# Patient Record
Sex: FEMALE | Race: WHITE | NOT HISPANIC OR LATINO | Employment: FULL TIME | URBAN - METROPOLITAN AREA
[De-identification: names, ages, dates, MRNs, and addresses within clinical notes are randomized per-mention and may not be internally consistent; named-entity substitution may affect disease eponyms.]

---

## 2017-04-03 ENCOUNTER — ALLSCRIPTS OFFICE VISIT (OUTPATIENT)
Dept: OTHER | Facility: OTHER | Age: 52
End: 2017-04-03

## 2017-04-03 DIAGNOSIS — Z00.00 ENCOUNTER FOR GENERAL ADULT MEDICAL EXAMINATION WITHOUT ABNORMAL FINDINGS: ICD-10-CM

## 2017-04-03 DIAGNOSIS — K91.2 POSTSURGICAL MALABSORPTION, NOT ELSEWHERE CLASSIFIED: ICD-10-CM

## 2017-04-03 DIAGNOSIS — Z98.84 BARIATRIC SURGERY STATUS: ICD-10-CM

## 2017-04-03 DIAGNOSIS — F41.9 ANXIETY DISORDER: ICD-10-CM

## 2017-04-27 ENCOUNTER — TRANSCRIBE ORDERS (OUTPATIENT)
Dept: LAB | Facility: HOSPITAL | Age: 52
End: 2017-04-27

## 2017-04-27 ENCOUNTER — APPOINTMENT (OUTPATIENT)
Dept: LAB | Facility: HOSPITAL | Age: 52
End: 2017-04-27
Payer: COMMERCIAL

## 2017-04-27 DIAGNOSIS — F41.9 ANXIETY DISORDER: ICD-10-CM

## 2017-04-27 DIAGNOSIS — K91.2 POSTSURGICAL MALABSORPTION, NOT ELSEWHERE CLASSIFIED: ICD-10-CM

## 2017-04-27 DIAGNOSIS — Z98.84 BARIATRIC SURGERY STATUS: ICD-10-CM

## 2017-04-27 DIAGNOSIS — Z00.00 ENCOUNTER FOR GENERAL ADULT MEDICAL EXAMINATION WITHOUT ABNORMAL FINDINGS: ICD-10-CM

## 2017-04-27 LAB
25(OH)D3 SERPL-MCNC: 31.3 NG/ML (ref 30–100)
ALBUMIN SERPL BCP-MCNC: 4 G/DL (ref 3.5–5)
ALP SERPL-CCNC: 57 U/L (ref 46–116)
ALT SERPL W P-5'-P-CCNC: 27 U/L (ref 12–78)
ANION GAP SERPL CALCULATED.3IONS-SCNC: 6 MMOL/L (ref 4–13)
AST SERPL W P-5'-P-CCNC: 20 U/L (ref 5–45)
BILIRUB SERPL-MCNC: 0.39 MG/DL (ref 0.2–1)
BUN SERPL-MCNC: 14 MG/DL (ref 5–25)
CALCIUM SERPL-MCNC: 8.4 MG/DL (ref 8.3–10.1)
CHLORIDE SERPL-SCNC: 105 MMOL/L (ref 100–108)
CHOLEST SERPL-MCNC: 196 MG/DL (ref 50–200)
CO2 SERPL-SCNC: 30 MMOL/L (ref 21–32)
CREAT SERPL-MCNC: 0.67 MG/DL (ref 0.6–1.3)
ERYTHROCYTE [DISTWIDTH] IN BLOOD BY AUTOMATED COUNT: 12.9 % (ref 11.6–15.1)
FERRITIN SERPL-MCNC: 11 NG/ML (ref 8–388)
FOLATE SERPL-MCNC: 16.8 NG/ML (ref 3.1–17.5)
GFR SERPL CREATININE-BSD FRML MDRD: >60 ML/MIN/1.73SQ M
GLUCOSE P FAST SERPL-MCNC: 81 MG/DL (ref 65–99)
HCT VFR BLD AUTO: 38.8 % (ref 34.8–46.1)
HDLC SERPL-MCNC: 80 MG/DL (ref 40–60)
HGB BLD-MCNC: 12.8 G/DL (ref 11.5–15.4)
LDLC SERPL CALC-MCNC: 107 MG/DL (ref 0–100)
MCH RBC QN AUTO: 29.7 PG (ref 26.8–34.3)
MCHC RBC AUTO-ENTMCNC: 33 G/DL (ref 31.4–37.4)
MCV RBC AUTO: 90 FL (ref 82–98)
PLATELET # BLD AUTO: 205 THOUSANDS/UL (ref 149–390)
PMV BLD AUTO: 11 FL (ref 8.9–12.7)
POTASSIUM SERPL-SCNC: 3.8 MMOL/L (ref 3.5–5.3)
PROT SERPL-MCNC: 6.6 G/DL (ref 6.4–8.2)
PTH-INTACT SERPL-MCNC: 105.9 PG/ML (ref 14–72)
RBC # BLD AUTO: 4.31 MILLION/UL (ref 3.81–5.12)
SODIUM SERPL-SCNC: 141 MMOL/L (ref 136–145)
TRIGL SERPL-MCNC: 43 MG/DL
VIT B12 SERPL-MCNC: 391 PG/ML (ref 100–900)
WBC # BLD AUTO: 3.24 THOUSAND/UL (ref 4.31–10.16)

## 2017-04-27 PROCEDURE — 84425 ASSAY OF VITAMIN B-1: CPT

## 2017-04-27 PROCEDURE — 84590 ASSAY OF VITAMIN A: CPT

## 2017-04-27 PROCEDURE — 82306 VITAMIN D 25 HYDROXY: CPT

## 2017-04-27 PROCEDURE — 36415 COLL VENOUS BLD VENIPUNCTURE: CPT

## 2017-04-27 PROCEDURE — 80061 LIPID PANEL: CPT

## 2017-04-27 PROCEDURE — 85027 COMPLETE CBC AUTOMATED: CPT

## 2017-04-27 PROCEDURE — 82607 VITAMIN B-12: CPT

## 2017-04-27 PROCEDURE — 83970 ASSAY OF PARATHORMONE: CPT

## 2017-04-27 PROCEDURE — 80053 COMPREHEN METABOLIC PANEL: CPT

## 2017-04-27 PROCEDURE — 82728 ASSAY OF FERRITIN: CPT

## 2017-04-27 PROCEDURE — 82746 ASSAY OF FOLIC ACID SERUM: CPT

## 2017-05-01 LAB
VIT A SERPL-MCNC: 69 UG/DL (ref 20–65)
VIT B1 BLD-SCNC: 175.5 NMOL/L (ref 66.5–200)

## 2017-05-08 ENCOUNTER — ANESTHESIA EVENT (OUTPATIENT)
Dept: OTHER | Facility: OTHER | Age: 52
End: 2017-05-08

## 2017-05-17 ENCOUNTER — GENERIC CONVERSION - ENCOUNTER (OUTPATIENT)
Dept: OTHER | Facility: OTHER | Age: 52
End: 2017-05-17

## 2017-06-17 DIAGNOSIS — Z98.84 BARIATRIC SURGERY STATUS: ICD-10-CM

## 2017-06-17 DIAGNOSIS — K91.2 POSTSURGICAL MALABSORPTION, NOT ELSEWHERE CLASSIFIED: ICD-10-CM

## 2017-06-17 DIAGNOSIS — E67.0 HYPERVITAMINOSIS A: ICD-10-CM

## 2017-08-17 DIAGNOSIS — E21.1 SECONDARY HYPERPARATHYROIDISM, NOT ELSEWHERE CLASSIFIED (HCC): ICD-10-CM

## 2017-08-17 DIAGNOSIS — E34.9 ENDOCRINE DISORDER: ICD-10-CM

## 2017-08-17 DIAGNOSIS — Z98.84 BARIATRIC SURGERY STATUS: ICD-10-CM

## 2017-08-17 DIAGNOSIS — K91.2 POSTSURGICAL MALABSORPTION, NOT ELSEWHERE CLASSIFIED: ICD-10-CM

## 2018-01-10 NOTE — RESULT NOTES
Dear Rolanda Humphrey,   Your test results have returned and are listed below:      Results  (1) CBC/ PLT (NO DIFF) [id number] 09:50AM Sonny Escalante     Test Name Result Flag Reference   HEMATOCRIT 40 7 %  34 8-46 1   HEMOGLOBIN 13 8 g/dL  11 5-15 4   MCHC 33 9 g/dL  31 4-37 4   MCH 31 0 pg  26 8-34 3   MCV 92 fL  82-98   PLATELET COUNT 239 Thousands/uL  149-390   RBC COUNT 4 45 Million/uL  3 81-5 12   RDW 12 4 %  11 6-15 1   WBC COUNT 3 51 Thousand/uL L 4 31-10 16   MPV 10 6 fL  8 9-12 7     (1) COMPREHENSIVE METABOLIC PANEL [id number] 11:81KA Nam G[facility]mez [facility] recommendations are as follows:  GFR calculation is accurate only with a steady state creatinine  Chronic Kidney disease less than 60 ml/min/1 73 sq  meters  Kidney failure less than 15 ml/min/1 73 sq  meters  Test Name Result Flag Reference   GLUCOSE,RANDM 88 mg/dL     If the patient is fasting, the ADA then defines impaired fasting glucose as > 100 mg/dL and diabetes as > or equal to 123 mg/dL     SODIUM 141 mmol/L  136-145   POTASSIUM 5 2 mmol/L  3 5-5 3   CHLORIDE 104 mmol/L  100-108   CARBON DIOXIDE 30 mmol/L  21-32   ANION GAP (CALC) 7 mmol/L  4-13   BLOOD UREA NITROGEN 10 mg/dL  5-25   CREATININE 0 75 mg/dL  0 60-1 30   Standardized to IDMS reference method   CALCIUM 9 0 mg/dL  8 3-10 1   BILI, TOTAL 0 63 mg/dL  0 20-1 00   ALK PHOSPHATAS 63 U/L     ALT (SGPT) 34 U/L  12-78   AST(SGOT) 26 U/L  5-45   ALBUMIN 4 3 g/dL  3 5-5 0   TOTAL PROTEIN 7 2 g/dL  6 4-8 2   eGFR Non-African American      >60 0 ml/min/1 73sq m     (1) FERRITIN 04Apr2016 09:50AM Sonny Omnisensgis     Test Name Result Flag Reference   FERRITIN 20 ng/mL  8-388     (1) FOLATE [id number] 09:50AM Liban Gómez     Test Name Result Flag Reference   FOLATE >20 0 ng/mL H 3 1-17 5     (1) LIPID PANEL, FASTING 04Apr2016 09:50AM Enriqueta Gómez   Triglyceride:         Normal              <150 mg/dl       Borderline High    150-199 mg/dl       High               200-499 mg/dl       Very High          >499 mg/dl  Cholesterol:         Desirable        <200 mg/dl      Borderline High  200-239 mg/dl      High             >239 mg/dl  HDL Cholesterol:        High    >59 mg/dL      Low     <41 mg/dL     Test Name Result Flag Reference   CHOLESTEROL 222 mg/dL H    HDL,DIRECT 89 mg/dL H 40-60   LDL CHOLESTEROL CALCULATED 119 mg/dL H 0-100   TRIGLYCERIDES 72 mg/dL  <=150   Specimen collection should occur prior to N-Acetylcysteine or Metamizole administration due to the potential for falsely depressed results  (1) PTH N-TERMINAL (INTACT) 04Apr2016 09:50AM Dalia Sailaja Garret     Test Name Result Flag Reference   PARATHYROID HORMONE INTACT 54 2 pg/mL  14 0-72 0     (1) VITAMIN B12 04Apr2016 09:50AM Dalia Sailaja Robbin     Test Name Result Flag Reference   VITAMIN B12 357 pg/mL  100-900     (1) VITAMIN D 25-HYDROXY 04Apr2016 09:50AM Dalia Sailajalam Siegel     Test Name Result Flag Reference   VIT D 25-HYDROX 38 2 ng/mL  30 0-100 0     (1) IRON SATURATION %, TIBC 04Apr2016 09:50AM Dalia Sailaja Garret     Test Name Result Flag Reference   IRON SATURATION 36 %     TOTAL IRON BINDING CAPACITY 429 ug/dL  250-450   IRON 156 ug/dL       (1) VITAMIN A 04Apr2016 09:50AM Sailaja Gómez   Performed at:  [address]  [id number]  : Geremias Townsend MD, Phone:  [phone]     Test Name Result Flag Reference   VITAMIN A 57 ug/dL  18 - 77   **Effective April 11, 2016 the reference interval for**  Vitamin A, Serum will be changing to:    Male     0        - 30 days       Not Established    31 days   -  6 months      7 - 51     7 months - 17 years      16 - 64               >17 years      25 - 80   Female     0        - 30 days       Not Established    31 days   -  6 months      7 - 51     7 months - 60 years      20 - 65               >60 years      32 - 80     (1) VITAMIN B1, WHOLE BLOOD 04Apr2016 09:50AM Gwendolyn Taveras   Performed at:  [address]  [id number]  : Larisa Pagan MD, Phone:  [phone]     Test Name Result Flag Reference   VITAMIN B1, WHOLE BLOOD 144 9 nmol/L  66 5 - 200 0       Discussion/Summary  Your results show some abnormalities  4/4/16 labs reviewed-overall ok except low white blood cell count of 3 51  If you are having frequent illness or infection you should have this checked sooner, otherwise I would review this with your PCP at a routine visit  Your total cholesterol is mildly high at 222 and LDL "bad cholesterol" is mildly high at 119 but your good cholesterol is very good -HDL of 89 which likely balances it out  You can review these levels further with your PCP at a routine visit  If you have any questions, please don't hesitate to call the office  Sincerely,      Signatures   Electronically signed by : RONALD Calderón;  Apr 21 2016  6:03PM EST                       (Author)    Electronically signed by : MICA Horta ; Apr 22 2016 12:50PM EST                       (Validation)

## 2018-01-11 NOTE — PROGRESS NOTES
Assessment    1  Acute cystitis with hematuria (595 0) (N30 01)    Plan  Acute cystitis with hematuria    · Ciprofloxacin HCl - 500 MG Oral Tablet; TAKE 1 TABLET TWICE DAILY    Discussion/Summary    #1 acute cystitis with hematuria: At this time I'll treat the patient with ciprofloxacin as she has been on Bactrim in the past  She'll continue to take in fluids as well  The patient will call or return of her symptoms worsen or do not improve  Chief Complaint  PT c/o urgency, bleeding and burning sensation when urinating  History of Present Illness  HPI: 44 y/o female here with concerns of burning with urination, abdominal cramping, and blood noted in urine since this AM  She states that she has UTIs about once or twice a year  She recently was treated for one in December  She states at that time she was treated with Bactrim  The patient states that she has been feeling pretty good up until today when she began to express discomfort  She states it feels like one of her UTIs  She states that she took Azo earlier today  She was unable to get in to see her primary care physician, and so she came here to be evaluated  She denies any known fevers  Review of Systems    Cardiovascular: no complaints of slow or fast heart rate, no chest pain, no palpitations, no leg claudication or lower extremity edema  Respiratory: no complaints of shortness of breath, no wheezing, no dyspnea on exertion, no orthopnea or PND  Gastrointestinal: no complaints of abdominal pain, no constipation, no nausea or diarrhea, no vomiting, no bloody stools  Genitourinary: dysuria and pelvic pain  Musculoskeletal: no complaints of arthralgia, no myalgia, no joint swelling or stiffness, no limb pain or swelling  Active Problems    1  Anxiety (300 00) (F41 9)   2  Obesity (278 00) (E66 9)   3  Obesity surgery status (V45 86) (Z98 84)   4  Other urinary incontinence (788 39) (N39 498)   5   Postgastrectomy malabsorption (579 3) (K91 2)   6  Secondary hyperparathyroidism, non-renal (252 02) (E21 1)   7  Spinal stenosis (724 00) (M48 00)    Social History    · Alcohol use (V49 89) (F10 99)   · Denied: History of Drug Use   · Never A Smoker    Current Meds   1  Biotin TABS; Therapy: (Recorded:82Szd4177) to Recorded   2  Calcium Citrate TABS; tid; Therapy: (Recorded:16Sep2013) to Recorded   3  Multiple Vitamin TABS; TAKE 1 TABLET DAILY  Opurity; Therapy: (Recorded:16Sep2013) to Recorded   4  Vitamin D 2000 UNIT Oral Capsule; Therapy: (Recorded:23Feb2015) to Recorded    Allergies    1  No Known Drug Allergies    Vitals   Recorded: [id number] 03:10PM   Heart Rate 72    Systolic 381    Diastolic 74    Patient Refused Height Yes Yes   Patient Refused Weight Yes Yes   O2 Saturation 96      Physical Exam    Constitutional   General appearance: No acute distress, well appearing and well nourished  Pulmonary   Respiratory effort: No increased work of breathing or signs of respiratory distress  Auscultation of lungs: Clear to auscultation  Cardiovascular   Auscultation of heart: Normal rate and rhythm, normal S1 and S2, without murmurs  Abdomen   Abdomen: Non-tender, no masses  Liver and spleen: No hepatomegaly or splenomegaly      Psychiatric   Orientation to person, place, and time: Normal     Mood and affect: Normal          Future Appointments    Date/Time Provider Specialty Site   02/23/2016 03:30 PM Godfrey Nunes [facility] General Surgery [address]     Signatures   Electronically signed by : MICA Dunne ; Feb 1 2016  3:24PM EST                       (Author)

## 2018-01-12 VITALS
WEIGHT: 147.5 LBS | SYSTOLIC BLOOD PRESSURE: 112 MMHG | BODY MASS INDEX: 24.57 KG/M2 | TEMPERATURE: 98.7 F | HEIGHT: 65 IN | DIASTOLIC BLOOD PRESSURE: 64 MMHG | HEART RATE: 70 BPM

## 2018-01-14 NOTE — PROGRESS NOTES
Message  as per request from CR, a call was placed to patient today  Patient unable to speak and will return call  Active Problems    1  Acute cystitis with hematuria (595 0) (N30 01)   2  Anxiety (300 00) (F41 9)   3  Obesity (278 00) (E66 9)   4  Obesity surgery status (V45 86) (Z98 84)   5  Other urinary incontinence (788 39) (N39 498)   6  Postgastrectomy malabsorption (579 3) (K91 2,Z90 3)   7  Secondary hyperparathyroidism, non-renal (252 02) (E21 1)   8  Spinal stenosis (724 00) (M48 00)    Current Meds   1  Biotin TABS; Therapy: (Recorded:02Mkb0405) to Recorded   2  Calcium Citrate TABS; tid; Therapy: (Recorded:42Jaa8891) to Recorded   3  Multiple Vitamin TABS; TAKE 1 TABLET DAILY  Opurity; Therapy: (Recorded:06Qpb9893) to Recorded   4  Pristiq 25 MG Oral Tablet Extended Release 24 Hour; Therapy: (Recorded:31Mar2016) to Recorded   5  Vitamin D 2000 UNIT Oral Capsule; Therapy: (Recorded:04Tlv9022) to Recorded    Allergies    1  No Known Drug Allergies    Signatures   Electronically signed by :  Ace Newman, ALESIAMSWMSGEOFF,RUDYW; Apr 11 2016  2:05PM EST                       (Author)

## 2018-01-17 NOTE — RESULT NOTES
Dear Celia Cedeno,   Your test results have returned and are listed below:      Plan  Elevated parathyroid hormone, Postgastrectomy malabsorption,  Secondary hyperparathyroidism, non-renal    · (1) PTH N-TERMINAL (INTACT); Status:Active - Retrospective By  Protocol Authorization; Requested for:2017;   High vitamin A level, Obesity surgery status, Postgastrectomy  malabsorption    · (1) VITAMIN A; Status:Active - Retrospective By Protocol  Authorization; Requested [id number];   Obesity surgery status, Postgastrectomy malabsorption    · (1) IRON SATURATION %, TIBC; Status:Active - Retrospective By  Protocol Authorization; Requested for:01Eyn0680;    · (1) IRON; Status:Active - Retrospective By Protocol Authorization; Requested for:02Ihh7022;   Postgastrectomy malabsorption    · (1) FERRITIN; Status:Active - Retrospective By Protocol Authorization; Requested for:2017;     Discussion/Summary  Your results show some abnormalities  Your parathyroid hormone (PTH) is elevated, which can indicate that you are losing calcium from your bones  Recommend that you take additional vitamin D3 plus 1200 to 1500 mg of calcium per day  Calcium needs to be taken in divided doses of 500 to 600 mg each  A lab slip is enclosed to recheck your levels again in three months  Your vitamin A level is elevated, which can lead to toxicity  Symptoms of having a high vitamin A include: nausea, vomiting, blurred vision, headache and vertigo  If you are experiencing these symptoms, please make an appointment to see your primary care provider  Discontinue all vitamin/mineral supplements that contain vitamin A and liver consumption  A lab slip is enclosed to recheck your level again in one month  Your iron stores are low which can lead to anemia  Recommend that you take a high potency iron supplement (65 mg of elemental iron) once per day for two weeks, then increase to twice per day as tolerated    Iron is better absorbed with a source of vitamin C  Calcium, coffee, and tea decrease absorption of iron and should be spaced 2 hours apart from iron supplements  If you are on a proton pump inhibitor medication (such as omeprazole, lansoprazole, esomeprazole) these should be taken separately from iron supplements as they also decrease absorption of iron  Enclosed is a lab slip to recheck your iron levels again after 3 months of supplementation  Your vitamin B12 level is low, which can affect your nerve health  Recommend that you take 500 mcg of vitamin B12 sublingually (under the tongue) per day  Your White Blood Cells are low, which can affect your immune function  Please follow up with your primary care physician for further evaluation and ensure that you are eating at least 60-70 grams of protein per day and adequate total calories  Your next office appointment is scheduled for April 3, 2018 at 3:30pm       If you have any questions, please don't hesitate to call the office     Sincerely,      Signatures   Electronically signed by : ALDO Peralta RD; May 17 2017 11:41AM EST                       (Author)    Electronically signed by : MICA Rodriguez ; May 19 2017 12:42PM EST                       (Validation)

## 2018-03-26 ENCOUNTER — OFFICE VISIT (OUTPATIENT)
Dept: OBGYN CLINIC | Facility: CLINIC | Age: 53
End: 2018-03-26
Payer: COMMERCIAL

## 2018-03-26 VITALS
DIASTOLIC BLOOD PRESSURE: 72 MMHG | SYSTOLIC BLOOD PRESSURE: 102 MMHG | BODY MASS INDEX: 24.29 KG/M2 | HEIGHT: 65 IN | WEIGHT: 145.8 LBS

## 2018-03-26 DIAGNOSIS — R92.2 DENSE BREAST TISSUE: ICD-10-CM

## 2018-03-26 DIAGNOSIS — N95.2 POSTMENOPAUSAL ATROPHIC VAGINITIS: ICD-10-CM

## 2018-03-26 DIAGNOSIS — Z01.419 WELL WOMAN EXAM WITH ROUTINE GYNECOLOGICAL EXAM: ICD-10-CM

## 2018-03-26 DIAGNOSIS — Z12.39 SCREENING FOR MALIGNANT NEOPLASM OF BREAST: Primary | ICD-10-CM

## 2018-03-26 PROBLEM — R79.89 ELEVATED PARATHYROID HORMONE: Status: ACTIVE | Noted: 2017-05-17

## 2018-03-26 PROBLEM — E34.9 ELEVATED PARATHYROID HORMONE: Status: ACTIVE | Noted: 2017-05-17

## 2018-03-26 PROCEDURE — G0145 SCR C/V CYTO,THINLAYER,RESCR: HCPCS | Performed by: OBSTETRICS & GYNECOLOGY

## 2018-03-26 PROCEDURE — 87624 HPV HI-RISK TYP POOLED RSLT: CPT | Performed by: OBSTETRICS & GYNECOLOGY

## 2018-03-26 PROCEDURE — S0610 ANNUAL GYNECOLOGICAL EXAMINA: HCPCS | Performed by: OBSTETRICS & GYNECOLOGY

## 2018-03-26 RX ORDER — MULTIVIT-MIN/IRON/FOLIC ACID/K 18-600-40
CAPSULE ORAL
COMMUNITY
End: 2021-11-23

## 2018-03-26 RX ORDER — FERROUS SULFATE 325(65) MG
325 TABLET ORAL
COMMUNITY
End: 2021-11-23

## 2018-03-26 RX ORDER — DESVENLAFAXINE 25 MG/1
TABLET, EXTENDED RELEASE ORAL
COMMUNITY
End: 2018-06-04 | Stop reason: ALTCHOICE

## 2018-03-26 RX ORDER — CALCIUM CITRATE 1040MG
TABLET ORAL 3 TIMES DAILY
COMMUNITY

## 2018-03-26 NOTE — PROGRESS NOTES
ASSESSMENT & PLAN: Jennifer Head is a 47 y o  [id number] with normal gynecologic exam     1   Routine well woman exam done today  2    Pap and HPV:Pap with HPV was done today  Current ASCCP Guidelines reviewed  Last Pap:  2013 :  no abnormalities  3  Mammogram ordered  Recommend yearly mammography  4   Colonoscopy recommend  5  The patient is sexually active  6  The following were reviewed in today's visit: breast self exam, mammography screening ordered, menopause, exercise and healthy diet  7  Patient to return to office in 12 months for annual yearly  8  Postmenopausal atrophic vaginitis -samples of Premarin cream given to patient  Use twice weekly  Call if interested in prescription  All questions have been answered to her satisfaction  CC:  Annual Gynecologic Examination    HPI: Jennifer Head is a 47 y o  [id number] who presents for annual gynecologic examination  She has the following concerns: vaginal dryness  Health Maintenance:    She exercises 3 days per week  She wears her seatbelt routinely  She does perform regular monthly self breast exams  She feels safe at home  Patients does follow a healthy diet  Last mammogram:   Last colonoscopy:        Past Medical History:   Diagnosis Date    Abnormal Pap smear of cervix     HPV (human papilloma virus) infection        Past Surgical History:   Procedure Laterality Date    COLPOSCOPY      COMBINED AUGMENTATION MAMMAPLASTY AND ABDOMINOPLASTY      GASTRIC BYPASS      TUBAL LIGATION         Past OB/Gyn History:   No LMP recorded  Patient is postmenopausal   Menstrual History:  OB History      Para Term  AB Living    2 2 2     2    SAB TAB Ectopic Multiple Live Births            2           No LMP recorded  Patient is postmenopausal        Menstrual history: Patient is not post menopausal    History of sexually transmitted infection No  Patient is currently sexually active    heterosexual Birth control: postmenopausal  Last Pap 2013 :  no abnormalities  H/o abnormal in past, but after treatment paps normalized  Family History   Problem Relation Age of Onset    Hypertension Mother     Hypertension Father        Social History:  Social History     Social History    Marital status: /Civil Union     Spouse name: N/A    Number of children: N/A    Years of education: N/A     Occupational History    Not on file  Social History Main Topics    Smoking status: Never Smoker    Smokeless tobacco: Never Used    Alcohol use No    Drug use: No    Sexual activity: Yes     Birth control/ protection: Female Sterilization     Other Topics Concern    Not on file     Social History Narrative    No narrative on file     Presently lives with  & son  Patient is   Patient is currently employed - [employer]  No Known Allergies    Current Outpatient Prescriptions:     Calcium Citrate 1040 MG TABS, Take by mouth 3 (three) times a day, Disp: , Rfl:     Cholecalciferol (VITAMIN D) 2000 units CAPS, Take by mouth, Disp: , Rfl:     Desvenlafaxine Succinate ER (PRISTIQ) 25 MG TB24, Take by mouth, Disp: , Rfl:     ferrous sulfate 325 (65 Fe) mg tablet, Take 325 mg by mouth daily with breakfast, Disp: , Rfl:     Review of Systems:  A complete review of systems was performed and was negative, except as listed  Denies weight changes, excessive fatigue, urinary incontinence, urinary frequency, constipation or bowel changes, blood in stool, severe headaches, vaginal bleeding, vaginal discharge, vaginal dryness  Physical Exam:  /72   Ht 5' 4 5" (1 638 m)   Wt 66 1 kg (145 lb 12 8 oz)   Breastfeeding? No   BMI 24 64 kg/m²    GEN: The patient was alert and oriented x3, pleasant well-appearing female in no acute distress     HEENT:  Unremarkable, no anterior or posterior lymphadenopathy, no thyromegaly  CV:  RRR, no murmurs  RESP:  Clear to auscultation bilaterally  BREAST:  Symmetric breasts with no palpable breast masses or obvious breast lesions  She has no retractions or nipple discharge  She has no axillary abnormalities or palpable masses  Self breast exam is taught  ABD:  Soft, nontender, non-distended  EXT: nontender, no edema  PELVIC:  Normal appearing external female genitalia, atrophic vaginal epithelium, No discharge  Cervix present   Bimanual: absent CMT,  normal uterus, non-tender  No palpable adnexal masses  Pap was collected  Some spotting with pap

## 2018-03-29 LAB
HPV RRNA GENITAL QL NAA+PROBE: NORMAL
LAB AP GYN PRIMARY INTERPRETATION: NORMAL
Lab: NORMAL

## 2018-04-03 ENCOUNTER — ANESTHESIA (OUTPATIENT)
Dept: OTHER | Facility: OTHER | Age: 53
End: 2018-04-03

## 2018-05-03 ENCOUNTER — HOSPITAL ENCOUNTER (OUTPATIENT)
Dept: RADIOLOGY | Facility: HOSPITAL | Age: 53
Discharge: HOME | End: 2018-05-03
Attending: OBSTETRICS & GYNECOLOGY
Payer: COMMERCIAL

## 2018-05-03 DIAGNOSIS — R92.2 DENSE BREAST TISSUE: ICD-10-CM

## 2018-05-03 DIAGNOSIS — Z12.39 SCREENING FOR MALIGNANT NEOPLASM OF BREAST: ICD-10-CM

## 2018-05-03 PROCEDURE — 77063 BREAST TOMOSYNTHESIS BI: CPT

## 2018-05-03 PROCEDURE — 77067 SCR MAMMO BI INCL CAD: CPT

## 2018-05-22 ENCOUNTER — PATIENT MESSAGE (OUTPATIENT)
Dept: BARIATRICS | Facility: CLINIC | Age: 53
End: 2018-05-22

## 2018-06-04 ENCOUNTER — OFFICE VISIT (OUTPATIENT)
Dept: BARIATRICS | Facility: CLINIC | Age: 53
End: 2018-06-04
Payer: COMMERCIAL

## 2018-06-04 VITALS
TEMPERATURE: 98.3 F | HEIGHT: 65 IN | DIASTOLIC BLOOD PRESSURE: 80 MMHG | SYSTOLIC BLOOD PRESSURE: 98 MMHG | HEART RATE: 61 BPM | BODY MASS INDEX: 24.74 KG/M2 | WEIGHT: 148.5 LBS

## 2018-06-04 DIAGNOSIS — E21.1 SECONDARY NON-RENAL HYPERPARATHYROIDISM (HCC): ICD-10-CM

## 2018-06-04 DIAGNOSIS — K91.2 POSTSURGICAL MALABSORPTION: ICD-10-CM

## 2018-06-04 DIAGNOSIS — E53.8 LOW VITAMIN B12 LEVEL: ICD-10-CM

## 2018-06-04 DIAGNOSIS — Z98.84 BARIATRIC SURGERY STATUS: Primary | ICD-10-CM

## 2018-06-04 DIAGNOSIS — E78.5 MILD HYPERLIPIDEMIA: ICD-10-CM

## 2018-06-04 DIAGNOSIS — E34.9 ELEVATED PARATHYROID HORMONE: ICD-10-CM

## 2018-06-04 PROBLEM — R79.89 LOW VITAMIN B12 LEVEL: Status: ACTIVE | Noted: 2018-06-04

## 2018-06-04 PROCEDURE — 99214 OFFICE O/P EST MOD 30 MIN: CPT | Performed by: PHYSICIAN ASSISTANT

## 2018-06-04 RX ORDER — ALPRAZOLAM 0.25 MG/1
TABLET ORAL
COMMUNITY
Start: 2018-04-19 | End: 2018-12-04

## 2018-06-04 NOTE — PROGRESS NOTES
Assessment/Plan:    Elevated parathyroid hormone  She is overdue to have repeat labs-was given lab slip at last visit  Encouraged to get done now-if still high would recommend referral to endocrinologist    Low vitamin B12 level  Based on older labs-she is overdue for all her labs now-ordering same-she is switching to [facility] which has extra vitamin D31-kkuh await labs for further advice    Bariatric surgery status  -s/p Tri-En-Y Gastric Bypass with Dr Mary Kate Wild on 2/14/2013  Overall doing Well-weight is stable    Initial: 214 lb  Current: 148 5 lb  EWL: 97% which is above average  Eusebio: 138 5 lb  Current BMI is Body mass index is 25 1 kg/m²      Tolerating a regular diet-yes  Eating at least 60 grams of protein per day-yes  Following 30/60 minute rule with liquids-yes  Drinking at least 64 ounces of fluid per day-yes  Drinking carbonated beverages-occasional tonic and lime  Sufficient exercise-yes  Using NSAIDs regularly-occasional use with back pain-advised to take rarely-only with food and take ppi when she needs it-but to ideally avoid regular use because of risk for ulcer  Using nicotine-no  Using alcohol-socially-one /month but working on decreasing further-advised on effect again and advised to abstain      Postsurgical malabsorption  -At risk for malabsorption of vitamins/minerals secondary to malabsorption and restriction of intake from their procedure  -Currently taking adequate postop bariatric surgery vitamin supplementation-no  -Last set of bariatric labs completed on 4/17 and are not within acceptable limits   -Next set of bariatric labs ordered for approximately 1 month    She is taking 3-500 mg calcium , b-complex, high potency 65 mg iron twice daily-and vitamin D 5000 Iu, biotin, she has not been taking a mvi because of high vitamin A-advised her this was only supposed to be for one month    She will trial switch to [employer] MVI with 45 mg of iron, 500 mg calcium citrate with D three times per day-and continue one high potency 65 mg of iron    Vitamin a level was high in the past but lab values have changed, so I suspect her level will be ok on bariatric supplements-will await results for further advice       Diagnoses and all orders for this visit:    Bariatric surgery status  -     Comprehensive metabolic panel; Future  -     CBC and Platelet; Future  -     Vitamin B12; Future  -     Vitamin A; Future  -     PTH, intact; Future  -     Vitamin B1, whole blood; Future  -     Vitamin D 25 hydroxy; Future  -     Ferritin; Future  -     Iron Saturation %; Future  -     Zinc; Future  -     Copper Level; Future  -     Folate; Future  -     Lipid panel; Future    Postsurgical malabsorption  -     Comprehensive metabolic panel; Future  -     CBC and Platelet; Future  -     Vitamin B12; Future  -     Vitamin A; Future  -     PTH, intact; Future  -     Vitamin B1, whole blood; Future  -     Vitamin D 25 hydroxy; Future  -     Ferritin; Future  -     Iron Saturation %; Future  -     Zinc; Future  -     Copper Level; Future  -     Folate; Future  -     Lipid panel; Future    Low vitamin B12 level  -     Comprehensive metabolic panel; Future  -     CBC and Platelet; Future  -     Vitamin B12; Future  -     Vitamin A; Future  -     PTH, intact; Future  -     Vitamin B1, whole blood; Future  -     Vitamin D 25 hydroxy; Future  -     Ferritin; Future  -     Iron Saturation %; Future  -     Zinc; Future  -     Copper Level; Future  -     Folate; Future  -     Lipid panel; Future    Elevated parathyroid hormone  -     PTH, intact; Future    Secondary non-renal hyperparathyroidism (HCC)    Mild hyperlipidemia  -     Lipid panel; Future    Other orders  -     ALPRAZolam (XANAX) 0 25 mg tablet;           Subjective:      Patient ID: Daisy Wadsworth is a 47 y o  female  She is tolerating a regular diet   She is taking vitamins and exercising fairly well-which got limited because of back pain but is increasing as tolerated        The following portions of the patient's history were reviewed and updated as appropriate: allergies, current medications, past family history, past medical history, past social history, past surgical history and problem list     Review of Systems   Constitutional: Negative for chills and fever  Unexpected weight change: planned weight loss  Respiratory: Negative for shortness of breath and wheezing  Cardiovascular: Negative for chest pain and palpitations  Gastrointestinal: Negative for abdominal pain, constipation, diarrhea, nausea and vomiting  Psychiatric/Behavioral: Suicidal ideas: no complait of anxiety or depression  Objective:      BP 98/80   Pulse 61   Temp 98 3 °F (36 8 °C)   Ht 5' 4 5" (1 638 m)   Wt 67 4 kg (148 lb 8 oz)   BMI 25 10 kg/m²          Physical Exam   Constitutional: She is oriented to person, place, and time  She appears well-developed and well-nourished  HENT:   Mouth/Throat: Oropharynx is clear and moist    Eyes: Conjunctivae are normal  No scleral icterus  Cardiovascular: Normal rate and regular rhythm  Pulmonary/Chest: Effort normal and breath sounds normal    Abdominal: Soft  There is no tenderness  No incisional hernias appreciated   Musculoskeletal:   Normal gait   Neurological: She is alert and oriented to person, place, and time  Psychiatric: She has a normal mood and affect  Her behavior is normal  Judgment and thought content normal    Nursing note and vitals reviewed  GOALS: Maintain  weight loss with good nutrition intakes    Normal vitamin and mineral levels  Exercise as tolerated    BARRIERS: none identified

## 2018-06-04 NOTE — ASSESSMENT & PLAN NOTE
Based on older labs-she is overdue for all her labs now-ordering same-she is switching to [facility] which has extra vitamin J09-ndam await labs for further advice

## 2018-06-04 NOTE — ASSESSMENT & PLAN NOTE
She is overdue to have repeat labs-was given lab slip at last visit   Encouraged to get done now-if still high would recommend referral to endocrinologist

## 2018-06-04 NOTE — ASSESSMENT & PLAN NOTE
-At risk for malabsorption of vitamins/minerals secondary to malabsorption and restriction of intake from their procedure  -Currently taking adequate postop bariatric surgery vitamin supplementation-no  -Last set of bariatric labs completed on 4/17 and are not within acceptable limits   -Next set of bariatric labs ordered for approximately 1 month    She is taking 3-500 mg calcium , b-complex, high potency 65 mg iron twice daily-and vitamin D 5000 Iu, biotin, she has not been taking a mvi because of high vitamin A-advised her this was only supposed to be for one month    She will trial switch to [employer] MVI with 45 mg of iron, 500 mg calcium citrate with D three times per day-and continue one high potency 65 mg of iron    Vitamin a level was high in the past but lab values have changed, so I suspect her level will be ok on bariatric supplements-will await results for further advice

## 2018-06-04 NOTE — ASSESSMENT & PLAN NOTE
-s/p Tri-En-Y Gastric Bypass with Dr Song Pandey on 2/14/2013  Overall doing Well-weight is stable    Initial: 214 lb  Current: 148 5 lb  EWL: 97% which is above average  Eusebio: 138 5 lb  Current BMI is Body mass index is 25 1 kg/m²      Tolerating a regular diet-yes  Eating at least 60 grams of protein per day-yes  Following 30/60 minute rule with liquids-yes  Drinking at least 64 ounces of fluid per day-yes  Drinking carbonated beverages-occasional tonic and lime  Sufficient exercise-yes  Using NSAIDs regularly-occasional use with back pain-advised to take rarely-only with food and take ppi when she needs it-but to ideally avoid regular use because of risk for ulcer  Using nicotine-no  Using alcohol-socially-one /month but working on decreasing further-advised on effect again and advised to abstain

## 2018-06-04 NOTE — PATIENT INSTRUCTIONS
Follow-up in one year  We kindly ask that you arrive 15 minutes before your scheduled appointment time with your provider to allow you to be roomed, have your vital signs checked and your chart updated by our staff  We thank you for your patience at your visit  Follow diet as discussed  Follow  vitamin and mineral recommendations as reviewed with you  Exercise as tolerated    If you have gotten a lab slip at this visit, please note that most labs are FASTING-but you need to drink water the night before and the morning before your labs are done  It is HIGHLY RECOMMENDED that you check with your insurance to make sure all the labs ordered are covered by your individual insurance policy  This is especially important if you also get labs done by other providers outside of [facility]  You want to avoid having duplicate labs done  Note you will be given a lab slip AFTER  your annual visit next year to check your vitamin and mineral levels  You will not need to make a second appointment after the labs are received/reviewed  You will receive a letter/and or phone call with your results  Most labs do NOT honor a lab slip dated one year in advance now  Call our office if he have any problems with abdominal pain especially if associated with fever, chills, nausea, vomiting or any other concerns  All  Post-bariatric surgery patients should be aware that very small quantities of any alcohol  can cause impairment and it is very possible not to feel the effect  The effect can be in the system for several hours  It is also a stomach irritant  It is advised to AVOID alcohol, Nonsteroidal antiinflammatory drugs (NSAIDS) and nicotine of all forms   Any of these can cause stomach irritation/pain  Get labs done fasting but drink water the night before and morning before labs are drawn

## 2018-07-12 ENCOUNTER — APPOINTMENT (OUTPATIENT)
Dept: LAB | Facility: HOSPITAL | Age: 53
End: 2018-07-12
Payer: COMMERCIAL

## 2018-07-12 ENCOUNTER — TRANSCRIBE ORDERS (OUTPATIENT)
Dept: ADMINISTRATIVE | Facility: HOSPITAL | Age: 53
End: 2018-07-12

## 2018-07-12 DIAGNOSIS — Z98.84 BARIATRIC SURGERY STATUS: ICD-10-CM

## 2018-07-12 DIAGNOSIS — E53.8 LOW VITAMIN B12 LEVEL: ICD-10-CM

## 2018-07-12 DIAGNOSIS — E78.5 MILD HYPERLIPIDEMIA: ICD-10-CM

## 2018-07-12 DIAGNOSIS — K91.2 POSTSURGICAL MALABSORPTION: ICD-10-CM

## 2018-07-12 DIAGNOSIS — E34.9 ELEVATED PARATHYROID HORMONE: ICD-10-CM

## 2018-07-12 LAB
25(OH)D3 SERPL-MCNC: 38.3 NG/ML (ref 30–100)
ALBUMIN SERPL BCP-MCNC: 4.2 G/DL (ref 3.5–5)
ALP SERPL-CCNC: 55 U/L (ref 46–116)
ALT SERPL W P-5'-P-CCNC: 43 U/L (ref 12–78)
ANION GAP SERPL CALCULATED.3IONS-SCNC: 8 MMOL/L (ref 4–13)
AST SERPL W P-5'-P-CCNC: 28 U/L (ref 5–45)
BILIRUB SERPL-MCNC: 0.6 MG/DL (ref 0.2–1)
BUN SERPL-MCNC: 15 MG/DL (ref 5–25)
CALCIUM SERPL-MCNC: 9.1 MG/DL (ref 8.3–10.1)
CHLORIDE SERPL-SCNC: 102 MMOL/L (ref 100–108)
CHOLEST SERPL-MCNC: 211 MG/DL (ref 50–200)
CO2 SERPL-SCNC: 31 MMOL/L (ref 21–32)
CREAT SERPL-MCNC: 0.75 MG/DL (ref 0.6–1.3)
ERYTHROCYTE [DISTWIDTH] IN BLOOD BY AUTOMATED COUNT: 12 % (ref 11.6–15.1)
FERRITIN SERPL-MCNC: 25 NG/ML (ref 8–388)
FOLATE SERPL-MCNC: >20 NG/ML (ref 3.1–17.5)
GFR SERPL CREATININE-BSD FRML MDRD: 91 ML/MIN/1.73SQ M
GLUCOSE P FAST SERPL-MCNC: 87 MG/DL (ref 65–99)
HCT VFR BLD AUTO: 43.3 % (ref 34.8–46.1)
HDLC SERPL-MCNC: 86 MG/DL (ref 40–60)
HGB BLD-MCNC: 13.9 G/DL (ref 11.5–15.4)
IRON SATN MFR SERPL: 27 %
IRON SERPL-MCNC: 110 UG/DL (ref 50–170)
LDLC SERPL CALC-MCNC: 111 MG/DL (ref 0–100)
MCH RBC QN AUTO: 30.7 PG (ref 26.8–34.3)
MCHC RBC AUTO-ENTMCNC: 32.1 G/DL (ref 31.4–37.4)
MCV RBC AUTO: 96 FL (ref 82–98)
NONHDLC SERPL-MCNC: 125 MG/DL
PLATELET # BLD AUTO: 229 THOUSANDS/UL (ref 149–390)
PMV BLD AUTO: 10.4 FL (ref 8.9–12.7)
POTASSIUM SERPL-SCNC: 4.3 MMOL/L (ref 3.5–5.3)
PROT SERPL-MCNC: 7.2 G/DL (ref 6.4–8.2)
PTH-INTACT SERPL-MCNC: 61.1 PG/ML (ref 18.4–80.1)
RBC # BLD AUTO: 4.53 MILLION/UL (ref 3.81–5.12)
SODIUM SERPL-SCNC: 141 MMOL/L (ref 136–145)
TIBC SERPL-MCNC: 406 UG/DL (ref 250–450)
TRIGL SERPL-MCNC: 70 MG/DL
VIT B12 SERPL-MCNC: 1575 PG/ML (ref 100–900)
WBC # BLD AUTO: 3.6 THOUSAND/UL (ref 4.31–10.16)

## 2018-07-12 PROCEDURE — 85027 COMPLETE CBC AUTOMATED: CPT

## 2018-07-12 PROCEDURE — 80061 LIPID PANEL: CPT

## 2018-07-12 PROCEDURE — 82306 VITAMIN D 25 HYDROXY: CPT

## 2018-07-12 PROCEDURE — 80053 COMPREHEN METABOLIC PANEL: CPT

## 2018-07-12 PROCEDURE — 36415 COLL VENOUS BLD VENIPUNCTURE: CPT

## 2018-07-12 PROCEDURE — 84425 ASSAY OF VITAMIN B-1: CPT

## 2018-07-12 PROCEDURE — 82607 VITAMIN B-12: CPT

## 2018-07-12 PROCEDURE — 84590 ASSAY OF VITAMIN A: CPT

## 2018-07-12 PROCEDURE — 82525 ASSAY OF COPPER: CPT

## 2018-07-12 PROCEDURE — 83970 ASSAY OF PARATHORMONE: CPT

## 2018-07-12 PROCEDURE — 82746 ASSAY OF FOLIC ACID SERUM: CPT

## 2018-07-12 PROCEDURE — 84630 ASSAY OF ZINC: CPT

## 2018-07-12 PROCEDURE — 83540 ASSAY OF IRON: CPT

## 2018-07-12 PROCEDURE — 83550 IRON BINDING TEST: CPT

## 2018-07-12 PROCEDURE — 82728 ASSAY OF FERRITIN: CPT

## 2018-07-14 LAB
COPPER SERPL-MCNC: 91 UG/DL (ref 72–166)
ZINC SERPL-MCNC: 98 UG/DL (ref 56–134)

## 2018-07-18 LAB
VIT A SERPL-MCNC: 41.6 UG/DL (ref 33.1–100)
VIT B1 BLD-SCNC: 177.6 NMOL/L (ref 66.5–200)

## 2018-12-04 ENCOUNTER — APPOINTMENT (OUTPATIENT)
Dept: RADIOLOGY | Facility: CLINIC | Age: 53
End: 2018-12-04
Payer: COMMERCIAL

## 2018-12-04 ENCOUNTER — OFFICE VISIT (OUTPATIENT)
Dept: OBGYN CLINIC | Facility: CLINIC | Age: 53
End: 2018-12-04
Payer: COMMERCIAL

## 2018-12-04 VITALS — HEIGHT: 65 IN | WEIGHT: 145.4 LBS | BODY MASS INDEX: 24.22 KG/M2

## 2018-12-04 DIAGNOSIS — M16.11 PRIMARY OSTEOARTHRITIS OF RIGHT HIP: Primary | ICD-10-CM

## 2018-12-04 DIAGNOSIS — M25.551 RIGHT HIP PAIN: ICD-10-CM

## 2018-12-04 DIAGNOSIS — M76.31 IT BAND SYNDROME, RIGHT: ICD-10-CM

## 2018-12-04 PROCEDURE — 99203 OFFICE O/P NEW LOW 30 MIN: CPT | Performed by: ORTHOPAEDIC SURGERY

## 2018-12-04 PROCEDURE — 73502 X-RAY EXAM HIP UNI 2-3 VIEWS: CPT

## 2018-12-04 NOTE — PROGRESS NOTES
Assessment/Plan:  1  Primary osteoarthritis of right hip  Ambulatory referral to Physical Therapy   2  It band syndrome, right  Ambulatory referral to Physical Therapy   3  Right hip pain  XR hip/pelv 2-3 vws right if performed       Scribe Attestation    I,:   Rangel Gorman am acting as a scribe while in the presence of the attending physician :        I,:   Shen Torres MD personally performed the services described in this documentation    as scribed in my presence :          Jael's physical examination today demonstrates signs and symptoms consistent with mild arthritis of her right hip as well as IT band syndrome  We did review her x-rays in the office today I explained that she does have mild arthritis in her right hip  I believe that this time she will do well with conservative management for her arthritis  I also explained to her that her right IT band is extremely taut and is likely limiting her internal rotation  This explains the lateral hip pain that radiates down her leg  I would like her to attend formal physical therapy 2-3 times per week for the next six weeks  I provided her with a prescription for this today in the office  I will see her back in six weeks for repeat clinical evaluation  She has not seen improvement in her symptoms at that time, we will consider an MRI to evaluate the soft tissues of the hip  Subjective: Cherie Maddox is a 46 y o  female who presents today for evaluation of right hip and groin pain  She has experiences pain for almost a year  She denies any acute inciting incident but describes worsening pain about the lateral aspect of her right hip and groin  She states that her pain is made worse with going up and down stairs  At today's visit, she describes intermittent and moderate sharp pain about her groin and a constant dull ache about the lateral aspect of her hip that radiates down her upper leg and to her knee  She denies any mechanical symptoms   She denies any distal paresthesias of the lower extremity  Review of Systems   Constitutional: Negative for chills, fever and unexpected weight change  HENT: Negative for hearing loss, nosebleeds and sore throat  Eyes: Negative for pain, redness and visual disturbance  Respiratory: Negative for cough, shortness of breath and wheezing  Cardiovascular: Negative for chest pain, palpitations and leg swelling  Gastrointestinal: Negative for abdominal pain, nausea and vomiting  Endocrine: Negative for polydipsia and polyuria  Genitourinary: Negative for dysuria and hematuria  Musculoskeletal: Positive for arthralgias and myalgias  Negative for joint swelling  See HPI   Skin: Negative for rash and wound  Neurological: Negative for dizziness, numbness and headaches  Psychiatric/Behavioral: Negative for decreased concentration and suicidal ideas  The patient is not nervous/anxious  Past Medical History:   Diagnosis Date    Abnormal Pap smear of cervix 1987    Anxiety     High vitamin A level     History of anxiety     History of hypercholesterolemia     History of hypertension     History of obesity     HPV (human papilloma virus) infection 1987    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    Secondary hyperparathyroidism, non-renal (HCC)     Spinal stenosis        Past Surgical History:   Procedure Laterality Date    ABDOMINOPLASTY      COLPOSCOPY  1987    COMBINED AUGMENTATION MAMMAPLASTY AND ABDOMINOPLASTY  2001    GASTRIC BYPASS      TUBAL LIGATION         Family History   Problem Relation Age of Onset    Hypertension Mother     Heart disease Mother     Hypertension Father     Heart disease Father        Social History     Occupational History    Not on file       Social History Main Topics    Smoking status: Never Smoker    Smokeless tobacco: Never Used    Alcohol use No    Drug use: No    Sexual activity: Yes     Birth control/ protection: Female Sterilization Current Outpatient Prescriptions:     Calcium Citrate 1040 MG TABS, Take by mouth 3 (three) times a day, Disp: , Rfl:     Cholecalciferol (VITAMIN D) 2000 units CAPS, Take by mouth, Disp: , Rfl:     ferrous sulfate 325 (65 Fe) mg tablet, Take 325 mg by mouth daily with breakfast, Disp: , Rfl:     No Known Allergies    Objective: There were no vitals filed for this visit  Right Hip Exam     Tenderness   The patient is experiencing no tenderness  Range of Motion   Internal Rotation: 10 (pain at end range)   External Rotation: 60     Muscle Strength   Flexion: 4/5     Other   Erythema: absent  Scars: absent  Sensation: normal  Pulse: present    Comments:    IT band extremely taut              Physical Exam   Constitutional: She is oriented to person, place, and time  She appears well-developed and well-nourished  HENT:   Head: Normocephalic and atraumatic  Eyes: Pupils are equal, round, and reactive to light  Conjunctivae are normal    Neck: Normal range of motion  Neck supple  Cardiovascular: Normal rate and intact distal pulses  Pulmonary/Chest: Effort normal  No respiratory distress  Musculoskeletal:   As noted in HPI   Neurological: She is alert and oriented to person, place, and time  Skin: Skin is warm and dry  Psychiatric: She has a normal mood and affect  Her behavior is normal    Vitals reviewed  I have personally reviewed pertinent films in PACS and my interpretation is as follows:  X-ray of the right hip obtained on 12/04/2018 shows no acute fracture or dislocation  There is mild osteoarthritic degenerative change to the femoral acetabular joint

## 2018-12-05 ENCOUNTER — EVALUATION (OUTPATIENT)
Dept: PHYSICAL THERAPY | Facility: CLINIC | Age: 53
End: 2018-12-05
Payer: COMMERCIAL

## 2018-12-05 DIAGNOSIS — M76.31 IT BAND SYNDROME, RIGHT: ICD-10-CM

## 2018-12-05 DIAGNOSIS — M16.11 PRIMARY OSTEOARTHRITIS OF RIGHT HIP: ICD-10-CM

## 2018-12-05 PROCEDURE — G8979 MOBILITY GOAL STATUS: HCPCS

## 2018-12-05 PROCEDURE — 97110 THERAPEUTIC EXERCISES: CPT

## 2018-12-05 PROCEDURE — G8978 MOBILITY CURRENT STATUS: HCPCS

## 2018-12-05 PROCEDURE — 97161 PT EVAL LOW COMPLEX 20 MIN: CPT

## 2018-12-05 NOTE — PROGRESS NOTES
PT Evaluation     Today's date: 2018  Patient name: Brandan Lowe  : 1965  MRN: [id number]  Referring provider: Pradeep Guzman MD  Dx:   Encounter Diagnosis     ICD-10-CM    1  Primary osteoarthritis of right hip M16 11 Ambulatory referral to Physical Therapy   2  It band syndrome, right M76 31 Ambulatory referral to Physical Therapy       Start Time: 1630  Stop Time: 1715  Total time in clinic (min): 45 minutes    Assessment  Assessment details: Brandan Lowe is a 46 y o  female who presents with pain, decreased strength, decreased ROM, ambulatory dysfunction and postural  dysfunction  Due to these impairments, patient has difficulty performing a/iadls, recreational activities, work-related activities and engaging in social activities  Patient's clinical presentation is consistent with their referring diagnosis of No diagnosis found    Patient has been educated in home exercise program and plan of care  Patient would benefit from skilled physical therapy services to address their aforementioned functional limitations and progress towards prior level of function and independence with home exercise program    Impairments: abnormal gait, abnormal muscle firing, abnormal or restricted ROM, activity intolerance, impaired balance, impaired physical strength, lacks appropriate home exercise program, pain with function and poor posture   Understanding of Dx/Px/POC: good   Prognosis: good    Goals  Short Term Goals: Target Date 18  1  Pt will initiate and advance HEP  2  Pt will report decreased pain frequency/intensity  3  Pt will demonstrate increased AROM  4  Pt will report increased ambulation tolerance  Long Term Goals: Target Date 19  1  Pt will demonstrate independence in HEP  2  Pt will report return to PLOF  3  Pt will navigate stairs step over step without limitations  4  Pt will demonstrate (-) Trendelenberg         Plan  Patient would benefit from: skilled PT  Planned modality interventions: cryotherapy, electrical stimulation/Russian stimulation and thermotherapy: hydrocollator packs  Planned therapy interventions: joint mobilization, manual therapy, patient education, postural training, activity modification, abdominal trunk stabilization, body mechanics training, flexibility, functional ROM exercises, graded exercise, home exercise program, neuromuscular re-education, strengthening, stretching, therapeutic activities, therapeutic exercise, motor coordination training, muscle pump exercises, gait training, balance/weight bearing training, ADL training and breathing training  Frequency: 2x week  Duration in weeks: 6  Treatment plan discussed with: patient        Subjective Evaluation    History of Present Illness  Date of surgery: 2017  Mechanism of injury: Pt reports increasing onset of R hip pain over the past year  Pt reports last week pain increased to impair her walking  Pt reports she sought Dr Jennifer Hooper radiographs (+) OA  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 and referred to OPPT     Not a recurrent problem   Quality of life: good    Pain  Current pain ratin  At best pain ratin  At worst pain ratin  Location: R lateral hip thigh  Quality: sharp and dull ache  Relieving factors: rest and medications  Aggravating factors: standing, stair climbing, running and walking  Progression: worsening    Social Support  Steps to enter house: yes  Stairs in house: yes   Lives in: multiple-level home  Lives with: spouse and adult children    Employment status: working  Exercise history: arc /elliptical 20-30 min 3-5x/week, upper body machines  Life stress: RN enestatist      Diagnostic Tests  X-ray: abnormal  Treatments  Current treatment: chiropractic and massage  Patient Goals  Patient goals for therapy: return to sport/leisure activities, return to work and decreased pain          Objective     Postural Observations    Additional Postural Observation Details  Pt stands with R lat shift, flattened T/S, slight L rotation, b/l pes plantus R > L    Palpation     Right   No palpable tenderness to the gluteus medardo, gluteus medius and piriformis  Hypertonic in the rectus femoris and vastus lateralis  Tenderness of the rectus femoris and vastus lateralis  Tenderness     Right Hip   No tenderness in the ASIS, PSIS, greater trochanter, iliac crest and sacroiliac joint  Active Range of Motion   Left Hip   External rotation (prone): 70 degrees   Internal rotation (90/90): 30 degrees     Right Hip   Flexion: 100 degrees with pain  Abduction: 60 degrees   External rotation (prone): 35 degrees   Internal rotation (90/90): 28 degrees with pain    Additional Active Range of Motion Details  L/S  Flexion WFLs  Extension decreased by 25%  Rotation WFLs  Side Bending WFLs     Passive Range of Motion     Right Hip   Flexion: 130 degrees     Strength/Myotome Testing     Left Hip   Planes of Motion   Flexion: 5  Extension: 4  External rotation: 4+  Internal rotation: 4-    Right Hip   Planes of Motion   Flexion: 5  Extension: 4-  Abduction: 3  External rotation: 4  Internal rotation: 4    Tests     Left Hip   Positive Ely's  90/90 SLR: Positive  Right Hip   Positive Ely's  Negative LAZARO    90/90 SLR: Positive       Additional Tests Details  SLR R 25 degrees, L 60 degrees     Functional Assessment     Single Leg Stance   Left: 6 seconds  Right: 10 seconds    Comments  Squat * pain R knee valgus       Flowsheet Rows      Most Recent Value   PT/OT G-Codes   Current Score  57   Projected Score  61   FOTO information reviewed  Yes   Assessment Type  Evaluation   G code set  Mobility: Walking & Moving Around   Mobility: Walking and Moving Around Current Status ()  CK   Mobility: Walking and Moving Around Goal Status ()  CJ          Precautions: LBP    Daily Treatment Diary     Manual  12/5            IASTM             LE Stretching Exercise Diary              Bridges 2x10            Clamshells 2x10            HS Stretch             Hip Flexor Str             TVA hip add/abd 20x5"            S/L hip abd 2x10                                                                                                                                                                                                      Modalities

## 2018-12-10 ENCOUNTER — OFFICE VISIT (OUTPATIENT)
Dept: PHYSICAL THERAPY | Facility: CLINIC | Age: 53
End: 2018-12-10
Payer: COMMERCIAL

## 2018-12-10 DIAGNOSIS — M16.11 PRIMARY OSTEOARTHRITIS OF RIGHT HIP: Primary | ICD-10-CM

## 2018-12-10 DIAGNOSIS — M76.31 IT BAND SYNDROME, RIGHT: ICD-10-CM

## 2018-12-10 PROCEDURE — 97140 MANUAL THERAPY 1/> REGIONS: CPT

## 2018-12-10 PROCEDURE — 97110 THERAPEUTIC EXERCISES: CPT

## 2018-12-10 NOTE — PROGRESS NOTES
Daily Note     Today's date: 12/10/2018  Patient name: Deanna Mcdowell  : 1965  MRN: [id number]  Referring provider: Devendra Abbott MD  Dx:   Encounter Diagnosis     ICD-10-CM    1  Primary osteoarthritis of right hip M16 11    2  It band syndrome, right M76 31        Start Time: 1630  Stop Time: 1715  Total time in clinic (min): 45 minutes    Subjective: Pt reports she went dancing on Friday night reports significant difficulty using R LE the next day  Pt reports symptoms have improved since Saturday, however pain levels remain high in R lateral hip and thigh  Pain Scale 7/10  Objective: See treatment diary below  Manual  12/5 12/10           IASTM  Quad/ITB           LE Stretching  HS           L ant innom  grd 3 mob           STM  R L/S hip                            Exercise Diary              Bridges 2x10 2x10           Clamshells 2x10 2x10           HS Stretch             Hip Flexor Str  1 min            TVA hip add/abd 20x5" 20x5"           S/L hip abd 2x10 10x                                                                                                                                                                                                     Modalities              Cryotherapy  5 min                                         Assessment: Pt demonstrates difficulty preforming hip flexion secondary to pain, reports decreased pain and increased mobility since start of treatment session  Pain Scale 0/10  Plan: Continue per plan of care  Progress treatment as tolerated

## 2018-12-12 ENCOUNTER — OFFICE VISIT (OUTPATIENT)
Dept: PHYSICAL THERAPY | Facility: CLINIC | Age: 53
End: 2018-12-12
Payer: COMMERCIAL

## 2018-12-12 DIAGNOSIS — M16.11 PRIMARY OSTEOARTHRITIS OF RIGHT HIP: Primary | ICD-10-CM

## 2018-12-12 DIAGNOSIS — M76.31 IT BAND SYNDROME, RIGHT: ICD-10-CM

## 2018-12-12 PROCEDURE — 97110 THERAPEUTIC EXERCISES: CPT

## 2018-12-12 PROCEDURE — 97140 MANUAL THERAPY 1/> REGIONS: CPT

## 2018-12-12 NOTE — PROGRESS NOTES
Daily Note     Today's date: 2018  Patient name: Cherie Maddox  : 1965  MRN: [id number]  Referring provider: Abilio Rinaldi MD  Dx:   Encounter Diagnosis     ICD-10-CM    1  Primary osteoarthritis of right hip M16 11    2  It band syndrome, right M76 31        Start Time: 1630  Stop Time: 1715  Total time in clinic (min): 45 minutes    Subjective: Pt reports symptoms have improved since last therapy session, Pt repots pain remains on R side, however walking has been less difficult  Pain Scale 2/10  Objective: See treatment diary below  Manual  12/5 12/10 12/12          IASTM  Quad/ITB Quad/ITB          LE Stretching  HS           L ant innom  grd 3 mob           STM  R L/S hip R L/S hip                           Exercise Diary              Bridges 2x10 2x10 2x10          Clamshells 2x10 2x10 2x10          HS Stretch             Hip Flexor Str  1 min  1 min          TVA hip add/abd 20x5" 20x5" 20x5"          S/L hip abd 2x10 10x 2x10          Palloff Press   5x yellow          Figure 4 stretch   3x:30                                                                                                                                                                          Modalities              Cryotherapy  5 min 5 min                                        Assessment: Pt demonstrates improved mobility in R LE, remains challenged with there ex no c/o pain post treatment  Pain Scale 0/10  Plan: Continue per plan of care  Progress treatment as tolerated

## 2018-12-17 ENCOUNTER — OFFICE VISIT (OUTPATIENT)
Dept: PHYSICAL THERAPY | Facility: CLINIC | Age: 53
End: 2018-12-17
Payer: COMMERCIAL

## 2018-12-17 DIAGNOSIS — M16.11 PRIMARY OSTEOARTHRITIS OF RIGHT HIP: Primary | ICD-10-CM

## 2018-12-17 DIAGNOSIS — M76.31 IT BAND SYNDROME, RIGHT: ICD-10-CM

## 2018-12-17 PROCEDURE — 97110 THERAPEUTIC EXERCISES: CPT

## 2018-12-17 PROCEDURE — 97140 MANUAL THERAPY 1/> REGIONS: CPT

## 2018-12-17 NOTE — PROGRESS NOTES
Daily Note     Today's date: 2018  Patient name: Madiha Gary  : 1965  MRN: [id number]  Referring provider: Makenzie Estrada MD  Dx:   Encounter Diagnosis     ICD-10-CM    1  Primary osteoarthritis of right hip M16 11    2  It band syndrome, right M76 31        Start Time: 1630  Stop Time: 1720  Total time in clinic (min): 50 minutes    Subjective: Pt reports overall she is feeling improvement, reports she had a massage today which they worked on R ITB and quads  Pt reports going up the stairs remains difficulty with R LE  Pain Scale 2/10  Objective: See treatment diary below  Manual  12/5 12/10 12/12 12/17         IASTM  Quad/ITB Quad/ITB quads         LE Stretching  HS           L ant innom  grd 3 mob           STM    Long axis traction  R L/S hip R L/S hip       3x:30         MET    R hip ext             Exercise Diary              Bridges 2x10 2x10 2x10 30x         Clamshells 2x10 2x10 2x10 2x10         HS Stretch             Hip Flexor Str  1 min  1 min 1 min         TVA hip add/abd 20x5" 20x5" 20x5" 10x5"         S/L hip abd 2x10 10x 2x10 20x         Palloff Press   5x yellow 5x yellow         Figure 4 stretch   3x:30 3x:30         SL hip abd    2x10         SLS     :30         Side taps    2 in 10x                                                                                                                                  Modalities              Cryotherapy  5 min 5 min 5 min                                       Assessment: Pt demonstrates difficulty performing SLS b/l secondary to abd weakness  Pain Scale 0/10  Plan: Continue per plan of care  Progress treatment as tolerated

## 2018-12-19 ENCOUNTER — OFFICE VISIT (OUTPATIENT)
Dept: PHYSICAL THERAPY | Facility: CLINIC | Age: 53
End: 2018-12-19
Payer: COMMERCIAL

## 2018-12-19 DIAGNOSIS — M16.11 PRIMARY OSTEOARTHRITIS OF RIGHT HIP: Primary | ICD-10-CM

## 2018-12-19 DIAGNOSIS — M76.31 IT BAND SYNDROME, RIGHT: ICD-10-CM

## 2018-12-19 PROCEDURE — 97140 MANUAL THERAPY 1/> REGIONS: CPT

## 2018-12-19 NOTE — PROGRESS NOTES
Daily Note     Today's date: 2018  Patient name: Peter Brady  : 1965  MRN: [id number]  Referring provider: Garry Beltrán MD  Dx:   Encounter Diagnosis     ICD-10-CM    1  Primary osteoarthritis of right hip M16 11    2  It band syndrome, right M76 31        Start Time: 1630  Stop Time: 1710  Total time in clinic (min): 40 minutes    Subjective: Pt reports she felt good after last session, however 2 days of being on her feet at work increased R symptoms  Pain Scale 7/10  Objective: See treatment diary below  Manual  12/5 12/10 12/12 12/17 12/19        IASTM  Quad/ITB Quad/ITB quads quads itb        LE Stretching  HS   R hip mobs lat/inf        L ant innom  grd 3 mob           STM    Long axis traction  R L/S hip R L/S hip       3x:30 iliopsoas,oectineus,glut med pirifmors      3x:30        MET    R hip ext             Exercise Diary              Bridges 2x10 2x10 2x10 30x         Clamshells 2x10 2x10 2x10 2x10         HS Stretch             Hip Flexor Str  1 min  1 min 1 min         TVA hip add/abd 20x5" 20x5" 20x5" 10x5"         S/L hip abd 2x10 10x 2x10 20x         Palloff Press   5x yellow 5x yellow         Figure 4 stretch   3x:30 3x:30 3x:30        SL hip abd    2x10         SLS     :30         Side taps    2 in 10x                                                                                                                                  Modalities              Cryotherapy  5 min 5 min 5 min                                       Assessment: Pt notes significant ttp along R piriforms and glut med, deferred there ex today secondary to pain  Pain Scale 3/10  Plan: Continue per plan of care  Progress treatment as tolerated

## 2018-12-26 ENCOUNTER — APPOINTMENT (OUTPATIENT)
Dept: PHYSICAL THERAPY | Facility: CLINIC | Age: 53
End: 2018-12-26
Payer: COMMERCIAL

## 2018-12-26 ENCOUNTER — OFFICE VISIT (OUTPATIENT)
Dept: PHYSICAL THERAPY | Facility: CLINIC | Age: 53
End: 2018-12-26
Payer: COMMERCIAL

## 2018-12-26 DIAGNOSIS — M76.31 IT BAND SYNDROME, RIGHT: ICD-10-CM

## 2018-12-26 DIAGNOSIS — M16.11 PRIMARY OSTEOARTHRITIS OF RIGHT HIP: Primary | ICD-10-CM

## 2018-12-26 PROCEDURE — 97110 THERAPEUTIC EXERCISES: CPT

## 2018-12-26 PROCEDURE — 97140 MANUAL THERAPY 1/> REGIONS: CPT

## 2018-12-26 NOTE — PROGRESS NOTES
Daily Note     Today's date: 2018  Patient name: Martin Gardner  : 1965  MRN: [id number]  Referring provider: Naina Banks MD  Dx:   Encounter Diagnosis     ICD-10-CM    1  Primary osteoarthritis of right hip M16 11    2  It band syndrome, right M76 31        Start Time: 1345  Stop Time: 1430  Total time in clinic (min): 45 minutes    Subjective: Pt reports pain has remained in anterior thigh, lateral pain is abolished  Objective: See treatment diary below  Manual  12/5 12/10 12/12 12/17 12/19 12/26       IASTM  Quad/ITB Quad/ITB quads quads itb quads itb       LE Stretching  HS   R hip mobs lat/inf        L ant innom  grd 3 mob           STM    Long axis traction  R L/S hip R L/S hip       3x:30 iliopsoas,oectineus,glut med pirifmors      3x:30 iliopsoas,oectineus,glut med pirifmors      3x:30       MET    R hip ext             Exercise Diary              Bridges 2x10 2x10 2x10 30x 30x 30x       Clamshells 2x10 2x10 2x10 2x10  20x       HS Stretch             Hip Flexor Str  1 min  1 min 1 min         TVA hip add/abd 20x5" 20x5" 20x5" 10x5"         S/L hip abd 2x10 10x 2x10 20x  20x       Palloff Press   5x yellow 5x yellow  5x       Figure 4 stretch   3x:30 3x:30 3x:30 3x:30       SL hip abd    2x10         SLS     :30         Side taps    2 in 10x                                                                                                                                  Modalities              Cryotherapy  5 min 5 min 5 min  5 min                                     Assessment: Pt reports no exacerbating symptoms with there ex, will consult with MD before next session to address pain levels  Plan: Continue per plan of care  Progress treatment as tolerated

## 2018-12-27 ENCOUNTER — APPOINTMENT (OUTPATIENT)
Dept: PHYSICAL THERAPY | Facility: CLINIC | Age: 53
End: 2018-12-27
Payer: COMMERCIAL

## 2019-01-02 ENCOUNTER — OFFICE VISIT (OUTPATIENT)
Dept: PHYSICAL THERAPY | Facility: CLINIC | Age: 54
End: 2019-01-02
Payer: COMMERCIAL

## 2019-01-02 DIAGNOSIS — M76.31 IT BAND SYNDROME, RIGHT: ICD-10-CM

## 2019-01-02 DIAGNOSIS — M16.11 PRIMARY OSTEOARTHRITIS OF RIGHT HIP: Primary | ICD-10-CM

## 2019-01-02 PROCEDURE — 97110 THERAPEUTIC EXERCISES: CPT

## 2019-01-02 PROCEDURE — G8978 MOBILITY CURRENT STATUS: HCPCS

## 2019-01-02 PROCEDURE — 97140 MANUAL THERAPY 1/> REGIONS: CPT

## 2019-01-02 PROCEDURE — G8979 MOBILITY GOAL STATUS: HCPCS

## 2019-01-02 NOTE — PROGRESS NOTES
Daily Note     Today's date: 2019  Patient name: Griselda Ortega  : 1965  MRN: [id number]  Referring provider: Benji Noble MD  Dx:   No diagnosis found  Subjective: Pt reports pain has remained in anterior thigh, lateral pain is abolished  Objective: See treatment diary below  Manual  12/5 12/10 12/12 12/17 12/19 12/26       IASTM  Quad/ITB Quad/ITB quads quads itb quads itb       LE Stretching  HS   R hip mobs lat/inf        L ant innom  grd 3 mob           STM    Long axis traction  R L/S hip R L/S hip       3x:30 iliopsoas,oectineus,glut med pirifmors      3x:30 iliopsoas,oectineus,glut med pirifmors      3x:30       MET    R hip ext             Exercise Diary              Bridges 2x10 2x10 2x10 30x 30x 30x       Clamshells 2x10 2x10 2x10 2x10  20x       HS Stretch             Hip Flexor Str  1 min  1 min 1 min         TVA hip add/abd 20x5" 20x5" 20x5" 10x5"         S/L hip abd 2x10 10x 2x10 20x  20x       Palloff Press   5x yellow 5x yellow  5x       Figure 4 stretch   3x:30 3x:30 3x:30 3x:30       SL hip abd    2x10         SLS     :30         Side taps    2 in 10x                                                                                                                                  Modalities              Cryotherapy  5 min 5 min 5 min  5 min                                     Assessment: Pt reports no exacerbating symptoms with there ex, will consult with MD before next session to address pain levels  Plan: Continue per plan of care  Progress treatment as tolerated

## 2019-01-02 NOTE — PROGRESS NOTES
PT Re-Evaluation     Today's date: 2019  Patient name: Serafin Ramos  : 1965  MRN: [id number]  Referring provider: Olga Oliveira MD  Dx:   Encounter Diagnosis     ICD-10-CM    1  Primary osteoarthritis of right hip M16 11    2  It band syndrome, right M76 31        Start Time: 1625  Stop Time: 1715  Total time in clinic (min): 50 minutes    Assessment  Assessment details: Serafin Ramos has received 7 visits of physical therapy and reports significant improvement in lateral hip pain, however reports worsening in anterior hip pain  since initial evaluation  Pt demonstrates increased strength and increased ROM, and reports improvement with ambulate  Pt continues to demonstrate pain, decreased strength, decreased ROM, ambulatory dysfunction and postural  dysfunction  Due to these remaining impairments, patient continues to have difficulty performing a/iadls, recreational activities, work-related activities and engaging in social activities  Patient would benefit from continued skilled physical therapy services to address their aforementioned functional limitations and and continue to progress towards prior level of function and independence with home exercise program    Impairments: abnormal gait, abnormal muscle firing, abnormal or restricted ROM, activity intolerance, impaired balance, impaired physical strength, lacks appropriate home exercise program, pain with function and poor posture   Understanding of Dx/Px/POC: good   Prognosis: good    Goals  Short Term Goals: Target Date 18  1  Pt will initiate and advance HEP  (achieved)   2  Pt will report decreased pain frequency/intensity  (partially achieved)  3  Pt will demonstrate increased AROM  (achieved)  4  Pt will report increased ambulation tolerance  (partially achieved)       Long Term Goals: Target Date 19 (progressing towards)   1  Pt will demonstrate independence in HEP  2  Pt will report return to PLOF    3  Pt will navigate stairs step over step without limitations  4  Pt will demonstrate (-) Trendelenberg  Plan  Patient would benefit from: skilled PT  Planned modality interventions: cryotherapy, electrical stimulation/Russian stimulation and thermotherapy: hydrocollator packs  Planned therapy interventions: joint mobilization, manual therapy, patient education, postural training, activity modification, abdominal trunk stabilization, body mechanics training, flexibility, functional ROM exercises, graded exercise, home exercise program, neuromuscular re-education, strengthening, stretching, therapeutic activities, therapeutic exercise, motor coordination training, muscle pump exercises, gait training, balance/weight bearing training, ADL training and breathing training  Frequency: 2x week  Duration in weeks: 4  Treatment plan discussed with: patient        Subjective Evaluation    History of Present Illness  Date of surgery: 2017  Mechanism of injury: Pt reports increasing onset of R hip pain over the past year  Pt reports last week pain increased to impair her walking  Pt reports she sought Dr Yury Powers radiographs (+) OA  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 and referred to [facility]      1/3/19  Pt has received 7 sessions of physical therapy and notes significant resolve in lateral hip pain  Pt reports increased anterior hip symptoms that are exacerbated with prolonged time on her feet at work, stair navigation, and hip flexion AROM  Pt reports she has been compliant with HEP and notes significant decrease in pain when she has several days off from work  Pt reports no pain is provoked with home exercises     Not a recurrent problem   Quality of life: good    Pain  No pain reported  Current pain ratin  At best pain ratin  At worst pain rating: 10  Location: "C" sign, anterior hip   Quality: sharp and dull ache  Relieving factors: rest and medications  Aggravating factors: stair climbing, running and walking  Progression: improved (lateral hip pain has improved, ant hip increased)    Social Support  Steps to enter house: yes  Stairs in house: yes   Lives in: multiple-level home  Lives with: spouse and adult children    Employment status: working  Exercise history: arc /elliptical 20-30 min 3-5x/week, upper body machines  Life stress: RN enestatist      Diagnostic Tests  X-ray: abnormal  Treatments  Current treatment: chiropractic and massage  Patient Goals  Patient goals for therapy: return to sport/leisure activities, return to work and decreased pain (partially achieved)          Objective     Postural Observations    Additional Postural Observation Details  Pt stands with R lat shift, flattened T/S, slight L rotation, b/l pes plantus R > L    Palpation     Right   No palpable tenderness to the gluteus medardo, gluteus medius, piriformis and rectus femoris  Hypertonic in the rectus femoris and vastus lateralis  Tenderness of the vastus lateralis  Tenderness     Right Hip   No tenderness in the ASIS, PSIS, greater trochanter, iliac crest and sacroiliac joint  Active Range of Motion   Left Hip   External rotation (prone): 70 degrees   Internal rotation (90/90): 30 degrees     Right Hip   Flexion: 130 degrees with pain  Abduction: 60 degrees   External rotation (prone): 45 degrees   Internal rotation (prone): 60 degrees with pain    Additional Active Range of Motion Details  L/S  Flexion decreased by 25%*  Extension decreased by 25%  Rotation WFLs  Side Bending WFLs   *=pain    Strength/Myotome Testing     Left Hip   Planes of Motion   Flexion: 5  Extension: 4  External rotation: 4+  Internal rotation: 4-    Right Hip   Planes of Motion   Flexion: 4-  Extension: 4-  Abduction: 3+  External rotation: 4-  Internal rotation: 4-    Right Knee   Flexion: 5  Extension: 5    Tests     Left Hip   Negative Ely's  90/90 SLR: Negative  Right Hip   Positive LAZARO and scour  Negative Ely's and Melba  90/90 SLR: Negative      Additional Tests Details  SLR R 25 degrees, L 60 degrees     Functional Assessment     Single Leg Stance - Eyes Open   Left  Trial 1: 3 seconds  Trial 2: 30 seconds    Right  Trial 1: 10 seconds  Trial 2: 30 seconds    Comments  Squat, full depth, trunk flexion, some pain provoked      Flowsheet Rows      Most Recent Value   PT/OT G-Codes   Current Score  33   Projected Score  61   FOTO information reviewed  Yes   Assessment Type  Re-evaluation   G code set  Mobility: Walking & Moving Around   Mobility: Walking and Moving Around Current Status ()  CL   Mobility: Walking and Moving Around Goal Status ()  CJ          Precautions: LBP    Daily Treatment Diary     Manual  1/3            IASTM Quad/ITB            LE Stretching                                                        Exercise Diary              Bridges 3x10            Clamshells 3x10            Palloff Press 5x            Lat Stepping yellow 20x            S/L hip abd 3x10                                                                                                                                                                                                                   Modalities

## 2019-01-03 ENCOUNTER — APPOINTMENT (OUTPATIENT)
Dept: PHYSICAL THERAPY | Facility: CLINIC | Age: 54
End: 2019-01-03
Payer: COMMERCIAL

## 2019-01-07 ENCOUNTER — APPOINTMENT (OUTPATIENT)
Dept: PHYSICAL THERAPY | Facility: CLINIC | Age: 54
End: 2019-01-07
Payer: COMMERCIAL

## 2019-01-08 ENCOUNTER — OFFICE VISIT (OUTPATIENT)
Dept: OBGYN CLINIC | Facility: CLINIC | Age: 54
End: 2019-01-08
Payer: COMMERCIAL

## 2019-01-08 VITALS — BODY MASS INDEX: 24.2 KG/M2 | HEIGHT: 65 IN

## 2019-01-08 DIAGNOSIS — M76.31 IT BAND SYNDROME, RIGHT: Primary | ICD-10-CM

## 2019-01-08 PROCEDURE — 99213 OFFICE O/P EST LOW 20 MIN: CPT | Performed by: ORTHOPAEDIC SURGERY

## 2019-01-08 NOTE — PROGRESS NOTES
Assessment/Plan:  1  It band syndrome, right       Scribe Attestation    I,:   Eleno Rodriguez Call am acting as a scribe while in the presence of the attending physician :        I,:   Tracee Pollack MD personally performed the services described in this documentation    as scribed in my presence :              Am pleased to see that she has shown progress  The painful stair climbing is likely related to weakness in her right hip flexor  I did provide her a new prescription for physical therapy to address this  She should continue to do her exercises for IT band tightness to maintain her range of motion  She can follow up with me as needed for this  Subjective: Eric Allen is a 46 y o  female who presents today for evaluation of her right hip  On last visit she was diagnosed with IT band syndrome as well as mild osteoarthritis  She was prescribed physical therapy and has been attending for the past 6 weeks  She states she is doing very well with therapy  She no longer is tight about her IT band  She is no longer experiencing pain at the lateral aspect of her hip  Her chief complaint is of intermittent moderate and sharp pain in the anterior aspect of the right hip only when climbing stairs  This nonradiating pain seems to be exacerbated only by stair climbing and at times prolonged walking  She denies radiating pain or pain in the groin  She denies distal paresthesias  Currently she has no pain with rest   She has 1 final session of physical therapy upcoming this week  Review of Systems   Constitutional: Negative for chills, fever and unexpected weight change  HENT: Negative for hearing loss, nosebleeds and sore throat  Eyes: Negative for pain, redness and visual disturbance  Respiratory: Negative for cough, shortness of breath and wheezing  Cardiovascular: Negative for chest pain, palpitations and leg swelling  Gastrointestinal: Negative for abdominal pain, nausea and vomiting  Endocrine: Negative for polydipsia and polyuria  Genitourinary: Negative for dysuria and hematuria  Musculoskeletal:        See HPI   Skin: Negative for rash and wound  Neurological: Negative for dizziness, numbness and headaches  Psychiatric/Behavioral: Negative for decreased concentration and suicidal ideas  The patient is not nervous/anxious  Past Medical History:   Diagnosis Date    Abnormal Pap smear of cervix 1987    Anxiety     High vitamin A level     History of anxiety     History of hypercholesterolemia     History of hypertension     History of obesity     HPV (human papilloma virus) infection 1987    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    Secondary hyperparathyroidism, non-renal (HCC)     Spinal stenosis        Past Surgical History:   Procedure Laterality Date    ABDOMINOPLASTY      COLPOSCOPY  1987    COMBINED AUGMENTATION MAMMAPLASTY AND ABDOMINOPLASTY  2001    GASTRIC BYPASS      TUBAL LIGATION         Family History   Problem Relation Age of Onset    Hypertension Mother     Heart disease Mother     Hypertension Father     Heart disease Father     No Known Problems Sister     No Known Problems Brother     No Known Problems Maternal Aunt     No Known Problems Maternal Uncle     No Known Problems Paternal Aunt     No Known Problems Paternal Uncle     No Known Problems Maternal Grandmother     No Known Problems Maternal Grandfather     No Known Problems Paternal Grandmother     No Known Problems Paternal Grandfather     ADD / ADHD Neg Hx     Anesthesia problems Neg Hx     Cancer Neg Hx     Clotting disorder Neg Hx     Collagen disease Neg Hx     Diabetes Neg Hx     Dislocations Neg Hx     Learning disabilities Neg Hx     Neurological problems Neg Hx     Osteoporosis Neg Hx     Rheumatologic disease Neg Hx     Scoliosis Neg Hx     Vascular Disease Neg Hx        Social History     Occupational History    Not on file       Social History Main Topics    Smoking status: Never Smoker    Smokeless tobacco: Never Used    Alcohol use No    Drug use: No    Sexual activity: Yes     Birth control/ protection: Female Sterilization         Current Outpatient Prescriptions:     Calcium Citrate 1040 MG TABS, Take by mouth 3 (three) times a day, Disp: , Rfl:     Cholecalciferol (VITAMIN D) 2000 units CAPS, Take by mouth, Disp: , Rfl:     ferrous sulfate 325 (65 Fe) mg tablet, Take 325 mg by mouth daily with breakfast, Disp: , Rfl:     No Known Allergies    Objective: There were no vitals filed for this visit  Right Hip Exam     Tenderness   The patient is experiencing no tenderness  Range of Motion   Internal Rotation: 30   External Rotation: 70     Muscle Strength   Abduction: 5/5   Adduction: 5/5   Flexion: 4/5     Other   Sensation: normal            Physical Exam   Constitutional: She is oriented to person, place, and time  She appears well-developed and well-nourished  HENT:   Head: Normocephalic and atraumatic  Eyes: Conjunctivae are normal  Right eye exhibits no discharge  Left eye exhibits no discharge  Neck: Normal range of motion  Neck supple  Cardiovascular: Regular rhythm and intact distal pulses  Pulmonary/Chest: Effort normal  No stridor  No respiratory distress  Neurological: She is alert and oriented to person, place, and time  Skin: Skin is warm and dry  Psychiatric: She has a normal mood and affect  Her behavior is normal    Vitals reviewed  I have personally reviewed pertinent films in PACS and my interpretation is as follows:  X-rays from last visit were reviewed  She has mild hip osteoarthritis

## 2019-01-09 ENCOUNTER — OFFICE VISIT (OUTPATIENT)
Dept: PHYSICAL THERAPY | Facility: CLINIC | Age: 54
End: 2019-01-09
Payer: COMMERCIAL

## 2019-01-09 DIAGNOSIS — M76.31 IT BAND SYNDROME, RIGHT: ICD-10-CM

## 2019-01-09 DIAGNOSIS — M16.11 PRIMARY OSTEOARTHRITIS OF RIGHT HIP: Primary | ICD-10-CM

## 2019-01-09 PROCEDURE — 97140 MANUAL THERAPY 1/> REGIONS: CPT

## 2019-01-09 PROCEDURE — 97110 THERAPEUTIC EXERCISES: CPT

## 2019-01-09 NOTE — PROGRESS NOTES
Daily Note     Today's date: 2019  Patient name: Madiha Gary  : 1965  MRN: [id number]  Referring provider: Makenzie Estrada MD  Dx:   Encounter Diagnosis     ICD-10-CM    1  Primary osteoarthritis of right hip M16 11    2  It band syndrome, right M76 31                   Subjective: Pt reports saw  MD yesterday, reports R hip flexor strain, continue physical therapy  Pt reports significant relief continues with rest from work  Objective: See treatment diary below  Manual  12/5 12/10 12/12 12/17 12/19 12/26 1/9      IASTM  Quad/ITB Quad/ITB quads quads itb quads itb quads itb  HS/gastroc      LE Stretching  HS   R hip mobs lat/inf        L ant innom  grd 3 mob           STM    Long axis traction  R L/S hip R L/S hip       3x:30 iliopsoas,oectineus,glut med pirifmors      3x:30 iliopsoas,oectineus,glut med pirifmors      3x:30 iliopsoas,oectineus,glut med pirifmors      MET    R hip ext             Exercise Diary              Bridges 2x10 2x10 2x10 30x 30x 30x       Clamshells 2x10 2x10 2x10 2x10  20x 30x yellow      HS Stretch             Hip Flexor Str  1 min  1 min 1 min   1 min      TVA hip add/abd 20x5" 20x5" 20x5" 10x5"         S/L hip abd 2x10 10x 2x10 20x  20x       Palloff Press   5x yellow 5x yellow  5x 5x blue      Figure 4 stretch   3x:30 3x:30 3x:30 3x:30 3x:30      SL hip abd    2x10         SLS     :30   Stand hip abd 20x      Side taps    2 in 10x   4in 20      Iso hip abd        45 deg 20x5"      SAQ       20z5" 3lbs                                                                                                     Modalities              Cryotherapy  5 min 5 min 5 min  5 min                                     Assessment: Pt reports fatigue post treatment, no pain  Presents with significantly less pain throughout treatment session  Plan: Continue per plan of care  Progress treatment as tolerated

## 2019-01-14 ENCOUNTER — OFFICE VISIT (OUTPATIENT)
Dept: PHYSICAL THERAPY | Facility: CLINIC | Age: 54
End: 2019-01-14
Payer: COMMERCIAL

## 2019-01-14 DIAGNOSIS — M76.31 IT BAND SYNDROME, RIGHT: ICD-10-CM

## 2019-01-14 DIAGNOSIS — M16.11 PRIMARY OSTEOARTHRITIS OF RIGHT HIP: Primary | ICD-10-CM

## 2019-01-14 PROCEDURE — 97140 MANUAL THERAPY 1/> REGIONS: CPT

## 2019-01-14 PROCEDURE — 97110 THERAPEUTIC EXERCISES: CPT

## 2019-01-14 NOTE — PROGRESS NOTES
Daily Note     Today's date: 2019  Patient name: Beth Gaming  : 1965  MRN: [id number]  Referring provider: Luna Lanza MD  Dx:   Encounter Diagnosis     ICD-10-CM    1  Primary osteoarthritis of right hip M16 11    2  It band syndrome, right M76 31        Start Time: 1740  Stop Time: 1830  Total time in clinic (min): 50 minutes    Subjective: Pt reports she has been compliant with HEP, reports increasing reps no better/worse  Objective: See treatment diary below  Manual  12/5 12/10 12/12 12/17 12/19 12/26 1/9 1/14     IASTM  Quad/ITB Quad/ITB quads quads itb quads itb quads itb  HS/gastroc quads itb  HS/gastroc     LE Stretching  HS   R hip mobs lat/inf        L ant innom  grd 3 mob           STM    Long axis traction  R L/S hip R L/S hip       3x:30 iliopsoas,oectineus,glut med pirifmors      3x:30 iliopsoas,oectineus,glut med pirifmors      3x:30 iliopsoas,oectineus,glut med pirifmors iliopsoas,oectineus,glut med pirifmors     MET    R hip ext             Exercise Diary              Bridges 2x10 2x10 2x10 30x 30x 30x       Clamshells 2x10 2x10 2x10 2x10  20x 30x yellow      HS Stretch             Hip Flexor Str  1 min  1 min 1 min   1 min      TVA hip add/abd 20x5" 20x5" 20x5" 10x5"         S/L hip abd 2x10 10x 2x10 20x  20x       Palloff Press   5x yellow 5x yellow  5x 5x blue 5x     Figure 4 stretch   3x:30 3x:30 3x:30 3x:30 3x:30 3x:30     SL hip abd    2x10         SLS     :30   Stand hip abd 20x flx/abd 20x     Side taps    2 in 10x   4in 20 4" 20x     Iso hip abd        45 deg 20x5"      SAQ       20z5" 3lbs      Mini Squats        20x     Prone hip ext        20x                                                                          Modalities              Cryotherapy  5 min 5 min 5 min  5 min                                     Assessment: Pt remains challenged with SLS, given hip flx for HEP and prone hip ext  Plan: Continue per plan of care    Progress treatment as tolerated

## 2019-01-21 ENCOUNTER — APPOINTMENT (OUTPATIENT)
Dept: RADIOLOGY | Facility: CLINIC | Age: 54
End: 2019-01-21
Payer: COMMERCIAL

## 2019-01-21 ENCOUNTER — OFFICE VISIT (OUTPATIENT)
Dept: OBGYN CLINIC | Facility: CLINIC | Age: 54
End: 2019-01-21
Payer: COMMERCIAL

## 2019-01-21 ENCOUNTER — OFFICE VISIT (OUTPATIENT)
Dept: PHYSICAL THERAPY | Facility: CLINIC | Age: 54
End: 2019-01-21
Payer: COMMERCIAL

## 2019-01-21 VITALS
WEIGHT: 145 LBS | BODY MASS INDEX: 24.16 KG/M2 | HEIGHT: 65 IN | DIASTOLIC BLOOD PRESSURE: 73 MMHG | SYSTOLIC BLOOD PRESSURE: 109 MMHG | HEART RATE: 60 BPM

## 2019-01-21 DIAGNOSIS — M76.31 IT BAND SYNDROME, RIGHT: ICD-10-CM

## 2019-01-21 DIAGNOSIS — M18.12 ARTHRITIS OF CARPOMETACARPAL (CMC) JOINT OF LEFT THUMB: Primary | ICD-10-CM

## 2019-01-21 DIAGNOSIS — M79.645 CHRONIC PAIN OF LEFT THUMB: ICD-10-CM

## 2019-01-21 DIAGNOSIS — M16.11 PRIMARY OSTEOARTHRITIS OF RIGHT HIP: Primary | ICD-10-CM

## 2019-01-21 DIAGNOSIS — G89.29 CHRONIC PAIN OF LEFT THUMB: ICD-10-CM

## 2019-01-21 PROCEDURE — 99213 OFFICE O/P EST LOW 20 MIN: CPT | Performed by: ORTHOPAEDIC SURGERY

## 2019-01-21 PROCEDURE — 97110 THERAPEUTIC EXERCISES: CPT

## 2019-01-21 PROCEDURE — 73140 X-RAY EXAM OF FINGER(S): CPT

## 2019-01-21 PROCEDURE — 97140 MANUAL THERAPY 1/> REGIONS: CPT

## 2019-01-21 PROCEDURE — 20600 DRAIN/INJ JOINT/BURSA W/O US: CPT | Performed by: ORTHOPAEDIC SURGERY

## 2019-01-21 RX ORDER — LIDOCAINE HYDROCHLORIDE 5 MG/ML
0.5 INJECTION, SOLUTION INFILTRATION; PERINEURAL
Status: COMPLETED | OUTPATIENT
Start: 2019-01-21 | End: 2019-01-21

## 2019-01-21 RX ORDER — TRIAMCINOLONE ACETONIDE 40 MG/ML
20 INJECTION, SUSPENSION INTRA-ARTICULAR; INTRAMUSCULAR
Status: COMPLETED | OUTPATIENT
Start: 2019-01-21 | End: 2019-01-21

## 2019-01-21 RX ADMIN — LIDOCAINE HYDROCHLORIDE 0.5 ML: 5 INJECTION, SOLUTION INFILTRATION; PERINEURAL at 17:01

## 2019-01-21 RX ADMIN — TRIAMCINOLONE ACETONIDE 20 MG: 40 INJECTION, SUSPENSION INTRA-ARTICULAR; INTRAMUSCULAR at 17:01

## 2019-01-21 NOTE — PROGRESS NOTES
Assessment/Plan:  1  Arthritis of carpometacarpal (CMC) joint of left thumb  XR thumb left first digit-min 2v    Small joint arthrocentesis       Scribe Attestation    I,:   Lala Arguello MA am acting as a scribe while in the presence of the attending physician :        I,:   Morgan Marcos DO personally performed the services described in this documentation    as scribed in my presence :              I discussed with Anju London today that her signs and symptoms are consistent with left thumb CMC arthritis  Treatment options were discussed in the form of steroid injection and bracing  She was fitted and provided with a left thumb comfort cool brace  She elected to proceed with a left thumb CMC injection  This was performed in the office today without any complications  She will follow up in 3 months for repeat evaluation  Subjective: Clint Shields is a 46 y o  female who presents to the office today for evaluation of left thumb pain  She notes pain to the base of her left thumb  She states this has been ongoing for the past week and has been progressively getting worse  She has been using and OTC brace which she states does provide her with mild relief and states her pain is improving  She denies any numbness or tingling  Review of Systems   Constitutional: Negative for chills and fever  HENT: Negative for drooling and sneezing  Eyes: Negative for redness  Respiratory: Negative for cough and wheezing  Gastrointestinal: Negative for nausea and vomiting  Musculoskeletal: Positive for arthralgias  Negative for joint swelling and myalgias  Neurological: Negative for weakness and numbness  Psychiatric/Behavioral: Negative for behavioral problems  The patient is not nervous/anxious            Past Medical History:   Diagnosis Date    Abnormal Pap smear of cervix 1987    Anxiety     High vitamin A level     History of anxiety     History of hypercholesterolemia     History of hypertension     History of obesity     HPV (human papilloma virus) infection 1987    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    Secondary hyperparathyroidism, non-renal (HCC)     Spinal stenosis        Past Surgical History:   Procedure Laterality Date    ABDOMINOPLASTY      COLPOSCOPY  1987    COMBINED AUGMENTATION MAMMAPLASTY AND ABDOMINOPLASTY  2001    GASTRIC BYPASS      TUBAL LIGATION         Family History   Problem Relation Age of Onset    Hypertension Mother     Heart disease Mother     Hypertension Father     Heart disease Father     No Known Problems Sister     No Known Problems Brother     No Known Problems Maternal Aunt     No Known Problems Maternal Uncle     No Known Problems Paternal Aunt     No Known Problems Paternal Uncle     No Known Problems Maternal Grandmother     No Known Problems Maternal Grandfather     No Known Problems Paternal Grandmother     No Known Problems Paternal Grandfather     ADD / ADHD Neg Hx     Anesthesia problems Neg Hx     Cancer Neg Hx     Clotting disorder Neg Hx     Collagen disease Neg Hx     Diabetes Neg Hx     Dislocations Neg Hx     Learning disabilities Neg Hx     Neurological problems Neg Hx     Osteoporosis Neg Hx     Rheumatologic disease Neg Hx     Scoliosis Neg Hx     Vascular Disease Neg Hx        Social History     Occupational History    Not on file       Social History Main Topics    Smoking status: Never Smoker    Smokeless tobacco: Never Used    Alcohol use No    Drug use: No    Sexual activity: Yes     Birth control/ protection: Female Sterilization         Current Outpatient Prescriptions:     Calcium Citrate 1040 MG TABS, Take by mouth 3 (three) times a day, Disp: , Rfl:     Cholecalciferol (VITAMIN D) 2000 units CAPS, Take by mouth, Disp: , Rfl:     ferrous sulfate 325 (65 Fe) mg tablet, Take 325 mg by mouth daily with breakfast, Disp: , Rfl:     No Known Allergies    Objective:  Vitals:    01/21/19 1636   BP: 109/73   Pulse: 60       Ortho Exam     Left thumb     TTP CMC joint  - tinel's at the wrist  - john's  - grind test thumb CMC  Sensation intact median, radial, and ulnar nerve  Compartments soft  Brisk capillary refill     Physical Exam   Constitutional: She is oriented to person, place, and time  She appears well-developed and well-nourished  HENT:   Head: Normocephalic and atraumatic  Eyes: Conjunctivae are normal  Right eye exhibits no discharge  Left eye exhibits no discharge  Neck: Normal range of motion  Neck supple  Cardiovascular: Normal rate and intact distal pulses  Pulmonary/Chest: Effort normal  No respiratory distress  Neurological: She is alert and oriented to person, place, and time  Skin: Skin is warm and dry  Psychiatric: She has a normal mood and affect  Her behavior is normal  Judgment and thought content normal      Small joint arthrocentesis  Date/Time: 1/21/2019 5:01 PM  Consent given by: patient  Site marked: site marked  Timeout: Immediately prior to procedure a time out was called to verify the correct patient, procedure, equipment, support staff and site/side marked as required   Supporting Documentation  Indications: pain   Procedure Details  Location: thumb - L thumb CMC  Preparation: Patient was prepped and draped in the usual sterile fashion  Ultrasound guidance: no  Medications administered: 0 5 mL lidocaine 0 5 %; 20 mg triamcinolone acetonide 40 mg/mL    Patient tolerance: patient tolerated the procedure well with no immediate complications  Dressing:  Sterile dressing applied      I have personally reviewed pertinent films in PACS and my interpretation is as follows:X-ray left thumb performed in the office today demonstrates mild CMC joint arthritis

## 2019-01-21 NOTE — PROGRESS NOTES
Daily Note     Today's date: 2019  Patient name: Bri Ortega  : 1965  MRN: [id number]  Referring provider: Xochilt Araujo MD  Dx:   Encounter Diagnosis     ICD-10-CM    1  Primary osteoarthritis of right hip M16 11    2  It band syndrome, right M76 31        Start Time: 1500  Stop Time: 1550  Total time in clinic (min): 50 minutes    Subjective: Pt reports overall pain has been less, reports 3/10 when ascending stairs  Pt reports compliance with HEP  Objective: See treatment diary below  Manual  12/5 12/10 12/12 12/17 12/19 12/26 1/9 1/14 1/21    IASTM  Quad/ITB Quad/ITB quads quads itb quads itb quads itb  HS/gastroc quads itb  HS/gastroc quads itb   HS/gastroc    LE Stretching  HS   R hip mobs lat/inf        L ant innom  grd 3 mob           STM    Long axis traction  R L/S hip R L/S hip       3x:30 iliopsoas,oectineus,glut med pirifmors      3x:30 iliopsoas,oectineus,glut med pirifmors      3x:30 iliopsoas,oectineus,glut med pirifmors iliopsoas,oectineus,glut med pirifmors     MET    R hip ext             Exercise Diary              Bridges 2x10 2x10 2x10 30x 30x 30x       Clamshells 2x10 2x10 2x10 2x10  20x 30x yellow      HS Stretch             Hip Flexor Str  1 min  1 min 1 min   1 min  2x1'    TVA hip add/abd 20x5" 20x5" 20x5" 10x5"         S/L hip abd 2x10 10x 2x10 20x  20x       Palloff Press   5x yellow 5x yellow  5x 5x blue 5x     Figure 4 stretch   3x:30 3x:30 3x:30 3x:30 3x:30 3x:30 3x:30    SL hip abd    2x10         SLS     :30   Stand hip abd 20x flx/abd 20x     Side taps    2 in 10x   4in 20 4" 20x 2x10    Iso hip abd        45 deg 20x5"      SAQ       20z5" 3lbs      Mini Squats        20x 20x    Prone hip ext        20x     4 way walking         5x ea    Prosretch         1 min                                               Modalities              Cryotherapy  5 min 5 min 5 min  5 min                                     Assessment: Pt notes fatigue post treatment session, reports she did perform HEP this morning  Pt educated to rest more tomorrow secondary to increased work today  Plan: Continue per plan of care  Progress treatment as tolerated

## 2019-01-28 ENCOUNTER — OFFICE VISIT (OUTPATIENT)
Dept: PHYSICAL THERAPY | Facility: CLINIC | Age: 54
End: 2019-01-28
Payer: COMMERCIAL

## 2019-01-28 DIAGNOSIS — M76.31 IT BAND SYNDROME, RIGHT: ICD-10-CM

## 2019-01-28 DIAGNOSIS — M16.11 PRIMARY OSTEOARTHRITIS OF RIGHT HIP: Primary | ICD-10-CM

## 2019-01-28 PROCEDURE — 97110 THERAPEUTIC EXERCISES: CPT

## 2019-01-28 PROCEDURE — 97140 MANUAL THERAPY 1/> REGIONS: CPT

## 2019-01-28 NOTE — PROGRESS NOTES
Daily Note     Today's date: 2019  Patient name: Beth Gaming  : 1965  MRN: [id number]  Referring provider: Luna Lanza MD  Dx:   Encounter Diagnosis     ICD-10-CM    1  Primary osteoarthritis of right hip M16 11    2  It band syndrome, right M76 31        Start Time: 1630  Stop Time: 1720  Total time in clinic (min): 50 minutes    Subjective: Pt reports she felt fine after last PT session, reports she tried the bike for 15 minutes, and reports was in increased pain over the next several days  Pt reports symptoms have resolved, R hip 2/10 pain scale  Objective: See treatment diary below  Manual  12/5 12/10 12/12 12/17 12/19 12/26 1/9 1/14 1/21 1/28   IASTM  Quad/ITB Quad/ITB quads quads itb quads itb quads itb  HS/gastroc quads itb  HS/gastroc quads itb   HS/gastroc Quad/ITB   LE Stretching  HS   R hip mobs lat/inf        L ant innom  grd 3 mob           STM    Long axis traction  R L/S hip R L/S hip       3x:30 iliopsoas,oectineus,glut med pirifmors      3x:30 iliopsoas,oectineus,glut med pirifmors      3x:30 iliopsoas,oectineus,glut med pirifmors iliopsoas,oectineus,glut med pirifmors     MET    R hip ext             Exercise Diary              Bridges 2x10 2x10 2x10 30x 30x 30x       Clamshells 2x10 2x10 2x10 2x10  20x 30x yellow      HS Stretch             Hip Flexor Str  1 min  1 min 1 min   1 min  2x1' 1'   TVA hip add/abd 20x5" 20x5" 20x5" 10x5"         S/L hip abd 2x10 10x 2x10 20x  20x       Palloff Press   5x yellow 5x yellow  5x 5x blue 5x     Figure 4 stretch   3x:30 3x:30 3x:30 3x:30 3x:30 3x:30 3x:30 3x:30   SL hip abd    2x10         SLS     :30   Stand hip abd 20x flx/abd 20x     Side taps    2 in 10x   4in 20 4" 20x 2x10 20x  forward taps 20x   Iso hip abd        45 deg 20x5"      SAQ       20z5" 3lbs      Mini Squats        20x 20x 20x   Prone hip ext        20x     4 way walking         5x ea 4 5 5x ea   Prosretch         1 min 1 min   Stand hip flex str          1 min Modalities              Cryotherapy  5 min 5 min 5 min  5 min                                     Assessment: Pt requires VCs for squats to avoid ant knee translation, difficulty avoiding weight shift off R LE  No increased pain post treatment, overall fatigue  Pt educated to avoid bike > 10 min  Plan: Continue per plan of care  Progress note during next visit  Progress treatment as tolerated

## 2019-02-05 ENCOUNTER — OFFICE VISIT (OUTPATIENT)
Dept: PHYSICAL THERAPY | Facility: CLINIC | Age: 54
End: 2019-02-05
Payer: COMMERCIAL

## 2019-02-05 DIAGNOSIS — M76.31 IT BAND SYNDROME, RIGHT: ICD-10-CM

## 2019-02-05 DIAGNOSIS — M16.11 PRIMARY OSTEOARTHRITIS OF RIGHT HIP: Primary | ICD-10-CM

## 2019-02-05 PROCEDURE — 97110 THERAPEUTIC EXERCISES: CPT

## 2019-02-05 PROCEDURE — 97140 MANUAL THERAPY 1/> REGIONS: CPT

## 2019-02-05 NOTE — PROGRESS NOTES
Daily Note     Today's date: 2019  Patient name: Jakob Cabral  : 1965  MRN: [id number]  Referring provider: Vlad Yeung MD  Dx:   Encounter Diagnoses   Name Primary?  Primary osteoarthritis of right hip Yes    It band syndrome, right        Start Time: 0800  Stop Time: 0845  Total time in clinic (min): 45 minutes    Subjective: Pt reports her pain on the stairs is approx 2/10, much improved from the previous 8/10  Pain Ratin/10    Objective: See treatment diary below    Manual     IASTM Quad/ITB  Quad/ITB quads quads itb quads itb quads itb  HS/gastroc quads itb  HS/gastroc quads itb   HS/gastroc Quad/ITB   LE Stretching Quad/ ITB    R hip mobs lat/inf        L ant innom             STM    Long axis traction   R L/S hip       3x:30 iliopsoas,oectineus,glut med pirifmors      3x:30 iliopsoas,oectineus,glut med pirifmors      3x:30 iliopsoas,oectineus,glut med pirifmors iliopsoas,oectineus,glut med pirifmors     MET    R hip ext             Exercise Diary              Bridges   2x10 30x 30x 30x       Clamshells   2x10 2x10  20x 30x yellow      HS Stretch             Hip Flexor Str 1'  1 min 1 min   1 min  2x1' 1'   TVA hip add/abd   20x5" 10x5"         S/L hip abd   2x10 20x  20x       Palloff Press   5x yellow 5x yellow  5x 5x blue 5x     Figure 4 stretch 3x:30  3x:30 3x:30 3x:30 3x:30 3x:30 3x:30 3x:30 3x:30   SL hip abd    2x10         SLS     :30   Stand hip abd 20x flx/abd 20x     Side taps 20x/  forward taps 20x   2 in 10x   4in 20 4" 20x 2x10 20x  forward taps 20x   Iso hip abd        45 deg 20x5"      SAQ       20z5" 3lbs      Mini Squats 20x       20x 20x 20x   Prone hip ext 2x10       20x     4 way walking 4 5 5x ea        5x ea 4 5 5x ea   Prosretch 1 min        1 min 1 min   Stand hip flex str 1 min         1 min   Standing hip flex 2x10                             Modalities              Cryotherapy   5 min 5 min  5 min Assessment: Pt reports of fatigue with standing hip flexion but denies pain  Demo good squat form without PT cuing  Plan: Continue per plan of care  Progress treatment as tolerated

## 2019-02-06 ENCOUNTER — APPOINTMENT (OUTPATIENT)
Dept: PHYSICAL THERAPY | Facility: CLINIC | Age: 54
End: 2019-02-06
Payer: COMMERCIAL

## 2019-02-12 ENCOUNTER — OFFICE VISIT (OUTPATIENT)
Dept: PHYSICAL THERAPY | Facility: CLINIC | Age: 54
End: 2019-02-12
Payer: COMMERCIAL

## 2019-02-12 DIAGNOSIS — M76.31 IT BAND SYNDROME, RIGHT: ICD-10-CM

## 2019-02-12 DIAGNOSIS — M16.11 PRIMARY OSTEOARTHRITIS OF RIGHT HIP: Primary | ICD-10-CM

## 2019-02-12 PROCEDURE — 97110 THERAPEUTIC EXERCISES: CPT

## 2019-02-12 PROCEDURE — G8979 MOBILITY GOAL STATUS: HCPCS

## 2019-02-12 PROCEDURE — G8978 MOBILITY CURRENT STATUS: HCPCS

## 2019-02-12 PROCEDURE — 97140 MANUAL THERAPY 1/> REGIONS: CPT

## 2019-02-12 NOTE — PROGRESS NOTES
PT Re-Evaluation     Today's date: 2019  Patient name: Uvaldo Barrett  : 1965  MRN: [id number]  Referring provider: Driss Peters MD  Dx:   Encounter Diagnosis     ICD-10-CM    1  Primary osteoarthritis of right hip M16 11    2  It band syndrome, right M76 31        Start Time: 1230  Stop Time: 1315  Total time in clinic (min): 45 minutes    Assessment  Assessment details: To date, Trecia Gottron has received 13 Physical Therapy visits consisting of manual therapy techniques and therapeutic exercises for Primary osteoarthritis of right hip  (primary encounter diagnosis) and IT band syndrome, right  Patient demonstrates decreased pain, increased strength, increased ROM, increased joint mobility, improved body mechanics, improved gait mechanics , improved posture  and increased activity tolerance and has been able to return to prolonged walking/standing with minimal c/o and performing the recumbent bike for 15 mins daily  Jael reports of continued pain/difficulty with stair negotiation, but reports of decreased pain intensity  Patient would benefit from continued skilled Physical Therapy services to progress towards prior level of function and independence with home exercise program   See updated goals below  Impairments: abnormal muscle firing, abnormal or restricted ROM, activity intolerance, impaired balance, impaired physical strength, lacks appropriate home exercise program, pain with function and poor posture   Understanding of Dx/Px/POC: good   Prognosis: good    Goals  Short Term Goals: Target Date 18  1  Pt will initiate and advance HEP  (achieved)   2  Pt will report decreased pain frequency/intensity  (achieved)  3  Pt will demonstrate increased AROM  (achieved)  4  Pt will report increased ambulation tolerance  (achieved)       Long Term Goals: Target Date 3/14/19   1  Pt will demonstrate independence in HEP  (partially achieved)  2  Pt will report return to PLOF  (progressing towards)  3  Pt will navigate stairs step over step without limitations  (progressing towards)  4  Pt will demonstrate (-) Trendelenberg  (progressing towards)      Plan  Patient would benefit from: skilled PT  Planned modality interventions: cryotherapy, electrical stimulation/Russian stimulation and thermotherapy: hydrocollator packs  Planned therapy interventions: joint mobilization, manual therapy, patient education, postural training, activity modification, abdominal trunk stabilization, body mechanics training, flexibility, functional ROM exercises, graded exercise, home exercise program, neuromuscular re-education, strengthening, stretching, therapeutic activities, therapeutic exercise, motor coordination training, muscle pump exercises, gait training, balance/weight bearing training, ADL training and breathing training  Frequency: 2x week  Duration in weeks: 4  Plan of Care beginning date: 2/12/2019  Plan of Care expiration date: 3/14/2019  Treatment plan discussed with: patient        Subjective Evaluation    History of Present Illness  Date of surgery: 12/1/2017  Mechanism of injury: Pt reports increasing onset of R hip pain over the past year  Pt reports last week pain increased to impair her walking  Pt reports she sought Dr Elias Rosenthal radiographs (+) OA  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 and referred to OPPT  2/12/19  Pt has received 13 sessions of physical therapy and reports of significant improvement in pain  Pt reports of minimal c/o with prolonged standing/ambulation and is able to squat to reach objects on the floor without pain  She reports of minimal pain still when negotiating stairs reciprocally  Pt reports she has been compliant with HEP and has returned to riding the recumbent bike for 15 mins daily  Pt would benefit from continued skilled PT services 1-2x/week x 4 weeks to progress towards PLOF and indep with HEP            Not a recurrent problem   Quality of life: good    Pain  Current pain ratin  At best pain ratin  At worst pain ratin  Location: "C" sign, anterior hip   Quality: dull ache  Relieving factors: rest and medications  Aggravating factors: stair climbing and running  Progression: improved (lateral hip pain has improved, ant hip increased)    Social Support  Steps to enter house: yes  Stairs in house: yes   Lives in: multiple-level home  Lives with: spouse and adult children    Employment status: working  Exercise history: arc /elliptical 20-30 min 3-5x/week, upper body machines  Life stress: RN enestatist      Diagnostic Tests  X-ray: abnormal  Treatments  Current treatment: chiropractic and massage  Patient Goals  Patient goals for therapy: return to sport/leisure activities, return to work and decreased pain (Progressing towards)          Objective     Postural Observations    Additional Postural Observation Details  Pt stands with slight R lat shift, flattened T/S, slight L rotation, b/l pes planus R > L    Palpation     Right   Hypertonic in the rectus femoris  Active Range of Motion   Left Hip   Normal active range of motion    Right Hip   Flexion: 130 degrees   Abduction: 60 degrees   External rotation (prone): 60 degrees   Internal rotation (prone): 30 degrees     Additional Active Range of Motion Details  L/S  Flexion WFLs  Extension WFLs  Rotation WFLs  Side Bending WFLs     Strength/Myotome Testing     Left Hip   Planes of Motion   Flexion: 5  Extension: 4+  Abduction: 4+  External rotation: 4+  Internal rotation: 4+    Right Hip   Planes of Motion   Flexion: 4+  Extension: 4+  Abduction: 4  External rotation: 4  Internal rotation: 4    Right Knee   Flexion: 5  Extension: 5    Tests     Left Hip   Negative Ely's  90/90 SLR: Negative  Right Hip   Positive LAZARO and scour  Negative Ely's and Melba  90/90 SLR: Negative      Additional Tests Details  SLR R 25 degrees, L 60 degrees (not assessed today)    Functional Assessment        Single Leg Stance   Left: 12 seconds  Right: 20 seconds    Comments  Squat:  Pt is able to squat to approx 90 deg knee flexion without c/o  Pt demo good form without excessive trunk lean or ant knee translation      Flowsheet Rows      Most Recent Value   PT/OT G-Codes   Current Score  69   Projected Score  61   FOTO information reviewed  Yes   Assessment Type  Re-evaluation   G code set  Mobility: Walking & Moving Around   Mobility: Walking and Moving Around Current Status ()  CJ   Mobility: Walking and Moving Around Goal Status ()  CJ            Manual  2/4 2/12 12/17 12/19 12/26 1/9 1/14 1/21 1/28   IASTM Quad/ITB Quad/ITB  quads quads itb quads itb quads itb  HS/gastroc quads itb  HS/gastroc quads itb   HS/gastroc Quad/ITB   LE Stretching Quad/ ITB Hip flexor/ ITB   R hip mobs lat/inf        L ant innom             STM    Long axis traction          3x:30 iliopsoas,oectineus,glut med pirifmors      3x:30 iliopsoas,oectineus,glut med pirifmors      3x:30 iliopsoas,oectineus,glut med pirifmors iliopsoas,oectineus,glut med pirifmors     MET    R hip ext             Exercise Diary              Bridges    30x 30x 30x       Clamshells    2x10  20x 30x yellow      HS Stretch             Hip Flexor Str 1' 3x30"  1 min   1 min  2x1' 1'   TVA hip add/abd    10x5"         S/L hip abd    20x  20x       Palloff Press    5x yellow  5x 5x blue 5x     Figure 4 stretch 3x:30 3x30"  3x:30 3x:30 3x:30 3x:30 3x:30 3x:30 3x:30   SL hip abd    2x10         SLS     :30   Stand hip abd 20x flx/abd 20x     Side taps 20x/  forward taps 20x 20x 4in  2 in 10x   4in 20 4" 20x 2x10 20x  forward taps 20x   Iso hip abd        45 deg 20x5"      SAQ       20z5" 3lbs      Mini Squats 20x 20x high- low      20x 20x 20x   Prone hip ext 2x10 Keiserext /abd x15 ea 1 0 maura      20x     4 way walking 4 5 5x ea 5 0 5x ea       5x ea 4 5 5x ea   Prostretch 1 min 3x30"       1 min 1 min   Stand hip flex str 1 min         1 min Standing hip flex 2x10 2x10                            Modalities              Cryotherapy    5 min  5 min

## 2019-02-13 ENCOUNTER — APPOINTMENT (OUTPATIENT)
Dept: PHYSICAL THERAPY | Facility: CLINIC | Age: 54
End: 2019-02-13
Payer: COMMERCIAL

## 2019-02-18 ENCOUNTER — OFFICE VISIT (OUTPATIENT)
Dept: PHYSICAL THERAPY | Facility: CLINIC | Age: 54
End: 2019-02-18
Payer: COMMERCIAL

## 2019-02-18 DIAGNOSIS — M16.11 PRIMARY OSTEOARTHRITIS OF RIGHT HIP: Primary | ICD-10-CM

## 2019-02-18 DIAGNOSIS — M76.31 IT BAND SYNDROME, RIGHT: ICD-10-CM

## 2019-02-18 PROCEDURE — 97110 THERAPEUTIC EXERCISES: CPT

## 2019-02-18 PROCEDURE — 97140 MANUAL THERAPY 1/> REGIONS: CPT

## 2019-02-18 NOTE — PROGRESS NOTES
Daily Note     Today's date: 2019  Patient name: Beth Gaming  : 1965  MRN: [id number]  Referring provider: Luna Lanza MD  Dx:   Encounter Diagnoses   Name Primary?  Primary osteoarthritis of right hip Yes    It band syndrome, right        Start Time: 1715  Stop Time: 1800  Total time in clinic (min): 45 minutes    Subjective: Pt reports she has been consistent with HEP, reports navigating stairs continues to be painful, but low grade 1-2/10  Pain Ratin/10    Objective: See treatment diary below    Manual     IASTM Quad/ITB Quad/ITB Quad/ITB  quads itb quads itb quads itb  HS/gastroc quads itb  HS/gastroc quads itb   HS/gastroc Quad/ITB   LE Stretching Quad/ ITB Hip flexor/ ITB Hip flexor/ ITB  R hip mobs lat/inf        L ant innom             STM    Long axis traction           iliopsoas,oectineus,glut med pirifmors      3x:30 iliopsoas,oectineus,glut med pirifmors      3x:30 iliopsoas,oectineus,glut med pirifmors iliopsoas,oectineus,glut med pirifmors     MET                 Exercise Diary              Bridges   On ball 20x  30x 30x       Clamshells   Hip series 1x   20x 30x yellow      HS Stretch             Hip Flexor Str 1' 3x30" 3x:30    1 min  2x1' 1'   TVA hip add/abd   Bike 5 min          S/L hip abd      20x       Palloff Press      5x 5x blue 5x     Figure 4 stretch 3x:30 3x30" 3x:30  3x:30 3x:30 3x:30 3x:30 3x:30 3x:30   SL hip abd             SLS        Stand hip abd 20x flx/abd 20x     Side taps 20x/  forward taps 20x 20x 4in Lat stepping green 3x10    4in 20 4" 20x 2x10 20x  forward taps 20x   Iso hip abd    45 deg 20x5"    45 deg 20x5"      SAQ       20z5" 3lbs      Mini Squats 20x 20x high- low      20x 20x 20x   Prone hip ext 2x10 Keiserext /abd x15 ea 1 0 maura      20x     4 way walking 4 5 5x ea 5 0 5x ea       5x ea 4 5 5x ea   Prostretch 1 min 3x30"       1 min 1 min   Stand hip flex str 1 min         1 min   Standing hip flex 2x10 2x10 With step up 10x                           Modalities              Cryotherapy      5 min                                       Assessment: Pt reports some hip pain with bike, seat setting adjusted improved symptoms  Pt fatigued post treatment session, cued to avoid hip flexion with hip series  Plan: Continue per plan of care  Progress treatment as tolerated

## 2019-02-21 ENCOUNTER — OFFICE VISIT (OUTPATIENT)
Dept: PHYSICAL THERAPY | Facility: CLINIC | Age: 54
End: 2019-02-21
Payer: COMMERCIAL

## 2019-02-21 DIAGNOSIS — M76.31 IT BAND SYNDROME, RIGHT: ICD-10-CM

## 2019-02-21 DIAGNOSIS — M16.11 PRIMARY OSTEOARTHRITIS OF RIGHT HIP: Primary | ICD-10-CM

## 2019-02-21 PROCEDURE — 97140 MANUAL THERAPY 1/> REGIONS: CPT

## 2019-02-21 NOTE — PROGRESS NOTES
Daily Note     Today's date: 2019  Patient name: Deanna Mcdowell  : 1965  MRN: [id number]  Referring provider: Devendra Abbott MD  Dx:   Encounter Diagnoses   Name Primary?  Primary osteoarthritis of right hip Yes    It band syndrome, right        Start Time: 1600  Stop Time: 1640  Total time in clinic (min): 40 minutes    Subjective: Pt reports she over did it by going to the gym before work the other day  Reports of 3/10 pain at night  Is feeling better today  Pain Ratin/10    Objective: See treatment diary below      Manual     IASTM Quad/ITB Quad/ITB Quad/ITB Quad/ITB  quads itb quads itb  HS/gastroc quads itb  HS/gastroc quads itb   HS/gastroc Quad/ITB   LE Stretching Quad/ ITB Hip flexor/ ITB Hip flexor/ ITB Hip flexor/ ITB         L ant innom             STM    Long axis traction            iliopsoas,oectineus,glut med pirifmors      3x:30 iliopsoas,oectineus,glut med pirifmors iliopsoas,oectineus,glut med pirifmors     MET                 Exercise Diary              Bridges   On ball 20x On ball 20x  30x       Clamshells   Hip series 1x Hip series 1x  20x 30x yellow      HS Stretch             Hip Flexor Str 1' 3x30" 3x:30 3x :30   1 min  2x1' 1'   TVA hip add/abd   Bike 5 min          S/L hip abd      20x       Palloff Press      5x 5x blue 5x     Figure 4 stretch 3x:30 3x30" 3x:30 3x:30  3x:30 3x:30 3x:30 3x:30 3x:30   SL hip abd             SLS        Stand hip abd 20x flx/abd 20x     Side taps 20x/  forward taps 20x 20x 4in Lat stepping green 3x10    4in 20 4" 20x 2x10 20x  forward taps 20x   Iso hip abd    45 deg 20x5" 45/90 deg 10x5" ea   45 deg 20x5"      SAQ       20z5" 3lbs      Mini Squats 20x 20x high- low  20x high- low    20x 20x 20x   Prone hip ext 2x10 Keiserext /abd x15 ea 1 0 maura  Keiser4 way hip x10 ea 1 0 maura    20x     4 way walking 4 5 5x ea 5 0 5x ea       5x ea 4 5 5x ea   Prostretch 1 min 3x30"       1 min 1 min Stand hip flex str 1 min         1 min   Standing hip flex 2x10 2x10 With step up 10x                           Modalities              Cryotherapy      5 min                                     Assessment: Pt reports of maura hip fatigue at EOS but denies pain  Difficulty stabilizing on R LE with dynamic activity  Plan: Continue per plan of care  Progress treatment as tolerated

## 2019-02-25 ENCOUNTER — OFFICE VISIT (OUTPATIENT)
Dept: PHYSICAL THERAPY | Facility: CLINIC | Age: 54
End: 2019-02-25
Payer: COMMERCIAL

## 2019-02-25 DIAGNOSIS — M76.31 IT BAND SYNDROME, RIGHT: ICD-10-CM

## 2019-02-25 DIAGNOSIS — M16.11 PRIMARY OSTEOARTHRITIS OF RIGHT HIP: Primary | ICD-10-CM

## 2019-02-25 PROCEDURE — 97110 THERAPEUTIC EXERCISES: CPT

## 2019-02-25 PROCEDURE — 97140 MANUAL THERAPY 1/> REGIONS: CPT

## 2019-02-25 NOTE — PROGRESS NOTES
Daily Note     Today's date: 2019  Patient name: Deanna Mcdowell  : 1965  MRN: [id number]  Referring provider: Devendra Abbott MD  Dx:   Encounter Diagnoses   Name Primary?  Primary osteoarthritis of right hip Yes    It band syndrome, right        Start Time: 1745  Stop Time: 1830  Total time in clinic (min): 45 minutes    Subjective: Pt reports she was on her feet at work all day today, performed HEP before hand  Pt reports pain and ache in R hip currently  Pain Ratin/10    Objective: See treatment diary below      Manual     IASTM Quad/ITB Quad/ITB Quad/ITB Quad/ITB Quad/ITB   quads itb  HS/gastroc quads itb  HS/gastroc quads itb   HS/gastroc Quad/ITB   LE Stretching Quad/ ITB Hip flexor/ ITB Hip flexor/ ITB Hip flexor/ ITB         L ant innom             STM    Long axis traction           pectineus, iliopsoas,oectineus,glut med pirifmors        iliopsoas,oectineus,glut med pirifmors iliopsoas,oectineus,glut med pirifmors     MET                 Exercise Diary              Bridges   On ball 20x On ball 20x         Clamshells   Hip series 1x Hip series 1x   30x yellow      HS Stretch     1'        Hip Flexor Str 1' 3x30" 3x:30 3x :30 1'  1 min  2x1' 1'   TVA hip add/abd   Bike 5 min          S/L hip abd             Palloff Press       5x blue 5x     Figure 4 stretch 3x:30 3x30" 3x:30 3x:30 3x:30  3x:30 3x:30 3x:30 3x:30   SL hip abd             SLS        Stand hip abd 20x flx/abd 20x     Side taps 20x/  forward taps 20x 20x 4in Lat stepping green 3x10    4in 20 4" 20x 2x10 20x  forward taps 20x   Iso hip abd    45 deg 20x5" 45/90 deg 10x5" ea   45 deg 20x5"      SAQ       20z5" 3lbs      Mini Squats 20x 20x high- low  20x high- low    20x 20x 20x   Prone hip ext 2x10 Keiserext /abd x15 ea 1 0 maura  Keiser4 way hip x10 ea 1 0 maura    20x     4 way walking 4 5 5x ea 5 0 5x ea       5x ea 4 5 5x ea   Prostretch 1 min 3x30"   1 min    1 min 1 min   Stand hip flex str 1 min         1 min   Standing hip flex    Dead Bugs 2x10 2x10 With step up 10x          Hip Flexor Stretch     1 min            Modalities              Cryotherapy                                           Assessment: Pt demonstrates increased difficulty with hip flexion, instructed to rest heat/ice tonight for pain management  Pt also instructed to not perform HEP on days she is scheduled for OPPT  Plan: Continue per plan of care  Progress treatment as tolerated  Add Dead Bugs next session

## 2019-02-28 ENCOUNTER — TELEPHONE (OUTPATIENT)
Dept: OBGYN CLINIC | Facility: CLINIC | Age: 54
End: 2019-02-28

## 2019-03-01 ENCOUNTER — OFFICE VISIT (OUTPATIENT)
Dept: PHYSICAL THERAPY | Facility: CLINIC | Age: 54
End: 2019-03-01
Payer: COMMERCIAL

## 2019-03-01 DIAGNOSIS — M16.11 PRIMARY OSTEOARTHRITIS OF RIGHT HIP: Primary | ICD-10-CM

## 2019-03-01 DIAGNOSIS — M76.31 IT BAND SYNDROME, RIGHT: ICD-10-CM

## 2019-03-01 PROCEDURE — 97110 THERAPEUTIC EXERCISES: CPT

## 2019-03-01 PROCEDURE — 97140 MANUAL THERAPY 1/> REGIONS: CPT

## 2019-03-01 NOTE — PROGRESS NOTES
Daily Note     Today's date: 3/1/2019  Patient name: Torito Campo  : 1965  MRN: [id number]  Referring provider: Luis Alberto Hunter MD  Dx:   Encounter Diagnoses   Name Primary?  Primary osteoarthritis of right hip Yes    It band syndrome, right                   Subjective: Pt reports 4/10 pain in anterior right hip  States that she continues to have difficulties negotiating stairs and pain usually increases at the end of her shift  Objective: See treatment diary below      Manual  2/4 2/12 2/18 2/21 2/25 3/1  1/14 1/21 1/28   IASTM Quad/ITB Quad/ITB Quad/ITB Quad/ITB Quad/ITB  Quad/ITB   quads itb  HS/gastroc quads itb   HS/gastroc Quad/ITB   LE Stretching Quad/ ITB Hip flexor/ ITB Hip flexor/ ITB Hip flexor/ ITB  Hip flexor/ ITB       L ant innom             STM    Long axis traction           pectineus, iliopsoas,oectineus,glut med pirifmors         iliopsoas,oectineus,glut med pirifmors     MET                 Exercise Diary              Bridges   On ball 20x On ball 20x  On ball 20x       Clamshells   Hip series 1x Hip series 1x         HS Stretch     1' 1'       Hip Flexor Str 1' 3x30" 3x:30 3x :30 1'    2x1' 1'   TVA hip add/abd   Bike 5 min          S/L hip abd             Palloff Press        5x     Figure 4 stretch 3x:30 3x30" 3x:30 3x:30 3x:30   3x:30 3x:30 3x:30   SL hip abd             SLS         flx/abd 20x     Side taps 20x/  forward taps 20x 20x 4in Lat stepping green 3x10   Lat stepping green 3x10  4" 20x 2x10 20x  forward taps 20x   Iso hip abd    45 deg 20x5" 45/90 deg 10x5" ea  45/90 deg 10x5" ea       SAQ             Mini Squats 20x 20x high- low  20x high- low    20x 20x 20x   Prone hip ext 2x10 Keiserext /abd x15 ea 1 0 maura  Keiser4 way hip x10 ea 1 0 maura  Keiserext x20 ea 2 0 maura  20x     4 way walking 4 5 5x ea 5 0 5x ea       5x ea 4 5 5x ea   Prostretch 1 min 3x30"   1 min 1 min   1 min 1 min   Stand hip flex str 1 min         1 min   Standing hip flex     2x10 2x10 With step up 10x          Dead Bugs      2x10                    Hip flexor isos      manual x 10        Hip Flexor Stretch     1 min 1 min           Modalities              Cryotherapy                                           Assessment: Pt tolerated treatment well with no complaints of pain  Pt demonstrates difficulties with initiating hip flexion from a neutral position  Mod resistance given for hip flexor isometrics  Pt instructed on LE dead bugs and denies any discomfort, advised to add to HEP  Plan: Continue with plan of care to decrease pain, improve mobility, strength, and function

## 2019-03-04 ENCOUNTER — OFFICE VISIT (OUTPATIENT)
Dept: PHYSICAL THERAPY | Facility: CLINIC | Age: 54
End: 2019-03-04
Payer: COMMERCIAL

## 2019-03-04 DIAGNOSIS — M76.31 IT BAND SYNDROME, RIGHT: ICD-10-CM

## 2019-03-04 DIAGNOSIS — M16.11 PRIMARY OSTEOARTHRITIS OF RIGHT HIP: Primary | ICD-10-CM

## 2019-03-04 PROCEDURE — 97140 MANUAL THERAPY 1/> REGIONS: CPT

## 2019-03-04 NOTE — PROGRESS NOTES
Daily Note     Today's date: 3/4/2019  Patient name: Madiha Gary  : 1965  MRN: [id number]  Referring provider: Makenzie Estrada MD  Dx:   Encounter Diagnoses   Name Primary?  Primary osteoarthritis of right hip Yes    It band syndrome, right        Start Time: 1130  Stop Time: 1215  Total time in clinic (min): 45 minutes    Subjective: Pt reports of continued decrease in pain from the beginning of last week, but has mild ache with stairs still  Reports she saw her chiropractor this morning who adjusted her and gave her a belt to wear around her hips for support  Pain Ratin/10    Objective: See treatment diary below      Manual  2/4 2/12 2/18 2/21 2/25 3/1 3/4  1/21 1/28   IASTM Quad/ITB Quad/ITB Quad/ITB Quad/ITB Quad/ITB  Quad/ITB  Quad/ITB   quads itb   HS/gastroc Quad/ITB   LE Stretching Quad/ ITB Hip flexor/ ITB Hip flexor/ ITB Hip flexor/ ITB  Hip flexor/ ITB Hip flexor/ ITB      L ant innom             STM    Long axis traction           pectineus, iliopsoas,oectineus,glut med pirifmors              MET                 Exercise Diary              Bridges   On ball 20x On ball 20x  On ball 20x On ball 20x      Clamshells   Hip series 1x Hip series 1x         HS Stretch     1' 1'       Hip Flexor Str 1' 3x30" 3x:30 3x :30 1'  1'  2x1' 1'   TVA hip add/abd   Bike 5 min          S/L hip abd             Palloff Press             Figure 4 stretch 3x:30 3x30" 3x:30 3x:30 3x:30  3x:30  3x:30 3x:30   SL hip abd             SLS              Side taps 20x/  forward taps 20x 20x 4in Lat stepping green 3x10   Lat stepping green 3x10 Lat stepping green 3x10  2x10 20x  forward taps 20x   Iso hip abd    45 deg 20x5" 45/90 deg 10x5" ea  45/90 deg 10x5" ea 45/90 deg 10x5" ea      SAQ             Mini Squats 20x 20x high- low  20x high- low     20x 20x   Prone hip ext 2x10 Keiserext /abd x15 ea 1 0 maura  Keiser4 way hip x10 ea 1 0 maura  Keiserext x20 ea 2 0 maura Keiser4 way hip x10 ea 1 0 maura      4 way walking 4 5 5x ea 5 0 5x ea       5x ea 4 5 5x ea   Prostretch 1 min 3x30"   1 min 1 min 1 min  1 min 1 min   Stand hip flex str 1 min         1 min   Standing hip flex     2x10 2x10 With step up 10x          Dead Bugs      2x10 x10 maura                   Hip flexor isos      manual x 10  10x5"      Hip Flexor Stretch     1 min 1 min           Modalities              Cryotherapy                                           Assessment: Pt reports of pain with end range passive hip ER  Tolerated all activity without c/o  Reports of fatigue with hip flexor isometrics and dead bug  Plan: Continue per plan of care  Progress treatment as tolerated

## 2019-03-06 ENCOUNTER — OFFICE VISIT (OUTPATIENT)
Dept: PHYSICAL THERAPY | Facility: CLINIC | Age: 54
End: 2019-03-06
Payer: COMMERCIAL

## 2019-03-06 DIAGNOSIS — M16.11 PRIMARY OSTEOARTHRITIS OF RIGHT HIP: Primary | ICD-10-CM

## 2019-03-06 DIAGNOSIS — M76.31 IT BAND SYNDROME, RIGHT: ICD-10-CM

## 2019-03-06 PROCEDURE — G8978 MOBILITY CURRENT STATUS: HCPCS

## 2019-03-06 PROCEDURE — 97140 MANUAL THERAPY 1/> REGIONS: CPT

## 2019-03-06 PROCEDURE — G8979 MOBILITY GOAL STATUS: HCPCS

## 2019-03-06 PROCEDURE — 97110 THERAPEUTIC EXERCISES: CPT

## 2019-03-06 NOTE — PROGRESS NOTES
Daily Note     Today's date: 3/6/2019  Patient name: Jennifer Head  : 1965  MRN: [id number]  Referring provider: Vadim Colvin MD  Dx:   No diagnosis found                 Subjective: Pt reports   Pain Ratin/10    Objective: See treatment diary below      Manual  2/4 2/12 2/18 2/21 2/25 3/1 3/4 3/6  1/28   IASTM Quad/ITB Quad/ITB Quad/ITB Quad/ITB Quad/ITB  Quad/ITB  Quad/ITB  Quad/ITB   Quad/ITB   LE Stretching Quad/ ITB Hip flexor/ ITB Hip flexor/ ITB Hip flexor/ ITB  Hip flexor/ ITB Hip flexor/ ITB Hip flexor/ ITB     L ant innom             STM    Long axis traction           pectineus, iliopsoas,oectineus,glut med pirifmors              MET                 Exercise Diary              Bridges   On ball 20x On ball 20x  On ball 20x On ball 20x On ball 20x     Clamshells   Hip series 1x Hip series 1x         HS Stretch     1' 1'  1'     Hip Flexor Str 1' 3x30" 3x:30 3x :30 1'  1' 1'  1'   TVA hip add/abd   Bike 5 min          S/L hip abd             Palloff Press             Figure 4 stretch 3x:30 3x30" 3x:30 3x:30 3x:30  3x:30 3x:30  3x:30   SL hip abd             SLS              Side taps 20x/  forward taps 20x 20x 4in Lat stepping green 3x10   Lat stepping green 3x10 Lat stepping green 3x10   20x  forward taps 20x   Iso hip abd    45 deg 20x5" 45/90 deg 10x5" ea  45/90 deg 10x5" ea 45/90 deg 10x5" ea      SAQ             Mini Squats 20x 20x high- low  20x high- low      20x   Prone hip ext 2x10 Keiserext /abd x15 ea 1 0 maura  Keiser4 way hip x10 ea 1 0 maura  Keiserext x20 ea 2 0 maura Keiser4 way hip x10 ea 1 0 maura      4 way walking 4 5 5x ea 5 0 5x ea        4 5 5x ea   Prostretch 1 min 3x30"   1 min 1 min 1 min 1'  1 min   Stand hip flex str 1 min         1 min   Standing hip flex     2x10 2x10 With step up 10x          Dead Bugs      2x10 x10 maura 2x10                  Hip flexor isos      manual x 10  10x5"      Hip Flexor Stretch     1 min 1 min           Modalities              Cryotherapy Assessment: Pt reports       Plan: Continue per plan of care  Progress treatment as tolerated

## 2019-03-06 NOTE — PROGRESS NOTES
PT Re-Evaluation     Today's date: 3/6/2019  Patient name: Jass Godwin  : 1965  MRN: [id number]  Referring provider: Nani Bender MD  Dx:   Encounter Diagnosis     ICD-10-CM    1  Primary osteoarthritis of right hip M16 11    2  It band syndrome, right M76 31        Start Time: 1545  Stop Time: 1635  Total time in clinic (min): 50 minutes    Assessment  Assessment details: Jass Godwin has received 19 visits of physical therapy and reports 80 % improvement since initial evaluation  Pt demonstrates improved pain, increased strength, increased ROM, increased joint mobility, improved gait mechanics  and improved posture , and reports improvement with ambulate, I/ADLs and work  Pt continues to demonstrate pain, decreased strength, decreased ROM, ambulatory dysfunction and postural  dysfunction  Due to these remaining impairments, patient continues to have difficulty performing a/iadls, recreational activities, work-related activities and engaging in social activities  Patient would benefit from continued skilled physical therapy services to address their aforementioned functional limitations and and continue to progress towards prior level of function and independence with home exercise program      Impairments: abnormal muscle firing, abnormal or restricted ROM, activity intolerance, impaired balance, impaired physical strength, lacks appropriate home exercise program, pain with function and poor posture   Understanding of Dx/Px/POC: good   Prognosis: good    Goals  Short Term Goals: Target Date 18  1  Pt will initiate and advance HEP  (achieved)   2  Pt will report decreased pain frequency/intensity  (achieved)  3  Pt will demonstrate increased AROM  (achieved)  4  Pt will report increased ambulation tolerance  (achieved)       Long Term Goals: Target Date 19   1  Pt will demonstrate independence in HEP  (partially achieved)  2  Pt will report return to PLOF  (progressing towards)  3   Pt will navigate stairs step over step without limitations  (progressing towards)  4  Pt will demonstrate (-) Trendelenberg  (progressing towards)      Plan  Patient would benefit from: skilled PT  Planned modality interventions: cryotherapy, electrical stimulation/Russian stimulation and thermotherapy: hydrocollator packs  Planned therapy interventions: joint mobilization, manual therapy, patient education, postural training, activity modification, abdominal trunk stabilization, body mechanics training, flexibility, functional ROM exercises, graded exercise, home exercise program, neuromuscular re-education, strengthening, stretching, therapeutic activities, therapeutic exercise, motor coordination training, muscle pump exercises, gait training, balance/weight bearing training, ADL training and breathing training  Frequency: 2x week  Duration in weeks: 4  Plan of Care beginning date: 3/6/2019  Plan of Care expiration date: 2019  Treatment plan discussed with: patient        Subjective Evaluation    History of Present Illness  Date of surgery: 2017  Mechanism of injury: Pt reports increasing onset of R hip pain over the past year  Pt reports last week pain increased to impair her walking  Pt reports she sought Dr Thao Graves radiographs (+) OA  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 and referred to [facility]      3/6/19  Pt has received 19 sessions of physical therapy and reports 80% improvement since initial evaluaiton  Pt reports she is adjusting to new work shift of 10 hours  Pt reports significant fatigue at the end of her shift  Pt reports she has improved with all ADLs/IADLs, however stairs remain limited secondary to pain  Pt reports compliance with HEP             Not a recurrent problem   Quality of life: good    Pain  Current pain ratin  At best pain ratin  At worst pain ratin  Location: "C" sign, anterior hip   Quality: dull ache  Relieving factors: rest and medications  Aggravating factors: stair climbing and running  Progression: improved (lateral hip pain has improved, ant hip increased)    Social Support  Steps to enter house: yes  Stairs in house: yes   Lives in: multiple-level home  Lives with: spouse and adult children    Employment status: working  Exercise history: arc /elliptical 20-30 min 3-5x/week, upper body machines  Life stress: RN enestatist      Diagnostic Tests  X-ray: abnormal  Treatments  Current treatment: chiropractic and massage  Patient Goals  Patient goals for therapy: return to sport/leisure activities, return to work and decreased pain (Progressing towards)          Objective     Postural Observations    Additional Postural Observation Details  Pt stands with slight R lat shift, flattened T/S, slight L rotation, b/l pes planus R > L    Palpation     Right   Hypertonic in the rectus femoris  Active Range of Motion   Left Hip   Normal active range of motion    Right Hip   Flexion: 130 degrees   Abduction: 60 degrees   External rotation (prone): 60 degrees   Internal rotation (prone): 30 degrees     Additional Active Range of Motion Details  L/S  Flexion WFLs  Extension WFLs  Rotation WFLs  Side Bending WFLs     Strength/Myotome Testing     Left Hip   Planes of Motion   Flexion: 5  Extension: 4+  Abduction: 4+  External rotation: 4+  Internal rotation: 4+    Right Hip   Planes of Motion   Flexion: 4+  Extension: 4+  Abduction: 4  External rotation: 4  Internal rotation: 4    Right Knee   Flexion: 5  Extension: 5    Tests     Left Hip   Negative Ely's  90/90 SLR: Negative  Right Hip   Positive LAZARO and scour  Negative Ely's and Melba  90/90 SLR: Negative  Additional Tests Details  SLR R 25 degrees, L 60 degrees (not assessed today)    Functional Assessment        Single Leg Stance   Left: 18 seconds  Right: 22 seconds    Comments  Squat:  Pt is able to squat to approx 90 deg knee flexion without c/o    Pt demo good form without excessive trunk lean or ant knee translation      Flowsheet Rows      Most Recent Value   PT/OT G-Codes   Current Score  55   Projected Score  61   FOTO information reviewed  Yes   Assessment Type  Re-evaluation   G code set  Mobility: Walking & Moving Around   Mobility: Walking and Moving Around Current Status ()  CK   Mobility: Walking and Moving Around Goal Status ()  CJ          Manual  2/4 2/12 2/18 2/21 2/25 3/1 3/4 3/6  1/28   IASTM Quad/ITB Quad/ITB Quad/ITB Quad/ITB Quad/ITB  Quad/ITB  Quad/ITB  Quad/ITB   Quad/ITB   LE Stretching Quad/ ITB Hip flexor/ ITB Hip flexor/ ITB Hip flexor/ ITB  Hip flexor/ ITB Hip flexor/ ITB Hip flexor/ ITB     L ant innom             STM    Long axis traction           pectineus, iliopsoas,oectineus,glut med pirifmors              MET                 Exercise Diary              Bridges   On ball 20x On ball 20x  On ball 20x On ball 20x  30x     Clamshells   Hip series 1x Hip series 1x         HS Stretch     1' 1'  1'     Hip Flexor Str 1' 3x30" 3x:30 3x :30 1'  1' 1'  1'   TVA hip add/abd   Bike 5 min          S/L hip abd             Palloff Press             Figure 4 stretch 3x:30 3x30" 3x:30 3x:30 3x:30  3x:30 3x:30  3x:30   SL hip abd             SLS              Side taps 20x/  forward taps 20x 20x 4in Lat stepping green 3x10   Lat stepping green 3x10 Lat stepping green 3x10   20x  forward taps 20x   Iso hip abd    45 deg 20x5" 45/90 deg 10x5" ea  45/90 deg 10x5" ea 45/90 deg 10x5" ea      SAQ             Mini Squats 20x 20x high- low  20x high- low      20x   Prone hip ext 2x10 Keiserext /abd x15 ea 1 0 maura  Keiser4 way hip x10 ea 1 0 maura  Keiserext x20 ea 2 0 maura Keiser4 way hip x10 ea 1 0 maura      4 way walking 4 5 5x ea 5 0 5x ea        4 5 5x ea   Prostretch 1 min 3x30"   1 min 1 min 1 min 1'  1 min   Stand hip flex str 1 min         1 min   Standing hip flex     2x10 2x10 With step up 10x          Dead Bugs      2x10 x10 maura 2x10                  Hip flexor isos      manual x 10  10x5" 10x5"     Hip Flexor Stretch     1 min 1 min  1 min         Modalities              Cryotherapy

## 2019-03-26 ENCOUNTER — OFFICE VISIT (OUTPATIENT)
Dept: OBGYN CLINIC | Facility: CLINIC | Age: 54
End: 2019-03-26
Payer: COMMERCIAL

## 2019-03-26 VITALS — BODY MASS INDEX: 24.13 KG/M2 | HEIGHT: 65 IN

## 2019-03-26 DIAGNOSIS — M16.11 PRIMARY OSTEOARTHRITIS OF RIGHT HIP: Primary | ICD-10-CM

## 2019-03-26 DIAGNOSIS — M25.551 RIGHT HIP PAIN: ICD-10-CM

## 2019-03-26 PROCEDURE — 99213 OFFICE O/P EST LOW 20 MIN: CPT | Performed by: ORTHOPAEDIC SURGERY

## 2019-03-26 NOTE — PROGRESS NOTES
Assessment/Plan:  1  Primary osteoarthritis of right hip  MRI hip right wo contrast   2  Right hip pain  MRI hip right wo contrast       Scribe Attestation    I,:   Ana Rodriguez am acting as a scribe while in the presence of the attending physician :        I,:   Any Brooks MD personally performed the services described in this documentation    as scribed in my presence :            Unfortunately, and continues to struggle with persistent pain about her hip despite participating in formal physical therapy for the last few months  She does demonstrate improved range of motion on exam although she is still limited with internal rotation of the hip and she is still quite weak with hip flexion strength  At this time, I feel it is appropriate to obtain advanced imaging in the form of an MRI of her right hip to evaluate for stress fracture as conservative management is now failed to provide full relief of her symptoms  I did provide her with a prescription for this MRI today in the office  I explained that should the MRI be negative for stress fracture, we could consider a fluoroscopic guided corticosteroid injection for pain relief  I will follow up with Hand after she has had the MRI completed to review the results and discuss treatment options  Subjective: Peter Brady is a 46 y o  female who presents to the office today for follow-up evaluation for her right hip  She was previously diagnosed with osteoarthritis in the right hip and was prescribed formal physical therapy  She has been participating in formal therapy since December and does note improvement in her symptoms, but continues to struggle with daily pain  At today's visit, she complains of a daily and persistent mild to moderate pain about the anterior aspect of her hip that does radiate into the groin  She states that ascending and descending stairs are especially difficult for her    Her pain is somewhat better at rest   She does experience daily pain at work as well  She denies any acute injury or trauma  She denies any distal paresthesias of the lower extremity  Review of Systems   Constitutional: Negative for chills, fever and unexpected weight change  HENT: Negative for hearing loss, nosebleeds and sore throat  Eyes: Negative for pain, redness and visual disturbance  Respiratory: Negative for cough, shortness of breath and wheezing  Cardiovascular: Negative for chest pain, palpitations and leg swelling  Gastrointestinal: Negative for abdominal pain, nausea and vomiting  Endocrine: Negative for polydipsia and polyuria  Genitourinary: Negative for dysuria and hematuria  Musculoskeletal: Positive for arthralgias and myalgias  Negative for joint swelling  See HPI   Skin: Negative for rash and wound  Neurological: Negative for dizziness, numbness and headaches  Psychiatric/Behavioral: Negative for decreased concentration and suicidal ideas  The patient is not nervous/anxious            Past Medical History:   Diagnosis Date    Abnormal Pap smear of cervix 1987    Anxiety     High vitamin A level     History of anxiety     History of hypercholesterolemia     History of hypertension     History of obesity     HPV (human papilloma virus) infection 1987    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    Secondary hyperparathyroidism, non-renal ([facility] [address] )     Spinal stenosis        Past Surgical History:   Procedure Laterality Date    ABDOMINOPLASTY      COLPOSCOPY  1987    COMBINED AUGMENTATION MAMMAPLASTY AND ABDOMINOPLASTY  2001    GASTRIC BYPASS      TUBAL LIGATION         Family History   Problem Relation Age of Onset    Hypertension Mother     Heart disease Mother     Hypertension Father     Heart disease Father     No Known Problems Sister     No Known Problems Brother     No Known Problems Maternal Aunt     No Known Problems Maternal Uncle     No Known Problems Paternal Aunt     No Known Problems Paternal Uncle     No Known Problems Maternal Grandmother     No Known Problems Maternal Grandfather     No Known Problems Paternal Grandmother     No Known Problems Paternal Grandfather     ADD / ADHD Neg Hx     Anesthesia problems Neg Hx     Cancer Neg Hx     Clotting disorder Neg Hx     Collagen disease Neg Hx     Diabetes Neg Hx     Dislocations Neg Hx     Learning disabilities Neg Hx     Neurological problems Neg Hx     Osteoporosis Neg Hx     Rheumatologic disease Neg Hx     Scoliosis Neg Hx     Vascular Disease Neg Hx        Social History     Occupational History    Not on file   Tobacco Use    Smoking status: Never Smoker    Smokeless tobacco: Never Used   Substance and Sexual Activity    Alcohol use: No    Drug use: No    Sexual activity: Yes     Birth control/protection: Female Sterilization         Current Outpatient Medications:     Calcium Citrate 1040 MG TABS, Take by mouth 3 (three) times a day, Disp: , Rfl:     Cholecalciferol (VITAMIN D) 2000 units CAPS, Take by mouth, Disp: , Rfl:     ferrous sulfate 325 (65 Fe) mg tablet, Take 325 mg by mouth daily with breakfast, Disp: , Rfl:     No Known Allergies    Objective: There were no vitals filed for this visit  Right Hip Exam     Tenderness   The patient is experiencing no tenderness  Range of Motion   External rotation: 60   Internal rotation: 10     Muscle Strength   Flexion: 4/5     Other   Erythema: absent  Scars: absent  Sensation: normal  Pulse: present    Comments:  Log roll (-)            Physical Exam   Constitutional: She is oriented to person, place, and time  She appears well-developed and well-nourished  HENT:   Head: Normocephalic and atraumatic  Eyes: Pupils are equal, round, and reactive to light  Conjunctivae are normal    Neck: Normal range of motion  Neck supple  Cardiovascular: Normal rate and intact distal pulses  Pulmonary/Chest: Effort normal  No respiratory distress  Musculoskeletal:   As noted in HPI   Neurological: She is alert and oriented to person, place, and time  Skin: Skin is warm and dry  Psychiatric: She has a normal mood and affect  Her behavior is normal    Vitals reviewed  I have personally reviewed pertinent films in PACS and my interpretation is as follows: No imaging reviewed with the patient

## 2019-04-09 ENCOUNTER — HOSPITAL ENCOUNTER (OUTPATIENT)
Dept: RADIOLOGY | Facility: HOSPITAL | Age: 54
Discharge: HOME | End: 2019-04-09
Attending: ORTHOPAEDIC SURGERY
Payer: COMMERCIAL

## 2019-04-09 DIAGNOSIS — M16.11 PRIMARY OSTEOARTHRITIS OF RIGHT HIP: ICD-10-CM

## 2019-04-09 DIAGNOSIS — M25.551 RIGHT HIP PAIN: ICD-10-CM

## 2019-04-09 PROCEDURE — 73721 MRI JNT OF LWR EXTRE W/O DYE: CPT

## 2019-04-10 DIAGNOSIS — M25.451 HIP EFFUSION, RIGHT: Primary | ICD-10-CM

## 2019-04-11 ENCOUNTER — TRANSCRIBE ORDERS (OUTPATIENT)
Dept: ADMINISTRATIVE | Facility: HOSPITAL | Age: 54
End: 2019-04-11

## 2019-04-11 DIAGNOSIS — M25.451 RIGHT HIP JOINT EFFUSION: Primary | ICD-10-CM

## 2019-04-11 DIAGNOSIS — M25.451 HIP EFFUSION, RIGHT: Primary | ICD-10-CM

## 2019-04-16 ENCOUNTER — HOSPITAL ENCOUNTER (OUTPATIENT)
Dept: RADIOLOGY | Facility: HOSPITAL | Age: 54
Discharge: HOME | End: 2019-04-16

## 2019-04-26 ENCOUNTER — HOSPITAL ENCOUNTER (OUTPATIENT)
Dept: RADIOLOGY | Facility: HOSPITAL | Age: 54
Discharge: HOME | End: 2019-04-26

## 2019-04-26 ENCOUNTER — HOSPITAL ENCOUNTER (OUTPATIENT)
Dept: RADIOLOGY | Facility: HOSPITAL | Age: 54
Discharge: HOME | End: 2019-04-26
Attending: RADIOLOGY
Payer: COMMERCIAL

## 2019-04-26 DIAGNOSIS — M25.451 RIGHT HIP JOINT EFFUSION: ICD-10-CM

## 2019-04-26 PROCEDURE — 76882 US LMTD JT/FCL EVL NVASC XTR: CPT

## 2019-04-29 ENCOUNTER — ANNUAL EXAM (OUTPATIENT)
Dept: OBGYN CLINIC | Facility: CLINIC | Age: 54
End: 2019-04-29
Payer: COMMERCIAL

## 2019-04-29 VITALS — HEIGHT: 65 IN | DIASTOLIC BLOOD PRESSURE: 66 MMHG | SYSTOLIC BLOOD PRESSURE: 108 MMHG | BODY MASS INDEX: 24.5 KG/M2

## 2019-04-29 DIAGNOSIS — Z12.31 ENCOUNTER FOR SCREENING MAMMOGRAM FOR MALIGNANT NEOPLASM OF BREAST: ICD-10-CM

## 2019-04-29 DIAGNOSIS — Z01.419 WOMEN'S ANNUAL ROUTINE GYNECOLOGICAL EXAMINATION: Primary | ICD-10-CM

## 2019-04-29 PROCEDURE — S0612 ANNUAL GYNECOLOGICAL EXAMINA: HCPCS | Performed by: OBSTETRICS & GYNECOLOGY

## 2019-05-09 DIAGNOSIS — M25.551 HIP PAIN, ACUTE, RIGHT: Primary | ICD-10-CM

## 2019-05-20 ENCOUNTER — TELEPHONE (OUTPATIENT)
Dept: OBGYN CLINIC | Facility: CLINIC | Age: 54
End: 2019-05-20

## 2019-05-21 NOTE — PROGRESS NOTES
Uvaldo Barrett was attending physical therapy secondary to   1  Primary osteoarthritis of right hip     2  It band syndrome, right        Patient was seen for 19 Physical Therapy visits between 12/5/18 and 3/6/19  Trecia Gottron was reporting of improvement in function and was attempting to progress to indep with HEP  Patient had a re-evaluation on 3/6/19, but never made future appointments  She is to be discharged from skilled physical therapy services at this time secondary to interruption of care  See Progress Report from 3/6/19 for most recent objective measurements

## 2019-05-24 ENCOUNTER — HOSPITAL ENCOUNTER (OUTPATIENT)
Dept: RADIOLOGY | Facility: HOSPITAL | Age: 54
Discharge: HOME | End: 2019-05-24
Admitting: RADIOLOGY
Payer: COMMERCIAL

## 2019-05-24 DIAGNOSIS — M25.551 HIP PAIN, ACUTE, RIGHT: ICD-10-CM

## 2019-05-24 PROCEDURE — 77002 NEEDLE LOCALIZATION BY XRAY: CPT

## 2019-05-24 PROCEDURE — 20610 DRAIN/INJ JOINT/BURSA W/O US: CPT

## 2019-05-24 RX ORDER — METHYLPREDNISOLONE ACETATE 80 MG/ML
80 INJECTION, SUSPENSION INTRA-ARTICULAR; INTRALESIONAL; INTRAMUSCULAR; SOFT TISSUE
Status: COMPLETED | OUTPATIENT
Start: 2019-05-24 | End: 2019-05-24

## 2019-05-24 RX ORDER — LIDOCAINE HYDROCHLORIDE 10 MG/ML
7 INJECTION, SOLUTION INFILTRATION; PERINEURAL
Status: COMPLETED | OUTPATIENT
Start: 2019-05-24 | End: 2019-05-24

## 2019-05-24 RX ORDER — BUPIVACAINE HYDROCHLORIDE 2.5 MG/ML
2 INJECTION, SOLUTION EPIDURAL; INFILTRATION; INTRACAUDAL
Status: COMPLETED | OUTPATIENT
Start: 2019-05-24 | End: 2019-05-24

## 2019-05-24 RX ADMIN — METHYLPREDNISOLONE ACETATE 80 MG: 80 INJECTION, SUSPENSION INTRA-ARTICULAR; INTRALESIONAL; INTRAMUSCULAR; SOFT TISSUE at 14:14

## 2019-05-24 RX ADMIN — BUPIVACAINE HYDROCHLORIDE 2 ML: 2.5 INJECTION, SOLUTION EPIDURAL; INFILTRATION; INTRACAUDAL; PERINEURAL at 14:14

## 2019-05-24 RX ADMIN — LIDOCAINE HYDROCHLORIDE 7 ML: 10 INJECTION, SOLUTION INFILTRATION; PERINEURAL at 14:14

## 2019-05-24 RX ADMIN — IOHEXOL 5 ML: 240 INJECTION, SOLUTION INTRATHECAL; INTRAVASCULAR; INTRAVENOUS; ORAL at 14:14

## 2019-06-05 ENCOUNTER — TELEPHONE (OUTPATIENT)
Dept: BARIATRICS | Facility: CLINIC | Age: 54
End: 2019-06-05

## 2019-06-05 ENCOUNTER — OFFICE VISIT (OUTPATIENT)
Dept: BARIATRICS | Facility: CLINIC | Age: 54
End: 2019-06-05
Payer: COMMERCIAL

## 2019-06-05 VITALS
DIASTOLIC BLOOD PRESSURE: 72 MMHG | HEIGHT: 65 IN | WEIGHT: 149.25 LBS | SYSTOLIC BLOOD PRESSURE: 118 MMHG | RESPIRATION RATE: 16 BRPM | TEMPERATURE: 97.2 F | HEART RATE: 62 BPM | BODY MASS INDEX: 24.87 KG/M2

## 2019-06-05 DIAGNOSIS — K91.2 POSTSURGICAL MALABSORPTION: ICD-10-CM

## 2019-06-05 DIAGNOSIS — Z98.84 S/P GASTRIC BYPASS: Primary | ICD-10-CM

## 2019-06-05 PROBLEM — E53.8 LOW VITAMIN B12 LEVEL: Status: RESOLVED | Noted: 2018-06-04 | Resolved: 2019-06-05

## 2019-06-05 PROBLEM — R79.89 LOW VITAMIN B12 LEVEL: Status: RESOLVED | Noted: 2018-06-04 | Resolved: 2019-06-05

## 2019-06-05 PROCEDURE — 99214 OFFICE O/P EST MOD 30 MIN: CPT | Performed by: PHYSICIAN ASSISTANT

## 2019-06-10 ENCOUNTER — TELEPHONE (OUTPATIENT)
Dept: BARIATRICS | Facility: CLINIC | Age: 54
End: 2019-06-10

## 2019-06-19 ENCOUNTER — HOSPITAL ENCOUNTER (OUTPATIENT)
Dept: RADIOLOGY | Facility: HOSPITAL | Age: 54
Discharge: HOME | End: 2019-06-19
Attending: OBSTETRICS & GYNECOLOGY
Payer: COMMERCIAL

## 2019-06-19 VITALS — HEIGHT: 65 IN | WEIGHT: 147 LBS | BODY MASS INDEX: 24.49 KG/M2

## 2019-06-19 DIAGNOSIS — Z12.31 ENCOUNTER FOR SCREENING MAMMOGRAM FOR MALIGNANT NEOPLASM OF BREAST: ICD-10-CM

## 2019-06-19 PROCEDURE — 77067 SCR MAMMO BI INCL CAD: CPT

## 2019-06-19 PROCEDURE — 77063 BREAST TOMOSYNTHESIS BI: CPT

## 2019-07-17 ENCOUNTER — TELEPHONE (OUTPATIENT)
Dept: BARIATRICS | Facility: CLINIC | Age: 54
End: 2019-07-17

## 2019-07-17 NOTE — TELEPHONE ENCOUNTER
Spoke to the patient regarding most recent lab results - noted elevated iron (she has been taking Procare 45mg iron and now is taking the Nascobal iron supplement with 54mg of iron) - advised her to reduce iron intake to 18mg daily  Rest of vitamins WNL  Vitamin A and B1 pending - will f/u with results and only contact patient if abnormal  Will repeat lab annually

## 2019-07-19 LAB
25(OH)D3 SERPL-MCNC: 35 NG/ML (ref 30–100)
ALBUMIN SERPL-MCNC: 4.7 G/DL (ref 3.6–5.1)
ALBUMIN/GLOB SERPL: 2.5 (CALC) (ref 1–2.5)
ALP SERPL-CCNC: 60 U/L (ref 33–130)
ALT SERPL-CCNC: 22 U/L (ref 6–29)
AST SERPL-CCNC: 27 U/L (ref 10–35)
BASOPHILS # BLD AUTO: 31 CELLS/UL (ref 0–200)
BASOPHILS NFR BLD AUTO: 0.7 %
BILIRUB SERPL-MCNC: 0.6 MG/DL (ref 0.2–1.2)
BUN SERPL-MCNC: 10 MG/DL (ref 7–25)
BUN/CREAT SERPL: NORMAL (CALC) (ref 6–22)
CALCIUM SERPL-MCNC: 9.6 MG/DL (ref 8.6–10.4)
CALCIUM SERPL-MCNC: 9.6 MG/DL (ref 8.6–10.4)
CHLORIDE SERPL-SCNC: 103 MMOL/L (ref 98–110)
CHOLEST SERPL-MCNC: 206 MG/DL
CHOLEST/HDLC SERPL: 2.5 (CALC)
CO2 SERPL-SCNC: 31 MMOL/L (ref 20–32)
COPPER SERPL-MCNC: 92 MCG/DL (ref 70–175)
CREAT SERPL-MCNC: 0.75 MG/DL (ref 0.5–1.05)
EOSINOPHIL # BLD AUTO: 88 CELLS/UL (ref 15–500)
EOSINOPHIL NFR BLD AUTO: 2 %
ERYTHROCYTE [DISTWIDTH] IN BLOOD BY AUTOMATED COUNT: 12.4 % (ref 11–15)
FERRITIN SERPL-MCNC: 16 NG/ML (ref 16–232)
FOLATE SERPL-MCNC: >24 NG/ML
GLOBULIN SER CALC-MCNC: 1.9 G/DL (CALC) (ref 1.9–3.7)
GLUCOSE SERPL-MCNC: 81 MG/DL (ref 65–99)
HCT VFR BLD AUTO: 41.7 % (ref 35–45)
HDLC SERPL-MCNC: 84 MG/DL
HGB BLD-MCNC: 13.9 G/DL (ref 11.7–15.5)
IRON SATN MFR SERPL: 51 % (CALC) (ref 16–45)
IRON SERPL-MCNC: 211 MCG/DL (ref 45–160)
LDLC SERPL CALC-MCNC: 103 MG/DL (CALC)
LYMPHOCYTES # BLD AUTO: 1206 CELLS/UL (ref 850–3900)
LYMPHOCYTES NFR BLD AUTO: 27.4 %
MCH RBC QN AUTO: 30.5 PG (ref 27–33)
MCHC RBC AUTO-ENTMCNC: 33.3 G/DL (ref 32–36)
MCV RBC AUTO: 91.4 FL (ref 80–100)
MONOCYTES # BLD AUTO: 387 CELLS/UL (ref 200–950)
MONOCYTES NFR BLD AUTO: 8.8 %
NEUTROPHILS # BLD AUTO: 2688 CELLS/UL (ref 1500–7800)
NEUTROPHILS NFR BLD AUTO: 61.1 %
NONHDLC SERPL-MCNC: 122 MG/DL (CALC)
PLATELET # BLD AUTO: 209 THOUSAND/UL (ref 140–400)
PMV BLD REES-ECKER: 10.9 FL (ref 7.5–12.5)
POTASSIUM SERPL-SCNC: 5 MMOL/L (ref 3.5–5.3)
PROT SERPL-MCNC: 6.6 G/DL (ref 6.1–8.1)
PTH-INTACT SERPL-MCNC: 60 PG/ML (ref 14–64)
RBC # BLD AUTO: 4.56 MILLION/UL (ref 3.8–5.1)
SL AMB EGFR AFRICAN AMERICAN: 105 ML/MIN/1.73M2
SL AMB EGFR NON AFRICAN AMERICAN: 90 ML/MIN/1.73M2
SODIUM SERPL-SCNC: 140 MMOL/L (ref 135–146)
TIBC SERPL-MCNC: 414 MCG/DL (CALC) (ref 250–450)
TRIGL SERPL-MCNC: 101 MG/DL
VIT A SERPL-MCNC: 47 MCG/DL (ref 38–98)
VIT B1 BLD-SCNC: 203 NMOL/L (ref 78–185)
VIT B12 SERPL-MCNC: 1057 PG/ML (ref 200–1100)
WBC # BLD AUTO: 4.4 THOUSAND/UL (ref 3.8–10.8)
ZINC SERPL-MCNC: 105 MCG/DL (ref 60–130)

## 2019-07-23 ENCOUNTER — TELEPHONE (OUTPATIENT)
Dept: BARIATRICS | Facility: CLINIC | Age: 54
End: 2019-07-23

## 2019-07-23 DIAGNOSIS — K91.2 POSTSURGICAL MALABSORPTION: ICD-10-CM

## 2019-07-23 DIAGNOSIS — Z98.84 S/P GASTRIC BYPASS: Primary | ICD-10-CM

## 2019-07-23 NOTE — TELEPHONE ENCOUNTER
Notified patient that Thiamin levels are mildly elevated - advised her to obtain repeat labs in 1 month  Elevated levels likely lab error - advised her to stop all MVI for 3-5 days before obtaining repeat labs

## 2019-10-17 ENCOUNTER — TELEPHONE (OUTPATIENT)
Dept: OBGYN CLINIC | Facility: CLINIC | Age: 54
End: 2019-10-17

## 2019-10-17 NOTE — TELEPHONE ENCOUNTER
[facility] - last mammogram and annual exam faxed        [phone]

## 2019-11-15 DIAGNOSIS — M21.969 ACQUIRED DEFORMITY OF FOOT, UNSPECIFIED LATERALITY: Primary | ICD-10-CM

## 2019-11-15 RX ORDER — GABAPENTIN 100 MG/1
100 CAPSULE ORAL 3 TIMES DAILY
Qty: 90 CAPSULE | Refills: 0 | Status: SHIPPED | OUTPATIENT
Start: 2019-11-15 | End: 2019-12-15

## 2019-12-09 ENCOUNTER — OFFICE VISIT (OUTPATIENT)
Dept: PODIATRY | Facility: CLINIC | Age: 54
End: 2019-12-09
Payer: COMMERCIAL

## 2019-12-09 VITALS — HEIGHT: 65 IN | RESPIRATION RATE: 17 BRPM | WEIGHT: 147 LBS | BODY MASS INDEX: 24.49 KG/M2

## 2019-12-09 DIAGNOSIS — M79.671 PAIN IN BOTH FEET: ICD-10-CM

## 2019-12-09 DIAGNOSIS — M79.672 PAIN IN BOTH FEET: ICD-10-CM

## 2019-12-09 DIAGNOSIS — Q66.51 CONGENITAL PES PLANUS OF RIGHT FOOT: ICD-10-CM

## 2019-12-09 DIAGNOSIS — Q66.52 CONGENITAL PES PLANUS OF LEFT FOOT: ICD-10-CM

## 2019-12-09 DIAGNOSIS — M72.2 PLANTAR FASCIITIS: Primary | ICD-10-CM

## 2019-12-09 PROCEDURE — L3000 FT INSERT UCB BERKELEY SHELL: HCPCS | Performed by: PODIATRIST

## 2019-12-09 PROCEDURE — 99203 OFFICE O/P NEW LOW 30 MIN: CPT | Performed by: PODIATRIST

## 2019-12-09 PROCEDURE — 73620 X-RAY EXAM OF FOOT: CPT | Performed by: PODIATRIST

## 2019-12-09 RX ORDER — GABAPENTIN 300 MG/1
300 CAPSULE ORAL 2 TIMES DAILY
Qty: 60 CAPSULE | Refills: 0 | Status: SHIPPED | OUTPATIENT
Start: 2019-12-09 | End: 2020-01-08

## 2019-12-09 NOTE — PROGRESS NOTES
Assessment/Plan:  Plantar fasciitis  Pes planus bilateral   Pain upon ambulation  Rule out fibromyalgia  Rule out inflammatory arthropathy  Plan  Foot exam performed  X-rays taken  Will increase gabapentin dosing  Patient's feet casted for custom molded foot orthotics  Blood work is being ordered to rule out inflammatory arthropathy and Lyme disease    No problem-specific Assessment & Plan notes found for this encounter  Diagnoses and all orders for this visit:    Plantar fasciitis  -     gabapentin (NEURONTIN) 300 mg capsule; Take 1 capsule (300 mg total) by mouth 2 (two) times a day    Pain in both feet  -     gabapentin (NEURONTIN) 300 mg capsule; Take 1 capsule (300 mg total) by mouth 2 (two) times a day    Congenital pes planus of right foot    Congenital pes planus of left foot          Subjective:  Patient has pain in her feet  Patient has pain with ambulation  She has pain in the heel area  No history of trauma  This has been ongoing for several months  Patient has been taking gabapentin gives her significant relief      Past Medical History:   Diagnosis Date    Abnormal Pap smear of cervix 1987    Anxiety     High vitamin A level     History of anxiety     History of hypercholesterolemia     History of hypertension     History of obesity     HPV (human papilloma virus) infection 1987    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    Secondary hyperparathyroidism, non-renal ([facility] [address] )     Spinal stenosis        Past Surgical History:   Procedure Laterality Date    ABDOMINOPLASTY      COLPOSCOPY  1987    COMBINED AUGMENTATION MAMMAPLASTY AND ABDOMINOPLASTY  2001    FL INJECTION RIGHT HIP (NON ARTHROGRAM)  5/24/2019    GASTRIC BYPASS      TUBAL LIGATION         No Known Allergies      Current Outpatient Medications:     ALPRAZolam (XANAX) 0 25 mg tablet, , Disp: , Rfl:     Calcium Citrate 1040 MG TABS, Take by mouth 3 (three) times a day, Disp: , Rfl:     Cholecalciferol (VITAMIN D) 2000 units CAPS, Take by mouth, Disp: , Rfl:     Cyanocobalamin (NASCOBAL) 500 MCG/0 1ML SOLN, 0 1 mL (500 mcg total) into each nostril once a week Send Tablets for other Vitamins, Disp: 4 Bottle, Rfl: 12    ferrous sulfate 325 (65 Fe) mg tablet, Take 325 mg by mouth daily with breakfast, Disp: , Rfl:     gabapentin (NEURONTIN) 100 mg capsule, Take 1 capsule (100 mg total) by mouth 3 (three) times a day, Disp: 90 capsule, Rfl: 0    gabapentin (NEURONTIN) 300 mg capsule, Take 1 capsule (300 mg total) by mouth 2 (two) times a day, Disp: 60 capsule, Rfl: 0    Multiple Vitamins-Minerals (BARIATRIC MULTIVITAMINS/IRON PO), Take by mouth daily, Disp: , Rfl:     Patient Active Problem List   Diagnosis    Elevated parathyroid hormone    Postsurgical malabsorption    Secondary non-renal hyperparathyroidism ([facility] [address]    Spinal stenosis    S/P gastric bypass          Patient ID: Mary Castillo is a 53 y o  female  HPI    The following portions of the patient's history were reviewed and updated as appropriate:     family history includes Heart disease in her father and mother; Hypertension in her father and mother; No Known Problems in her brother, maternal aunt, maternal grandfather, maternal grandmother, maternal uncle, paternal aunt, paternal grandfather, paternal grandmother, paternal uncle, and sister  reports that she has never smoked  She has never used smokeless tobacco  She reports that she does not drink alcohol or use drugs  Vitals:    12/09/19 1357   Resp: 17       Review of Systems      Objective:  Patient's shoes and socks removed     Foot Exam    General  General Appearance: appears stated age and healthy   Orientation: alert and oriented to person, place, and time   Affect: appropriate   Gait: antalgic       Right Foot/Ankle     Inspection and Palpation  Ecchymosis: none  Tenderness: calcaneus tenderness, bony tenderness and plantar fascia   Swelling: none   Arch: pes planus  Hammertoes: fifth toe  Hallux valgus: yes  Hallux limitus: yes  Skin Exam: callus and dry skin;     Neurovascular  Dorsalis pedis: 3+  Posterior tibial: 3+  Saphenous nerve sensation: normal  Tibial nerve sensation: normal  Superficial peroneal nerve sensation: normal  Deep peroneal nerve sensation: normal  Sural nerve sensation: normal      Left Foot/Ankle      Inspection and Palpation  Ecchymosis: none  Tenderness: bony tenderness, plantar fascia and calcaneus tenderness   Swelling: none   Arch: pes planus  Hammertoes: fifth toe  Hallux valgus: yes  Hallux limitus: yes  Skin Exam: callus and dry skin;     Neurovascular  Dorsalis pedis: 3+  Posterior tibial: 3+  Saphenous nerve sensation: normal  Tibial nerve sensation: normal  Superficial peroneal nerve sensation: normal  Deep peroneal nerve sensation: normal  Sural nerve sensation: normal        Physical Exam   Constitutional: She is oriented to person, place, and time  She appears well-developed and well-nourished  Cardiovascular: Normal rate and regular rhythm  Pulses:       Dorsalis pedis pulses are 3+ on the right side, and 3+ on the left side  Posterior tibial pulses are 3+ on the right side, and 3+ on the left side  Musculoskeletal: She exhibits edema, tenderness and deformity  Right foot: There is bony tenderness  Left foot: There is bony tenderness  Patient is pronated in stance and gait  There is pain with palpation plantar fascia insertion bilateral     X-ray  Plantar calcaneal spurring noted bilateral   No evidence of fracture or bony mass   Feet:   Right Foot:   Skin Integrity: Positive for callus and dry skin  Left Foot:   Skin Integrity: Positive for callus and dry skin  Neurological: She is alert and oriented to person, place, and time  Skin: Skin is warm and dry  Capillary refill takes less than 2 seconds  Psychiatric: She has a normal mood and affect   Her behavior is normal  Judgment and thought content normal    Vitals reviewed          Procedures

## 2019-12-10 ENCOUNTER — TELEPHONE (OUTPATIENT)
Dept: PODIATRY | Facility: CLINIC | Age: 54
End: 2019-12-10

## 2019-12-11 LAB
ANA SER QL: POSITIVE
B BURGDOR IGG PATRN SER IB-IMP: NEGATIVE
B BURGDOR IGG+IGM SER-ACNC: <0.91 ISR (ref 0–0.9)
B BURGDOR IGM PATRN SER IB-IMP: NEGATIVE
B BURGDOR18KD IGG SER QL IB: PRESENT
B BURGDOR23KD IGG SER QL IB: ABNORMAL
B BURGDOR23KD IGM SER QL IB: ABNORMAL
B BURGDOR28KD IGG SER QL IB: ABNORMAL
B BURGDOR30KD IGG SER QL IB: ABNORMAL
B BURGDOR39KD IGG SER QL IB: ABNORMAL
B BURGDOR39KD IGM SER QL IB: ABNORMAL
B BURGDOR41KD IGG SER QL IB: ABNORMAL
B BURGDOR41KD IGM SER QL IB: ABNORMAL
B BURGDOR45KD IGG SER QL IB: ABNORMAL
B BURGDOR58KD IGG SER QL IB: ABNORMAL
B BURGDOR66KD IGG SER QL IB: ABNORMAL
B BURGDOR93KD IGG SER QL IB: ABNORMAL
ERYTHROCYTE [SEDIMENTATION RATE] IN BLOOD BY WESTERGREN METHOD: 3 MM/HR (ref 0–40)
RHEUMATOID FACT SERPL-ACNC: 10.6 IU/ML (ref 0–13.9)

## 2019-12-17 ENCOUNTER — TELEPHONE (OUTPATIENT)
Dept: OTHER | Facility: HOSPITAL | Age: 54
End: 2019-12-17

## 2019-12-17 DIAGNOSIS — R79.0 LOW FERRITIN: ICD-10-CM

## 2019-12-17 DIAGNOSIS — Z98.84 S/P GASTRIC BYPASS: ICD-10-CM

## 2019-12-17 DIAGNOSIS — K91.2 POSTSURGICAL MALABSORPTION: Primary | ICD-10-CM

## 2020-01-09 ENCOUNTER — OFFICE VISIT (OUTPATIENT)
Dept: PODIATRY | Facility: CLINIC | Age: 55
End: 2020-01-09
Payer: COMMERCIAL

## 2020-01-09 ENCOUNTER — OFFICE VISIT (OUTPATIENT)
Dept: OBGYN CLINIC | Facility: CLINIC | Age: 55
End: 2020-01-09
Payer: COMMERCIAL

## 2020-01-09 VITALS — RESPIRATION RATE: 17 BRPM | WEIGHT: 147 LBS | HEIGHT: 65 IN | BODY MASS INDEX: 24.49 KG/M2

## 2020-01-09 VITALS
HEART RATE: 54 BPM | BODY MASS INDEX: 24.99 KG/M2 | WEIGHT: 150 LBS | DIASTOLIC BLOOD PRESSURE: 95 MMHG | SYSTOLIC BLOOD PRESSURE: 157 MMHG | HEIGHT: 65 IN

## 2020-01-09 DIAGNOSIS — M72.2 PLANTAR FASCIITIS: Primary | ICD-10-CM

## 2020-01-09 DIAGNOSIS — M18.12 DEGENERATIVE ARTHRITIS OF THUMB, LEFT: ICD-10-CM

## 2020-01-09 DIAGNOSIS — M18.12 ARTHRITIS OF CARPOMETACARPAL (CMC) JOINT OF LEFT THUMB: Primary | ICD-10-CM

## 2020-01-09 DIAGNOSIS — M79.671 RIGHT FOOT PAIN: ICD-10-CM

## 2020-01-09 DIAGNOSIS — G57.51 TARSAL TUNNEL SYNDROME, RIGHT: ICD-10-CM

## 2020-01-09 PROCEDURE — 29540 STRAPPING ANKLE &/FOOT: CPT | Performed by: PODIATRIST

## 2020-01-09 PROCEDURE — 20550 NJX 1 TENDON SHEATH/LIGAMENT: CPT | Performed by: PODIATRIST

## 2020-01-09 PROCEDURE — 99213 OFFICE O/P EST LOW 20 MIN: CPT | Performed by: ORTHOPAEDIC SURGERY

## 2020-01-09 PROCEDURE — 99212 OFFICE O/P EST SF 10 MIN: CPT | Performed by: PODIATRIST

## 2020-01-09 PROCEDURE — 20600 DRAIN/INJ JOINT/BURSA W/O US: CPT | Performed by: ORTHOPAEDIC SURGERY

## 2020-01-09 RX ORDER — TRIAMCINOLONE ACETONIDE 40 MG/ML
20 INJECTION, SUSPENSION INTRA-ARTICULAR; INTRAMUSCULAR
Status: COMPLETED | OUTPATIENT
Start: 2020-01-09 | End: 2020-01-09

## 2020-01-09 RX ORDER — MELOXICAM 7.5 MG/1
7.5 TABLET ORAL DAILY
Qty: 10 TABLET | Refills: 0 | Status: SHIPPED | OUTPATIENT
Start: 2020-01-09 | End: 2020-01-19

## 2020-01-09 RX ORDER — LIDOCAINE HYDROCHLORIDE 5 MG/ML
0.5 INJECTION, SOLUTION INFILTRATION; PERINEURAL
Status: COMPLETED | OUTPATIENT
Start: 2020-01-09 | End: 2020-01-09

## 2020-01-09 RX ORDER — PREGABALIN 75 MG/1
75 CAPSULE ORAL 2 TIMES DAILY
Qty: 60 CAPSULE | Refills: 0 | Status: SHIPPED | OUTPATIENT
Start: 2020-01-09 | End: 2021-11-23

## 2020-01-09 RX ADMIN — LIDOCAINE HYDROCHLORIDE 0.5 ML: 5 INJECTION, SOLUTION INFILTRATION; PERINEURAL at 14:25

## 2020-01-09 RX ADMIN — TRIAMCINOLONE ACETONIDE 20 MG: 40 INJECTION, SUSPENSION INTRA-ARTICULAR; INTRAMUSCULAR at 14:25

## 2020-01-09 NOTE — PROGRESS NOTES
Assessment/Plan:  1  Arthritis of carpometacarpal (CMC) joint of left thumb  Small joint arthrocentesis: L thumb CMC   2  Degenerative arthritis of thumb, left  Small joint arthrocentesis: L thumb MCP       Scribe Attestation    I,:   Lala Arguello MA am acting as a scribe while in the presence of the attending physician :        I,:   Jairo Franklin DO personally performed the services described in this documentation    as scribed in my presence :              Xiomara Vael is doing well  She did see good relief from previous left thumb CMC injection  A repeat left thumb CMC injection was discussed  She was agreeable to this and this was performed in the office today without any complications  Patient does also have MCP arthritis left thumb  Treatment options for this were discussed in the form of left MCP injection she was agreeable to this and this was also performed in the office today without any complications  She may follow up with me as needed  Subjective: Austin Lopez is a 53 y o  female who presents to the office today for follow up evaluation left thumb CMC arthritis  Patient underwent a steroid injection at her last visit on 1/21/19 which she states provided her with good pain relief  Patient notes pain to the MCP and CMC joint  She states this is increased with pinch and   She denies any numbness or tingling  Review of Systems   Constitutional: Negative for chills and fever  HENT: Negative for drooling and sneezing  Eyes: Negative for redness  Respiratory: Negative for cough and wheezing  Gastrointestinal: Negative for nausea and vomiting  Musculoskeletal: Negative for arthralgias, joint swelling and myalgias  Neurological: Negative for weakness and numbness  Psychiatric/Behavioral: Negative for behavioral problems  The patient is not nervous/anxious            Past Medical History:   Diagnosis Date    Abnormal Pap smear of cervix 1987    Anxiety     High vitamin A level     History of anxiety     History of hypercholesterolemia     History of hypertension     History of obesity     HPV (human papilloma virus) infection 1987    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    Secondary hyperparathyroidism, non-renal (HCC)     Spinal stenosis        Past Surgical History:   Procedure Laterality Date    ABDOMINOPLASTY      COLPOSCOPY  1987    COMBINED AUGMENTATION MAMMAPLASTY AND ABDOMINOPLASTY  2001    FL INJECTION RIGHT HIP (NON ARTHROGRAM)  5/24/2019    GASTRIC BYPASS      TUBAL LIGATION         Family History   Problem Relation Age of Onset    Hypertension Mother     Heart disease Mother     Hypertension Father     Heart disease Father     No Known Problems Sister     No Known Problems Brother     No Known Problems Maternal Aunt     No Known Problems Maternal Uncle     No Known Problems Paternal Aunt     No Known Problems Paternal Uncle     No Known Problems Maternal Grandmother     No Known Problems Maternal Grandfather     No Known Problems Paternal Grandmother     No Known Problems Paternal Grandfather     ADD / ADHD Neg Hx     Anesthesia problems Neg Hx     Cancer Neg Hx     Clotting disorder Neg Hx     Collagen disease Neg Hx     Diabetes Neg Hx     Dislocations Neg Hx     Learning disabilities Neg Hx     Neurological problems Neg Hx     Osteoporosis Neg Hx     Rheumatologic disease Neg Hx     Scoliosis Neg Hx     Vascular Disease Neg Hx        Social History     Occupational History    Not on file   Tobacco Use    Smoking status: Never Smoker    Smokeless tobacco: Never Used   Substance and Sexual Activity    Alcohol use: No    Drug use: No    Sexual activity: Yes     Partners: Male     Birth control/protection: Female Sterilization         Current Outpatient Medications:     ALPRAZolam (XANAX) 0 25 mg tablet, , Disp: , Rfl:     Calcium Citrate 1040 MG TABS, Take by mouth 3 (three) times a day, Disp: , Rfl:     Cholecalciferol (VITAMIN D) 2000 units CAPS, Take by mouth, Disp: , Rfl:     Cyanocobalamin (NASCOBAL) 500 MCG/0 1ML SOLN, 0 1 mL (500 mcg total) into each nostril once a week Send Tablets for other Vitamins, Disp: 4 Bottle, Rfl: 12    ferrous sulfate 325 (65 Fe) mg tablet, Take 325 mg by mouth daily with breakfast, Disp: , Rfl:     meloxicam (MOBIC) 7 5 mg tablet, Take 1 tablet (7 5 mg total) by mouth daily for 10 days, Disp: 10 tablet, Rfl: 0    Multiple Vitamins-Minerals (BARIATRIC MULTIVITAMINS/IRON PO), Take by mouth daily, Disp: , Rfl:     pregabalin (LYRICA) 75 mg capsule, Take 1 capsule (75 mg total) by mouth 2 (two) times a day, Disp: 60 capsule, Rfl: 0    gabapentin (NEURONTIN) 100 mg capsule, Take 1 capsule (100 mg total) by mouth 3 (three) times a day, Disp: 90 capsule, Rfl: 0    gabapentin (NEURONTIN) 300 mg capsule, Take 1 capsule (300 mg total) by mouth 2 (two) times a day, Disp: 60 capsule, Rfl: 0    No Known Allergies    Objective:  Vitals:    01/09/20 1403   BP: 157/95   Pulse: (!) 54       Ortho Exam     Left thumb    TTP CMC  TTP MCP  EPL FPL intact  Compartments soft  Brisk capillary refill  S/m intact median, radial, and ulnar nerve     Physical Exam   Constitutional: She is oriented to person, place, and time  She appears well-developed and well-nourished  HENT:   Head: Normocephalic and atraumatic  Eyes: Conjunctivae are normal  Right eye exhibits no discharge  Left eye exhibits no discharge  Neck: Normal range of motion  Neck supple  Cardiovascular: Normal rate and intact distal pulses  Pulmonary/Chest: Effort normal  No respiratory distress  Musculoskeletal:   As noted in HPI   Neurological: She is alert and oriented to person, place, and time  Skin: Skin is warm and dry  Psychiatric: She has a normal mood and affect   Her behavior is normal  Judgment and thought content normal    Small joint arthrocentesis: L thumb CMC  Date/Time: 1/9/2020 2:25 PM  Consent given by: patient  Site marked: site marked  Timeout: Immediately prior to procedure a time out was called to verify the correct patient, procedure, equipment, support staff and site/side marked as required   Supporting Documentation  Indications: pain   Procedure Details  Location: thumb - L thumb CMC  Preparation: Patient was prepped and draped in the usual sterile fashion  Needle size: 27 G  Ultrasound guidance: no  Medications administered: 0 5 mL lidocaine 0 5 %; 20 mg triamcinolone acetonide 40 mg/mL    Patient tolerance: patient tolerated the procedure well with no immediate complications  Dressing:  Sterile dressing applied    Small joint arthrocentesis: L thumb MCP  Date/Time: 1/9/2020 2:25 PM  Consent given by: patient  Site marked: site marked  Timeout: Immediately prior to procedure a time out was called to verify the correct patient, procedure, equipment, support staff and site/side marked as required   Supporting Documentation  Indications: pain   Procedure Details  Location: thumb - L thumb MCP  Preparation: Patient was prepped and draped in the usual sterile fashion  Needle size: 27 G  Ultrasound guidance: no  Medications administered: 0 5 mL lidocaine 0 5 %; 20 mg triamcinolone acetonide 40 mg/mL    Patient tolerance: patient tolerated the procedure well with no immediate complications  Dressing:  Sterile dressing applied

## 2020-01-09 NOTE — PROGRESS NOTES
Assessment/Plan:  Plantar fasciitis right foot  Fibromyalgia  Rule out tarsal tunnel syndrome  Rule out calcaneal stress fracture  Plan  Right heel trigger point injection done  1 25 cc of Kenalog 10 injected into right heel without pain or complication  Right arch bound a low dye arch strapping fashion  MRI will be ordered to rule out tarsal tunnel syndrome  Or calcaneal stress fracture  Mobic added  Patient would like to change from gabapentin to Lyrica  We will order Lyrica 75 b i d   No problem-specific Assessment & Plan notes found for this encounter  Diagnoses and all orders for this visit:    Plantar fasciitis  -     MRI ankle/heel right  wo contrast; Future  -     meloxicam (MOBIC) 7 5 mg tablet; Take 1 tablet (7 5 mg total) by mouth daily for 10 days  -     pregabalin (LYRICA) 75 mg capsule; Take 1 capsule (75 mg total) by mouth 2 (two) times a day    Right foot pain  -     MRI ankle/heel right  wo contrast; Future  -     meloxicam (MOBIC) 7 5 mg tablet; Take 1 tablet (7 5 mg total) by mouth daily for 10 days  -     pregabalin (LYRICA) 75 mg capsule; Take 1 capsule (75 mg total) by mouth 2 (two) times a day    Tarsal tunnel syndrome, right  -     MRI ankle/heel right  wo contrast; Future  -     meloxicam (MOBIC) 7 5 mg tablet; Take 1 tablet (7 5 mg total) by mouth daily for 10 days  -     pregabalin (LYRICA) 75 mg capsule; Take 1 capsule (75 mg total) by mouth 2 (two) times a day          Subjective:  Patient had been doing very well  However she has return of right heel pain  She has pain at the end of the day  She occasionally feels cramping in her arch      Past Medical History:   Diagnosis Date    Abnormal Pap smear of cervix 1987    Anxiety     High vitamin A level     History of anxiety     History of hypercholesterolemia     History of hypertension     History of obesity     HPV (human papilloma virus) infection 1987    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    Secondary hyperparathyroidism, non-renal ([facility] [address]     Spinal stenosis        Past Surgical History:   Procedure Laterality Date    ABDOMINOPLASTY      COLPOSCOPY  1987    COMBINED AUGMENTATION MAMMAPLASTY AND ABDOMINOPLASTY  2001    FL INJECTION RIGHT HIP (NON ARTHROGRAM)  5/24/2019    GASTRIC BYPASS      TUBAL LIGATION         No Known Allergies      Current Outpatient Medications:     ALPRAZolam (XANAX) 0 25 mg tablet, , Disp: , Rfl:     Calcium Citrate 1040 MG TABS, Take by mouth 3 (three) times a day, Disp: , Rfl:     Cholecalciferol (VITAMIN D) 2000 units CAPS, Take by mouth, Disp: , Rfl:     Cyanocobalamin (NASCOBAL) 500 MCG/0 1ML SOLN, 0 1 mL (500 mcg total) into each nostril once a week Send Tablets for other Vitamins, Disp: 4 Bottle, Rfl: 12    ferrous sulfate 325 (65 Fe) mg tablet, Take 325 mg by mouth daily with breakfast, Disp: , Rfl:     gabapentin (NEURONTIN) 100 mg capsule, Take 1 capsule (100 mg total) by mouth 3 (three) times a day, Disp: 90 capsule, Rfl: 0    gabapentin (NEURONTIN) 300 mg capsule, Take 1 capsule (300 mg total) by mouth 2 (two) times a day, Disp: 60 capsule, Rfl: 0    meloxicam (MOBIC) 7 5 mg tablet, Take 1 tablet (7 5 mg total) by mouth daily for 10 days, Disp: 10 tablet, Rfl: 0    Multiple Vitamins-Minerals (BARIATRIC MULTIVITAMINS/IRON PO), Take by mouth daily, Disp: , Rfl:     pregabalin (LYRICA) 75 mg capsule, Take 1 capsule (75 mg total) by mouth 2 (two) times a day, Disp: 60 capsule, Rfl: 0    Patient Active Problem List   Diagnosis    Elevated parathyroid hormone    Postsurgical malabsorption    Secondary non-renal hyperparathyroidism (HCC)    Spinal stenosis    S/P gastric bypass          Patient ID: Genaro Sky is a 53 y o  female      HPI    The following portions of the patient's history were reviewed and updated as appropriate:     family history includes Heart disease in her father and mother; Hypertension in her father and mother; No Known Problems in her brother, maternal aunt, maternal grandfather, maternal grandmother, maternal uncle, paternal aunt, paternal grandfather, paternal grandmother, paternal uncle, and sister  reports that she has never smoked  She has never used smokeless tobacco  She reports that she does not drink alcohol or use drugs  Vitals:    01/09/20 1255   Resp: 17       Review of Systems      Objective:  Patient's shoes and socks removed  Foot ExamPhysical Exam       Foot Exam     General  General Appearance: appears stated age and healthy   Orientation: alert and oriented to person, place, and time   Affect: appropriate   Gait: antalgic         Right Foot/Ankle      Inspection and Palpation  Ecchymosis: none  Tenderness: calcaneus tenderness, bony tenderness and plantar fascia   Swelling: none   Arch: pes planus  Hammertoes: fifth toe  Hallux valgus: yes  Hallux limitus: yes  Skin Exam: callus and dry skin;      Neurovascular  Dorsalis pedis: 3+  Posterior tibial: 3+  Saphenous nerve sensation: normal  Tibial nerve sensation: normal  Superficial peroneal nerve sensation: normal  Deep peroneal nerve sensation: normal  Sural nerve sensation: normal        Left Foot/Ankle       Inspection and Palpation  Ecchymosis: none  Tenderness: bony tenderness, plantar fascia and calcaneus tenderness   Swelling: none   Arch: pes planus  Hammertoes: fifth toe  Hallux valgus: yes  Hallux limitus: yes  Skin Exam: callus and dry skin;      Neurovascular  Dorsalis pedis: 3+  Posterior tibial: 3+  Saphenous nerve sensation: normal  Tibial nerve sensation: normal  Superficial peroneal nerve sensation: normal  Deep peroneal nerve sensation: normal  Sural nerve sensation: normal           Physical Exam   Constitutional: She is oriented to person, place, and time  She appears well-developed and well-nourished  Cardiovascular: Normal rate and regular rhythm  Pulses:       Dorsalis pedis pulses are 3+ on the right side, and 3+ on the left side  Posterior tibial pulses are 3+ on the right side, and 3+ on the left side  Musculoskeletal: She exhibits edema, tenderness and deformity  Right foot: There is bony tenderness  Left foot: There is bony tenderness  Patient is pronated in stance and gait  There is pain with palpation plantar fascia insertion bilateral     X-ray  Plantar calcaneal spurring noted bilateral   No evidence of fracture or bony mass   Feet:   Right Foot:   Skin Integrity: Positive for callus and dry skin  Left Foot:   Skin Integrity: Positive for callus and dry skin  Neurological: She is alert and oriented to person, place, and time  Positive Tinel right tarsal tunnel  Pain with compression right tarsal tunnel  Skin: Skin is warm and dry  Capillary refill takes less than 2 seconds  Psychiatric: She has a normal mood and affect  Her behavior is normal  Judgment and thought content normal    Vitals reviewed      Procedures

## 2020-01-20 ENCOUNTER — HOSPITAL ENCOUNTER (OUTPATIENT)
Dept: RADIOLOGY | Facility: HOSPITAL | Age: 55
Discharge: HOME | End: 2020-01-20
Attending: PODIATRIST
Payer: COMMERCIAL

## 2020-01-20 DIAGNOSIS — M79.671 RIGHT FOOT PAIN: ICD-10-CM

## 2020-01-20 DIAGNOSIS — G57.51 TARSAL TUNNEL SYNDROME, RIGHT: ICD-10-CM

## 2020-01-20 DIAGNOSIS — M72.2 PLANTAR FASCIITIS: ICD-10-CM

## 2020-01-20 PROCEDURE — 73721 MRI JNT OF LWR EXTRE W/O DYE: CPT

## 2020-01-23 ENCOUNTER — TELEPHONE (OUTPATIENT)
Dept: PODIATRY | Facility: CLINIC | Age: 55
End: 2020-01-23

## 2020-01-23 DIAGNOSIS — M79.672 PAIN IN BOTH FEET: Primary | ICD-10-CM

## 2020-01-23 DIAGNOSIS — M79.671 PAIN IN BOTH FEET: Primary | ICD-10-CM

## 2020-01-23 RX ORDER — GABAPENTIN 300 MG/1
300 CAPSULE ORAL 3 TIMES DAILY
Qty: 90 CAPSULE | Refills: 1 | Status: SHIPPED | OUTPATIENT
Start: 2020-01-23 | End: 2021-11-23

## 2020-01-23 NOTE — TELEPHONE ENCOUNTER
Patient advised of MRI results  No evidence of fracture  We will treat for fibromyalgia  We will increase gabapentin dosing to 3 times daily

## 2020-06-02 ENCOUNTER — TELEPHONE (OUTPATIENT)
Dept: OBGYN CLINIC | Facility: CLINIC | Age: 55
End: 2020-06-02

## 2020-06-02 DIAGNOSIS — Z12.31 ENCOUNTER FOR SCREENING MAMMOGRAM FOR MALIGNANT NEOPLASM OF BREAST: Primary | ICD-10-CM

## 2020-06-12 ENCOUNTER — OFFICE VISIT (OUTPATIENT)
Dept: OBGYN CLINIC | Facility: CLINIC | Age: 55
End: 2020-06-12
Payer: COMMERCIAL

## 2020-06-12 VITALS
HEIGHT: 65 IN | HEART RATE: 50 BPM | BODY MASS INDEX: 24.99 KG/M2 | SYSTOLIC BLOOD PRESSURE: 126 MMHG | WEIGHT: 150 LBS | DIASTOLIC BLOOD PRESSURE: 84 MMHG | TEMPERATURE: 97.6 F

## 2020-06-12 DIAGNOSIS — M18.12 ARTHRITIS OF CARPOMETACARPAL (CMC) JOINT OF LEFT THUMB: Primary | ICD-10-CM

## 2020-06-12 PROCEDURE — 99213 OFFICE O/P EST LOW 20 MIN: CPT | Performed by: ORTHOPAEDIC SURGERY

## 2020-06-30 ENCOUNTER — HOSPITAL ENCOUNTER (OUTPATIENT)
Dept: RADIOLOGY | Facility: HOSPITAL | Age: 55
Discharge: HOME | End: 2020-06-30
Attending: OBSTETRICS & GYNECOLOGY
Payer: COMMERCIAL

## 2020-06-30 VITALS — WEIGHT: 147 LBS | HEIGHT: 65 IN | BODY MASS INDEX: 24.49 KG/M2

## 2020-06-30 DIAGNOSIS — Z12.31 ENCOUNTER FOR SCREENING MAMMOGRAM FOR MALIGNANT NEOPLASM OF BREAST: ICD-10-CM

## 2020-06-30 PROCEDURE — 77063 BREAST TOMOSYNTHESIS BI: CPT

## 2020-06-30 PROCEDURE — 77067 SCR MAMMO BI INCL CAD: CPT

## 2020-07-07 ENCOUNTER — TELEPHONE (OUTPATIENT)
Dept: BARIATRICS | Facility: CLINIC | Age: 55
End: 2020-07-07

## 2020-07-07 NOTE — TELEPHONE ENCOUNTER
Spoke w/pt and she is f/u w/her pcp and will call office to schedule appointments  ----- Message from Mala Keating RN sent at 2020  5:37 AM EDT -----  Regardin year f/u appointment  Please contact patient via TELEPHONE and send LETTER to schedule 7 year follow up appointment with our office  Thank you

## 2020-08-12 ENCOUNTER — TELEPHONE (OUTPATIENT)
Dept: BARIATRICS | Facility: CLINIC | Age: 55
End: 2020-08-12

## 2020-08-12 NOTE — TELEPHONE ENCOUNTER
----- Message from Dean Weinstein RN sent at 8/10/2020  9:01 AM EDT -----  Regarding: RE: 7 year follow up  Kelsey Ramos  ----- Message -----  From: Linda Sparrow  Sent: 8/7/2020  11:39 AM EDT  To: Dean Weinstein RN  Subject: RE: 7 year follow up                             Which patient?   ----- Message -----  From: Dean Weinstein RN  Sent: 8/7/2020  11:08 AM EDT  To: , #  Subject: 7 year follow up                                 Please send follow up contact letter to patient

## 2020-12-24 ENCOUNTER — IMMUNIZATIONS (OUTPATIENT)
Dept: FAMILY MEDICINE CLINIC | Facility: HOSPITAL | Age: 55
End: 2020-12-24
Payer: COMMERCIAL

## 2020-12-24 DIAGNOSIS — Z23 ENCOUNTER FOR IMMUNIZATION: ICD-10-CM

## 2020-12-24 PROCEDURE — 0011A SARS-COV-2 / COVID-19 MRNA VACCINE (MODERNA) 100 MCG: CPT

## 2020-12-24 PROCEDURE — 91301 SARS-COV-2 / COVID-19 MRNA VACCINE (MODERNA) 100 MCG: CPT

## 2021-01-20 ENCOUNTER — IMMUNIZATIONS (OUTPATIENT)
Dept: FAMILY MEDICINE CLINIC | Facility: HOSPITAL | Age: 56
End: 2021-01-20

## 2021-01-20 DIAGNOSIS — Z23 ENCOUNTER FOR IMMUNIZATION: Primary | ICD-10-CM

## 2021-01-20 PROCEDURE — 0012A SARS-COV-2 / COVID-19 MRNA VACCINE (MODERNA) 100 MCG: CPT | Performed by: NURSE PRACTITIONER

## 2021-01-20 PROCEDURE — 91301 SARS-COV-2 / COVID-19 MRNA VACCINE (MODERNA) 100 MCG: CPT | Performed by: NURSE PRACTITIONER

## 2021-09-10 ENCOUNTER — TELEPHONE (OUTPATIENT)
Dept: OBGYN CLINIC | Facility: CLINIC | Age: 56
End: 2021-09-10

## 2021-09-10 DIAGNOSIS — Z12.31 ENCOUNTER FOR MAMMOGRAM TO ESTABLISH BASELINE MAMMOGRAM: Primary | ICD-10-CM

## 2021-09-10 NOTE — TELEPHONE ENCOUNTER
----- Message from Nahun Urias sent at 9/10/2021 10:14 AM EDT -----  Regarding: Non-Urgent Medical Question  Contact: [phone]  Can you send me a prescription for a mammogram  Through email would be great  But if not mail    Nahun Urias  [birth year]  [email or web address]  [phone]

## 2021-09-24 ENCOUNTER — HOSPITAL ENCOUNTER (OUTPATIENT)
Dept: RADIOLOGY | Facility: HOSPITAL | Age: 56
Discharge: HOME | End: 2021-09-24
Attending: OBSTETRICS & GYNECOLOGY
Payer: COMMERCIAL

## 2021-09-24 DIAGNOSIS — Z12.31 ENCOUNTER FOR MAMMOGRAM TO ESTABLISH BASELINE MAMMOGRAM: ICD-10-CM

## 2021-09-24 PROCEDURE — 77067 SCR MAMMO BI INCL CAD: CPT

## 2021-09-24 PROCEDURE — 77063 BREAST TOMOSYNTHESIS BI: CPT

## 2021-11-03 ENCOUNTER — OFFICE VISIT (OUTPATIENT)
Dept: PODIATRY | Facility: CLINIC | Age: 56
End: 2021-11-03
Payer: COMMERCIAL

## 2021-11-03 VITALS — BODY MASS INDEX: 25.1 KG/M2 | HEIGHT: 64 IN | WEIGHT: 147 LBS | RESPIRATION RATE: 16 BRPM

## 2021-11-03 DIAGNOSIS — M21.961 ACQUIRED DEFORMITY OF RIGHT FOOT: ICD-10-CM

## 2021-11-03 DIAGNOSIS — M76.71 PERONEAL TENDINITIS OF RIGHT LOWER EXTREMITY: Primary | ICD-10-CM

## 2021-11-03 DIAGNOSIS — M79.671 RIGHT FOOT PAIN: ICD-10-CM

## 2021-11-03 PROCEDURE — 99213 OFFICE O/P EST LOW 20 MIN: CPT | Performed by: PODIATRIST

## 2021-11-03 PROCEDURE — 73620 X-RAY EXAM OF FOOT: CPT | Performed by: PODIATRIST

## 2021-11-03 PROCEDURE — 20551 NJX 1 TENDON ORIGIN/INSJ: CPT | Performed by: PODIATRIST

## 2021-11-03 RX ORDER — MELOXICAM 7.5 MG/1
7.5 TABLET ORAL DAILY
Qty: 10 TABLET | Refills: 0 | Status: SHIPPED | OUTPATIENT
Start: 2021-11-03 | End: 2022-03-07

## 2021-11-05 ENCOUNTER — TELEPHONE (OUTPATIENT)
Dept: OBGYN CLINIC | Facility: CLINIC | Age: 56
End: 2021-11-05

## 2021-11-05 ENCOUNTER — IMMUNIZATIONS (OUTPATIENT)
Dept: FAMILY MEDICINE CLINIC | Facility: HOSPITAL | Age: 56
End: 2021-11-05

## 2021-11-05 DIAGNOSIS — Z23 ENCOUNTER FOR IMMUNIZATION: Primary | ICD-10-CM

## 2021-11-05 PROCEDURE — 91306 COVID-19 MODERNA VACC 0.25 ML BOOSTER: CPT

## 2021-11-05 PROCEDURE — 0013A COVID-19 MODERNA VACC 0.25 ML BOOSTER: CPT

## 2021-11-23 ENCOUNTER — ANNUAL EXAM (OUTPATIENT)
Dept: OBGYN CLINIC | Facility: CLINIC | Age: 56
End: 2021-11-23
Payer: COMMERCIAL

## 2021-11-23 VITALS
WEIGHT: 141 LBS | HEIGHT: 64 IN | BODY MASS INDEX: 24.07 KG/M2 | DIASTOLIC BLOOD PRESSURE: 62 MMHG | SYSTOLIC BLOOD PRESSURE: 110 MMHG

## 2021-11-23 DIAGNOSIS — Z01.419 WELL WOMAN EXAM WITH ROUTINE GYNECOLOGICAL EXAM: Primary | ICD-10-CM

## 2021-11-23 DIAGNOSIS — Z12.4 SCREENING FOR MALIGNANT NEOPLASM OF CERVIX: ICD-10-CM

## 2021-11-23 DIAGNOSIS — R30.0 DYSURIA: ICD-10-CM

## 2021-11-23 DIAGNOSIS — Z11.51 SCREENING FOR HUMAN PAPILLOMAVIRUS (HPV): ICD-10-CM

## 2021-11-23 DIAGNOSIS — N95.2 POSTMENOPAUSAL ATROPHIC VAGINITIS: ICD-10-CM

## 2021-11-23 PROCEDURE — G0476 HPV COMBO ASSAY CA SCREEN: HCPCS | Performed by: OBSTETRICS & GYNECOLOGY

## 2021-11-23 PROCEDURE — 87077 CULTURE AEROBIC IDENTIFY: CPT | Performed by: OBSTETRICS & GYNECOLOGY

## 2021-11-23 PROCEDURE — 87186 SC STD MICRODIL/AGAR DIL: CPT | Performed by: OBSTETRICS & GYNECOLOGY

## 2021-11-23 PROCEDURE — G0145 SCR C/V CYTO,THINLAYER,RESCR: HCPCS | Performed by: OBSTETRICS & GYNECOLOGY

## 2021-11-23 PROCEDURE — 87086 URINE CULTURE/COLONY COUNT: CPT | Performed by: OBSTETRICS & GYNECOLOGY

## 2021-11-23 PROCEDURE — S0612 ANNUAL GYNECOLOGICAL EXAMINA: HCPCS | Performed by: OBSTETRICS & GYNECOLOGY

## 2021-11-23 RX ORDER — LORAZEPAM 1 MG/1
TABLET ORAL
COMMUNITY
Start: 2021-08-16 | End: 2022-03-16 | Stop reason: ALTCHOICE

## 2021-11-23 RX ORDER — DESVENLAFAXINE 50 MG/1
TABLET, EXTENDED RELEASE ORAL
COMMUNITY
Start: 2021-08-31 | End: 2022-03-07

## 2021-11-26 LAB
BACTERIA UR CULT: ABNORMAL
HPV HR 12 DNA CVX QL NAA+PROBE: NEGATIVE
HPV16 DNA CVX QL NAA+PROBE: NEGATIVE
HPV18 DNA CVX QL NAA+PROBE: NEGATIVE

## 2021-11-27 ENCOUNTER — DOCUMENTATION (OUTPATIENT)
Dept: LABOR AND DELIVERY | Facility: HOSPITAL | Age: 56
End: 2021-11-27

## 2021-11-27 DIAGNOSIS — N30.00 ACUTE CYSTITIS WITHOUT HEMATURIA: Primary | ICD-10-CM

## 2021-11-27 RX ORDER — SULFAMETHOXAZOLE AND TRIMETHOPRIM 800; 160 MG/1; MG/1
1 TABLET ORAL EVERY 12 HOURS SCHEDULED
Qty: 14 TABLET | Refills: 0 | Status: SHIPPED | OUTPATIENT
Start: 2021-11-27 | End: 2021-12-04

## 2021-12-02 LAB
LAB AP GYN PRIMARY INTERPRETATION: NORMAL
Lab: NORMAL

## 2022-03-05 ENCOUNTER — AMB VIDEO VISIT (OUTPATIENT)
Dept: OTHER | Facility: HOSPITAL | Age: 57
End: 2022-03-05

## 2022-03-05 DIAGNOSIS — M54.42 ACUTE LEFT-SIDED LOW BACK PAIN WITH LEFT-SIDED SCIATICA: Primary | ICD-10-CM

## 2022-03-05 PROCEDURE — ECARE PR SL URGENT CARE VIRTUAL VISIT: Performed by: PHYSICIAN ASSISTANT

## 2022-03-05 RX ORDER — CYCLOBENZAPRINE HCL 5 MG
5 TABLET ORAL 3 TIMES DAILY PRN
Qty: 15 TABLET | Refills: 0 | Status: SHIPPED | OUTPATIENT
Start: 2022-03-05 | End: 2022-03-16 | Stop reason: ALTCHOICE

## 2022-03-05 NOTE — CARE ANYWHERE EVISITS
Visit Summary for Benji Beck - Gender: Female - Date of Birth: [id number]  Date: [id number] - Duration: 15 minutes  Patient: Benji Beck  Provider: Td Portillo PA-C    Patient Contact Information  Address  [address]; [address]      Visit Topics    Triage Questions   What is your current physical address in the event of a medical emergency? Answer []  Are you allergic to any medications? Answer []  Are you now or could you be pregnant? Answer []  Do you have any immune system compromise or chronic lung   disease? Answer []  Do you have any vulnerable family members in the home (infant, pregnant, cancer, elderly)? Answer []     Conversation Transcripts  [0A][0A] [Notification] You are connected with Td Portillo PA-C, Urgent Care Specialist [0A][Notification] Chantal Mayo is located in [place]  [0A][Notification] Chantal Mayo has shared health history  Tobi Flores  [0A]    Diagnosis  Lumbago with sciatica, left side    Procedures  Value: [id number] Code: CPT-4 UNLISTED E&M SERVICE    Medications Prescribed    No prescriptions ordered    Electronically signed by: Christie Gerard(NPI [id number])

## 2022-03-05 NOTE — PATIENT INSTRUCTIONS
Continue with steroids as directed by PCP  Start flexeril as needed for spasm  Ice/heat  Referral placed to spine and pain    Please call for apt -  [phone]  Referral placed for PT please call for apt  Follow up with PCP  ER if worsen

## 2022-03-05 NOTE — PROGRESS NOTES
Video Visit - Daune Kussmaul 61 y o  female MRN: [id number]    REQUIRED DOCUMENTATION:         1  This service was provided via [employer]  2  Provider located at [address] [place] [id number]  3  [facility] provider: Caterina Zabala PA-C   4  Identify all parties in room with patient during [facility] visit:  No one else  5  After connecting through [employer], patient was identified by name and date of birth  Patient was then informed that this was a Telemedicine visit and that the exam was being conducted confidentially over secure lines  My office door was closed  No one else was in the room  Patient acknowledged consent and understanding of privacy and security of the Telemedicine visit  I informed the patient that I have reviewed their record in Epic and presented the opportunity for them to ask any questions regarding the visit today  The patient agreed to participate  HPI  Patient states on Monday she started with severe lower back pain that is mainly on the left side  It radiates down her thigh to her calf  She denies any groin pain  Its worse with movement and weightbearing  She I using curtches and wheelchair to get around  She saw her PCP this week and was given a steroid taper  She states her pain has gone from a 12 to a 7  She denies any foot drop, saddle anesthesia, bowel or bladder changes  Physical Exam  Constitutional:       Appearance: Normal appearance  HENT:      Head: Normocephalic and atraumatic  Pulmonary:      Effort: Pulmonary effort is normal    Musculoskeletal:      Comments: Pt reports left hip ROM decreased due to pain, sensation intact  TTP over lumbar spine and left SI joint  Neurological:      General: No focal deficit present  Mental Status: She is alert and oriented to person, place, and time  Sensory: No sensory deficit     Psychiatric:         Mood and Affect: Mood normal          Behavior: Behavior normal  Diagnoses and all orders for this visit:    Acute left-sided low back pain with left-sided sciatica  -     cyclobenzaprine (FLEXERIL) 5 mg tablet; Take 1 tablet (5 mg total) by mouth 3 (three) times a day as needed for muscle spasms  -     Ambulatory referral to Pain Management; Future  -     Ambulatory referral to Physical Therapy; Future      Patient Instructions   Continue with steroids as directed by PCP  Start flexeril as needed for spasm  Ice/heat  Referral placed to spine and pain    Please call for apt - [phone]  Referral placed for PT please call for apt  Follow up with PCP  ER if worsen

## 2022-03-07 ENCOUNTER — OFFICE VISIT (OUTPATIENT)
Dept: OBGYN CLINIC | Facility: CLINIC | Age: 57
End: 2022-03-07
Payer: COMMERCIAL

## 2022-03-07 ENCOUNTER — APPOINTMENT (OUTPATIENT)
Dept: RADIOLOGY | Facility: CLINIC | Age: 57
End: 2022-03-07
Payer: COMMERCIAL

## 2022-03-07 VITALS — BODY MASS INDEX: 24.75 KG/M2 | HEIGHT: 64 IN | WEIGHT: 145 LBS

## 2022-03-07 DIAGNOSIS — M54.16 ACUTE LEFT LUMBAR RADICULOPATHY: Primary | ICD-10-CM

## 2022-03-07 DIAGNOSIS — M54.50 LOW BACK PAIN, UNSPECIFIED BACK PAIN LATERALITY, UNSPECIFIED CHRONICITY, UNSPECIFIED WHETHER SCIATICA PRESENT: ICD-10-CM

## 2022-03-07 DIAGNOSIS — M25.552 PAIN IN LEFT HIP: ICD-10-CM

## 2022-03-07 DIAGNOSIS — M41.9 SCOLIOSIS OF LUMBAR SPINE, UNSPECIFIED SCOLIOSIS TYPE: ICD-10-CM

## 2022-03-07 PROCEDURE — 99214 OFFICE O/P EST MOD 30 MIN: CPT | Performed by: ORTHOPAEDIC SURGERY

## 2022-03-07 PROCEDURE — 72110 X-RAY EXAM L-2 SPINE 4/>VWS: CPT

## 2022-03-07 PROCEDURE — 73502 X-RAY EXAM HIP UNI 2-3 VIEWS: CPT

## 2022-03-07 RX ORDER — PREDNISONE 10 MG/1
TABLET ORAL
COMMUNITY
Start: 2022-03-01 | End: 2022-03-16 | Stop reason: ALTCHOICE

## 2022-03-07 RX ORDER — TRAZODONE HYDROCHLORIDE 100 MG/1
TABLET ORAL
COMMUNITY
Start: 2021-12-30 | End: 2022-03-16 | Stop reason: ALTCHOICE

## 2022-03-07 NOTE — PROGRESS NOTES
Assessment/Plan:  1  Acute left lumbar radiculopathy  XR spine lumbar minimum 4 views non injury    MRI lumbar spine wo contrast   2  Scoliosis of lumbar spine, unspecified scoliosis type  XR hip/pelv 2-3 vws left if performed    MRI lumbar spine wo contrast     Olamide Rocha has severe low back pain which appears to be acute lumbar radiculopathy on top of chronic pain  Her x-rays demonstrate a slight scoliosis and degenerative changes however does appear that she has a slight listhesis of L2-3 and L3-4  There is no compared to films to see if this is a new process  This could be creating some foraminal narrowing at that level and related to her impingement  She has been declining with conservative treatment of oral steroids in the last 2 weeks and is in severe discomfort today  She has weakness of the left leg on examination today as well  I would like for her to have an MRI of the lumbar spine at this time  She could be a potential surgical candidate if there were large disc herniation based on her weakness  She will follow up in the office after her MRI is complete  Subjective: Anish Horner is a 61 y o  female who presents to the office for evaluation for severe onset of low back pain  She denies any recent injury or trauma but 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  She feels like she may have aggravated her back 2 weeks ago but starting feeling increased pain last week  She has had severe pain in her back radiating down the left leg into her left calf  She initially went to her PCP office who placed her on oral steroids  She is currently on a steroid taper  She feels very little relief from the steroid medication  She was put on muscle relaxers recently which has helped her pain  She has a long history of chronic back pain but never felt this severe of discomfort    She never recalled any radiculopathy in the past   Today she has sharp stabbing pain in the low back radiating into the left buttock down the left leg  She has to use crutches for ambulation as she cannot stand up straight  Review of Systems   Constitutional: Negative for chills, fever and unexpected weight change  HENT: Negative for hearing loss, nosebleeds and sore throat  Eyes: Negative for pain, redness and visual disturbance  Respiratory: Negative for cough, shortness of breath and wheezing  Cardiovascular: Negative for chest pain, palpitations and leg swelling  Gastrointestinal: Negative for abdominal pain, nausea and vomiting  Endocrine: Negative for polydipsia and polyuria  Genitourinary: Negative for dysuria and hematuria  Musculoskeletal:        See HPI   Skin: Negative for rash and wound  Neurological: Negative for dizziness, numbness and headaches  Psychiatric/Behavioral: Negative for decreased concentration and suicidal ideas  The patient is not nervous/anxious            Past Medical History:   Diagnosis Date    Abnormal Pap smear of cervix 1987    Anxiety     High vitamin A level     History of anxiety     History of hypercholesterolemia     History of hypertension     History of obesity     HPV (human papilloma virus) infection 1987    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    Secondary hyperparathyroidism, non-renal ([address] )     Spinal stenosis        Past Surgical History:   Procedure Laterality Date    ABDOMINOPLASTY      BREAST CYST ASPIRATION      COLONOSCOPY      COLONOSCOPY  11/22/2021    Eyvazzedah - 11 y f/u     COLPOSCOPY  1987    COMBINED AUGMENTATION MAMMAPLASTY AND ABDOMINOPLASTY  2001    FL INJECTION RIGHT HIP (NON ARTHROGRAM)  5/24/2019    GASTRIC BYPASS      TUBAL LIGATION         Family History   Problem Relation Age of Onset    Hypertension Mother     Heart disease Mother     Hypertension Father     Heart disease Father     No Known Problems Sister     No Known Problems Brother     No Known Problems Maternal Aunt     No Known Problems Maternal Uncle     No Known Problems Paternal Aunt     No Known Problems Paternal Uncle     No Known Problems Maternal Grandmother     No Known Problems Maternal Grandfather     No Known Problems Paternal Grandmother     No Known Problems Paternal Grandfather     ADD / ADHD Neg Hx     Anesthesia problems Neg Hx     Cancer Neg Hx     Clotting disorder Neg Hx     Collagen disease Neg Hx     Diabetes Neg Hx     Dislocations Neg Hx     Learning disabilities Neg Hx     Neurological problems Neg Hx     Osteoporosis Neg Hx     Rheumatologic disease Neg Hx     Scoliosis Neg Hx     Vascular Disease Neg Hx        Social History     Occupational History    Not on file   Tobacco Use    Smoking status: Never Smoker    Smokeless tobacco: Never Used   Vaping Use    Vaping Use: Never used   Substance and Sexual Activity    Alcohol use: No    Drug use: No    Sexual activity: Yes     Partners: Male     Birth control/protection: Female Sterilization         Current Outpatient Medications:     Calcium Citrate 1040 MG TABS, Take by mouth 3 (three) times a day, Disp: , Rfl:     cyclobenzaprine (FLEXERIL) 5 mg tablet, Take 1 tablet (5 mg total) by mouth 3 (three) times a day as needed for muscle spasms, Disp: 15 tablet, Rfl: 0    LORazepam (ATIVAN) 1 mg tablet, , Disp: , Rfl:     Multiple Vitamins-Minerals (BARIATRIC MULTIVITAMINS/IRON PO), Take by mouth daily, Disp: , Rfl:     predniSONE 10 mg tablet, , Disp: , Rfl:     traZODone (DESYREL) 100 mg tablet, , Disp: , Rfl:     No Known Allergies    Objective: There were no vitals filed for this visit  Back Exam     Tenderness   Back tenderness location: Significant tenderness to palpation over left lumbar paraspinal region at L3-4 and L4-5      Range of Motion   Extension:  0 abnormal   Flexion:  40 abnormal     Muscle Strength   Right Quadriceps:  5/5   Left Quadriceps:  3/5   Right Hamstrings:  5/5   Left Hamstrings: 4/5     Tests   Straight leg raise right: negative  Straight leg raise left: positive at 80 deg    Other   Toe walk: abnormal  Heel walk: abnormal  Sensation: normal  Gait: abnormal   Erythema: no back redness            Physical Exam  Vitals and nursing note reviewed  Constitutional:       Appearance: She is well-developed  HENT:      Head: Normocephalic and atraumatic  Eyes:      General: No scleral icterus  Conjunctiva/sclera: Conjunctivae normal    Cardiovascular:      Rate and Rhythm: Normal rate  Pulmonary:      Effort: Pulmonary effort is normal  No respiratory distress  Musculoskeletal:      Cervical back: Normal range of motion and neck supple  Lumbar back: Positive left straight leg raise test  Negative right straight leg raise test       Comments: As noted in HPI   Skin:     General: Skin is warm and dry  Neurological:      Mental Status: She is alert and oriented to person, place, and time  Psychiatric:         Behavior: Behavior normal          I have personally reviewed pertinent films in PACS and my interpretation is as follows:  Lumbar x-rays demonstrate a slight scoliosis and mild degenerative changes with a retrolisthesis at L2-3 and L3-4    No fractures

## 2022-03-11 ENCOUNTER — TELEPHONE (OUTPATIENT)
Dept: PAIN MEDICINE | Facility: CLINIC | Age: 57
End: 2022-03-11

## 2022-03-11 ENCOUNTER — TELEPHONE (OUTPATIENT)
Dept: OBGYN CLINIC | Facility: CLINIC | Age: 57
End: 2022-03-11

## 2022-03-11 ENCOUNTER — TELEPHONE (OUTPATIENT)
Dept: OTHER | Facility: HOSPITAL | Age: 57
End: 2022-03-11

## 2022-03-11 ENCOUNTER — HOSPITAL ENCOUNTER (OUTPATIENT)
Dept: MRI IMAGING | Facility: HOSPITAL | Age: 57
Discharge: HOME | End: 2022-03-11
Payer: COMMERCIAL

## 2022-03-11 DIAGNOSIS — M41.9 SCOLIOSIS OF LUMBAR SPINE, UNSPECIFIED SCOLIOSIS TYPE: ICD-10-CM

## 2022-03-11 DIAGNOSIS — M54.16 ACUTE LEFT LUMBAR RADICULOPATHY: ICD-10-CM

## 2022-03-11 PROCEDURE — G1004 CDSM NDSC: HCPCS

## 2022-03-11 PROCEDURE — 72148 MRI LUMBAR SPINE W/O DYE: CPT

## 2022-03-11 NOTE — TELEPHONE ENCOUNTER
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

## 2022-03-11 NOTE — TELEPHONE ENCOUNTER
Patient states that she needs to be seen by end of March  She is in severe pain  Patient works in [address] [place] [address]  Patient upset on the phone stating that she can't wait until May for an appointment with Dr Davide Tucker    Can you please review chart? Can I schedule patient?

## 2022-03-12 NOTE — TELEPHONE ENCOUNTER
Called by an asked to look at her images  She has a disc herniation at the L4-5 level which is in the foramen on the left side  She complains of a left-sided radiculopathy which is severe  The pain is a touch better on steroids  I have recommended that she do a trial of epidural steroids  If these are ineffective then a surgical resection would be appropriate  I informed her of these thoughts  I will send a note off to Dr Anyi Chun with these thoughts

## 2022-03-14 NOTE — TELEPHONE ENCOUNTER
Received a call from Annabel Paula, patient's son, confirming plan  Reviewed Dr Jethro Lanza note  Patient is scheduled with spine and pain on 3/22/22 to discuss injections  Patient will present to the ER with any new development of urinary/bowel incontinence, weakness, or saddle anesthesia  Annabel Paula denies any red flag signs/symptoms  Patient is in severe pain with numbness below her right knee cap  Annabel Paula was appreciative

## 2022-03-14 NOTE — TELEPHONE ENCOUNTER
Pts son called in to get her scheduled for a sooner appt, due to his mother being in pain  I have scheduled pt for 3/16/22 w/ Dr Harriet Sharpe  I made pts son aware that if his mother sees Dr Harriet Sharpe all of her FU's and procedure appts will be at [facility] office  Pt son said that he will call his mother to make her aware  He said that he will call back to confirm if she will keep that appt on 3/16/22  I did make his aware that Dr Aster Putnam goes to  and [address] [place] [address], which she wear she lives  When pts son calls back in please cancel either Dr Aster Putnam appt on 3/22/22 or Dr Leonor Roman appt

## 2022-03-15 NOTE — TELEPHONE ENCOUNTER
Pts son called back in confirming appr for tomorrow 3/16/22 at [id number] [facility] w/ Dr Aleksandra Null  Jennifer Reece said that his mother is aware FU's and procedures will be at the [facility] office

## 2022-03-15 NOTE — PROGRESS NOTES
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 left lower extremity radiculopathy and weakness secondary to notable left foraminal L4-5 disc extrusion  She has diminished sensation in the L4 dermatome and also myotomal weakness as well  She is also hyporeflexic of the left patella  We discussed left L4-5 transforaminal epidural steroid injection to help diminish the inflammatory component of her symptoms  Approach will be super neural as it appears the disc is pressing the nerve inferiorly in the foramen  Discussed with patient that given the degree of stenosis, that administration of the injectate may be uncomfortable for the patient, therefore will also plan for possible interlaminar approach at the L4-5 level  In regards pharmacological treatment options, patient has failed gabapentin in the past due to intolerance of the medication  Given intolerance to gabapentin, I will initiate Lyrica 75 mg t i d  on a dose titration schedule that was explained to the patient today  If tolerating this dose, may benefit from escalation in the dose moving forward  Given her focal weakness, she will also be seen by Dr Letty Woody  Discussed with patient that injection will help with pain symptoms but will not help with weakness  Given the patient's symptoms, examination findings and imaging results noted above, I discussed the utility of proceeding with a left L4 transforaminal epidural steroid injection under fluoroscopic guidance, possible L4-5 interlmainar approach  Using an anatomical model, the procedure, as well as its potential risks, benefits, and reasonable alternatives were discussed in detail    Discussed risks of the procedure included but are not limited to bleeding, infection, allergic reaction, nerve damage, hematoma fomation, abscess formation, failure of the pain to improve and potential worsening of the pain  Since Ms Thad Gastelum has failed at least 6 weeks of conservative measures including over-the-counter pain medications, prescription medications, chiropractic therapy, physical therapy and a home exercise program it is reasonable to proceed with the above injection  The patient verbalized understanding to the potential risks, benefits, and reasonable alternatives to the above injection and wishes to proceed  Her response will help to further determine a treatment plan  [place] Prescription Drug Monitoring Program report was reviewed and was appropriate     My impressions and treatment recommendations were discussed in detail with the patient who verbalized understanding and had no further questions  Discharge instructions were provided  I personally saw and examined the patient and I agree with the above discussed plan of care  Orders Placed This Encounter   Procedures    FL spine and pain procedure     Standing Status:   Future     Standing Expiration Date:   3/16/2026     Order Specific Question:   Reason for Exam:     Answer:   left L4 TFESI, possible L4-5 LESI     Order Specific Question:   Anticoagulant hold needed? Answer:   No     Order Specific Question:   Is the patient pregnant? Answer:   Unknown     New Medications Ordered This Visit   Medications    pregabalin (LYRICA) 75 mg capsule     Sig: Take 1 capsule (75 mg total) by mouth 3 (three) times a day Start by taking 1 tablet at bedtime for 1 day  Then increase to 1 tablet in the evening and 1 tablet at bedtime for 1 day  Then increase to 1 tablet three times a day thereafter  Dispense:  90 capsule     Refill:  1       History of Present Illness    Referring Provider: Self, Referral    Luis A Interiano is a 61 y o  female who presents with a chief complaint of left lower extremity pain in weakness from hip to the ankle, inside the thigh as well as pain over the knee, shin    Associated with numbness and tingling  This is an acute issue that began on 02/28/2022  Patient denies any traumatic event  Patient is a nurse anesthetist  Niall Evans localize an isolated event  Over the past month, intensity the pain has been severe  Current pain is 4/10, but can escalate to a 12/10  Reports she cannot walk straight due to pain  Pain is nearly constant  Pain is worse at all times a day with no typical pattern  Described as burning, cramping, shooting, numbness, sharp, cutting, pins and needles, pressure-like, throbbing, dull/aching  Since this started, pain is not as instense when at rest  However, walking exacerbates her symptoms  Reports weakness in the left lower extremity only  She is ambulating with crutches  Pain is increased with standing, bending, sitting, walking, exercising  No change with coughing, sneezing  Decreased with relaxation and lying down  No previous pain management  Patient has MRI of the lumbar spine that is most notable for a new L4-5 foraminal disc extrusion causing mass effect on the L4 nerve root  No previous back surgery  Past surgical history includes bariatric surgery  Past medical history includes anxiety, high cholesterol, depression  Reports physical therapy in the past provided excellent relief  Moderate relief with heat therapy, and moderate relief with chiropractic manipulation  Does not smoke tobacco marijuana  Opioids have not provided relief  Currently using acetaminophen with relief  Also currently use Aleve with relief  Currently using Flexeril without relief  I have personally reviewed and/or updated the patient's past medical history, past surgical history, family history, social history, current medications, allergies, and vital signs today  Review of Systems   Constitutional: Negative for fever and unexpected weight change  HENT: Negative for trouble swallowing  Eyes: Negative for visual disturbance     Respiratory: Negative for shortness of breath and wheezing  Cardiovascular: Negative for chest pain and palpitations  Gastrointestinal: Negative for constipation, diarrhea, nausea and vomiting  Endocrine: Negative for cold intolerance, heat intolerance and polydipsia  Genitourinary: Negative for difficulty urinating and frequency  Musculoskeletal: Positive for arthralgias and myalgias  Negative for gait problem and joint swelling  Skin: Negative for rash  Neurological: Negative for dizziness, seizures, syncope, weakness and headaches  Hematological: Does not bruise/bleed easily  Psychiatric/Behavioral: Negative for dysphoric mood  All other systems reviewed and are negative        Patient Active Problem List   Diagnosis    Elevated parathyroid hormone    Postsurgical malabsorption    Secondary non-renal hyperparathyroidism ([facility] [address] )    Spinal stenosis    S/P gastric bypass    Arthritis of carpometacarpal (CMC) joint of left thumb       Past Medical History:   Diagnosis Date    Abnormal Pap smear of cervix 1987    Anxiety     High vitamin A level     History of anxiety     History of hypercholesterolemia     History of hypertension     History of obesity     HPV (human papilloma virus) infection 1987    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    Secondary hyperparathyroidism, non-renal ([facility] [address] )     Spinal stenosis        Past Surgical History:   Procedure Laterality Date    ABDOMINOPLASTY      BREAST CYST ASPIRATION      COLONOSCOPY      COLONOSCOPY  11/22/2021    Rlvazzedah - 11 y f/u     COLPOSCOPY  1987    COMBINED AUGMENTATION MAMMAPLASTY AND ABDOMINOPLASTY  2001    FACIAL COSMETIC SURGERY      FL INJECTION RIGHT HIP (NON ARTHROGRAM)  5/24/2019    GASTRIC BYPASS      TUBAL LIGATION         Family History   Problem Relation Age of Onset    Hypertension Mother     Heart disease Mother     Hypertension Father     Heart disease Father     No Known Problems Sister     No Known Problems Brother     No Known Problems Maternal Aunt     No Known Problems Maternal Uncle     No Known Problems Paternal Aunt     No Known Problems Paternal Uncle     No Known Problems Maternal Grandmother     No Known Problems Maternal Grandfather     No Known Problems Paternal Grandmother     No Known Problems Paternal Grandfather     ADD / ADHD Neg Hx     Anesthesia problems Neg Hx     Cancer Neg Hx     Clotting disorder Neg Hx     Collagen disease Neg Hx     Diabetes Neg Hx     Dislocations Neg Hx     Learning disabilities Neg Hx     Neurological problems Neg Hx     Osteoporosis Neg Hx     Rheumatologic disease Neg Hx     Scoliosis Neg Hx     Vascular Disease Neg Hx        Social History     Occupational History    Not on file   Tobacco Use    Smoking status: Never Smoker    Smokeless tobacco: Never Used   Vaping Use    Vaping Use: Never used   Substance and Sexual Activity    Alcohol use: No    Drug use: No    Sexual activity: Yes     Partners: Male     Birth control/protection: Female Sterilization       Current Outpatient Medications on File Prior to Visit   Medication Sig    Calcium Citrate 1040 MG TABS Take by mouth 3 (three) times a day    Multiple Vitamins-Minerals (BARIATRIC MULTIVITAMINS/IRON PO) Take by mouth daily    [DISCONTINUED] LORazepam (ATIVAN) 1 mg tablet     [DISCONTINUED] traZODone (DESYREL) 100 mg tablet      [DISCONTINUED] cyclobenzaprine (FLEXERIL) 5 mg tablet Take 1 tablet (5 mg total) by mouth 3 (three) times a day as needed for muscle spasms    [DISCONTINUED] predniSONE 10 mg tablet  (Patient not taking: Reported on 3/16/2022 )     No current facility-administered medications on file prior to visit  No Known Allergies    Physical Exam    Resp 18   Ht 5' 4" (1 626 m)   Wt 67 4 kg (148 lb 9 6 oz)   BMI 25 51 kg/m²     Constitutional: normal, well developed, well nourished, alert, in no distress and non-toxic and no overt pain behavior    Eyes: anicteric  HEENT: grossly intact  Neck: supple, symmetric, trachea midline and no masses   Pulmonary:even and unlabored  Cardiovascular:No edema or pitting edema present  Skin:Normal without rashes or lesions and well hydrated  Psychiatric:Mood and affect appropriate  Neurologic:Cranial Nerves II-XII grossly intact  Musculoskeletal:normal     Lumbar Spine Exam    Appearance:  Normal lordosis  Palpation/Tenderness:  left lumbar paraspinal tenderness  right lumbar paraspinal tenderness  Sensory:  no sensory deficits noted except: diminished left L4  Motor Strength:  Left hip flexion:  4/5  Left hip extension:  5/5  Right hip flexion:  5/5  Right hip extension:  5/5  Left knee flexion:  5/5  Left knee extension:  4/5  Right knee flexion:  5/5  Right knee extension:  5/5  Left foot dorsiflexion:  4/5  Left foot plantar flexion:  5/5  Right foot dorsiflexion:  5/5  Right foot plantar flexion:  5/5        Reflexes:  Left Patellar:  1+   Right Patellar:  2+   Left Achilles:  2+   Right Achilles:  2+   Special Tests:  Left Straight Leg Test:  positive  Right Straight Leg Test:  negative      DIAGNOSTIC IMAGING AND TEST RESULTS:  LUMBAR SPINE  03/07/2021     INDICATION:   M54 50: Low back pain, unspecified      COMPARISON:  6/30/2008     VIEWS:  XR SPINE LUMBAR MINIMUM 4 VIEWS NON INJURY  Images: 5     FINDINGS:     There are 5 non rib bearing lumbar vertebral bodies       There is no evidence of acute fracture or destructive osseous lesion      Alignment is unremarkable       Moderate degenerative disc disease T12-L1, L1-2, L2-3, L5-S1 mildly progressive     The pedicles appear intact      Soft tissues are unremarkable      IMPRESSION:  Mildly progressive multilevel degenerative spondylosis     No acute lumbar spinal abnormalities identified    LEFT HIP  03/07/2021     INDICATION:   M25 552: Pain in left hip      COMPARISON:  12/4/2018     VIEWS:  XR HIP/PELV 2-3 VWS LEFT  W PELVIS IF PERFORMED   Images: 3     FINDINGS:  Development of mild degenerative arthritis both hips     There is no acute fracture or dislocation      No lytic or blastic osseous lesion      Soft tissues are unremarkable  Left-sided abdominal staple line again evident     The visualized lumbar spine is unremarkable      IMPRESSION:  Development of mild degenerative arthritis both hips     No acute osseous abnormality  MRI LUMBAR SPINE WITHOUT CONTRAST  03/11/2022     INDICATION: M54 16: Radiculopathy, lumbar region  M41 9: Scoliosis, unspecified  Acute left lumbar radiculopathy  Patient reports severe low back pain radiating into the left leg with difficulty ambulating for 2 weeks  No trauma      COMPARISON:  7/24/2008     TECHNIQUE:  Sagittal T1, sagittal T2, sagittal inversion recovery, axial T1 and axial T2, coronal T2        IMAGE QUALITY:  Diagnostic     FINDINGS:     Vertebral bodies are numbered such that the 1st conical shape vertebral segment is called S1  Iliolumbar ligaments at L5      VERTEBRAL BODIES:    Alignment: Similar reversal of curvature in the upper lumbar region/thoracolumbar junction with otherwise preserved lordosis  Mild, increased retrolisthesis at L2-L3 and L3-L4  Mild thoracolumbar dextroscoliosis      Vertebral body heights: Preserved        Bone marrow: Multilevel degenerative endplate changes  No suspicious marrow edema or mass      SACRUM:  Preserved signal intensity  No fracture or mass      DISTAL CORD AND CONUS:  Conus and nerve roots are within normal limits  Conus terminates at L1      PARASPINAL SOFT TISSUES:  Within normal limits      LOWER THORACIC DISC SPACES:  Chronic loss of disc height at T11-T12 and T12-L1  Similar small T11-T12 disc bulge with improved disc herniation  Mild, improved central canal narrowing      LUMBAR DISC SPACES:  Diffuse desiccation  Loss of height at L1-L2 and L2-L3, increased at L2-L3      L1-L2:  Disc bulge and small right central disc protrusion    Mild central canal narrowing  Similar to the prior study      L2-L3:  Disc bulge and mild facet hypertrophy  Mild central canal and foraminal narrowing, mildly increased due to increased retrolisthesis      L3-L4:  New disc bulge and small right foraminal disc protrusion  Facet hypertrophy, small left greater than right facet effusions  Mild central canal and foraminal narrowing      L4-L5:  Similar small disc bulge and mild facet hypertrophy  New small left facet effusion  New left foraminal disc extrusion (6/17 resulting in marked left foraminal narrowing and mass effect on the exiting left L4 nerve root  Mild right foraminal   and central canal narrowing      L5-S1:  Similar small disc bulge and mild facet hypertrophy without stenosis      IMPRESSION:     Multilevel degenerative disc disease and lower lumbar facet osteoarthritis, predominantly mild stenoses  Mildly increased degenerative changes compared to 2008  Detailed description above      At L4-L5, new left-sided disc herniation impinging the left L4 nerve root      Increased, likely degenerative mild retrolisthesis at L2-L3 and L3-L4      Other chronic findings above

## 2022-03-16 ENCOUNTER — TELEPHONE (OUTPATIENT)
Dept: NEUROSURGERY | Facility: CLINIC | Age: 57
End: 2022-03-16

## 2022-03-16 ENCOUNTER — TELEPHONE (OUTPATIENT)
Dept: PAIN MEDICINE | Facility: CLINIC | Age: 57
End: 2022-03-16

## 2022-03-16 ENCOUNTER — CONSULT (OUTPATIENT)
Dept: PAIN MEDICINE | Facility: CLINIC | Age: 57
End: 2022-03-16
Payer: COMMERCIAL

## 2022-03-16 VITALS — WEIGHT: 148.6 LBS | RESPIRATION RATE: 18 BRPM | HEIGHT: 64 IN | BODY MASS INDEX: 25.37 KG/M2

## 2022-03-16 DIAGNOSIS — M54.42 ACUTE LEFT-SIDED LOW BACK PAIN WITH LEFT-SIDED SCIATICA: Primary | ICD-10-CM

## 2022-03-16 PROCEDURE — 99204 OFFICE O/P NEW MOD 45 MIN: CPT | Performed by: STUDENT IN AN ORGANIZED HEALTH CARE EDUCATION/TRAINING PROGRAM

## 2022-03-16 RX ORDER — PREGABALIN 75 MG/1
75 CAPSULE ORAL 3 TIMES DAILY
Qty: 90 CAPSULE | Refills: 1 | Status: SHIPPED | OUTPATIENT
Start: 2022-03-16

## 2022-03-16 NOTE — TELEPHONE ENCOUNTER
Patient called the office looking for advice regarding next steps  She reports she is scheduled for an REYNOLD [facility]  She is inquiring if she should return back to work and what is the process  She is also inquiring when would surgery be scheduled  She is a new patient with [facility], scheduled a new patient appointment for tomorrow with Marleny Felder at [facility]  Patient is aware the call intake center will give her a call to complete the intake  Patient was appreciative  Notified Marleny Felder of the appointment tomorrow

## 2022-03-16 NOTE — TELEPHONE ENCOUNTER
Pt son Hermelinda Mask  is on the phone , pt is in extreme pain  Please be advised thank you    Hermelinda Zheng  can be reached @ [phone]

## 2022-03-16 NOTE — PATIENT INSTRUCTIONS
Epidural Steroid Injection   AMBULATORY CARE:   What you need to know about an epidural steroid injection (REYNOLD):  An REYNOLD is a procedure to inject steroid medicine into the epidural space  The epidural space is between your spinal cord and vertebrae  Steroids reduce inflammation and fluid buildup in your spine that may be causing pain  You may be given pain medicine along with the steroids  How to prepare for an REYNOLD:  Your healthcare provider will talk to you about how to prepare for your procedure  He or she will tell you what medicines to take or not take on the day of your procedure  You may need to stop taking blood thinners or other medicines several days before your procedure  You may need to adjust any diabetes medicine you take on the day of your procedure  Steroid medicine can increase your blood sugar level  Arrange for someone to drive you home when you are discharged  What will happen during an REYNOLD:   · You will be given medicine to numb the procedure area  You will be awake for the procedure, but you will not feel pain  You may also be given medicine to help you relax  Contrast liquid will be used to help your healthcare provider see the area better  Tell the healthcare provider if you have ever had an allergic reaction to contrast liquid  · Your healthcare provider may place the needle into your neck area, middle of your back, or tailbone area  He may inject the medicine next to the nerves that are causing your pain  He may instead inject the medicine into a larger area of the epidural space  This helps the medicine spread to more nerves  Your healthcare provider will use a fluoroscope to help guide the needle to the right place  A fluoroscope is a type of x-ray  After the procedure, a bandage will be placed over the injection site to prevent infection  What will happen after an REYNOLD:  You will have a bandage over the injection site to prevent infection   Your healthcare provider will tell you when you can bathe and any activity guidelines  You will be able to go home  Risks of an REYNOLD:  You may have temporary or permanent nerve damage or paralysis  You may have bleeding or develop a serious infection, such as meningitis (swelling of the brain coverings)  An abscess may also develop  An abscess is a pus-filled area under the skin  You may need surgery to fix the abscess  You may have a seizure, anxiety, or trouble sleeping  If you are a man, you may have temporary erectile dysfunction (not able to have an erection)  Call your local emergency number (911 in the [address] [place] [address],[address]) if:   · You have a seizure  · You have trouble moving your legs  Seek care immediately if:   · Blood soaks through your bandage  · You have a fever or chills, severe back pain, and the procedure area is sensitive to the touch  · You cannot control when you urinate or have a bowel movement  Call your doctor if:   · You have weakness or numbness in your legs  · Your wound is red, swollen, or draining pus  · You have nausea or are vomiting  · Your face or neck is red and you feel warm  · You have more pain than you had before the procedure  · You have swelling in your hands or feet  · You have questions or concerns about your condition or care  Care for your wound as directed: You may remove the bandage before you go to bed the day of your procedure  You may take a shower, but do not take a bath for at least 24 hours  Self-care:   · Do not drive,  use machines, or do strenuous activity for 24 hours after your procedure or as directed  · Continue other treatments  as directed  Steroid injections alone will not control your pain  The injections are meant to be used with other treatments, such as physical therapy  Follow up with your doctor as directed:  Write down your questions so you remember to ask them during your visits     © Copyright [employer] 2022 Information is for End User's use only and may not be sold, redistributed or otherwise used for commercial purposes  All illustrations and images included in CareNotes® are the copyrighted property of A D [employer] M [employer]  or Mariana Barger  The above information is an  only  It is not intended as medical advice for individual conditions or treatments  Talk to your doctor, nurse or pharmacist before following any medical regimen to see if it is safe and effective for you

## 2022-03-16 NOTE — TELEPHONE ENCOUNTER
recvd a fax from [employer] and scanned into chart - Prior Auth for Pregabalin capsules - Dr Ramirez Courser patient

## 2022-03-16 NOTE — LETTER
March 16, 2022     Referral [address] [place] [id number]    Patient: Luis A Interiano   YOB: 1965   Date of Visit: 3/16/2022       Dear Dr Zully Cabrera:    Thank you for referring Luis A Interiano to me for evaluation  Below are my notes for this consultation  If you have questions, please do not hesitate to call me  I look forward to following your patient along with you  Sincerely,        Beth Eduardo MD        CC: MD Kavon Ramires MD Sidonie Brook, MD  3/16/2022  8:03 AM  Sign when Signing Visit  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 left lower extremity radiculopathy and weakness secondary to notable left foraminal L4-5 disc extrusion  She has diminished sensation in the L4 dermatome and also myotomal weakness as well  She is also hyporeflexic of the left patella  We discussed left L4-5 transforaminal epidural steroid injection to help diminish the inflammatory component of her symptoms  Approach will be super neural as it appears the disc is pressing the nerve inferiorly in the foramen  Discussed with patient that given the degree of stenosis, that administration of the injectate may be uncomfortable for the patient, therefore will also plan for possible interlaminar approach at the L4-5 level  In regards pharmacological treatment options, patient has failed gabapentin in the past due to intolerance of the medication  Given intolerance to gabapentin, I will initiate Lyrica 75 mg t i d  on a dose titration schedule that was explained to the patient today  If tolerating this dose, may benefit from escalation in the dose moving forward  Given her focal weakness, she will also be seen by Dr Anne Mejias  Discussed with patient that injection will help with pain symptoms but will not help with weakness      Given the patient's symptoms, examination findings and imaging results noted above, I discussed the utility of proceeding with a left L4 transforaminal epidural steroid injection under fluoroscopic guidance, possible L4-5 interlmainar approach  Using an anatomical model, the procedure, as well as its potential risks, benefits, and reasonable alternatives were discussed in detail  Discussed risks of the procedure included but are not limited to bleeding, infection, allergic reaction, nerve damage, hematoma fomation, abscess formation, failure of the pain to improve and potential worsening of the pain  Since Ms Parth Soto has failed at least 6 weeks of conservative measures including over-the-counter pain medications, prescription medications, chiropractic therapy, physical therapy and a home exercise program it is reasonable to proceed with the above injection  The patient verbalized understanding to the potential risks, benefits, and reasonable alternatives to the above injection and wishes to proceed  Her response will help to further determine a treatment plan  [place] Prescription Drug Monitoring Program report was reviewed and was appropriate     My impressions and treatment recommendations were discussed in detail with the patient who verbalized understanding and had no further questions  Discharge instructions were provided  I personally saw and examined the patient and I agree with the above discussed plan of care  Orders Placed This Encounter   Procedures    FL spine and pain procedure     Standing Status:   Future     Standing Expiration Date:   3/16/2026     Order Specific Question:   Reason for Exam:     Answer:   left L4 TFESI, possible L4-5 LESI     Order Specific Question:   Anticoagulant hold needed? Answer:   No     Order Specific Question:   Is the patient pregnant?      Answer:   Unknown     New Medications Ordered This Visit   Medications    pregabalin (LYRICA) 75 mg capsule     Sig: Take 1 capsule (75 mg total) by mouth 3 (three) times a day Start by taking 1 tablet at bedtime for 1 day  Then increase to 1 tablet in the evening and 1 tablet at bedtime for 1 day  Then increase to 1 tablet three times a day thereafter  Dispense:  90 capsule     Refill:  1       History of Present Illness    Referring Provider: Self, Referral    Eric Allen is a 61 y o  female who presents with a chief complaint of left lower extremity pain in weakness from hip to the ankle, inside the thigh as well as pain over the knee, shin  Associated with numbness and tingling  This is an acute issue that began on 02/28/2022  Patient denies any traumatic event  Patient is a nurse anesthetist  Janny Butterfield localize an isolated event  Over the past month, intensity the pain has been severe  Current pain is 4/10, but can escalate to a 12/10  Reports she cannot walk straight due to pain  Pain is nearly constant  Pain is worse at all times a day with no typical pattern  Described as burning, cramping, shooting, numbness, sharp, cutting, pins and needles, pressure-like, throbbing, dull/aching  Since this started, pain is not as instense when at rest  However, walking exacerbates her symptoms  Reports weakness in the left lower extremity only  She is ambulating with crutches  Pain is increased with standing, bending, sitting, walking, exercising  No change with coughing, sneezing  Decreased with relaxation and lying down  No previous pain management  Patient has MRI of the lumbar spine that is most notable for a new L4-5 foraminal disc extrusion causing mass effect on the L4 nerve root  No previous back surgery  Past surgical history includes bariatric surgery  Past medical history includes anxiety, high cholesterol, depression  Reports physical therapy in the past provided excellent relief  Moderate relief with heat therapy, and moderate relief with chiropractic manipulation        Does not smoke tobacco marijuana  Opioids have not provided relief  Currently using acetaminophen with relief  Also currently use Aleve with relief  Currently using Flexeril without relief  I have personally reviewed and/or updated the patient's past medical history, past surgical history, family history, social history, current medications, allergies, and vital signs today  Review of Systems   Constitutional: Negative for fever and unexpected weight change  HENT: Negative for trouble swallowing  Eyes: Negative for visual disturbance  Respiratory: Negative for shortness of breath and wheezing  Cardiovascular: Negative for chest pain and palpitations  Gastrointestinal: Negative for constipation, diarrhea, nausea and vomiting  Endocrine: Negative for cold intolerance, heat intolerance and polydipsia  Genitourinary: Negative for difficulty urinating and frequency  Musculoskeletal: Positive for arthralgias and myalgias  Negative for gait problem and joint swelling  Skin: Negative for rash  Neurological: Negative for dizziness, seizures, syncope, weakness and headaches  Hematological: Does not bruise/bleed easily  Psychiatric/Behavioral: Negative for dysphoric mood  All other systems reviewed and are negative        Patient Active Problem List   Diagnosis    Elevated parathyroid hormone    Postsurgical malabsorption    Secondary non-renal hyperparathyroidism ([facility] [address] )    Spinal stenosis    S/P gastric bypass    Arthritis of carpometacarpal (CMC) joint of left thumb       Past Medical History:   Diagnosis Date    Abnormal Pap smear of cervix 1987    Anxiety     High vitamin A level     History of anxiety     History of hypercholesterolemia     History of hypertension     History of obesity     HPV (human papilloma virus) infection 1987    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    Secondary hyperparathyroidism, non-renal ([address] )     Spinal stenosis        Past Surgical History: Procedure Laterality Date    ABDOMINOPLASTY      BREAST CYST ASPIRATION      COLONOSCOPY      COLONOSCOPY  11/22/2021    Eyvazzedah - 11 y f/u     COLPOSCOPY  1987    COMBINED AUGMENTATION MAMMAPLASTY AND ABDOMINOPLASTY  2001    FACIAL COSMETIC SURGERY      FL INJECTION RIGHT HIP (NON ARTHROGRAM)  5/24/2019    GASTRIC BYPASS      TUBAL LIGATION         Family History   Problem Relation Age of Onset    Hypertension Mother     Heart disease Mother     Hypertension Father     Heart disease Father     No Known Problems Sister     No Known Problems Brother     No Known Problems Maternal Aunt     No Known Problems Maternal Uncle     No Known Problems Paternal Aunt     No Known Problems Paternal Uncle     No Known Problems Maternal Grandmother     No Known Problems Maternal Grandfather     No Known Problems Paternal Grandmother     No Known Problems Paternal Grandfather     ADD / ADHD Neg Hx     Anesthesia problems Neg Hx     Cancer Neg Hx     Clotting disorder Neg Hx     Collagen disease Neg Hx     Diabetes Neg Hx     Dislocations Neg Hx     Learning disabilities Neg Hx     Neurological problems Neg Hx     Osteoporosis Neg Hx     Rheumatologic disease Neg Hx     Scoliosis Neg Hx     Vascular Disease Neg Hx        Social History     Occupational History    Not on file   Tobacco Use    Smoking status: Never Smoker    Smokeless tobacco: Never Used   Vaping Use    Vaping Use: Never used   Substance and Sexual Activity    Alcohol use: No    Drug use: No    Sexual activity: Yes     Partners: Male     Birth control/protection: Female Sterilization       Current Outpatient Medications on File Prior to Visit   Medication Sig    Calcium Citrate 1040 MG TABS Take by mouth 3 (three) times a day    Multiple Vitamins-Minerals (BARIATRIC MULTIVITAMINS/IRON PO) Take by mouth daily    [DISCONTINUED] LORazepam (ATIVAN) 1 mg tablet     [DISCONTINUED] traZODone (DESYREL) 100 mg tablet      [DISCONTINUED] cyclobenzaprine (FLEXERIL) 5 mg tablet Take 1 tablet (5 mg total) by mouth 3 (three) times a day as needed for muscle spasms    [DISCONTINUED] predniSONE 10 mg tablet  (Patient not taking: Reported on 3/16/2022 )     No current facility-administered medications on file prior to visit  No Known Allergies    Physical Exam    Resp 18   Ht 5' 4" (1 626 m)   Wt 67 4 kg (148 lb 9 6 oz)   BMI 25 51 kg/m²     Constitutional: normal, well developed, well nourished, alert, in no distress and non-toxic and no overt pain behavior    Eyes: anicteric  HEENT: grossly intact  Neck: supple, symmetric, trachea midline and no masses   Pulmonary:even and unlabored  Cardiovascular:No edema or pitting edema present  Skin:Normal without rashes or lesions and well hydrated  Psychiatric:Mood and affect appropriate  Neurologic:Cranial Nerves II-XII grossly intact  Musculoskeletal:normal     Lumbar Spine Exam    Appearance:  Normal lordosis  Palpation/Tenderness:  left lumbar paraspinal tenderness  right lumbar paraspinal tenderness  Sensory:  no sensory deficits noted except: diminished left L4  Motor Strength:  Left hip flexion:  4/5  Left hip extension:  5/5  Right hip flexion:  5/5  Right hip extension:  5/5  Left knee flexion:  5/5  Left knee extension:  4/5  Right knee flexion:  5/5  Right knee extension:  5/5  Left foot dorsiflexion:  4/5  Left foot plantar flexion:  5/5  Right foot dorsiflexion:  5/5  Right foot plantar flexion:  5/5        Reflexes:  Left Patellar:  1+   Right Patellar:  2+   Left Achilles:  2+   Right Achilles:  2+   Special Tests:  Left Straight Leg Test:  positive  Right Straight Leg Test:  negative      DIAGNOSTIC IMAGING AND TEST RESULTS:  LUMBAR SPINE  03/07/2021     INDICATION:   M54 50: Low back pain, unspecified      COMPARISON:  6/30/2008     VIEWS:  XR SPINE LUMBAR MINIMUM 4 VIEWS NON INJURY  Images: 5     FINDINGS:     There are 5 non rib bearing lumbar vertebral bodies       There is no evidence of acute fracture or destructive osseous lesion      Alignment is unremarkable       Moderate degenerative disc disease T12-L1, L1-2, L2-3, L5-S1 mildly progressive     The pedicles appear intact      Soft tissues are unremarkable      IMPRESSION:  Mildly progressive multilevel degenerative spondylosis     No acute lumbar spinal abnormalities identified    LEFT HIP  03/07/2021     INDICATION:   M25 552: Pain in left hip      COMPARISON:  12/4/2018     VIEWS:  XR HIP/PELV 2-3 VWS LEFT  W PELVIS IF PERFORMED   Images: 3     FINDINGS:  Development of mild degenerative arthritis both hips     There is no acute fracture or dislocation      No lytic or blastic osseous lesion      Soft tissues are unremarkable  Left-sided abdominal staple line again evident     The visualized lumbar spine is unremarkable      IMPRESSION:  Development of mild degenerative arthritis both hips     No acute osseous abnormality  MRI LUMBAR SPINE WITHOUT CONTRAST  03/11/2022     INDICATION: M54 16: Radiculopathy, lumbar region  M41 9: Scoliosis, unspecified  Acute left lumbar radiculopathy  Patient reports severe low back pain radiating into the left leg with difficulty ambulating for 2 weeks  No trauma      COMPARISON:  7/24/2008     TECHNIQUE:  Sagittal T1, sagittal T2, sagittal inversion recovery, axial T1 and axial T2, coronal T2        IMAGE QUALITY:  Diagnostic     FINDINGS:     Vertebral bodies are numbered such that the 1st conical shape vertebral segment is called S1  Iliolumbar ligaments at L5      VERTEBRAL BODIES:    Alignment: Similar reversal of curvature in the upper lumbar region/thoracolumbar junction with otherwise preserved lordosis  Mild, increased retrolisthesis at L2-L3 and L3-L4  Mild thoracolumbar dextroscoliosis      Vertebral body heights: Preserved        Bone marrow: Multilevel degenerative endplate changes  No suspicious marrow edema or mass      SACRUM:  Preserved signal intensity    No fracture or mass      DISTAL CORD AND CONUS:  Conus and nerve roots are within normal limits  Conus terminates at L1      PARASPINAL SOFT TISSUES:  Within normal limits      LOWER THORACIC DISC SPACES:  Chronic loss of disc height at T11-T12 and T12-L1  Similar small T11-T12 disc bulge with improved disc herniation  Mild, improved central canal narrowing      LUMBAR DISC SPACES:  Diffuse desiccation  Loss of height at L1-L2 and L2-L3, increased at L2-L3      L1-L2:  Disc bulge and small right central disc protrusion  Mild central canal narrowing  Similar to the prior study      L2-L3:  Disc bulge and mild facet hypertrophy  Mild central canal and foraminal narrowing, mildly increased due to increased retrolisthesis      L3-L4:  New disc bulge and small right foraminal disc protrusion  Facet hypertrophy, small left greater than right facet effusions  Mild central canal and foraminal narrowing      L4-L5:  Similar small disc bulge and mild facet hypertrophy  New small left facet effusion  New left foraminal disc extrusion (6/17 resulting in marked left foraminal narrowing and mass effect on the exiting left L4 nerve root  Mild right foraminal   and central canal narrowing      L5-S1:  Similar small disc bulge and mild facet hypertrophy without stenosis      IMPRESSION:     Multilevel degenerative disc disease and lower lumbar facet osteoarthritis, predominantly mild stenoses  Mildly increased degenerative changes compared to 2008  Detailed description above      At L4-L5, new left-sided disc herniation impinging the left L4 nerve root      Increased, likely degenerative mild retrolisthesis at L2-L3 and L3-L4      Other chronic findings above

## 2022-03-17 ENCOUNTER — HOSPITAL ENCOUNTER (OUTPATIENT)
Dept: RADIOLOGY | Facility: CLINIC | Age: 57
Discharge: HOME | End: 2022-03-17
Admitting: STUDENT IN AN ORGANIZED HEALTH CARE EDUCATION/TRAINING PROGRAM
Payer: COMMERCIAL

## 2022-03-17 VITALS
DIASTOLIC BLOOD PRESSURE: 87 MMHG | RESPIRATION RATE: 20 BRPM | TEMPERATURE: 98.6 F | HEART RATE: 74 BPM | OXYGEN SATURATION: 98 % | SYSTOLIC BLOOD PRESSURE: 127 MMHG

## 2022-03-17 DIAGNOSIS — M54.42 ACUTE LEFT-SIDED LOW BACK PAIN WITH LEFT-SIDED SCIATICA: ICD-10-CM

## 2022-03-17 PROCEDURE — 64483 NJX AA&/STRD TFRM EPI L/S 1: CPT | Performed by: STUDENT IN AN ORGANIZED HEALTH CARE EDUCATION/TRAINING PROGRAM

## 2022-03-17 RX ORDER — METHYLPREDNISOLONE ACETATE 40 MG/ML
40 INJECTION, SUSPENSION INTRA-ARTICULAR; INTRALESIONAL; INTRAMUSCULAR; PARENTERAL; SOFT TISSUE ONCE
Status: COMPLETED | OUTPATIENT
Start: 2022-03-17 | End: 2022-03-17

## 2022-03-17 RX ORDER — BUPIVACAINE HCL/PF 2.5 MG/ML
1 VIAL (ML) INJECTION ONCE
Status: COMPLETED | OUTPATIENT
Start: 2022-03-17 | End: 2022-03-17

## 2022-03-17 RX ADMIN — IOHEXOL 1 ML: 300 INJECTION, SOLUTION INTRAVENOUS at 15:24

## 2022-03-17 RX ADMIN — Medication 1 ML: at 15:24

## 2022-03-17 RX ADMIN — METHYLPREDNISOLONE ACETATE 40 MG: 40 INJECTION, SUSPENSION INTRA-ARTICULAR; INTRALESIONAL; INTRAMUSCULAR; PARENTERAL; SOFT TISSUE at 15:24

## 2022-03-17 NOTE — TELEPHONE ENCOUNTER
S/W pt and scheduled for today at [phone] arrival  Advised  needed  Nothing to eat or drink one hour prior  Loose fitting comfortable pants without and belts/zippers  Denies COVID or COVID symptoms

## 2022-03-17 NOTE — TELEPHONE ENCOUNTER
No procedure was done   Waiting for injection which I made STAT, but let patient know it would be pending approval  Can send short course of tramadol to pharmacy

## 2022-03-17 NOTE — INTERVAL H&P NOTE
Update: (This section must be completed if the H&P was completed greater than 24 hrs to procedure or admission)    H&P reviewed  After examining the patient, I find no changed to the H&P since it had been written  Patient re-evaluated   Accept as history and physical     Myrtle Yeh MD/March 17, 2022/3:08 PM

## 2022-03-17 NOTE — TELEPHONE ENCOUNTER
Auth obtained by Dayana Campbell for STAT TFESI  Lt L4-L5 Tfesi #[id number] 3/17/22-5/16/22   Will call to schedule

## 2022-03-17 NOTE — DISCHARGE INSTR - LAB
Epidural Steroid Injection   WHAT YOU NEED TO KNOW:   An epidural steroid injection (REYNOLD) is a procedure to inject steroid medicine into the epidural space  The epidural space is between your spinal cord and vertebrae  Steroids reduce inflammation and fluid buildup in your spine that may be causing pain  You may be given pain medicine along with the steroids  ACTIVITY  Do not drive or operate machinery today  No strenuous activity today - bending, lifting, etc   You may resume normal activites starting tomorrow - start slowly and as tolerated  You may shower today, but no tub baths or hot tubs  You may have numbness for several hours from the local anesthetic  Please use caution and common sense, especially with weight-bearing activities  CARE OF THE INJECTION SITE  If you have soreness or pain, apply ice to the area today (20 minutes on/20 minutes off)  Starting tomorrow, you may use warm, moist heat or ice if needed  You may have an increase or change in your discomfort for 36-48 hours after your treatment  Apply ice and continue with any pain medication you have been prescribed  Notify the [facility] if you have any of the following: redness, drainage, swelling, headache, stiff neck or fever above 100°F     SPECIAL INSTRUCTIONS  Our office will contact you in approximately 7 days for a progress report  MEDICATIONS  Continue to take all routine medications  Our office may have instructed you to hold some medications  As no general anesthesia was used in today's procedure, you should not experience any side effects related to anesthesia  If you have a problem specifically related to your procedure, please call our office at [phone]  Problems not related to your procedure should be directed to your primary care physician

## 2022-03-18 ENCOUNTER — OFFICE VISIT (OUTPATIENT)
Dept: NEUROSURGERY | Facility: CLINIC | Age: 57
End: 2022-03-18
Payer: COMMERCIAL

## 2022-03-18 VITALS
HEART RATE: 66 BPM | HEIGHT: 64 IN | SYSTOLIC BLOOD PRESSURE: 136 MMHG | RESPIRATION RATE: 17 BRPM | WEIGHT: 146 LBS | TEMPERATURE: 97.5 F | BODY MASS INDEX: 24.92 KG/M2 | DIASTOLIC BLOOD PRESSURE: 88 MMHG

## 2022-03-18 DIAGNOSIS — M54.16 LUMBAR RADICULOPATHY: Primary | ICD-10-CM

## 2022-03-18 PROCEDURE — 99203 OFFICE O/P NEW LOW 30 MIN: CPT | Performed by: PHYSICIAN ASSISTANT

## 2022-03-18 RX ORDER — OXYCODONE AND ACETAMINOPHEN 10; 325 MG/1; MG/1
10-325 TABLET ORAL AS NEEDED
COMMUNITY
Start: 2022-03-17 | End: 2022-03-24

## 2022-03-18 NOTE — PROGRESS NOTES
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, s/p gastric bypass here for follow up of left lower lumbar radiculopathy  Pain starts few weeks ago and  is associated with left posterolateral LE radicular pain, paresthesia in the left outer calf and medial distal leg  Reports limping gait in the left leg with some weakness  Movement makes the pain worse  Patient denies Bowel/bladder dysfunction nor saddle anaesthesia  Patient following up with pain management , Dr Segundo Blancas and had one REYNOLD yesterday ( 03/17/2022)  She is to start Lyrica, as she had adverse reaction to Gabapentin  Exam- A&O, communicates well  Esteban, strength int he legs 5/5 bilaterally  Decreased LT sensation in the left medial distal leg and also left outer calf region  DTR 2+ wo clonus bilaterally  Slight limping gait in the left leg    MRI of Lumbar spine done on 03/11/2022 demonstrates new disc herniation at left L4-5 pressing on the left L4 nerve root  Multilevel degenerative disc disease and facet ortho arthritis with mild stenosis    Hx & PEx were reviewed with the patient  Mx plan discussed  Patient continuous to have left leg radiculopathy  She got REYNOLD one day ago and it is difficult to see the effect of the meds thus early, advised to continue with pain Mx and follow up with Dr Segundo Blancas for 2nd round of REYNOLD  However, patient says she wants to go back to work  As a CRNP, she her job involves bending,  lifting , and transferring patients and wants to have definitive treatment-surgical option  She wants to discuss with Dr Donna Sandy  I will TT Dr Donna Sandy,  will let her know what his plan would be  Advised, Fall precaution, avoid lifting heavy objects, excessive bending or twisting  Questions and concerns were answered  Patient verbalized understandings and agreed with the plan  Plan:  1   Continue conservative treatment, repeat REYNOLD  2  Continue PT  3  I will discuss with Dr Ramirez Lopez and call the patient for Mx plan-patient knows Dr Ramirez Lopez and wants definitive Mx-surgical as she wants to go back to work  4  Call with question or concern  Chief Complaint   Patient presents with    Consult     LBP        C/C: " Lower back pain with left LE radiculopathy"    History of Present Illness     61y o  year old female 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, s/p gastric bypass here for follow up of left lower lumbar radiculopathy  Pain starts few weeks ago and  is associated with left posterolateral LE radicular pain, paresthesia in the left outer calf and medial distal leg  Reports limping gait in the left leg with some weakness  Movement makes the pain worse  Patient denies Bowel/bladder dysfunction nor saddle anaesthesia  Patient following up with pain management , Dr Amauri Caceres and had one REYNOLD yesterday ( 03/17/2022)  She is to start Lyrica, as she had adverse reaction to Gabapentin  Patient denies Hx of fever, chills, rigors, cough or chest pain  Denies Hx of DM, HTN, CHF, stroke, seizure, bleeding disorder or taking anticoagulant meds  Imaging findings as described in the assessment section above  REVIEW OF SYSTEMS  ROS personally reviewed and updated  Review of Systems   Musculoskeletal: Positive for back pain (LBP RADIATES TO ENTIRE LEFT LEG ) and gait problem (DIFFICULTY WALKING ON THE LEFT )  SX PRESENT SINCE 2/2000-LEFT LEG SINCE 2/28/22    NO PREVIOUS SX ON LSPINE    PHYSICAL THERAPY WITH [facility] X 1 YEAR AGO     LAST OV WITH ORTHO 3/7/22    REYNOLD DONE 3/17 RELIEF ONLY LAST 6HOURS    PAIN 4/10 FOR LEFT LEG    Neurological: Positive for weakness and numbness (INNER LEFT CALF TO ANKLE)  All other systems reviewed and are negative          Meds/Allergies     Current Outpatient Medications   Medication Sig Dispense Refill    Calcium Citrate 1040 MG TABS Take by mouth 3 (three) times a day      Multiple Vitamins-Minerals (BARIATRIC MULTIVITAMINS/IRON PO) Take by mouth daily      pregabalin (LYRICA) 75 mg capsule Take 1 capsule (75 mg total) by mouth 3 (three) times a day Start by taking 1 tablet at bedtime for 1 day  Then increase to 1 tablet in the evening and 1 tablet at bedtime for 1 day  Then increase to 1 tablet three times a day thereafter  90 capsule 1     No current facility-administered medications for this visit  Allergies   Allergen Reactions    Other Wheezing     Lobster when a teenager    Wheezing       PAST HISTORY    Past Medical History:   Diagnosis Date    Abnormal Pap smear of cervix 1987    Anxiety     High vitamin A level     History of anxiety     History of hypercholesterolemia     History of hypertension     History of obesity     HPV (human papilloma virus) infection 1987    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    Secondary hyperparathyroidism, non-renal ([address] )     Spinal stenosis        Past Surgical History:   Procedure Laterality Date    ABDOMINOPLASTY      BREAST CYST ASPIRATION      COLONOSCOPY      COLONOSCOPY  11/22/2021    Eyv[facility]zedah - 11 y f/u     COLPOSCOPY  1987    COMBINED AUGMENTATION MAMMAPLASTY AND ABDOMINOPLASTY  2001    FACIAL COSMETIC SURGERY      FL INJECTION RIGHT HIP (NON ARTHROGRAM)  5/24/2019    GASTRIC BYPASS      TUBAL LIGATION         Social History     Tobacco Use    Smoking status: Never Smoker    Smokeless tobacco: Never Used   Vaping Use    Vaping Use: Never used   Substance Use Topics    Alcohol use: No    Drug use: No       Family History   Problem Relation Age of Onset    Hypertension Mother     Heart disease Mother     Hypertension Father     Heart disease Father     No Known Problems Sister     No Known Problems Brother     No Known Problems Maternal Aunt     No Known Problems Maternal Uncle     No Known Problems Paternal Aunt     No Known Problems Paternal Uncle     No Known Problems Maternal Grandmother  No Known Problems Maternal Grandfather     No Known Problems Paternal Grandmother     No Known Problems Paternal Grandfather     ADD / ADHD Neg Hx     Anesthesia problems Neg Hx     Cancer Neg Hx     Clotting disorder Neg Hx     Collagen disease Neg Hx     Diabetes Neg Hx     Dislocations Neg Hx     Learning disabilities Neg Hx     Neurological problems Neg Hx     Osteoporosis Neg Hx     Rheumatologic disease Neg Hx     Scoliosis Neg Hx     Vascular Disease Neg Hx          Above history personally reviewed  EXAM    Vitals:not currently breastfeeding  ,There is no height or weight on file to calculate BMI  Physical Exam  Constitutional:       Appearance: Normal appearance  HENT:      Head: Normocephalic and atraumatic  Eyes:      Extraocular Movements: Extraocular movements intact  Cardiovascular:      Rate and Rhythm: Normal rate  Pulses: Normal pulses  Pulmonary:      Effort: Pulmonary effort is normal    Musculoskeletal:         General: Tenderness present  Cervical back: Normal range of motion  Neurological:      Mental Status: She is alert  GCS: GCS eye subscore is 4  GCS verbal subscore is 5  GCS motor subscore is 6  Cranial Nerves: Cranial nerves are intact  Sensory: Sensory deficit present  Motor: No weakness  Deep Tendon Reflexes:      Reflex Scores:       Tricep reflexes are 2+ on the right side and 2+ on the left side  Bicep reflexes are 2+ on the right side and 2+ on the left side  Brachioradialis reflexes are 2+ on the right side and 2+ on the left side  Patellar reflexes are 1+ on the right side  Achilles reflexes are 1+ on the right side and 2+ on the left side    Psychiatric:         Speech: Speech normal          Neurologic Exam     Mental Status   Speech: speech is normal   Level of consciousness: alert    Cranial Nerves     CN III, IV, VI   Nystagmus: none     CN XI   CN XI normal      Motor Exam Muscle bulk: normal  Overall muscle tone: normal  Right arm tone: normal  Left arm tone: normal  Right arm pronator drift: absent  Left arm pronator drift: absent  Right leg tone: normal  Left leg tone: normal    Sensory Exam   Right arm light touch: normal  Left arm light touch: normal  Right leg light touch: normal  Left leg light touch: decreased from knee    Gait, Coordination, and Reflexes     Reflexes   Right brachioradialis: 2+  Left brachioradialis: 2+  Right biceps: 2+  Left biceps: 2+  Right triceps: 2+  Left triceps: 2+  Right patellar: 1+  Right achilles: 1+  Left achilles: 2+  Right : 2+  Left : 2+  Right Rueda: absent  Left Rueda: absent  Right ankle clonus: absent  Left pendular knee jerk: absent        MEDICAL DECISION MAKING    Imaging Studies:     XR spine lumbar minimum 4 views non injury    Result Date: 3/11/2022  Narrative: LUMBAR SPINE INDICATION:   M54 50: Low back pain, unspecified  COMPARISON:  6/30/2008 VIEWS:  XR SPINE LUMBAR MINIMUM 4 VIEWS NON INJURY Images: 5 FINDINGS: There are 5 non rib bearing lumbar vertebral bodies  There is no evidence of acute fracture or destructive osseous lesion  Alignment is unremarkable  Moderate degenerative disc disease T12-L1, L1-2, L2-3, L5-S1 mildly progressive The pedicles appear intact  Soft tissues are unremarkable  Impression: Mildly progressive multilevel degenerative spondylosis No acute lumbar spinal abnormalities identified Workstation performed: [id number]     XR hip/pelv 2-3 vws left if performed    Result Date: 3/11/2022  Narrative: LEFT HIP INDICATION:   M25 552: Pain in left hip  COMPARISON:  12/4/2018 VIEWS:  XR HIP/PELV 2-3 VWS LEFT  W PELVIS IF PERFORMED Images: 3 FINDINGS: Development of mild degenerative arthritis both hips There is no acute fracture or dislocation  No lytic or blastic osseous lesion  Soft tissues are unremarkable   Left-sided abdominal staple line again evident The visualized lumbar spine is unremarkable  Impression: Development of mild degenerative arthritis both hips No acute osseous abnormality  Workstation performed: [id number]     MRI lumbar spine wo contrast    Result Date: 3/11/2022  Narrative: MRI LUMBAR SPINE WITHOUT CONTRAST INDICATION: M54 16: Radiculopathy, lumbar region M41 9: Scoliosis, unspecified  Acute left lumbar radiculopathy  Patient reports severe low back pain radiating into the left leg with difficulty ambulating for 2 weeks  No trauma  COMPARISON:  7/24/2008 TECHNIQUE:  Sagittal T1, sagittal T2, sagittal inversion recovery, axial T1 and axial T2, coronal T2   IMAGE QUALITY:  Diagnostic FINDINGS: Vertebral bodies are numbered such that the 1st conical shape vertebral segment is called S1  Iliolumbar ligaments at L5  VERTEBRAL BODIES:  Alignment: Similar reversal of curvature in the upper lumbar region/thoracolumbar junction with otherwise preserved lordosis  Mild, increased retrolisthesis at L2-L3 and L3-L4  Mild thoracolumbar dextroscoliosis  Vertebral body heights: Preserved  Bone marrow: Multilevel degenerative endplate changes  No suspicious marrow edema or mass  SACRUM:  Preserved signal intensity  No fracture or mass  DISTAL CORD AND CONUS:  Conus and nerve roots are within normal limits  Conus terminates at L1  PARASPINAL SOFT TISSUES:  Within normal limits  LOWER THORACIC DISC SPACES:  Chronic loss of disc height at T11-T12 and T12-L1  Similar small T11-T12 disc bulge with improved disc herniation  Mild, improved central canal narrowing  LUMBAR DISC SPACES:  Diffuse desiccation  Loss of height at L1-L2 and L2-L3, increased at L2-L3  L1-L2:  Disc bulge and small right central disc protrusion  Mild central canal narrowing  Similar to the prior study  L2-L3:  Disc bulge and mild facet hypertrophy  Mild central canal and foraminal narrowing, mildly increased due to increased retrolisthesis  L3-L4:  New disc bulge and small right foraminal disc protrusion  Facet hypertrophy, small left greater than right facet effusions  Mild central canal and foraminal narrowing  L4-L5:  Similar small disc bulge and mild facet hypertrophy  New small left facet effusion  New left foraminal disc extrusion (6/17 resulting in marked left foraminal narrowing and mass effect on the exiting left L4 nerve root  Mild right foraminal and central canal narrowing  L5-S1:  Similar small disc bulge and mild facet hypertrophy without stenosis  Impression: Multilevel degenerative disc disease and lower lumbar facet osteoarthritis, predominantly mild stenoses  Mildly increased degenerative changes compared to 2008  Detailed description above  At L4-L5, new left-sided disc herniation impinging the left L4 nerve root  Increased, likely degenerative mild retrolisthesis at L2-L3 and L3-L4  Other chronic findings above   Workstation performed: [id number]     FL spine and pain procedure    Result Date: 3/17/2022  Narrative: PROCEDURE NOTE PATIENT NAME:  [address] [place] [address] RECORD NUMBER:  [id number] YOB: 1965 DATE OF PROCEDURE:  03/17/22 PROCEDURE:  LEFT L4 TRANSFORAMINAL EPIDURAL STEROID INJECTION UNDER FLUOROSCOPIC GUIDANCE    ATTENDING PHYSICIAN:  ELENA PAULINO   PRE-PROCEDURE DIAGNOSIS:  LOW BACK PAIN AND LUMBAR RADICULOPATHY   POST-PROCEDURE DIAGNOSIS:  DIAGNOSIS UNCHANGED   ANESTHESIA:  LOCAL   ESTIMATED BLOOD LOSS:  MINIMAL   COMPLICATIONS:  NONE      CONSENT:  TODAY'S PROCEDURE, ITS POTENTIAL BENEFITS AS WELL AS ITS RISKS AND POTENTIAL SIDE EFFECTS WERE REVIEWED   DISCUSSED RISKS OF THE PROCEDURE INCLUDE BLEEDING, INFECTION, NERVE IRRITATION OR DAMAGE, REACTIONS MEDICATIONS ADMINISTERED, HEADACHE, FAILURE OF THE PAIN TO IMPROVE, AND EXACERBATION OF THE PAIN   THESE RISKS WERE EXPLAINED TO THE PATIENT, WHO VERBALIZED UNDERSTANDING AND WHO WISHED TO PROCEED   INFORMED CONSENT WAS SIGNED    DESCRIPTION OF PROCEDURE:  AFTER WRITTEN INFORMED CONSENT WAS OBTAINED, THE PATIENT WAS TAKEN TO THE FLUOROSCOPY SUITE AND PLACED IN THE PRONE POSITION   ANATOMICAL LANDMARKS WERE IDENTIFIED BY WAY OF FLUOROSCOPY IN MULTIPLE VIEWS   THE SKIN OF THE LUMBAR REGION WAS PREPPED USING ANTISEPTIC AND DRAPED IN THE USUAL STERILE FASHION   STRICT ASEPTIC TECHNIQUE WAS UTILIZED   THE SKIN AND SUBCUTANEOUS TISSUES AT THE NEEDLE ENTRY SITE WERE INFILTRATED WITH A TOTAL OF 1 ML OF 1% PRESERVATIVE-FREE LIDOCAINE USING 25 GAUGE 1-1/2-INCH NEEDLE      A 22-GAUGE NEEDLE WAS THEN INCREMENTALLY ADVANCED UNDER FLUOROSCOPIC GUIDANCE IN THE OBLIQUE VIEW INTO THE LEFT L4 NEURAL FORAMEN  PROPER PLACEMENT INTO THE NEURAL FORAMEN WAS CONFIRMED WITH FLUOROSCOPY IN BOTH THE LATERAL AND AP VIEWS   AFTER NEGATIVE ASPIRATION FOR CSF OR HEME, 0 5 ML OMNIPAQUE 300 MG ML CONTRAST WAS INJECTED WITH DELINEATED THE NERVE ROOT AND THE EPIDURAL SPACE UNDER FLUOROSCOPY IN THE AP VIEW   THERE WAS ONLY A TRANSIENT PRESSURE PARESTHESIA THAT RESOLVED IMMEDIATELY UPON INJECTION   AFTER NEGATIVE ASPIRATION, A 2 ML INJECTATE CONSISTING 1 ML OF PRESERVATIVE-FREE 0 25% BUPIVACAINE AND 1 ML OF DEXAMETHASONE 10 MG/ML WAS SLOWLY INJECTED       THE PATIENT TOLERATED THE PROCEDURE WELL AND ALL NEEDLES WERE REMOVED INTACT   HEMOSTASIS WAS MAINTAINED   THERE WERE NO APPARENT PARESTHESIAS OR COMPLICATIONS   THE SKIN WAS WIPED CLEAN, AND A BAND-AID WAS PLACED AS APPROPRIATE   THE PATIENT WAS MONITORED FOR AN APPROPRIATE PERIOD OF TIME FOLLOWING THE PROCEDURE AND REMAINED HEMODYNAMICALLY STABLE AND NEUROVASCULARLY INTACT   THE PATIENT WAS ULTIMATELY DISCHARGED TO HOME WITH SUPERVISION IN GOOD CONDITION AND INSTRUCTED THAT OUR OFFICE WOULD CALL IN 7 DAYS TO OBTAIN AN UPDATE        I have personally reviewed pertinent reports   , I have personally reviewed pertinent films in PACS and I have personally reviewed pertinent films in PACS with a Radiologist

## 2022-03-21 ENCOUNTER — TELEPHONE (OUTPATIENT)
Dept: NEUROSURGERY | Facility: CLINIC | Age: 57
End: 2022-03-21

## 2022-03-21 NOTE — TELEPHONE ENCOUNTER
3/21/22    RECEIVED NOTE FROM LISSY FOR PATIENT TO SEE DR JUNIOR FOR POSSIBLE SURGERY - SEE 3/18/22 OV NOTE  PER BRENDA THIS IS BEING TAKEN CARE OF

## 2022-03-22 ENCOUNTER — TELEPHONE (OUTPATIENT)
Dept: PAIN MEDICINE | Facility: CLINIC | Age: 57
End: 2022-03-22

## 2022-03-22 NOTE — TELEPHONE ENCOUNTER
Pt called requesting a repeat injection- pt last injection was 3/17- pain level 4/10         [facility] [phone]

## 2022-03-23 NOTE — TELEPHONE ENCOUNTER
Review below  Left TFESI on 3/17  Pt states pain is 2/10 when up and moving around, but what really bothers her is that the pain wakes her up at night  States she sleeps on her sides and back  Advised pt to give injection more time and that inj are not repeated prior to 2 weeks  Advised to try a pillow between her knees to support her leg and align her hip and back when she is sleeping on her side  Pt also saw a NS PA on 3/18 and advised that she would like to see Dr Leroy Oropeza to discuss surgical options  Pt sees Dr Leroy Oropeza tomorrow 3/24  Advised pt to wait until after this visit and CB with Dr Francisco Carson      Anything else to add?

## 2022-03-24 ENCOUNTER — OFFICE VISIT (OUTPATIENT)
Dept: NEUROSURGERY | Facility: CLINIC | Age: 57
End: 2022-03-24
Payer: COMMERCIAL

## 2022-03-24 VITALS
RESPIRATION RATE: 16 BRPM | HEART RATE: 73 BPM | WEIGHT: 140 LBS | SYSTOLIC BLOOD PRESSURE: 111 MMHG | DIASTOLIC BLOOD PRESSURE: 78 MMHG | BODY MASS INDEX: 23.9 KG/M2 | HEIGHT: 64 IN | TEMPERATURE: 98.3 F

## 2022-03-24 DIAGNOSIS — M54.16 LUMBAR RADICULOPATHY: ICD-10-CM

## 2022-03-24 DIAGNOSIS — M51.26 LUMBAR HERNIATED DISC: Primary | ICD-10-CM

## 2022-03-24 PROCEDURE — 99203 OFFICE O/P NEW LOW 30 MIN: CPT | Performed by: NEUROLOGICAL SURGERY

## 2022-03-24 NOTE — LETTER
March 24, 2022     Patient: Radha Richardson   YOB: 1965   Date of Visit: 3/24/2022       To Whom it May Concern:    Radha Richardson is under my professional care  She was seen in my office on 3/24/2022  She may return to work on 4/4/2022  If you have any questions or concerns, please don't hesitate to call           Sincerely,          Abby Jim MD        CC: No Recipients

## 2022-03-24 NOTE — LETTER
March 24, 2022     Patient: Tegan Pedraza   YOB: 1965   Date of Visit: 3/24/2022       To Whom it May Concern:    Tegan Pedraza is under my professional care  She was seen in my office on 3/24/2022  She may return to work on 4/11/2022  If you have any questions or concerns, please don't hesitate to call           Sincerely,          Priya Roth MD        CC: No Recipients

## 2022-03-24 NOTE — PROGRESS NOTES
DISCUSSION SUMMARY  This is a 61 y o  female with a disc herniation and radiculopathy  She has improved about 75% with epidural steroid injection  I recommended that she wear brace at work and that she repeat the epidural steroid injection based on worsening pain or worsening weakness  If she fails to improve then a far lateral approach for resection of the disc would be indicated  Return if symptoms worsen or fail to improve  Diagnosis ICD-10-CM Associated Orders   1  Lumbar herniated disc  M51 26 Ambulatory referral to Physical Therapy   2  Lumbar radiculopathy  M54 16 Ambulatory referral to Physical Therapy          Chief Complaint   Patient presents with    Follow-up     2 Weeks (last seen by Dede Waller) F/u for Lower back pain and discuss Sx; MRI L-Spine 3/11/22 [facility] and XR L-Spine 3/7/22 [facility]       HPI this is a 74-year-old female who is a respected her sinuses and her system  She has had pain for many years since age 23  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  Recently she developed acute severe pain in her back and leg  This affected her left hip thigh and leg  Occasion went to the medial shin and calf muscle  The pain was so severe and intense that she had difficulty controlling this  She did undergo an epidural steroid injection which improved the pain by at least 75%  She did go through chiropractic manipulation which was not helpful  She denies bowel or bladder difficulties  She does feel that because of the severity of the pain her appetite has been reduced and she has lost weight because of this  Prior to this exacerbation she enjoys hiking and uses an elliptical machine on a regular basis  Review of Systems   Constitutional: Positive for appetite change (decreased)  HENT: Negative  Eyes: Negative  Respiratory: Negative  Cardiovascular: Negative  Negative for leg swelling  Gastrointestinal: Negative    Negative for constipation, nausea and vomiting  Endocrine: Negative  Genitourinary: Negative  Negative for frequency and urgency  Musculoskeletal: Positive for back pain (Constant Left sided lower back pain radiates to left hip and left lateral thigh and occasionally in medial left shin  Pain varies in intensity throughout the day ) and gait problem (limps due difficulty with left leg )  Negative for joint swelling and myalgias (h/o at time in left hip and thigh)  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/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  Pain first appeared at the age of 23 after pulling out her back  Denies Hx of falls    Chiro - Not helpful  PT - None  INJ - 1 epidural - helped after 3 days, 75% better  No previous lumbar or hip Sx   Skin: Negative  Allergic/Immunologic: Negative  Neurological: Positive for weakness (left hip and thigh) and numbness (and tingling occasional in left medial shin (calf muscle)  )  Hematological: Negative  Psychiatric/Behavioral: Positive for sleep disturbance  I reviewed the ROS        Vitals:    /78 (BP Location: Left arm, Patient Position: Sitting, Cuff Size: Standard)   Pulse 73   Temp 98 3 °F (36 8 °C) (Probe)   Resp 16   Ht 5' 4" (1 626 m)   Wt 63 5 kg (140 lb)   BMI 24 03 kg/m²       MEDICAL HISTORY  Past Medical History:   Diagnosis Date    Abnormal Pap smear of cervix 1987    Anxiety     High vitamin A level     History of anxiety     History of hypercholesterolemia     History of hypertension     History of obesity     HPV (human papilloma virus) infection 1987    Lordosis     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    Scoliosis     Secondary hyperparathyroidism, non-renal ([address] )     Spinal stenosis      Past Surgical History:   Procedure Laterality Date    ABDOMINOPLASTY      BREAST CYST ASPIRATION      COLONOSCOPY      COLONOSCOPY  11/22/2021    Zach - 5 y f/u     COLPOSCOPY  1987    COMBINED AUGMENTATION MAMMAPLASTY AND ABDOMINOPLASTY  2001    FACIAL COSMETIC SURGERY      FL INJECTION RIGHT HIP (NON ARTHROGRAM)  5/24/2019    GASTRIC BYPASS      TUBAL LIGATION       Social History     Tobacco Use    Smoking status: Never Smoker    Smokeless tobacco: Never Used   Vaping Use    Vaping Use: Never used   Substance Use Topics    Alcohol use: No    Drug use: No        Current Outpatient Medications:     Acetaminophen (TYLENOL ARTHRITIS PAIN PO), Take 800 mg by mouth 0nce or twice daily, Disp: , Rfl:     Calcium Citrate 1040 MG TABS, Take by mouth 3 (three) times a day, Disp: , Rfl:     Multiple Vitamins-Minerals (BARIATRIC MULTIVITAMINS/IRON PO), Take by mouth daily, Disp: , Rfl:     Naproxen Sodium (ALEVE PO), Take 200 mg by mouth, Disp: , Rfl:     pregabalin (LYRICA) 75 mg capsule, Take 1 capsule (75 mg total) by mouth 3 (three) times a day Start by taking 1 tablet at bedtime for 1 day  Then increase to 1 tablet in the evening and 1 tablet at bedtime for 1 day  Then increase to 1 tablet three times a day thereafter , Disp: 90 capsule, Rfl: 1   Allergies   Allergen Reactions    Other Wheezing     Lobster when a teenager  Wheezing        The following portions of the patient's history were updated by MA and reviewed by MD: allergies, current medications, past family history, past medical history, past social history, past surgical history and problem list       Physical Exam  Vitals and nursing note reviewed  Constitutional:       General: She is not in acute distress  Appearance: Normal appearance  She is not ill-appearing, toxic-appearing or diaphoretic  HENT:      Head: Normocephalic  Nose: Nose normal    Eyes:      Extraocular Movements: Extraocular movements intact  Pupils: Pupils are equal, round, and reactive to light  Musculoskeletal:         General: No swelling, tenderness, deformity or signs of injury  Normal range of motion  Cervical back: Normal range of motion and neck supple  No rigidity        Right lower leg: No edema  Left lower leg: No edema  Lymphadenopathy:      Cervical: No cervical adenopathy  Skin:     General: Skin is warm and dry  Coloration: Skin is not jaundiced  Findings: No erythema or rash  Neurological:      Mental Status: She is alert and oriented to person, place, and time  Cranial Nerves: No cranial nerve deficit  Sensory: No sensory deficit  Motor: Weakness (Some weakness of the left lower leg however she can heel walk and toe walk she has difficulty with a squat ) present  Coordination: Coordination normal       Gait: Gait abnormal ( this is slow but balanced  She can not perform tandem gait )  Deep Tendon Reflexes: Reflexes abnormal ( reflexes are blunted throughout however her left patella is absent  )  Psychiatric:         Mood and Affect: Mood normal          Behavior: Behavior normal          Thought Content: Thought content normal          Judgment: Judgment normal            RESULTS/DATA  I have personally reviewed pertinent films in PACS     MRI of the LS spine is carefully reviewed  This demonstrates degenerative disc disease with Schmorl's nodes and especially severe changes at the thoracolumbar junction

## 2022-03-30 ENCOUNTER — EVALUATION (OUTPATIENT)
Dept: PHYSICAL THERAPY | Facility: CLINIC | Age: 57
End: 2022-03-30
Payer: COMMERCIAL

## 2022-03-30 DIAGNOSIS — M51.26 LUMBAR HERNIATED DISC: ICD-10-CM

## 2022-03-30 DIAGNOSIS — M54.16 LUMBAR RADICULOPATHY: ICD-10-CM

## 2022-03-30 PROCEDURE — 97162 PT EVAL MOD COMPLEX 30 MIN: CPT

## 2022-03-30 NOTE — PROGRESS NOTES
PT Evaluation     Today's date: 3/30/2022  Patient name: Jass Godwin  : 1965  MRN: [id number]  Referring provider: Jocelyne Domingo,*  Dx:   Encounter Diagnosis     ICD-10-CM    1  Lumbar herniated disc  M51 26 Ambulatory referral to Physical Therapy   2  Lumbar radiculopathy  M54 16 Ambulatory referral to Physical Therapy       Start Time: 1140  Stop Time: 1230  Total time in clinic (min): 50 minutes    Assessment  Assessment details: Pt reports to the clinic with complaints of low back pain with L sided radiculopathy for the past month  Pt demonstrated decreased lumbar AROM in all directions except flexion, with extension being the most limited and painful  Hip MMT showed decreases in all planes of motion in the L compared to the R, testing of the knees and ankle produced similar results  Special testing was negative  Repeated motion testing of the lumbar spine showed the most significant change with REIL with abolished numbness, produced back pain, and decreased limp during gait  Pt's hx and clinical findings are consistent with the referring diagnosis of lumbar radiculopathy  Pt's decreased muscle strength is most likely a result of the nerve irritation and decreased use of the LLE  Pt's numbness, pain, and deficits are limiting her from performing self care, ADL's, recreational activities, and work duties to her fullest capability without compensations  Pt was educated on her diagnosis, HEP, prognosis, and POC  Pt would benefit from skilled physical therapy to reduce her numbness, address her deficits, progress towards her goals, and return to her PLOF     Impairments: abnormal gait, abnormal or restricted ROM, abnormal movement, activity intolerance, impaired balance, impaired physical strength, lacks appropriate home exercise program, pain with function, poor posture  and poor body mechanics    Symptom irritability: moderateUnderstanding of Dx/Px/POC: good   Prognosis: good    Goals  Short Term Goals:  1) Pt will initiate and progress her HEP in 3-4 weeks  2) Pt will improve lumbar extension AROM by 50% with less pain in 3-4 weeks  3) Pt will improve hip MMT strength by 1 in all planes of motion in 3-4 weeks  Long Term Goals:  1) Pt will be able to sleep throughout the night, 6-8 hours, without numbness or pain in 6-8 weeks  2) Pt will be able to ambulate for 15-20 mins without numbness or pain in 6-8 weeks  3) Pt will be able to sit for up to 30 mins without numbness or pain in 6-8 weeks  4) Pt will be able to perform ADL's without numbness or pain in 6-8 weeks  Plan  Patient would benefit from: skilled physical therapy and PT eval  Planned modality interventions: cryotherapy and thermotherapy: hydrocollator packs  Planned therapy interventions: joint mobilization, manual therapy, neuromuscular re-education, patient education, postural training, self care, activity modification, ADL retraining, balance, body mechanics training, coordination, flexibility, functional ROM exercises, gait training, strengthening, stretching, therapeutic activities, therapeutic exercise, graded exercise and home exercise program  Frequency: 1-2x/week  Duration in weeks: 8  Treatment plan discussed with: patient        Subjective Evaluation    History of Present Illness  Mechanism of injury: Pt has a herniated disc around L4-5 back in the end of February, got an injection which helped  Prednisone and muscle relaxers helped  Pt states in general she doesn't have much pain, if she does its at night  Pt states general weakness in her L leg  Pt feels numbness that goes down the front of her leg  Pt has done some sciatica youtube for 2 weeks  Pt wants specific exercises to get her muscles working together  Pt states no particular event caused the pain  Pt states feeling much better since the injection  Pt denies numbness in the groin region or difficulty going to the bathroom   No previous surgeries or injuries to her back or hip  Nurse for [employer]  Pain  Current pain ratin  At best pain ratin  At worst pain ratin  Location: anterior LLE down into the calf  Quality: tingling, numbness  Relieving factors: rest and medications  Aggravating factors: lifting, stair climbing, walking and sitting  Progression: no change    Social Support  Steps to enter house: yes  Stairs in house: yes   Lives in: multiple-level home  Lives with: alone    Employment status: working  Exercise history: Yoga    Treatments  Current treatment: chiropractic  Patient Goals  Patient goal: Pt wants to walk without a limp, get rid of numbness and pain        Objective     Concurrent Complaints  Positive for night pain   Negative for disturbed sleep, bladder dysfunction, bowel dysfunction and saddle (S4) numbness    Postural Observations  Seated posture: poor  Standing posture: fair    Additional Postural Observation Details  Minor increase in numbness with overexaggeration of lordotic curve    Active Range of Motion     Lumbar   Flexion:  WFL  Extension:  with pain Restriction level: maximal  Left lateral flexion:  Restriction level: moderate  Right lateral flexion:  Restriction level: moderate  Left rotation:  Restriction level: minimal  Right rotation:  Restriction level: minimal    Joint Play     Hypomobile: T10, T11, T12, L1, L2, L3, L4, L5 and S1   Mechanical Assessment    Cervical      Thoracic      Lumbar    Standing flexion: repeated movements   Pain location:no change  Lying flexion: repeated movements  Pain level: decreased  increased limp after testing  Standing extension: repeated movements  Pain location: no change  Pain intensity: worse  Pain level: produced  Lying extension: repeated movements  Pain location: centralized  Pain intensity: better  Left Sidegliding: repeated movements  Pain location: no change  Right sidegliding: repeated movements  Pain location: no change    Strength/Myotome Testing     Left Hip   Planes of Motion Flexion: 3  External rotation: 3+  Internal rotation: 3+    Right Hip   Planes of Motion   Flexion: 4-  External rotation: 4-  Internal rotation: 4-    Left Knee   Flexion: 3-  Extension: 3+    Right Knee   Flexion: 3+  Extension: 4-    Left Ankle/Foot   Dorsiflexion: 4-  Plantar flexion: 4-  Inversion: 3+  Eversion: 3+    Right Ankle/Foot   Dorsiflexion: 4-  Plantar flexion: 4-  Inversion: 4-  Eversion: 4-    Tests     Lumbar     Left   Negative passive SLR and slump test      Right   Negative passive SLR and slump test      Left Hip   Negative LAZARO, FADIR and scour  Right Hip   Negative LAZARO, FADIR and scour  Ambulation   Weight-Bearing Status   Weight-Bearing Status (Left): full weight bearing   Weight-Bearing Status (Right): full weight-bearing    Assistive device used: none    Observational Gait   Gait: asymmetric   Increased left stance time and right swing time  Decreased walking speed, stride length, right stance time, left swing time, left step length and right step length  Left foot contact pattern: heel to toe  Right foot contact pattern: heel to toe  Left arm swing: decreased  Right arm swing: decreased  Base of support: normal    Additional Observational Gait Details  Minor trendelenburg noted with increased trunk sway during gait    Functional Assessment      Squat    Pain, left tibial anterior translation beyond toes and right tibial anterior translation beyond toes       Single Leg Stance - Eyes Open   Left  Trial 1: 30 seconds  Comments: Increased ankle, knee, hip sway    Right  Trial 1: 30 seconds      Flowsheet Rows      Most Recent Value   PT/OT G-Codes    Current Score 58   Projected Score 73                Precautions: None   Access Code [email or web address]   IE: 3/30/22    Date     3/30/22   Visit Number     1 (IE)   Manuals                                        Neuro Re-Ed         Postural correction                                                        Ther Ex        ARIES BRAY LTR                                                Ther Activity                        Gait Training                        Modalities

## 2022-04-06 ENCOUNTER — TELEPHONE (OUTPATIENT)
Dept: BARIATRICS | Facility: CLINIC | Age: 57
End: 2022-04-06

## 2022-04-06 ENCOUNTER — OFFICE VISIT (OUTPATIENT)
Dept: PHYSICAL THERAPY | Facility: CLINIC | Age: 57
End: 2022-04-06
Payer: COMMERCIAL

## 2022-04-06 DIAGNOSIS — M51.26 LUMBAR HERNIATED DISC: Primary | ICD-10-CM

## 2022-04-06 DIAGNOSIS — M54.16 LUMBAR RADICULOPATHY: ICD-10-CM

## 2022-04-06 DIAGNOSIS — E34.9 ELEVATED PARATHYROID HORMONE: ICD-10-CM

## 2022-04-06 DIAGNOSIS — K91.2 POSTSURGICAL MALABSORPTION: Primary | ICD-10-CM

## 2022-04-06 PROCEDURE — 97110 THERAPEUTIC EXERCISES: CPT

## 2022-04-06 PROCEDURE — 97112 NEUROMUSCULAR REEDUCATION: CPT

## 2022-04-06 NOTE — PROGRESS NOTES
Daily Note     Today's date: 2022  Patient name: Cynthia Orozco  : 1965  MRN: [id number]  Referring provider: Rayne Wang,*  Dx:   Encounter Diagnosis     ICD-10-CM    1  Lumbar herniated disc  M51 26    2  Lumbar radiculopathy  M54 16        Start Time: 0850  Stop Time: 0930  Total time in clinic (min): 40 minutes    Subjective: Pt reports that the numbness and tingling has decreased since her evaluation, she felt most of her tingling at night but throughout the day it is very intermittent  Pt states her back is feeling good and isn't experiencing much pain at this time  Pt feels good with her HEP, no new complaints  Objective: See treatment diary below      Assessment: Tolerated treatment well  Patient demonstrated fatigue post treatment, exhibited good technique with therapeutic exercises and would benefit from continued PT  Pt's numbness and tingling was abolished with repeated extensions and ARIES  Pt tolerated all additions, too much resistance with paloff press brought on her numbness but was abolished when resistance was decreased  Plan: Continue per plan of care  Progress treatment as tolerated            Precautions: None   Access Code [email or web address]   IE: 3/30/22    Date    4/6 3/30/22   Visit Number    2 1 (IE)   Manuals                                        Neuro Re-Ed         Postural correction        Sciatic nerve glide    30x B    PPT    2x10 w/TA    TA act /march    2x10 B    Bridges w/glute set    3x10    Paloff Press    Blue 3x ea            Ther Ex        ARIES    3 mins    REIL    2x10    LTR    10x5s    Open book str    10x5s B    Prone hip ext    2x10 B                            Ther Activity                        Gait Training                        Modalities

## 2022-04-08 ENCOUNTER — APPOINTMENT (OUTPATIENT)
Dept: LAB | Facility: CLINIC | Age: 57
End: 2022-04-08
Payer: COMMERCIAL

## 2022-04-08 DIAGNOSIS — K91.2 POSTSURGICAL MALABSORPTION: ICD-10-CM

## 2022-04-08 DIAGNOSIS — E34.9 ELEVATED PARATHYROID HORMONE: ICD-10-CM

## 2022-04-08 LAB
25(OH)D3 SERPL-MCNC: 88.3 NG/ML (ref 30–100)
ALBUMIN SERPL BCP-MCNC: 4 G/DL (ref 3.5–5)
ALP SERPL-CCNC: 54 U/L (ref 46–116)
ALT SERPL W P-5'-P-CCNC: 31 U/L (ref 12–78)
ANION GAP SERPL CALCULATED.3IONS-SCNC: 4 MMOL/L (ref 4–13)
AST SERPL W P-5'-P-CCNC: 22 U/L (ref 5–45)
BASOPHILS # BLD AUTO: 0.02 THOUSANDS/ΜL (ref 0–0.1)
BASOPHILS NFR BLD AUTO: 1 % (ref 0–1)
BILIRUB SERPL-MCNC: 0.48 MG/DL (ref 0.2–1)
BUN SERPL-MCNC: 14 MG/DL (ref 5–25)
CA-I BLD-SCNC: 1.2 MMOL/L (ref 1.12–1.32)
CALCIUM SERPL-MCNC: 9.1 MG/DL (ref 8.3–10.1)
CHLORIDE SERPL-SCNC: 106 MMOL/L (ref 100–108)
CHOLEST SERPL-MCNC: 211 MG/DL
CO2 SERPL-SCNC: 29 MMOL/L (ref 21–32)
CREAT SERPL-MCNC: 0.82 MG/DL (ref 0.6–1.3)
EOSINOPHIL # BLD AUTO: 0.11 THOUSAND/ΜL (ref 0–0.61)
EOSINOPHIL NFR BLD AUTO: 3 % (ref 0–6)
ERYTHROCYTE [DISTWIDTH] IN BLOOD BY AUTOMATED COUNT: 13 % (ref 11.6–15.1)
EST. AVERAGE GLUCOSE BLD GHB EST-MCNC: 105 MG/DL
FERRITIN SERPL-MCNC: 13 NG/ML (ref 8–388)
FOLATE SERPL-MCNC: >20 NG/ML (ref 3.1–17.5)
GFR SERPL CREATININE-BSD FRML MDRD: 79 ML/MIN/1.73SQ M
GLUCOSE SERPL-MCNC: 94 MG/DL (ref 65–140)
HBA1C MFR BLD: 5.3 %
HCT VFR BLD AUTO: 40.8 % (ref 34.8–46.1)
HDLC SERPL-MCNC: 83 MG/DL
HGB BLD-MCNC: 13.1 G/DL (ref 11.5–15.4)
IMM GRANULOCYTES # BLD AUTO: 0.01 THOUSAND/UL (ref 0–0.2)
IMM GRANULOCYTES NFR BLD AUTO: 0 % (ref 0–2)
IRON SATN MFR SERPL: 23 % (ref 15–50)
IRON SERPL-MCNC: 92 UG/DL (ref 50–170)
LDLC SERPL CALC-MCNC: 112 MG/DL (ref 0–100)
LYMPHOCYTES # BLD AUTO: 1.2 THOUSANDS/ΜL (ref 0.6–4.47)
LYMPHOCYTES NFR BLD AUTO: 29 % (ref 14–44)
MCH RBC QN AUTO: 29 PG (ref 26.8–34.3)
MCHC RBC AUTO-ENTMCNC: 32.1 G/DL (ref 31.4–37.4)
MCV RBC AUTO: 91 FL (ref 82–98)
MONOCYTES # BLD AUTO: 0.36 THOUSAND/ΜL (ref 0.17–1.22)
MONOCYTES NFR BLD AUTO: 9 % (ref 4–12)
NEUTROPHILS # BLD AUTO: 2.44 THOUSANDS/ΜL (ref 1.85–7.62)
NEUTS SEG NFR BLD AUTO: 58 % (ref 43–75)
NONHDLC SERPL-MCNC: 128 MG/DL
NRBC BLD AUTO-RTO: 0 /100 WBCS
PLATELET # BLD AUTO: 247 THOUSANDS/UL (ref 149–390)
PMV BLD AUTO: 9.9 FL (ref 8.9–12.7)
POTASSIUM SERPL-SCNC: 4.4 MMOL/L (ref 3.5–5.3)
PROT SERPL-MCNC: 6.8 G/DL (ref 6.4–8.2)
PTH-INTACT SERPL-MCNC: 41.1 PG/ML (ref 18.4–80.1)
RBC # BLD AUTO: 4.51 MILLION/UL (ref 3.81–5.12)
SODIUM SERPL-SCNC: 139 MMOL/L (ref 136–145)
TIBC SERPL-MCNC: 405 UG/DL (ref 250–450)
TRIGL SERPL-MCNC: 78 MG/DL
VIT B12 SERPL-MCNC: 1323 PG/ML (ref 100–900)
WBC # BLD AUTO: 4.14 THOUSAND/UL (ref 4.31–10.16)

## 2022-04-08 PROCEDURE — 80061 LIPID PANEL: CPT

## 2022-04-08 PROCEDURE — 83970 ASSAY OF PARATHORMONE: CPT

## 2022-04-08 PROCEDURE — 82746 ASSAY OF FOLIC ACID SERUM: CPT

## 2022-04-08 PROCEDURE — 83540 ASSAY OF IRON: CPT

## 2022-04-08 PROCEDURE — 85025 COMPLETE CBC W/AUTO DIFF WBC: CPT

## 2022-04-08 PROCEDURE — 82330 ASSAY OF CALCIUM: CPT

## 2022-04-08 PROCEDURE — 82728 ASSAY OF FERRITIN: CPT

## 2022-04-08 PROCEDURE — 82607 VITAMIN B-12: CPT

## 2022-04-08 PROCEDURE — 83550 IRON BINDING TEST: CPT

## 2022-04-08 PROCEDURE — 84630 ASSAY OF ZINC: CPT

## 2022-04-08 PROCEDURE — 82306 VITAMIN D 25 HYDROXY: CPT

## 2022-04-08 PROCEDURE — 36415 COLL VENOUS BLD VENIPUNCTURE: CPT

## 2022-04-08 PROCEDURE — 80053 COMPREHEN METABOLIC PANEL: CPT

## 2022-04-08 PROCEDURE — 84590 ASSAY OF VITAMIN A: CPT

## 2022-04-08 PROCEDURE — 83036 HEMOGLOBIN GLYCOSYLATED A1C: CPT

## 2022-04-08 PROCEDURE — 84425 ASSAY OF VITAMIN B-1: CPT

## 2022-04-13 LAB — ZINC SERPL-MCNC: 95 UG/DL (ref 44–115)

## 2022-04-14 ENCOUNTER — APPOINTMENT (OUTPATIENT)
Dept: PHYSICAL THERAPY | Facility: CLINIC | Age: 57
End: 2022-04-14
Payer: COMMERCIAL

## 2022-04-15 ENCOUNTER — OFFICE VISIT (OUTPATIENT)
Dept: PHYSICAL THERAPY | Facility: CLINIC | Age: 57
End: 2022-04-15
Payer: COMMERCIAL

## 2022-04-15 DIAGNOSIS — M54.16 LUMBAR RADICULOPATHY: ICD-10-CM

## 2022-04-15 DIAGNOSIS — M51.26 LUMBAR HERNIATED DISC: Primary | ICD-10-CM

## 2022-04-15 PROCEDURE — 97112 NEUROMUSCULAR REEDUCATION: CPT

## 2022-04-15 PROCEDURE — 97110 THERAPEUTIC EXERCISES: CPT

## 2022-04-15 NOTE — PROGRESS NOTES
Daily Note     Today's date: 4/15/2022  Patient name: Mary Castillo  : 1965  MRN: [id number]  Referring provider: Purvi Munoz,*  Dx:   Encounter Diagnosis     ICD-10-CM    1  Lumbar herniated disc  M51 26    2  Lumbar radiculopathy  M54 16        Start Time: 932  Stop Time: 1015  Total time in clinic (min): 43 minutes    Subjective: Pt reports that she felt great after her last treatment session, no numbness or tingling down her LLE  Pt has been doing her HEP frequently with good results, however had a really bad day at work yesterday and wasn't able to perform her HEP right away to reduce her symptoms  Pt adjusted her bed setting to make it firmer which made her feel better this morning  Pt states her nerve pain was more in the front of her thigh than on the sides or posterior aspects  Pt states minor nerve sensations/pain prior to the start of her treatment session today  Objective: See treatment diary below      Assessment: Tolerated treatment well  Patient demonstrated fatigue post treatment, exhibited good technique with therapeutic exercises and would benefit from continued PT  Pt was able to reduce and abolish her LE symptoms with her prone press-ups and ARIES  Pt felt minor increases during femoral nerve glides but reduced and abolished once finished  Pt would benefit from continuing her HEP with high frequency, progressions to return to function once symptom free for multiple days in a row  HEP was updated  Plan: Continue per plan of care  Progress treatment as tolerated            Precautions: None   Access Code [email or web address]   IE: 3/30/22    Date   4/15 4/6 3/30/22   Visit Number   3 2 1 (IE)   Manuals                                        Neuro Re-Ed         Postural correction        Femoral nerve glide   Prone 30x B     Sciatic nerve glide   30x B 30x B    PPT    2x10 w/TA    TA act w/march   2x10 B 2x10 B    Bridges w/glute set   2x10 w/hip abd blue 3x10    [employer]    [facility] 3x ea    Bird dogs   15x B     Dead bugs   10x B     Ther Ex        ARIES   2 mins 3 mins    REIL   2x10 2x10    LTR   10x5s 10x5s    Open book str    10x5s B    Prone hip ext   2x10 B 2x10 B                            Ther Activity                        Gait Training                        Modalities

## 2022-04-18 ENCOUNTER — OFFICE VISIT (OUTPATIENT)
Dept: PHYSICAL THERAPY | Facility: CLINIC | Age: 57
End: 2022-04-18
Payer: COMMERCIAL

## 2022-04-18 DIAGNOSIS — M51.26 LUMBAR HERNIATED DISC: Primary | ICD-10-CM

## 2022-04-18 DIAGNOSIS — M54.16 LUMBAR RADICULOPATHY: ICD-10-CM

## 2022-04-18 PROCEDURE — 97112 NEUROMUSCULAR REEDUCATION: CPT

## 2022-04-18 PROCEDURE — 97110 THERAPEUTIC EXERCISES: CPT

## 2022-04-18 NOTE — PROGRESS NOTES
Daily Note     Today's date: 2022  Patient name: Massimo Macias  : 1965  MRN: [id number]  Referring provider: Tonio Hodges,*  Dx:   Encounter Diagnosis     ICD-10-CM    1  Lumbar herniated disc  M51 26    2  Lumbar radiculopathy  M54 16        Start Time: 8412  Stop Time: 1015  Total time in clinic (min): 41 minutes    Subjective: Pt reports she felt good after her last treatment session  Pt will have 1 hour or longer of no low back or LE pain, when she does her HEP it is abolished and she can continue  Pt denies any pain prior to the start of her treatment session  Objective: See treatment diary below      Assessment: Tolerated treatment well  Patient demonstrated fatigue post treatment, exhibited good technique with therapeutic exercises and would benefit from continued PT      Plan: Continue per plan of care  Progress treatment as tolerated            Precautions: None   Access Code [email or web address]   IE: 3/30/22    Date  4/18 4/15 4/6 3/30/22   Visit Number  4 3 2 1 (IE)   Manuals                                        Neuro Re-Ed         Postural correction        Femoral nerve glide   Prone 30x B     Sciatic nerve glide  30x LLE 30x B 30x B    PPT    2x10 w/TA    TA act w/march  2x10 B 2x10 B 2x10 B    Bridges w/glute set  2x10 w/hip abd blue 2x10 w/hip abd blue 3x10    Paloff Press    Blue 3x ea    Bird dogs  15xB 15x B     Dead bugs  10x B 10x B     Ther Ex        ARIES  3 mins 2 mins 3 mins    REIL  2x10 2x10 2x10    LTR  10x5s 10x5s 10x5s    Open book str    10x5s B    Prone hip ext  2x10 B 2x10 B 2x10 B    clamshells  Blue 15xB      LAZARO str  2x30s B      Hip flexor str  2x30s B      Ther Activity                        Gait Training                        Modalities

## 2022-04-20 ENCOUNTER — OFFICE VISIT (OUTPATIENT)
Dept: PHYSICAL THERAPY | Facility: CLINIC | Age: 57
End: 2022-04-20
Payer: COMMERCIAL

## 2022-04-20 DIAGNOSIS — M51.26 LUMBAR HERNIATED DISC: Primary | ICD-10-CM

## 2022-04-20 DIAGNOSIS — M54.16 LUMBAR RADICULOPATHY: ICD-10-CM

## 2022-04-20 LAB — VIT B1 BLD-SCNC: 164.7 NMOL/L (ref 66.5–200)

## 2022-04-20 PROCEDURE — 97110 THERAPEUTIC EXERCISES: CPT | Performed by: PHYSICAL THERAPIST

## 2022-04-20 PROCEDURE — 97112 NEUROMUSCULAR REEDUCATION: CPT | Performed by: PHYSICAL THERAPIST

## 2022-04-20 NOTE — PROGRESS NOTES
Daily Note     Today's date: 2022  Patient name: Yaniv Chu  : 1965  MRN: [id number]  Referring provider: Sreekanth Gonzalez,*  Dx:   Encounter Diagnosis     ICD-10-CM    1  Lumbar herniated disc  M51 26    2  Lumbar radiculopathy  M54 16                   Subjective: Yaniv Chu states she has had no radiating leg pain and she feels she is walking better  She does continue to report intermittent tingling in left thigh when initially standing following prolonged walking  Objective: See treatment diary below  Assessment: Tolerated treatment well  Patient had left thigh and lower limb paresthesia provoked with sitting in upright posture and during hip flexor/quad stretch off table  It would reduce with slouched posture or with slight abduction of hip to relieve tension in hip flexor/adductors  She demonstrates signs of femoral nerve compression from tightness along hip flexor adductors in addition to her lumbar disc derangement  Plan: Continue per plan of care           Precautions: None   Access Code [email or web address]   IE: 3/30/22    Date 4/20 4/18 4/15 4/6 3/30/22   Visit Number 5 4 3 2 1 (IE)   Manuals                                        Neuro Re-Ed         Seated hip IR/ER 10x w/ core stab       Femoral nerve glide   Prone 30x B     Sciatic nerve glide  30x LLE 30x B 30x B    PPT    2x10 w/TA    TA act w/march  2x10 B 2x10 B 2x10 B    Bridges w/glute set 2x10 5s w/ Blue band abd 2x10 w/hip abd blue 2x10 w/hip abd blue 3x10    Paloff Press    Blue 3x ea    Bird dogs  15xB 15x B     Dead bugs  10x B 10x B     Ther Ex        ARIES W/pt op 10x 3 mins 2 mins 3 mins    REIL 15x 2x10 2x10 2x10    LTR  10x5s 10x5s 10x5s    Open book str    10x5s B    Prone hip ext  2x10 B 2x10 B 2x10 B    clamshells  Blue 15xB      LAZARO str  2x30s B      Hip flexor str Supine off table w/ SOS 3x30s (provoked right lower limb paresthesia) 2x30s B      CHENG 10x        Standing add stretch 3x30s B       Ther Activity                        Gait Training                        Modalities

## 2022-04-21 LAB — VIT A SERPL-MCNC: 49.8 UG/DL (ref 20.1–62)

## 2022-04-27 ENCOUNTER — EVALUATION (OUTPATIENT)
Dept: PHYSICAL THERAPY | Facility: CLINIC | Age: 57
End: 2022-04-27
Payer: COMMERCIAL

## 2022-04-27 DIAGNOSIS — M51.26 LUMBAR HERNIATED DISC: Primary | ICD-10-CM

## 2022-04-27 DIAGNOSIS — M54.16 LUMBAR RADICULOPATHY: ICD-10-CM

## 2022-04-27 PROCEDURE — 97110 THERAPEUTIC EXERCISES: CPT

## 2022-04-27 NOTE — PROGRESS NOTES
PT Re-Evaluation     Today's date: 2022  Patient name: Donna Herring  : 1965  MRN: [id number]  Referring provider: Jodi Briceño,*  Dx:   Encounter Diagnosis     ICD-10-CM    1  Lumbar herniated disc  M51 26    2  Lumbar radiculopathy  M54 16        Start Time: 0845  Stop Time: 0930  Total time in clinic (min): 45 minutes    Assessment  Assessment details: Pt reports to the clinic for her 6th visit of skilled physical therapy for complaints of low back pain with L sided radiculopathy  Pt demonstrated improved lumbar AROM in all directions with decreased pain  Hip MMT showed increases in all planes of motion  Repeated motion in extension continues to abolish any radicular symptoms, however hip flexor tightness appears to be causing nerve entrapment in the anterior thigh  After performing hip flexor stretching, symptoms resolve upon standing  Pt is making steady progress in her rehab with decreasing pain and increasing function, however deficits persist  Pt's numbness, pain, and remaining deficits are limiting her from performing self care, ADL's, recreational activities, and work duties to her fullest capability without compensations  Pt would continue to benefit from skilled physical therapy to reduce her numbness, address her deficits, progress towards her goals, and return to her PLOF  Impairments: abnormal or restricted ROM, abnormal movement, activity intolerance, impaired balance, impaired physical strength and pain with function    Symptom irritability: lowUnderstanding of Dx/Px/POC: good   Prognosis: good    Goals  Short Term Goals:  1) Pt will initiate and progress her HEP in 3-4 weeks  - Met  2) Pt will improve lumbar extension AROM by 50% with less pain in 3-4 weeks  - Met  3) Pt will improve hip MMT strength by 1 in all planes of motion in 3-4 weeks  - Met    Long Term Goals:  1) Pt will be able to sleep throughout the night, 6-8 hours, without numbness or pain in 6-8 weeks  - Met  2) Pt will be able to ambulate for 15-20 mins without numbness or pain in 6-8 weeks  - Partially Met  3) Pt will be able to sit for up to 30 mins without numbness or pain in 6-8 weeks  - Progressing  4) Pt will be able to perform ADL's without numbness or pain in 6-8 weeks  - Progressing    Plan  Patient would benefit from: skilled physical therapy and PT eval  Planned modality interventions: cryotherapy and thermotherapy: hydrocollator packs  Planned therapy interventions: joint mobilization, manual therapy, neuromuscular re-education, patient education, postural training, self care, activity modification, ADL retraining, balance, body mechanics training, coordination, flexibility, functional ROM exercises, gait training, strengthening, stretching, therapeutic activities, therapeutic exercise, graded exercise and home exercise program  Frequency: 1-2x/week  Duration in weeks: 6  Treatment plan discussed with: patient        Subjective Evaluation    History of Present Illness  Mechanism of injury: 4/27/22 Update:  Pt states she is limping less  Pt doesn't really think she has much pain  Her 10 hour shifts are tough, 3/4 through her shift she feels like she is limping with her L leg but she thinks it is due to weakness  Her overall life with the exercises has improved  Pt does them before bed and feels relief  Numbness in the front of the leg has decreased  Pain never wakes her up at night  Pt feels that she is 90% better, she still fatigues out at work and feels she doesn't walk as fast, symptoms are much less frequent but still pop up  IE:  Pt has a herniated disc around L4-5 back in the end of February, got an injection which helped  Prednisone and muscle relaxers helped  Pt states in general she doesn't have much pain, if she does its at night  Pt states general weakness in her L leg  Pt feels numbness that goes down the front of her leg  Pt has done some sciatica youtube for 2 weeks   Pt wants specific exercises to get her muscles working together  Pt states no particular event caused the pain  Pt states feeling much better since the injection  Pt denies numbness in the groin region or difficulty going to the bathroom  No previous surgeries or injuries to her back or hip  Nurse for [employer]  Pain  Current pain ratin  At best pain ratin  At worst pain ratin  Location: L medial thigh to calf, lateral L hip  Quality: discomfort, tingling  Relieving factors: rest and medications  Aggravating factors: walking and sitting  Progression: improved    Social Support  Steps to enter house: yes  Stairs in house: yes   Lives in: multiple-level home  Lives with: alone    Employment status: working  Exercise history: Yoga    Treatments  Current treatment: chiropractic  Patient Goals  Patient goal: Pt wants to walk without a limp, get rid of numbness and pain        Objective     Concurrent Complaints  Negative for night pain, disturbed sleep, bladder dysfunction, bowel dysfunction and saddle (S4) numbness    Postural Observations  Seated posture: poor  Standing posture: fair    Additional Postural Observation Details  Minor increase in numbness with overexaggeration of lordotic curve    Active Range of Motion     Lumbar   Flexion:  WFL  Extension: Active lumbar extension: tingling    Restriction level: moderate  Left lateral flexion:  Restriction level: minimal  Right lateral flexion:  Restriction level: minimal  Left rotation:  WFL  [facility]  [facility]    Joint Play     Hypomobile: T10, T11, T12, L1, L2, L3, L4, L5 and S1   Mechanical Assessment    Cervical      Thoracic      Lumbar    Standing flexion: repeated movements   Pain location:no change  Lying flexion: repeated movements  Pain level: decreased  increased limp after testing  Standing extension: repeated movements  Pain location: no change  Pain intensity: worse  Pain level: produced  Lying extension: repeated movements  Pain location: centralized  Pain intensity: better  Left Sidegliding: repeated movements  Pain location: no change  Right sidegliding: repeated movements  Pain location: no change    Strength/Myotome Testing     Left Hip   Planes of Motion   Flexion: 3+  Extension: 3+  Abduction: 3  Adduction: 4-  External rotation: 4-  Internal rotation: 4-    Right Hip   Planes of Motion   Flexion: 4  Extension: 3+  Abduction: 3+  Adduction: 4  External rotation: 4  Internal rotation: 4    Left Knee   Flexion: 3+  Extension: 4-    Right Knee   Flexion: 4-  Extension: 4    Left Ankle/Foot   Dorsiflexion: 4-  Plantar flexion: 4-  Inversion: 3+  Eversion: 3+    Right Ankle/Foot   Dorsiflexion: 4-  Plantar flexion: 4-  Inversion: 4-  Eversion: 4-    Tests     Lumbar     Left   Negative passive SLR and slump test      Right   Negative passive SLR and slump test      Left Hip   Negative LAZARO, FADIR and scour  Right Hip   Negative LAZARO, FADIR and scour  Ambulation   Weight-Bearing Status   Weight-Bearing Status (Left): full weight bearing   Weight-Bearing Status (Right): full weight-bearing    Assistive device used: none    Observational Gait   Left stance time, right stance time, left swing time and right swing time within functional limits  Decreased walking speed, stride length, left step length and right step length  Left foot contact pattern: heel to toe  Right foot contact pattern: heel to toe  Left arm swing: within functional limits  Right arm swing: within functional limits  Base of support: normal    Functional Assessment      Squat  No left tibial anterior translation beyond toes and no right tibial anterior translation beyond toes       Single Leg Stance - Eyes Open   Left  Trial 1: 20 seconds  Comments: Fatigued    Right  Trial 1: 30 seconds                Precautions: None   Access Code [email or web address]   IE: 3/30/22    Date 4/27 4/20 4/18 4/15 4/6   Visit Number 6 5 4 3 2   Manuals                                        Neuro Re-Ed         Seated hip IR/ER  10x w/ core stab      Femoral nerve glide    Prone 30x B    Sciatic nerve glide   30x LLE 30x B 30x B   PPT     2x10 w/TA   TA act w/march   2x10 B 2x10 B 2x10 B   Bridges w/glute set  2x10 5s w/ Blue band abd 2x10 w/hip abd blue 2x10 w/hip abd blue 3x10   Paloff Press     Blue 3x ea   Bird dogs   15xB 15x B    Dead bugs   10x B 10x B    Ther Ex        ARIES  W/pt op 10x 3 mins 2 mins 3 mins   REIL  15x 2x10 2x10 2x10   LTR   10x5s 10x5s 10x5s   Open book str     10x5s B   Prone hip ext   2x10 B 2x10 B 2x10 B   clamshells   Blue 15xB     LAZARO str   2x30s B     Hip flexor str  Supine off table w/ SOS 3x30s (provoked right lower limb paresthesia) 2x30s B     CHENG  10x       Standing add stretch  3x30s B      Ther Activity                        Gait Training                        Modalities

## 2022-04-28 ENCOUNTER — OFFICE VISIT (OUTPATIENT)
Dept: PHYSICAL THERAPY | Facility: CLINIC | Age: 57
End: 2022-04-28
Payer: COMMERCIAL

## 2022-04-28 DIAGNOSIS — M51.26 LUMBAR HERNIATED DISC: Primary | ICD-10-CM

## 2022-04-28 DIAGNOSIS — M54.16 LUMBAR RADICULOPATHY: ICD-10-CM

## 2022-04-28 PROCEDURE — 97110 THERAPEUTIC EXERCISES: CPT

## 2022-04-28 PROCEDURE — 97112 NEUROMUSCULAR REEDUCATION: CPT

## 2022-04-28 NOTE — PROGRESS NOTES
Daily Note     Today's date: 2022  Patient name: Abhi White  : 1965  MRN: [id number]  Referring provider: Juliana Millan,*  Dx:   Encounter Diagnosis     ICD-10-CM    1  Lumbar herniated disc  M51 26    2  Lumbar radiculopathy  M54 16        Start Time: 1745  Stop Time:   Total time in clinic (min): 43 minutes    Subjective: Pt states she is currently limping because she just worked a 10 hour shift and feels her LLE is weaker at the moment  Pt has mid back pain that she normally doesn't experience and she doesn't know why  Pt states 2-3/10 pain in the middle of her back, no pain in her low back or LLE  Objective: See treatment diary below      Assessment: Tolerated treatment well  Patient demonstrated fatigue post treatment, exhibited good technique with therapeutic exercises and would benefit from continued PT  Mid-thoracic pain is reduced with corrected upright posture and upper and mid-thoracic extensions, however some numbness in the LLE returns with flexion at the hip  Plan: Continue per plan of care  Progress treatment as tolerated            Precautions: None   Access Code [email or web address]   IE: 3/30/22    Date 4/28 4/27 4/20 4/18 4/15   Visit Number 7 6 5 4 3   Manuals                                        Neuro Re-Ed         Seated hip IR/ER   10x w/ core stab     Femoral nerve glide     Prone 30x B   Sciatic nerve glide    30x LLE 30x B   PPT        TA act w/march    2x10 B 2x10 B   Bridges w/glute set   2x10 5s w/ Blue band abd 2x10 w/hip abd blue 2x10 w/hip abd blue   Paloff Press        Bird dogs    15xB 15x B   Dead bugs    10x B 10x B   SLS airex 2x30s B       wobbleboard ML/AP 30x ea       SLR Ext 15x B       Ther Ex        ARIES   W/pt op 10x 3 mins 2 mins   REIL 2x10  15x 2x10 2x10   LTR    10x5s 10x5s   Seated t/s ext 2x10 (pain abolished)       Seated t/s rot 2x10 B       Open book str        Prone hip ext    2x10 B 2x10 B   clamshells    Blue 15xB    LAZARO str 2x30s B    Hip flexor str Standing hip flexor str LLE 3x30s  Kneeling hip flexor str RLE 3x30s  Supine off table w/ SOS 3x30s (provoked right lower limb paresthesia) 2x30s B    CHENG   10x      Standing add stretch 10x10s B  3x30s B     Monster walks YTB 3x20ft       Side steps YTB 3x20ft       Ther Activity                        Gait Training                        Modalities

## 2022-05-04 ENCOUNTER — OFFICE VISIT (OUTPATIENT)
Dept: PHYSICAL THERAPY | Facility: CLINIC | Age: 57
End: 2022-05-04
Payer: COMMERCIAL

## 2022-05-04 DIAGNOSIS — M54.16 LUMBAR RADICULOPATHY: ICD-10-CM

## 2022-05-04 DIAGNOSIS — M51.26 LUMBAR HERNIATED DISC: Primary | ICD-10-CM

## 2022-05-04 PROCEDURE — 97112 NEUROMUSCULAR REEDUCATION: CPT

## 2022-05-04 PROCEDURE — 97110 THERAPEUTIC EXERCISES: CPT

## 2022-05-04 NOTE — PROGRESS NOTES
Daily Note     Today's date: 2022  Patient name: Max Haji  : 1965  MRN: [id number]  Referring provider: Dottie Bullock,*  Dx:   Encounter Diagnosis     ICD-10-CM    1  Lumbar herniated disc  M51 26    2  Lumbar radiculopathy  M54 16        Start Time: 1103  Stop Time: 1145  Total time in clinic (min): 42 minutes    Subjective: Pt states feeling really good after her last treatment session  Pt states the thoracic extensions have gotten rid of her upper-mid back pain  Pt continues to perform her HEP with good results  Pt denies pain prior to the start of her treatment session  Objective: See treatment diary below      Assessment: Tolerated treatment well  Patient demonstrated fatigue post treatment, exhibited good technique with therapeutic exercises and would benefit from continued PT      Plan: Continue per plan of care  Progress treatment as tolerated            Precautions: None   Access Code [email or web address]   IE: 3/30/22    Date    Visit Number 8 7 6 5 4   Manuals                                        Neuro Re-Ed         Seated hip IR/ER    10x w/ core stab    Femoral nerve glide        Sciatic nerve glide     30x LLE   PPT        TA act /march     2x10 B   Bridges w/glute set    2x10 5s w/ Blue band abd 2x10 w/hip abd blue   Paloff Press        Bird dogs     15xB   Dead bugs     10x B   Biodex Balance static, L12, 9, 5 30s ea       bosu lunge 15x B lateral       SLS airex 2x30s B airex 2x30s B      wobbleboard ML/AP 30x ea ML/AP 30x ea      SLR Ext 20xB Ext 15x B      Ther Ex        ARIES    W/pt op 10x 3 mins   REIL 2x10 2x10  15x 2x10   LTR     10x5s   Seated t/s ext 2x10 2x10 (pain abolished)      Seated t/s rot 2x10 B 2x10 B      Open book str        Prone hip ext     2x10 B   clamshells     Blue 15xB   LAZARO str     2x30s B   Hip flexor str  Standing hip flexor str LLE 3x30s  Kneeling hip flexor str RLE 3x30s  Supine off table w/ SOS 3x30s (provoked right lower limb paresthesia) 2x30s B   CHENG    10x     Standing add stretch  10x10s B  3x30s B    Monster walks YTB 3x20ft YTB 3x20ft      Side steps YTB 3x20ft YTB 3x20ft      Leg Press        Ther Activity                        Gait Training                        Modalities

## 2022-05-20 ENCOUNTER — OFFICE VISIT (OUTPATIENT)
Dept: PHYSICAL THERAPY | Facility: CLINIC | Age: 57
End: 2022-05-20
Payer: COMMERCIAL

## 2022-05-20 DIAGNOSIS — M54.16 LUMBAR RADICULOPATHY: ICD-10-CM

## 2022-05-20 DIAGNOSIS — M51.26 LUMBAR HERNIATED DISC: Primary | ICD-10-CM

## 2022-05-20 PROCEDURE — 97112 NEUROMUSCULAR REEDUCATION: CPT

## 2022-05-20 PROCEDURE — 97110 THERAPEUTIC EXERCISES: CPT

## 2022-05-20 NOTE — PROGRESS NOTES
Daily Note     Today's date: 2022  Patient name: Marielle Ahn  : 1965  MRN: [id number]  Referring provider: Mateus Doyle,*  Dx:   Encounter Diagnosis     ICD-10-CM    1  Lumbar herniated disc  M51 26    2  Lumbar radiculopathy  M54 16        Start Time: 1100  Stop Time: 1150  Total time in clinic (min): 50 minutes    Subjective: Pt reports she has been feeling good, reports no pain in back or hip  Pt reports she has one more session and will be discharged  Objective: See treatment diary below      Assessment: No pain provoked throughout treatment session  Pt reports improving SLS  Plan: Continue per plan of care  Progress treatment as tolerated            Precautions: None   Access Code [email or web address]   IE: 3/30/22    Date    Visit Number 9 8 7 6 5   Manuals                                        Neuro Re-Ed         Seated hip IR/ER     10x w/ core stab   Femoral nerve glide        Sciatic nerve glide        PPT        TA act w/march        Meriel Lawson w/glute set Bridge 30x    2x10 5s w/ Blue band abd   Paloff Press 10x        Bird dogs        Dead bugs 20x       Biodex  Balance static, L12, 9, 5 30s ea      bosu lunge  15x B lateral      SLS 30" 60" airex 2x30s B airex 2x30s B     wobbleboard  ML/AP 30x ea ML/AP 30x ea     SLR  Ext 20xB Ext 15x B     Ther Ex        ARIES     W/pt op 10x   REIL  2x10 2x10  15x   LTR        Seated t/s ext  2x10 2x10 (pain abolished)     Seated t/s rot  2x10 B 2x10 B     Open book str        Prone hip ext        clamshells 30x BTB       LAZARO str        Hip flexor str 1 min supine   Standing hip flexor str LLE 3x30s  Kneeling hip flexor str RLE 3x30s  Supine off table w/ SOS 3x30s (provoked right lower limb paresthesia)   CHENG     10x    Standing add stretch   10x10s B  3x30s B   Monster walks YTB 3x20ft YTB 3x20ft YTB 3x20ft     Side steps YTB 3x20ft YTB 3x20ft YTB 3x20ft     Leg Press        Ther Activity                        Gait Training                        Modalities

## 2022-05-27 ENCOUNTER — OFFICE VISIT (OUTPATIENT)
Dept: PHYSICAL THERAPY | Facility: CLINIC | Age: 57
End: 2022-05-27
Payer: COMMERCIAL

## 2022-05-27 DIAGNOSIS — M51.26 LUMBAR HERNIATED DISC: Primary | ICD-10-CM

## 2022-05-27 DIAGNOSIS — M54.16 LUMBAR RADICULOPATHY: ICD-10-CM

## 2022-05-27 PROCEDURE — 97110 THERAPEUTIC EXERCISES: CPT

## 2022-05-27 PROCEDURE — 97112 NEUROMUSCULAR REEDUCATION: CPT

## 2022-05-27 NOTE — PROGRESS NOTES
Discharge     Today's date: 2022  Patient name: Mamadou Vines  : 1965  MRN: [id number]  Referring provider: Princess Sampson,*  Dx:   Encounter Diagnosis     ICD-10-CM    1  Lumbar herniated disc  M51 26    2  Lumbar radiculopathy  M54 16        Start Time: 0845  Stop Time: 0910  Total time in clinic (min): 25 minutes    Subjective: Pt reports that she has been doing great and has not had any numbness down her legs for awhile  Denies pain in her back and has been doing well at work  Pt feels she is ready for discharge  Objective: See treatment diary below      Assessment: Tolerated treatment well  Patient demonstrated fatigue post treatment and exhibited good technique with therapeutic exercises  Pt has progressed extremely well since starting PT and no longer has any radicular symptoms  Pt has shown improving LE strength, balance, and understanding of her diagnosis  Pt appears ready to discharge to an updated HEP  Pt was educated on what to continue and she was agreeable to her POC  Plan: Discharge to updated HEP           Precautions: None   Access Code [email or web address]   IE: 3/30/22    Date    Visit Number 10 [phone] 7 6   Manuals                                        Neuro Re-Ed         Seated hip IR/ER        Femoral nerve glide        Sciatic nerve glide        PPT        TA act w/march        Jeralene Solum w/glute set  Fit Steps Press  10x       Bird dogs 20x B       Dead bugs 10x B 20x      Biodex   Balance static, L12, 9, 5 30s ea     bosu lunge   15x B lateral     SLS  30" 60" airex 2x30s B airex 2x30s B    wobbleboard   ML/AP 30x ea ML/AP 30x ea    SLR   Ext 20xB Ext 15x B    Ther Ex        ARIES        REIL 2x10  2x10 2x10    LTR        Seated t/s ext 10x  2x10 2x10 (pain abolished)    Seated t/s rot 2x10 B  2x10 B 2x10 B    Open book str        Prone hip ext        clamshells  30x BTB      LAZARO str        Hip flexor str  1 min supine   Standing hip flexor str LLE 3x30s  Kneeling hip flexor str RLE 3x30s    CHENG        Standing add stretch    10x10s B    Monster walks  YTB 3x20ft YTB 3x20ft YTB 3x20ft    Side steps  YTB 3x20ft YTB 3x20ft YTB 3x20ft    Leg Press        education HEP       Ther Activity                        Gait Training                        Modalities

## 2022-10-10 ENCOUNTER — AMB VIDEO VISIT (OUTPATIENT)
Dept: OTHER | Facility: HOSPITAL | Age: 57
End: 2022-10-10
Payer: COMMERCIAL

## 2022-10-10 DIAGNOSIS — N39.0 URINARY TRACT INFECTION WITHOUT HEMATURIA, SITE UNSPECIFIED: Primary | ICD-10-CM

## 2022-10-10 PROCEDURE — 99212 OFFICE O/P EST SF 10 MIN: CPT | Performed by: PHYSICIAN ASSISTANT

## 2022-10-10 RX ORDER — SULFAMETHOXAZOLE AND TRIMETHOPRIM 800; 160 MG/1; MG/1
1 TABLET ORAL EVERY 12 HOURS SCHEDULED
Qty: 14 TABLET | Refills: 0 | Status: SHIPPED | OUTPATIENT
Start: 2022-10-10 | End: 2022-10-17

## 2022-10-10 NOTE — PROGRESS NOTES
Video Visit - Daune Kussmaul 61 y o  female MRN: [id number]    REQUIRED DOCUMENTATION:     [facility]    1  This service was provided via [facility]  2  Provider located at [address] [place] [phone]  3  [facility] provider: Kashif Cast PA-C   4  Identify all parties in room with patient during [facility] visit:  Patient & partner  5  After connecting through televideo, patient was identified by name and date of birth  Patient was then informed that this was a Telemedicine visit and that the exam was being conducted confidentially over secure lines  My office door was closed  No one else was in the room  Patient acknowledged consent and understanding of privacy and security of the Telemedicine visit  I informed the patient that I have reviewed their record in Epic and presented the opportunity for them to ask any questions regarding the visit today  The patient agreed to participate  Patient with past medical history significant for UTIs presents with complaint of probable UTI  She states that for the past 5 days she has had increasing urinary frequency, urgency, dysuria, and incontinence  The patient also notes that she has developed sore throat and body aches and had a positive COVID-19 home test today  She denies fever, chills, sweats, abdominal pain, nausea, vomiting, diarrhea, gross hematuria, chest tightness, and shortness of breath  Patient notes that her urine does seem to have a foul odor as well  She reports tolerating Bactrim while in the past for UTIs  Patient notes that she is also taking Pristiq and lorazepam     Review of Systems   Constitutional: Negative for chills, diaphoresis and fever  HENT: Positive for sore throat  Negative for ear pain  Eyes: Negative for pain and visual disturbance  Respiratory: Negative for cough and shortness of breath  Cardiovascular: Negative for chest pain and palpitations  Gastrointestinal: Negative for abdominal pain and vomiting  Genitourinary: Positive for dysuria, frequency and urgency  Negative for hematuria  Musculoskeletal: Positive for myalgias  Negative for arthralgias and back pain  Skin: Negative for color change and rash  Neurological: Negative for seizures and syncope  All other systems reviewed and are negative  Physical Exam  Vitals and nursing note reviewed  Constitutional:       General: She is not in acute distress  Appearance: Normal appearance  She is well-developed  She is not ill-appearing or diaphoretic  HENT:      Head: Normocephalic and atraumatic  Right Ear: External ear normal       Left Ear: External ear normal       Nose: Nose normal       Mouth/Throat:      Mouth: Mucous membranes are moist       Pharynx: Oropharynx is clear  Eyes:      General:         Right eye: No discharge  Left eye: No discharge  Conjunctiva/sclera: Conjunctivae normal    Pulmonary:      Effort: Pulmonary effort is normal  No respiratory distress  Breath sounds: Normal breath sounds  Musculoskeletal:      Cervical back: Neck supple  Skin:     General: Skin is dry  Findings: No rash  Comments: Of face & anterior neck   Neurological:      Mental Status: She is alert and oriented to person, place, and time  Psychiatric:         Behavior: Behavior normal          Thought Content: Thought content normal        Diagnoses and all orders for this visit:    Urinary tract infection without hematuria, site unspecified  -     sulfamethoxazole-trimethoprim (BACTRIM DS) 800-160 mg per tablet;  Take 1 tablet by mouth every 12 (twelve) hours for 7 days    Take antibiotic as directed with food  Recommend probiotics for side effects  Continue with over-the-counter symptom relief - fluids, tylenol, Azo, etc   Monitor for worsening symptoms - fever, severe pain, etc     Patient Instructions     Urinary Tract Infection in Women   AMBULATORY CARE:   A urinary tract infection (UTI)  is caused by bacteria that get inside your urinary tract  Most bacteria that enter your urinary tract come out when you urinate  If the bacteria stay in your urinary tract, you may get an infection  Your urinary tract includes your kidneys, ureters, bladder, and urethra  Urine is made in your kidneys, and it flows from the ureters to the bladder  Urine leaves the bladder through the urethra  A UTI is more common in your lower urinary tract, which includes your bladder and urethra  Common symptoms include the following:   · Urinating more often or waking from sleep to urinate    · Pain or burning when you urinate    · Pain or pressure in your lower abdomen     · Urine that smells bad    · Blood in your urine    · Leaking urine    Seek care immediately if:   · You are urinating very little or not at all  · You have a high fever with shaking chills  · You have side or back pain that gets worse  Call your doctor if:   · You have a fever  · You do not feel better after 2 days of taking antibiotics  · You are vomiting  · You have questions or concerns about your condition or care  Treatment for a UTI  may include antibiotics to treat a bacterial infection  You may also need medicines to decrease pain and burning, or decrease the urge to urinate often  If you have UTIs often (called recurrent UTIs), you may be given antibiotics to take regularly  You will be given directions for when and how to use antibiotics  The goal is to prevent UTIs but not cause antibiotic resistance by using antibiotics too often  Prevent a UTI:   · Empty your bladder often  Urinate and empty your bladder as soon as you feel the need  Do not hold your urine for long periods of time  · Wipe from front to back after you urinate or have a bowel movement  This will help prevent germs from getting into your urinary tract through your urethra  · Drink liquids as directed  Ask how much liquid to drink each day and which liquids are best for you  You may need to drink more liquids than usual to help flush out the bacteria  Do not drink alcohol, caffeine, or citrus juices  These can irritate your bladder and increase your symptoms  Your healthcare provider may recommend cranberry juice to help prevent a UTI  · Urinate after you have sex  This can help flush out bacteria passed during sex  · Do not douche or use feminine deodorants  These can change the chemical balance in your vagina  · Change sanitary pads or tampons often  This will help prevent germs from getting into your urinary tract  · Talk to your healthcare provider about your birth control method  You may need to change your method if it is increasing your risk for UTIs  · Wear cotton underwear and clothes that are loose  Tight pants and nylon underwear can trap moisture and cause bacteria to grow  · Vaginal estrogen may be recommended  This medicine helps prevent UTIs in women who have gone through menopause or are in peña-menopause  · Do pelvic muscle exercises often  Pelvic muscle exercises may help you start and stop urinating  Strong pelvic muscles may help you empty your bladder easier  Squeeze these muscles tightly for 5 seconds like you are trying to hold back urine  Then relax for 5 seconds  Gradually work up to squeezing for 10 seconds  Do 3 sets of 15 repetitions a day, or as directed  Follow up with your doctor as directed:  Write down your questions so you remember to ask them during your visits  © Copyright [employer] 2022 Information is for End User's use only and may not be sold, redistributed or otherwise used for commercial purposes  All illustrations and images included in CareNotes® are the copyrighted property of [employer]  or Mariana Barger  The above information is an  only   It is not intended as medical advice for individual conditions or treatments  Talk to your doctor, nurse or pharmacist before following any medical regimen to see if it is safe and effective for you  COVID-19 (Coronavirus Disease 2019)   AMBULATORY CARE:   What you need to know about COVID-19:  COVID-19 is the disease caused by a coronavirus first discovered in December 2019  Coronaviruses generally cause upper respiratory (nose, throat, and lung) infections, such as a cold  The 2019 virus spreads quickly and easily  It can be spread starting 2 to 3 days before symptoms even begin  What you need to know about variants: The virus has changed into several new forms (called variants) since it was discovered  The variants may be more contagious (easily spread) than the original form  Some may also cause more severe illness than others  Signs and symptoms of COVID-19  may not develop  Signs and symptoms usually start about 5 days after infection but can take 2 to 14 days  You may feel like you have the flu or a bad cold  Some signs and symptoms go away in a few days  Others can last weeks, months, or possibly years  You may have any of the following:  · A cough    · Shortness of breath or trouble breathing that may become severe    · A fever    · Chills that might include shaking    · Muscle pain, body aches, or a headache    · A sore throat    · Sudden changes or loss of your taste or smell    · Feeling mentally and physically tired (fatigue)    · Congestion (stuffy head and nose), or a runny nose    · Diarrhea, nausea, or vomiting    Call your local emergency number (911 in the [address] [place] [address],[address]) if:   · You have trouble breathing or shortness of breath at rest     · You have chest pain or pressure that lasts longer than 5 minutes  · You become confused or hard to wake  · Your lips or face are blue  Seek care immediately if:   · You have a fever of 104°F (40°C) or higher  Call your doctor if:   · You have symptoms of COVID-19      · You have questions or concerns about your condition or care  How COVID-19 is diagnosed:  Testing is offered at many sites  You may need to quarantine until you get your results  Any of the following tests may be used:  · A viral PCR test  shows if you have a current infection  A sample is taken from your nose or throat with a swab  You may need to wait 1 or more days to get the test results  · An antigen test  shows if you have a protein from the COVID-19 virus  This test is often called a rapid test because the results can be available in 30 minutes or less  · An antibody test  shows if you had a recent or past infection  Blood samples are used for this test  Antibodies are made by your immune system to fight the virus that causes COVID-19  Antibodies form 1 to 3 weeks after you are infected  This test is not used to show if you are immune to the virus  · A CT, MRI, ultrasound, or x-ray  may be used to check for complications of [id number]  These may include pneumonia, blood clots, or other complications  Treatment:   1  Mild symptoms  may get better on their own  Some treatments have emergency use authorization (EUA)  Examples include monoclonal antibodies and convalescent plasma  These may be given to help prevent worsening of your symptoms  You may also need any of the following:    ? Decongestants  help reduce nasal congestion and help you breathe more easily  If you take decongestant pills, they may make you feel restless or cause problems with your sleep  Do not use decongestant sprays for more than a few days  ? Cough suppressants  help reduce coughing  Ask your healthcare provider which type of cough medicine is best for you  ? To soothe a sore throat,  gargle with warm salt water, or use throat lozenges or a throat spray  Drink more liquids to thin and loosen mucus and to prevent dehydration  ? NSAIDs or acetaminophen  can help lower a fever and relieve body aches or a headache  Follow directions   If not taken correctly, NSAIDs can cause kidney damage and acetaminophen can cause liver damage  2  Severe or life-threatening symptoms  are treated in the hospital  You may need any of the following:     ? Medicines  may be given to fight the virus or treat inflammation  ? Blood thinners  help prevent or treat blood clots  If you have a deep vein thrombosis (DVT) or pulmonary embolism (PE), you may need to use blood thinners for at least 3 months  ? Extra oxygen  may be given if you have respiratory failure  This means your lungs cannot get enough oxygen into your blood and out to your organs  ? A ventilator  may be used to help you breathe  What you need to know about health problems the virus may cause: You may develop long-term health problems caused by the virus  Your risk is higher if you are 65 or older  A weak immune system, obesity, diabetes, chronic kidney disease, or a heart or lung condition can also increase your risk  Your risk is also higher if you are a current or former cigarette smoker  COVID-19 can lead to any of the following:  · Multisymptom inflammatory syndrome in adults (MIS-A) or in children (MIS-C), causing inflammation in the heart, digestive system, skin, or brain    · Shortness of breath, serious lower respiratory conditions, such as pneumonia or acute respiratory distress syndrome (ARDS)    · Blood clots or blood vessel damage    · Organ damage from a lack of oxygen or from blood clots    · Sleep problems    · Problems thinking clearly, remembering information, or concentrating    · Mood changes, depression, or anxiety    · Long-term problems tasting or smelling    · Loss of appetite and weight loss    · Nerve pain    · Fatigue (feeling mentally and physically tired)    What you need to know about COVID-19 vaccines:  Healthcare providers recommend a COVID-19 vaccine, even if you have already had COVID-19   You are considered fully vaccinated against COVID-19 two weeks after the final dose of any COVID-19 vaccine  Let your healthcare provider know when you have received the final dose of the vaccine  Make a copy of your vaccination card  Keep the original with you in case you need to show it  Keep the copy in a safe place  1  COVID-19 vaccines are given as a shot in 1 or 2 doses  Vaccination is recommended for everyone 5 years or older  1  One 2-dose vaccine is fully approved  for those 16 years or older  This vaccine also has an emergency use authorization (EUA) for children 11to 13years old  No vaccine is currently available for children younger than 5 years  2  A booster (additional) dose  is given to help the immune system continue to protect against severe COVID-19     1  A booster is recommended for all adults 18 or older  The booster can be a different brand of the COVID-19 vaccine than you originally received  The timing for the booster depends on the type of vaccine you received:    § 1-dose vaccine: The booster is given at least 2 months after you received the vaccine  § 2-dose vaccine: The booster is given at least 5 or 6 months after the second dose  2  A booster can be given to adolescents 15to 16years old  Only 1 COVID-19 vaccine has this EUA  The booster is given at least 5 months after the second dose of the original vaccine series  3  A booster is recommended for immunocompromised children 11to 6years old  Only 1 COVID-19 vaccine has this EUA  The booster is given 28 days after the second dose  Continue social distancing and other measures, even after you get the vaccine  Although it is not common, you can become infected after you get the vaccine  You may also be able to pass the virus to others without knowing you are infected  After you get the vaccine, check local, national, and international travel rules  You may need to be tested before you travel  Some countries require proof of a negative test before you travel   You may also need to quarantine after you return  Medicine may be given to prevent infection  The medicine can be given if you are at high risk for infection and cannot get the vaccine  It can also be given if your immune system does not respond well to the vaccine  How the 2019 coronavirus spreads:   · Droplets are the main way all coronaviruses spread  The virus travels in droplets that form when a person talks, sings, coughs, or sneezes  The droplets can also float in the air for minutes or hours  Infection happens when you breathe in the droplets or get them in your eyes or nose  Close personal contact with an infected person increases your risk for infection  This means being within 6 feet (2 meters) of the person for at least 15 minutes over 24 hours  · Person-to-person contact can spread the virus  For example, a person with the virus on his or her hands can spread it by shaking hands with someone  · The virus can stay on objects and surfaces for up to 3 days  You may become infected by touching the object or surface and then touching your eyes or mouth  Help lower the risk for COVID-19:   · Wash your hands often throughout the day  Use soap and water  Rub your soapy hands together, lacing your fingers, for at least 20 seconds  Rinse with warm, running water  Dry your hands with a clean towel or paper towel  Use hand  that contains alcohol if soap and water are not available  Teach children how to wash their hands and use hand   · Cover sneezes and coughs  Turn your face away and cover your mouth and nose with a tissue  Throw the tissue away  Use the bend of your arm if a tissue is not available  Then wash your hands well with soap and water or use hand   Teach children how to cover a cough or sneeze  · Wear a face covering (mask) when needed  Use a cloth covering with at least 2 layers  You can also create layers by putting a cloth covering over a disposable non-medical mask  Cover your mouth and your nose  · Follow worldwide, national, and local social distancing guidelines  Keep at least 6 feet (2 meters) between you and others  · Try not to touch your face  If you get the virus on your hands, you can transfer it to your eyes, nose, or mouth and become infected  You can also transfer it to objects, surfaces, or people  · Clean and disinfect high-touch surfaces and objects often  Use disinfecting wipes, or make a solution of 4 teaspoons of bleach in 1 quart (4 cups) of water  · Ask about other vaccines you may need  Get the influenza (flu) vaccine as soon as recommended each year, usually starting in September or October  Get the pneumonia vaccine if recommended  Your healthcare provider can tell you if you should also get other vaccines, and when to get them  Follow social distancing guidelines:  National and local social distancing rules vary  Rules and restrictions may change over time as restrictions are lifted  The following are general guidelines:  · Stay home if you are sick or think you may have COVID-19  It is important to stay home if you are waiting for a testing appointment or for test results  · Avoid close physical contact with anyone who does not live in your home  Do not shake hands with, hug, or kiss a person as a greeting  If you must use public transportation (such as a bus or subway), try to sit or stand away from others  Wear your face covering  · Avoid in-person gatherings and crowds  Attend virtually if possible  Follow up with your doctor as directed:  Write down your questions so you remember to ask them during your visits    For more information:   · Centers for Disease Control and Prevention  [address] , [address]  Phone: 0- 409 - 870-6169  Web Address: [employer] com br    © Copyright [employer] 2022 Information is for End User's use only and may not be sold, redistributed or otherwise used for commercial purposes  All illustrations and images included in CareNotes® are the copyrighted property of [employer]  or Mariana Barger  The above information is an  only  It is not intended as medical advice for individual conditions or treatments  Talk to your doctor, nurse or pharmacist before following any medical regimen to see if it is safe and effective for you  Follow up with PCP if not improved, if symptoms are worse, go to the ER

## 2022-10-10 NOTE — PATIENT INSTRUCTIONS
Urinary Tract Infection in Women   AMBULATORY CARE:   A urinary tract infection (UTI)  is caused by bacteria that get inside your urinary tract  Most bacteria that enter your urinary tract come out when you urinate  If the bacteria stay in your urinary tract, you may get an infection  Your urinary tract includes your kidneys, ureters, bladder, and urethra  Urine is made in your kidneys, and it flows from the ureters to the bladder  Urine leaves the bladder through the urethra  A UTI is more common in your lower urinary tract, which includes your bladder and urethra  Common symptoms include the following:   Urinating more often or waking from sleep to urinate    Pain or burning when you urinate    Pain or pressure in your lower abdomen     Urine that smells bad    Blood in your urine    Leaking urine    Seek care immediately if:   You are urinating very little or not at all  You have a high fever with shaking chills  You have side or back pain that gets worse  Call your doctor if:   You have a fever  You do not feel better after 2 days of taking antibiotics  You are vomiting  You have questions or concerns about your condition or care  Treatment for a UTI  may include antibiotics to treat a bacterial infection  You may also need medicines to decrease pain and burning, or decrease the urge to urinate often  If you have UTIs often (called recurrent UTIs), you may be given antibiotics to take regularly  You will be given directions for when and how to use antibiotics  The goal is to prevent UTIs but not cause antibiotic resistance by using antibiotics too often  Prevent a UTI:   Empty your bladder often  Urinate and empty your bladder as soon as you feel the need  Do not hold your urine for long periods of time  Wipe from front to back after you urinate or have a bowel movement  This will help prevent germs from getting into your urinary tract through your urethra      Drink liquids as directed  Ask how much liquid to drink each day and which liquids are best for you  You may need to drink more liquids than usual to help flush out the bacteria  Do not drink alcohol, caffeine, or citrus juices  These can irritate your bladder and increase your symptoms  Your healthcare provider may recommend cranberry juice to help prevent a UTI  Urinate after you have sex  This can help flush out bacteria passed during sex  Do not douche or use feminine deodorants  These can change the chemical balance in your vagina  Change sanitary pads or tampons often  This will help prevent germs from getting into your urinary tract  Talk to your healthcare provider about your birth control method  You may need to change your method if it is increasing your risk for UTIs  Wear cotton underwear and clothes that are loose  Tight pants and nylon underwear can trap moisture and cause bacteria to grow  Vaginal estrogen may be recommended  This medicine helps prevent UTIs in women who have gone through menopause or are in peña-menopause  Do pelvic muscle exercises often  Pelvic muscle exercises may help you start and stop urinating  Strong pelvic muscles may help you empty your bladder easier  Squeeze these muscles tightly for 5 seconds like you are trying to hold back urine  Then relax for 5 seconds  Gradually work up to squeezing for 10 seconds  Do 3 sets of 15 repetitions a day, or as directed  Follow up with your doctor as directed:  Write down your questions so you remember to ask them during your visits  © Copyright [employer] 2022 Information is for End User's use only and may not be sold, redistributed or otherwise used for commercial purposes  All illustrations and images included in CareNotes® are the copyrighted property of [employer]  or Mariana Barger  The above information is an  only   It is not intended as medical advice for individual conditions or treatments  Talk to your doctor, nurse or pharmacist before following any medical regimen to see if it is safe and effective for you  COVID-19 (Coronavirus Disease 2019)   AMBULATORY CARE:   What you need to know about COVID-19:  COVID-19 is the disease caused by a coronavirus first discovered in December 2019  Coronaviruses generally cause upper respiratory (nose, throat, and lung) infections, such as a cold  The 2019 virus spreads quickly and easily  It can be spread starting 2 to 3 days before symptoms even begin  What you need to know about variants: The virus has changed into several new forms (called variants) since it was discovered  The variants may be more contagious (easily spread) than the original form  Some may also cause more severe illness than others  Signs and symptoms of COVID-19  may not develop  Signs and symptoms usually start about 5 days after infection but can take 2 to 14 days  You may feel like you have the flu or a bad cold  Some signs and symptoms go away in a few days  Others can last weeks, months, or possibly years  You may have any of the following:  A cough    Shortness of breath or trouble breathing that may become severe    A fever    Chills that might include shaking    Muscle pain, body aches, or a headache    A sore throat    Sudden changes or loss of your taste or smell    Feeling mentally and physically tired (fatigue)    Congestion (stuffy head and nose), or a runny nose    Diarrhea, nausea, or vomiting    Call your local emergency number (911 in the [facility]) if:   You have trouble breathing or shortness of breath at rest     You have chest pain or pressure that lasts longer than 5 minutes  You become confused or hard to wake  Your lips or face are blue  Seek care immediately if:   You have a fever of 104°F (40°C) or higher  Call your doctor if:   You have symptoms of COVID-19  You have questions or concerns about your condition or care      How COVID-19 is diagnosed:  Testing is offered at many sites  You may need to quarantine until you get your results  Any of the following tests may be used:  A viral PCR test  shows if you have a current infection  A sample is taken from your nose or throat with a swab  You may need to wait 1 or more days to get the test results  An antigen test  shows if you have a protein from the COVID-19 virus  This test is often called a rapid test because the results can be available in 30 minutes or less  An antibody test  shows if you had a recent or past infection  Blood samples are used for this test  Antibodies are made by your immune system to fight the virus that causes COVID-19  Antibodies form 1 to 3 weeks after you are infected  This test is not used to show if you are immune to the virus  A CT, MRI, ultrasound, or x-ray  may be used to check for complications of [id number]  These may include pneumonia, blood clots, or other complications  Treatment:   Mild symptoms  may get better on their own  Some treatments have emergency use authorization (EUA)  Examples include monoclonal antibodies and convalescent plasma  These may be given to help prevent worsening of your symptoms  You may also need any of the following:    Decongestants  help reduce nasal congestion and help you breathe more easily  If you take decongestant pills, they may make you feel restless or cause problems with your sleep  Do not use decongestant sprays for more than a few days  Cough suppressants  help reduce coughing  Ask your healthcare provider which type of cough medicine is best for you  To soothe a sore throat,  gargle with warm salt water, or use throat lozenges or a throat spray  Drink more liquids to thin and loosen mucus and to prevent dehydration  NSAIDs or acetaminophen  can help lower a fever and relieve body aches or a headache  Follow directions   If not taken correctly, NSAIDs can cause kidney damage and acetaminophen can cause liver damage  Severe or life-threatening symptoms  are treated in the hospital  You may need any of the following:     Medicines  may be given to fight the virus or treat inflammation  Blood thinners  help prevent or treat blood clots  If you have a deep vein thrombosis (DVT) or pulmonary embolism (PE), you may need to use blood thinners for at least 3 months  Extra oxygen  may be given if you have respiratory failure  This means your lungs cannot get enough oxygen into your blood and out to your organs  A ventilator  may be used to help you breathe  What you need to know about health problems the virus may cause: You may develop long-term health problems caused by the virus  Your risk is higher if you are 65 or older  A weak immune system, obesity, diabetes, chronic kidney disease, or a heart or lung condition can also increase your risk  Your risk is also higher if you are a current or former cigarette smoker  COVID-19 can lead to any of the following:  Multisymptom inflammatory syndrome in adults (MIS-A) or in children (MIS-C), causing inflammation in the heart, digestive system, skin, or brain    Shortness of breath, serious lower respiratory conditions, such as pneumonia or acute respiratory distress syndrome (ARDS)    Blood clots or blood vessel damage    Organ damage from a lack of oxygen or from blood clots    Sleep problems    Problems thinking clearly, remembering information, or concentrating    Mood changes, depression, or anxiety    Long-term problems tasting or smelling    Loss of appetite and weight loss    Nerve pain    Fatigue (feeling mentally and physically tired)    What you need to know about COVID-19 vaccines:  Healthcare providers recommend a COVID-19 vaccine, even if you have already had COVID-19  You are considered fully vaccinated against COVID-19 two weeks after the final dose of any COVID-19 vaccine   Let your healthcare provider know when you have received the final dose of the vaccine  Make a copy of your vaccination card  Keep the original with you in case you need to show it  Keep the copy in a safe place  COVID-19 vaccines are given as a shot in 1 or 2 doses  Vaccination is recommended for everyone 5 years or older  One 2-dose vaccine is fully approved  for those 16 years or older  This vaccine also has an emergency use authorization (EUA) for children 11to 13years old  No vaccine is currently available for children younger than 5 years  A booster (additional) dose  is given to help the immune system continue to protect against severe COVID-19  A booster is recommended for all adults 18 or older  The booster can be a different brand of the COVID-19 vaccine than you originally received  The timing for the booster depends on the type of vaccine you received:    1-dose vaccine: The booster is given at least 2 months after you received the vaccine  2-dose vaccine: The booster is given at least 5 or 6 months after the second dose  A booster can be given to adolescents 15to 16years old  Only 1 COVID-19 vaccine has this EUA  The booster is given at least 5 months after the second dose of the original vaccine series  A booster is recommended for immunocompromised children 11to 6years old  Only 1 COVID-19 vaccine has this EUA  The booster is given 28 days after the second dose  Continue social distancing and other measures, even after you get the vaccine  Although it is not common, you can become infected after you get the vaccine  You may also be able to pass the virus to others without knowing you are infected  After you get the vaccine, check local, national, and international travel rules  You may need to be tested before you travel  Some countries require proof of a negative test before you travel  You may also need to quarantine after you return  Medicine may be given to prevent infection    The medicine can be given if you are at high risk for infection and cannot get the vaccine  It can also be given if your immune system does not respond well to the vaccine  How the 2019 coronavirus spreads:   Droplets are the main way all coronaviruses spread  The virus travels in droplets that form when a person talks, sings, coughs, or sneezes  The droplets can also float in the air for minutes or hours  Infection happens when you breathe in the droplets or get them in your eyes or nose  Close personal contact with an infected person increases your risk for infection  This means being within 6 feet (2 meters) of the person for at least 15 minutes over 24 hours  Person-to-person contact can spread the virus  For example, a person with the virus on his or her hands can spread it by shaking hands with someone  The virus can stay on objects and surfaces for up to 3 days  You may become infected by touching the object or surface and then touching your eyes or mouth  Help lower the risk for COVID-19:   Wash your hands often throughout the day  Use soap and water  Rub your soapy hands together, lacing your fingers, for at least 20 seconds  Rinse with warm, running water  Dry your hands with a clean towel or paper towel  Use hand  that contains alcohol if soap and water are not available  Teach children how to wash their hands and use hand   Cover sneezes and coughs  Turn your face away and cover your mouth and nose with a tissue  Throw the tissue away  Use the bend of your arm if a tissue is not available  Then wash your hands well with soap and water or use hand   Teach children how to cover a cough or sneeze  Wear a face covering (mask) when needed  Use a cloth covering with at least 2 layers  You can also create layers by putting a cloth covering over a disposable non-medical mask  Cover your mouth and your nose  Follow worldwide, national, and local social distancing guidelines    Keep at least 6 feet (2 meters) between you and others  Try not to touch your face  If you get the virus on your hands, you can transfer it to your eyes, nose, or mouth and become infected  You can also transfer it to objects, surfaces, or people  Clean and disinfect high-touch surfaces and objects often  Use disinfecting wipes, or make a solution of 4 teaspoons of bleach in 1 quart (4 cups) of water  Ask about other vaccines you may need  Get the influenza (flu) vaccine as soon as recommended each year, usually starting in September or October  Get the pneumonia vaccine if recommended  Your healthcare provider can tell you if you should also get other vaccines, and when to get them  Follow social distancing guidelines:  National and local social distancing rules vary  Rules and restrictions may change over time as restrictions are lifted  The following are general guidelines:  Stay home if you are sick or think you may have COVID-19  It is important to stay home if you are waiting for a testing appointment or for test results  Avoid close physical contact with anyone who does not live in your home  Do not shake hands with, hug, or kiss a person as a greeting  If you must use public transportation (such as a bus or subway), try to sit or stand away from others  Wear your face covering  Avoid in-person gatherings and crowds  Attend virtually if possible  Follow up with your doctor as directed:  Write down your questions so you remember to ask them during your visits  For more information:   [facility]  [address] , [address]  Phone: 1- 591 - 235-5867  Web Address: [employer] br    © Copyright [employer] 2022 Information is for End User's use only and may not be sold, redistributed or otherwise used for commercial purposes   All illustrations and images included in CareNotes® are the copyrighted property of [employer]  or Mariana Alejandra above information is an  only  It is not intended as medical advice for individual conditions or treatments  Talk to your doctor, nurse or pharmacist before following any medical regimen to see if it is safe and effective for you

## 2023-02-20 ENCOUNTER — ANNUAL EXAM (OUTPATIENT)
Dept: OBGYN CLINIC | Facility: CLINIC | Age: 58
End: 2023-02-20

## 2023-02-20 VITALS — DIASTOLIC BLOOD PRESSURE: 80 MMHG | SYSTOLIC BLOOD PRESSURE: 114 MMHG | HEIGHT: 64 IN | BODY MASS INDEX: 24.03 KG/M2

## 2023-02-20 DIAGNOSIS — Z01.419 WELL WOMAN EXAM WITH ROUTINE GYNECOLOGICAL EXAM: Primary | ICD-10-CM

## 2023-02-20 DIAGNOSIS — Z12.31 ENCOUNTER FOR SCREENING MAMMOGRAM FOR MALIGNANT NEOPLASM OF BREAST: ICD-10-CM

## 2023-02-20 RX ORDER — LORAZEPAM 0.5 MG/1
TABLET ORAL
COMMUNITY
Start: 2022-12-27

## 2023-02-20 RX ORDER — DESVENLAFAXINE 25 MG/1
25 TABLET, EXTENDED RELEASE ORAL
COMMUNITY

## 2023-03-24 ENCOUNTER — HOSPITAL ENCOUNTER (OUTPATIENT)
Dept: RADIOLOGY | Facility: HOSPITAL | Age: 58
Discharge: HOME | End: 2023-03-24
Attending: OBSTETRICS & GYNECOLOGY

## 2023-03-24 DIAGNOSIS — Z12.31 ENCOUNTER FOR SCREENING MAMMOGRAM FOR MALIGNANT NEOPLASM OF BREAST: ICD-10-CM

## 2023-05-09 ENCOUNTER — OFFICE VISIT (OUTPATIENT)
Dept: PAIN MEDICINE | Facility: CLINIC | Age: 58
End: 2023-05-09

## 2023-05-09 VITALS
HEART RATE: 78 BPM | WEIGHT: 152 LBS | SYSTOLIC BLOOD PRESSURE: 121 MMHG | BODY MASS INDEX: 26.09 KG/M2 | DIASTOLIC BLOOD PRESSURE: 80 MMHG | TEMPERATURE: 98.2 F

## 2023-05-09 DIAGNOSIS — M51.26 LUMBAR HERNIATED DISC: ICD-10-CM

## 2023-05-09 DIAGNOSIS — M62.838 MUSCLE SPASM: Primary | ICD-10-CM

## 2023-05-09 DIAGNOSIS — M54.16 LUMBAR RADICULOPATHY: ICD-10-CM

## 2023-05-09 RX ORDER — CYCLOBENZAPRINE HCL 5 MG
5 TABLET ORAL 3 TIMES DAILY PRN
Qty: 60 TABLET | Refills: 0 | Status: SHIPPED | OUTPATIENT
Start: 2023-05-09

## 2023-05-09 NOTE — PROGRESS NOTES
Assessment:  1  Muscle spasm    2  Lumbar radiculopathy    3  Lumbar herniated disc        Plan:  Ms Shell Hirsch is a pleasant 58-year-old female who presents for follow-up/[facility] and to establish care regarding lumbar radiculopathy into the left lower extremity  She previously saw Dr Antonieta Rivera urgently regarding an acute lumbar radiculopathy into the left lower extremity in March 2022 and subsequently underwent a left-sided L4-L5 TFESI successfully treating her pain  Over the last year her pain has been controlled with muscle relaxers including Flexeril and home exercises/stretching  She reports a recent injury on April 26, 2023 and wanted to reestablish care  At this time we will provide the patient with Flexeril 5 mg up to 3 times a day as needed for muscle spasms and pain as this previously provided significant relief  Advised patient if her pain gets progressively worse would consider repeating left-sided L4-L5 TFESI under fluoroscopy guidance  All questions answered, patient is agreeable plan  For now we will follow-up as needed  History of Present Illness:    Joel Marion is a 61 y o  female who presents to [facility] for initial evaluation of the above stated pain complaints  The patient has a past medical and chronic pain history as outlined in the assessment section  Ms Shell Hirsch is a pleasant 58-year-old female who presents as transfer of care previously seen by Dr Antonieta Rivera in March 2022 regarding acute onset low back pain with radiating symptoms into the left lower extremity  She subsequently underwent a left-sided L4-L5 TFESI which provided significant nearly 100% complete relief in her pain  She presents as the pain is gradually started to return with a recent injury on April 26, 2023  Presents for follow-up and to establish care  Currently reports 1-4 out of 10 pain in the low back with radiating symptoms into the left thigh    Describes symptoms as a burning, throbbing, dull aching pain  Presents for follow-up    Review of Systems:    Review of Systems   Constitutional: Negative for chills and fatigue  HENT: Negative for ear pain, mouth sores and sinus pressure  Eyes: Negative for pain, redness and visual disturbance  Respiratory: Negative for shortness of breath and wheezing  Cardiovascular: Negative for chest pain and palpitations  Gastrointestinal: Negative for abdominal pain and nausea  Endocrine: Negative for polyphagia  Musculoskeletal: Positive for back pain and gait problem  Negative for arthralgias and neck pain  Decreased ROM, muscle weakness   Skin: Negative for wound  Neurological: Positive for weakness  Negative for seizures  Psychiatric/Behavioral: Positive for dysphoric mood and sleep disturbance  The patient is nervous/anxious              Past Medical History:   Diagnosis Date   • Abnormal Pap smear of cervix 1987   • Anxiety    • Depression    • Fibrocystic breast    • High vitamin A level    • History of anxiety    • History of hypercholesterolemia    • History of hypertension    • History of obesity    • HPV (human papilloma virus) infection 1987   • Hyperlipidemia    • Lordosis    • 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   • Scoliosis    • Secondary hyperparathyroidism, non-renal ([address] )    • Spinal stenosis        Past Surgical History:   Procedure Laterality Date   • ABDOMINOPLASTY     • BREAST CYST ASPIRATION Right    • COLONOSCOPY     • COLONOSCOPY  11/22/2021    Eyvazzedah - 5 y f/u    • COLPOSCOPY  1987   • COMBINED AUGMENTATION MAMMAPLASTY AND ABDOMINOPLASTY  2001   • FACIAL COSMETIC SURGERY     • FL INJECTION RIGHT HIP (NON ARTHROGRAM)  05/24/2019   • GASTRIC BYPASS     • TUBAL LIGATION         Family History   Problem Relation Age of Onset   • Hypertension Mother    • Heart disease Mother    • Hypertension Father    • Heart disease Father    • No Known Problems Sister    • No Known Problems Brother    • No Known Problems Maternal Aunt    • No Known Problems Maternal Uncle    • No Known Problems Paternal Aunt    • No Known Problems Paternal Uncle    • No Known Problems Maternal Grandmother    • No Known Problems Maternal Grandfather    • No Known Problems Paternal Grandmother    • No Known Problems Paternal Grandfather    • ADD / ADHD Neg Hx    • Anesthesia problems Neg Hx    • Cancer Neg Hx    • Clotting disorder Neg Hx    • Collagen disease Neg Hx    • Diabetes Neg Hx    • Dislocations Neg Hx    • Learning disabilities Neg Hx    • Neurological problems Neg Hx    • Osteoporosis Neg Hx    • Rheumatologic disease Neg Hx    • Scoliosis Neg Hx    • Vascular Disease Neg Hx        Social History     Occupational History   • Not on file   Tobacco Use   • Smoking status: Never   • Smokeless tobacco: Never   Vaping Use   • Vaping Use: Never used   Substance and Sexual Activity   • Alcohol use: No   • Drug use: No   • Sexual activity: Yes     Partners: Male     Birth control/protection: Female Sterilization         Current Outpatient Medications:   •  Calcium Citrate 1040 MG TABS, Take by mouth 3 (three) times a day, Disp: , Rfl:   •  cyclobenzaprine (FLEXERIL) 5 mg tablet, Take 1 tablet (5 mg total) by mouth 3 (three) times a day as needed for muscle spasms, Disp: 60 tablet, Rfl: 0  •  Desvenlafaxine Succinate ER 25 MG TB24, Take 25 mg by mouth, Disp: , Rfl:   •  LORazepam (ATIVAN) 0 5 mg tablet, , Disp: , Rfl:   •  Multiple Vitamins-Minerals (BARIATRIC MULTIVITAMINS/IRON PO), Take by mouth daily, Disp: , Rfl:   •  Turmeric (QC TUMERIC COMPLEX PO), Take by mouth, Disp: , Rfl:     Allergies   Allergen Reactions   • Other Wheezing     Lobster when a teenager  Wheezing       Physical Exam:    /80   Pulse 78   Temp 98 2 °F (36 8 °C)   Wt 68 9 kg (152 lb)   BMI 26 09 kg/m²     Constitutional: normal, well developed, well nourished, alert, in no distress and non-toxic and no overt pain behavior    Eyes: anicteric  HEENT: grossly intact  Neck: supple, symmetric, trachea midline and no masses   Pulmonary:even and unlabored  Cardiovascular:No edema or pitting edema present  Skin:Normal without rashes or lesions and well hydrated  Psychiatric:Mood and affect appropriate  Neurologic:Cranial Nerves II-XII grossly intact  Musculoskeletal:normal    Imaging     MRI lumbar spine wo contrast  Status: Final result     PACS Images     Show images for MRI lumbar spine wo contrast    MRI lumbar spine wo contrast: Result Notes   Brennon Elkins DO   3/11/2022 11:47 AM [facility] MRI of the lumbar spine showed a disc herniation at L4-5 pressing on the left L4 nerve root   I would recommend follow-up with Dr Jamie Gillespie at our Spine and Pain office in [address] [place] [address] to consider an injection to help with this pain             Study Result    Narrative & Impression   MRI LUMBAR SPINE WITHOUT CONTRAST     INDICATION: M54 16: Radiculopathy, lumbar region  M41 9: Scoliosis, unspecified  Acute left lumbar radiculopathy  Patient reports severe low back pain radiating into the left leg with difficulty ambulating for 2 weeks  No trauma      COMPARISON:  7/24/2008     TECHNIQUE:  Sagittal T1, sagittal T2, sagittal inversion recovery, axial T1 and axial T2, coronal T2        IMAGE QUALITY:  Diagnostic     FINDINGS:     Vertebral bodies are numbered such that the 1st conical shape vertebral segment is called S1  Iliolumbar ligaments at L5      VERTEBRAL BODIES:    Alignment: Similar reversal of curvature in the upper lumbar region/thoracolumbar junction with otherwise preserved lordosis  Mild, increased retrolisthesis at L2-L3 and L3-L4  Mild thoracolumbar dextroscoliosis      Vertebral body heights: Preserved        Bone marrow: Multilevel degenerative endplate changes  No suspicious marrow edema or mass      SACRUM:  Preserved signal intensity  No fracture or mass      DISTAL CORD AND CONUS:  Conus and nerve roots are within normal limits   Conus terminates at L1      PARASPINAL SOFT TISSUES:  Within normal limits      LOWER THORACIC DISC SPACES:  Chronic loss of disc height at T11-T12 and T12-L1  Similar small T11-T12 disc bulge with improved disc herniation  Mild, improved central canal narrowing      LUMBAR DISC SPACES:  Diffuse desiccation  Loss of height at L1-L2 and L2-L3, increased at L2-L3      L1-L2:  Disc bulge and small right central disc protrusion  Mild central canal narrowing  Similar to the prior study      L2-L3:  Disc bulge and mild facet hypertrophy  Mild central canal and foraminal narrowing, mildly increased due to increased retrolisthesis      L3-L4:  New disc bulge and small right foraminal disc protrusion  Facet hypertrophy, small left greater than right facet effusions  Mild central canal and foraminal narrowing      L4-L5:  Similar small disc bulge and mild facet hypertrophy  New small left facet effusion  New left foraminal disc extrusion (6/17 resulting in marked left foraminal narrowing and mass effect on the exiting left L4 nerve root  Mild right foraminal   and central canal narrowing      L5-S1:  Similar small disc bulge and mild facet hypertrophy without stenosis      IMPRESSION:     Multilevel degenerative disc disease and lower lumbar facet osteoarthritis, predominantly mild stenoses  Mildly increased degenerative changes compared to 2008  Detailed description above      At L4-L5, new left-sided disc herniation impinging the left L4 nerve root      Increased, likely degenerative mild retrolisthesis at L2-L3 and L3-L4      Other chronic findings above         Workstation performed: [id number]        Imaging    MRI lumbar spine wo contrast (Order: [id number]) - 3/11/2022    Result History    MRI lumbar spine wo contrast (Order #[id number]) on 3/11/2022 - Order Result History Report    Order Report     Order Details      Order Questions    Question Answer   What is the patient's sedation requirement? No Sedation   Pregnant?  No "  Metallic implants? No   Note: Answer Yes or No   Does this procedure require the 3T MRI at [facility] or [facility]? No   Release to patient through [address] [place] [address] 951 Immediate   Is order priority selected as STAT? No   Exam reason left lumbar radiculopathy   Note: Enter reason for exam              Result Information    Status Priority Source   Final result (3/11/2022 11:01 AM) Routine        MRI lumbar spine wo contrast: Result Notes     Nyiah Valero DO   3/11/2022 11:47 AM [facility]         Jael's MRI of the lumbar spine showed a disc herniation at L4-5 pressing on the left L4 nerve root   I would recommend follow-up with Dr Perry Levine at our Spine and Pain office in [address] [place] [address] to consider an injection to help with this pain             Reason for Exam    left lumbar radiculopathy; Lumbar radiculopathy, symptoms persist with conservative treatment; left lumbar radiculopathy   Dx: Acute left lumbar radiculopathy [M54 16 (ICD-10-CM)]; Scoliosis of lumbar spine, unspecified scoliosis type [M41 9 (ICD-10-CM)]     All Reviewers List    Nelda Chopra MA on 3/11/2022 12:23 PM   Niyah Valero DO on 3/11/2022 11:47 AM         MRI lumbar spine wo contrast: Patient Communication     Add Comments   Seen         Signed by    Signed Date/Time  Phone Pager   Nirav Reybindmabel 3/11/2022 11:01 [phone]        Exam Information    Status Exam Begun  Exam Ended  Performing Tech   Final [99] 3/11/2022 07:44 3/11/2022 08:10 Ashley Monterroso       Screening Form Questions     important suggestion  Questionnaires are displayed in the order they were answered  Answer Comment   What are your symptoms? Severe left leg pain radiating to my ankle  Numbnesd left side lower back  Do you have a cardiac pacemaker or internal defibrillator? NA    Please list /make/model:     Have you had any HEART surgery? None    Please list make/model:     Heart surgery: If \"other\", please describe:     Have you had any BRAIN surgery?   None    Please list " " or describe implant:     Brain surgery: If \"other\", please describe:     Have you had any EYE surgery? None    Eye surgery: If \"other\", please describe:     Have you ever injured your eyes with metal or metal fragments (grinding,metallic slivers)? No    Eye injury: Please describe:     Have you had any EAR surgery? None    Ear surgery: If \"other\", please describe:      Do you have an electronic \"stimulation\" device? None    Please provide  information:     Have you had any abdominal or pelvic surgery? No    Have you had any of the following abdominal or pelvic surgeries:     Abdominal/pelvic surgery: If \"other\", please describe:     Do you have any ORTHOPEDIC implants/devices? None    Do you have the following: Tattoos (eyeliner included)    Do you have liver disease? None    Do you have kidney disease? None    Have you had a problem with a previous MRI? No    Does the patient require pre-medication? Do you have a personal history of cancer? None    If \"other\", please specify:       Are you wearing medication patches?   No    Are you wearing any of the following: None    Date of last menstrual cycle: ? Postmenopausal? Yes    Pregnant? Breastfeeding? Breast tissue expander? Do any of the following apply to you:     Please specify:     Did the patient provide this information? Yes    Who provided this information (if not the patient)? Relationship to the patient:     Contact number:     MRI STAFF ONLY- Prior imaging reviewed in PACS (initials): [facility]    MRI STAFF ONLY- Epic chart reviewed (initials): [facility]    MRI STAFF ONLY: The patient was scheduled to receive MRI contrast and has received the Medication Guide    No contrast given        External Results Report    Open External Results Report      Encounter    View Encounter                     Patient Care Timeline    No data selected in time range    Pre-op Summary    Pre-op             Recovery Summary    Recovery    "       Routing History    None         No orders to display       No orders of the defined types were placed in this encounter

## 2023-06-12 ENCOUNTER — OFFICE VISIT (OUTPATIENT)
Dept: OTOLARYNGOLOGY | Facility: CLINIC | Age: 58
End: 2023-06-12
Payer: COMMERCIAL

## 2023-06-12 VITALS — WEIGHT: 148 LBS | TEMPERATURE: 97.2 F | BODY MASS INDEX: 25.27 KG/M2 | HEIGHT: 64 IN

## 2023-06-12 DIAGNOSIS — H61.23 BILATERAL IMPACTED CERUMEN: ICD-10-CM

## 2023-06-12 DIAGNOSIS — H90.3 SENSORINEURAL HEARING LOSS (SNHL), BILATERAL: Primary | ICD-10-CM

## 2023-06-12 PROCEDURE — 99204 OFFICE O/P NEW MOD 45 MIN: CPT | Performed by: NURSE PRACTITIONER

## 2023-06-12 PROCEDURE — 69210 REMOVE IMPACTED EAR WAX UNI: CPT | Performed by: NURSE PRACTITIONER

## 2023-06-12 NOTE — PROGRESS NOTES
"Assessment/Plan:    Bilateral impacted cerumen    On exam noted bilateral cerumen impaction and unable to fully view tympanic membrane  Cerumen impaction removed bilateral eac with irrigation and suction, pt tolerated procedure well  Upon removal, improved hearing and decreased clogged sensation of bilateral ears  Discussed routine cerumen care including avoidance of q-tips, may use cerumen softeners every one to two months  Hydrocortisone cream pea sized amount on finger as needed for itching in ears  Encourage ongoing follow up prn to monitor for cerumen and hearing  Audiogram if symptoms worsen  Diagnoses and all orders for this visit:    Sensorineural hearing loss (SNHL), bilateral  -     Ambulatory referral to Audiology    Bilateral impacted cerumen  -     Ear cerumen removal          Subjective:      Patient ID: Pauly Mena is a 61 y o  female  Presents today as a new patient due to ear concerns  Hearing gradually worsening  Bilateral ears feel blocked  Asking others to repeat themselves  Bilateral ringing tinnitus  No otalgia or otorrhea  No history of ear surgery  No current hearing aids  Previously seen in our [facility] in 2019        The following portions of the patient's history were reviewed and updated as appropriate: allergies, current medications, past family history, past medical history, past social history, past surgical history and problem list     Review of Systems   Constitutional: Negative  HENT: Negative for congestion, ear discharge, ear pain, hearing loss, nosebleeds, postnasal drip, rhinorrhea, sinus pressure, sinus pain, sore throat, tinnitus and voice change  Respiratory: Negative for chest tightness and shortness of breath  Skin: Negative for color change  Neurological: Negative for dizziness, numbness and headaches  Psychiatric/Behavioral: Negative            Objective:      Temp (!) 97 2 °F (36 2 °C) (Temporal)   Ht 5' 4\" (1 626 m)   " Wt 67 1 kg (148 lb)   BMI 25 40 kg/m²          Physical Exam  Constitutional:       Appearance: She is well-developed  HENT:      Head: Normocephalic  Right Ear: Hearing, tympanic membrane, ear canal and external ear normal  No decreased hearing noted  No drainage or tenderness  There is impacted cerumen  Tympanic membrane is not perforated or erythematous  Left Ear: Hearing, tympanic membrane, ear canal and external ear normal  No decreased hearing noted  No drainage or tenderness  There is impacted cerumen  Tympanic membrane is not perforated or erythematous  Nose: Nose normal  No nasal deformity or septal deviation  Mouth/Throat:      Mouth: Mucous membranes are not pale and not dry  No oral lesions  Dentition: Normal dentition  Pharynx: Uvula midline  No oropharyngeal exudate  Neck:      Trachea: No tracheal deviation  Pulmonary:      Effort: Pulmonary effort is normal  No accessory muscle usage or respiratory distress  Musculoskeletal:      Cervical back: Full passive range of motion without pain and neck supple  Lymphadenopathy:      Cervical: No cervical adenopathy  Skin:     General: Skin is warm and dry  Neurological:      Mental Status: She is alert and oriented to person, place, and time  Cranial Nerves: No cranial nerve deficit  Sensory: No sensory deficit  Psychiatric:         Behavior: Behavior is cooperative  Ear cerumen removal    Date/Time: 6/12/2023 10:30 AM    Performed by: KYLEE Shah  Authorized by: KYLEE Shah  Universal Protocol:  Consent: Verbal consent obtained    Risks and benefits: risks, benefits and alternatives were discussed  Consent given by: patient  Patient understanding: patient states understanding of the procedure being performed      Patient location:  Clinic  Procedure details:     Local anesthetic:  None    Location:  L ear and R ear    Approach:  External  Post-procedure details:     Complication: None    Hearing quality:  Normal    Patient tolerance of procedure:   Tolerated well, no immediate complications

## 2023-06-12 NOTE — ASSESSMENT & PLAN NOTE
On exam noted bilateral cerumen impaction and unable to fully view tympanic membrane  Cerumen impaction removed bilateral eac with irrigation and suction, pt tolerated procedure well  Upon removal, improved hearing and decreased clogged sensation of bilateral ears  Discussed routine cerumen care including avoidance of q-tips, may use cerumen softeners every one to two months  Hydrocortisone cream pea sized amount on finger as needed for itching in ears  Encourage ongoing follow up prn to monitor for cerumen and hearing  Audiogram if symptoms worsen

## 2023-08-03 ENCOUNTER — TELEPHONE (OUTPATIENT)
Age: 58
End: 2023-08-03

## 2023-08-03 NOTE — TELEPHONE ENCOUNTER
Left message for the patient to call to schedule her injection.   Gave my direct phone number [phone]

## 2023-08-03 NOTE — TELEPHONE ENCOUNTER
Caller: Jael ANNE    Doctor: Dr Lei Montilla     Reason for call: Pt called in to schedule a injection     Call back#: [phone] [facility]

## 2023-08-03 NOTE — TELEPHONE ENCOUNTER
Here for right foot and ankle pain. Ambulatory. Doesn't know if he injured it at work yesterday.    Scheduled patient for TFESI 8/23/23  Patient denies RX blood thinners/ NSAIDS  Nothing to eat or drink 1 hour prior to procedure  Needs to arrange transportation  Proper clothing for procedure  No vaccines 2 weeks prior or after procedure  If ill or place on antibiotics, please call to reschedule

## 2023-08-11 PROBLEM — H61.23 BILATERAL IMPACTED CERUMEN: Status: RESOLVED | Noted: 2023-06-12 | Resolved: 2023-08-11

## 2023-08-23 ENCOUNTER — HOSPITAL ENCOUNTER (OUTPATIENT)
Facility: AMBULARY SURGERY CENTER | Age: 58
Setting detail: OUTPATIENT SURGERY
Discharge: HOME | End: 2023-08-23
Attending: PHYSICAL MEDICINE & REHABILITATION | Admitting: PHYSICAL MEDICINE & REHABILITATION
Payer: COMMERCIAL

## 2023-08-23 ENCOUNTER — HOSPITAL ENCOUNTER (OUTPATIENT)
Dept: RADIOLOGY | Facility: HOSPITAL | Age: 58
Setting detail: OUTPATIENT SURGERY
Discharge: HOME | End: 2023-08-23
Payer: COMMERCIAL

## 2023-08-23 VITALS
RESPIRATION RATE: 18 BRPM | TEMPERATURE: 96.6 F | DIASTOLIC BLOOD PRESSURE: 100 MMHG | SYSTOLIC BLOOD PRESSURE: 147 MMHG | OXYGEN SATURATION: 98 % | HEART RATE: 68 BPM

## 2023-08-23 DIAGNOSIS — Z92.241 S/P EPIDURAL STEROID INJECTION: ICD-10-CM

## 2023-08-23 PROCEDURE — 64483 NJX AA&/STRD TFRM EPI L/S 1: CPT | Performed by: PHYSICAL MEDICINE & REHABILITATION

## 2023-08-23 RX ORDER — BUPIVACAINE HYDROCHLORIDE 2.5 MG/ML
INJECTION, SOLUTION EPIDURAL; INFILTRATION; INTRACAUDAL AS NEEDED
Status: DISCONTINUED | OUTPATIENT
Start: 2023-08-23 | End: 2023-08-23 | Stop reason: HOSPADM

## 2023-08-23 RX ORDER — METHYLPREDNISOLONE ACETATE 40 MG/ML
INJECTION, SUSPENSION INTRA-ARTICULAR; INTRALESIONAL; INTRAMUSCULAR; SOFT TISSUE AS NEEDED
Status: DISCONTINUED | OUTPATIENT
Start: 2023-08-23 | End: 2023-08-23 | Stop reason: HOSPADM

## 2023-08-23 RX ORDER — LIDOCAINE HYDROCHLORIDE 10 MG/ML
INJECTION, SOLUTION EPIDURAL; INFILTRATION; INTRACAUDAL; PERINEURAL AS NEEDED
Status: DISCONTINUED | OUTPATIENT
Start: 2023-08-23 | End: 2023-08-23 | Stop reason: HOSPADM

## 2023-08-23 NOTE — DISCHARGE INSTRUCTIONS
Epidural Steroid Injection   WHAT YOU NEED TO KNOW:   An epidural steroid injection (REYNOLD) is a procedure to inject steroid medicine into the epidural space. The epidural space is between your spinal cord and vertebrae. Steroids reduce inflammation and fluid buildup in your spine that may be causing pain. You may be given pain medicine along with the steroids. ACTIVITY  Do not drive or operate machinery today. No strenuous activity today - bending, lifting, etc.  You may resume normal activites starting tomorrow - start slowly and as tolerated. You may shower today, but no tub baths or hot tubs. You may have numbness for several hours from the local anesthetic. Please use caution and common sense, especially with weight-bearing activities. CARE OF THE INJECTION SITE  If you have soreness or pain, apply ice to the area today (20 minutes on/20 minutes off). Starting tomorrow, you may use warm, moist heat or ice if needed. You may have an increase or change in your discomfort for 36-48 hours after your treatment. Apply ice and continue with any pain medication you have been prescribed. Notify the [facility] if you have any of the following: redness, drainage, swelling, headache, stiff neck or fever above 100°F.    SPECIAL INSTRUCTIONS  Our office will contact you in approximately 7 days for a progress report. MEDICATIONS  Continue to take all routine medications. Our office may have instructed you to hold some medications. As no general anesthesia was used in today's procedure, you should not experience any side effects related to anesthesia. If you are diabetic, the steroids used in today's injection may temporarily increase your blood sugar levels after the first few days after your injection. Please keep a close eye on your sugars and alert the doctor who manages your diabetes if your sugars are significantly high from your baseline or you are symptomatic.      If you have a problem specifically related to your procedure, please call our office at [phone]. Problems not related to your procedure should be directed to your primary care physician.

## 2023-08-23 NOTE — H&P
History of Present Illness: The patient is a 61 y.o. female who presents with complaints of low back pain    Past Medical History:   Diagnosis Date   • Abnormal Pap smear of cervix 1987   • Anxiety    • Depression    • Ear problems 1996   • Fibrocystic breast    • GERD (gastroesophageal reflux disease) 2025   • High vitamin A level    • History of anxiety    • History of hypercholesterolemia    • History of hypertension    • History of obesity    • HPV (human papilloma virus) infection 1987   • Hyperlipidemia    • Lordosis    • 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   • Scoliosis    • Secondary hyperparathyroidism, non-renal ([address])    • Spinal stenosis    • Tinnitus 1996       Past Surgical History:   Procedure Laterality Date   • ABDOMINOPLASTY     • BREAST CYST ASPIRATION Right    • COLONOSCOPY     • COLONOSCOPY  11/22/2021    Eyvazzedah - 5 y f/u    • COLPOSCOPY  1987   • COMBINED AUGMENTATION MAMMAPLASTY AND ABDOMINOPLASTY  2001   • FACIAL COSMETIC SURGERY     • FL INJECTION RIGHT HIP (NON ARTHROGRAM)  05/24/2019   • GASTRIC BYPASS     • TUBAL LIGATION         No current facility-administered medications for this encounter. Allergies   Allergen Reactions   • Other Wheezing     Lobster when a teenager. Wheezing       Physical Exam:   Vitals:    08/23/23 1335   BP: (!) 146/107   Pulse: 71   Resp: 18   Temp: (!) 96.6 °F (35.9 °C)   SpO2: 99%     General: Awake, Alert, Oriented x 3, Mood and affect appropriate  Respiratory: Respirations even and unlabored  Cardiovascular: Peripheral pulses intact; no edema  Musculoskeletal Exam: Tenderness palpation bilateral lumbar paraspinals    ASA Score: 2  Patient/Chart Verification  Patient ID Verified: Verbal, Armband  ID Band Applied: Yes  Consents Confirmed: Procedural  H&P( within 30 days) Verified: Yes  Interval H&P(within 24 hr) Complete (required for Outpatients and Surgery Admit only):  To be obtained in the Pre-Procedure area  Beta Blocker given : N/A  Pre-op Lab/Test Results Available: N/A  Pregnancy Lab Collected: N/A comment  Does Patient Have a Prosthetic Device/Implant: No    Assessment: No diagnosis found.     Plan:

## 2023-08-23 NOTE — OP NOTE
OPERATIVE REPORT  PATIENT NAME: Parag Marquez    :  1965  MRN: [id number]  Pt Location: [facility] MINOR/PAIN ROOM 01    SURGERY DATE: 2023    Surgeon(s) and Role:     * DO Dhaval Young Primary    Preop Diagnosis:  Lumbar radiculopathy [M54.16]  Lumbar herniated disc [M51.26]    Post-Op Diagnosis Codes:     * Lumbar radiculopathy [M54.16]     * Lumbar herniated disc [M51.26]    Procedure(s):  Bilateral - Left L4-L5   TRANSFORAMINAL epidural steroid injection ( [id number])    Indication: Leg pain  Preoperative diagnosis: Lumbar radiculitis  Postoperative diagnosis: Lumbar radiculitis  Procedure: Fluoroscopically-guided bilateral L4-L5 transforaminal epidural steroid injection under fluoroscopy  EBL: none  Specimens: not applicable  After discussing the risks, benefits, and alternatives to the procedure, the patient expressed understanding and wished to proceed. The patient was brought to the fluoroscopy suite and placed in the prone position. A procedural pause was conducted to verify: correct patient identity, procedure to be performed and as applicable, correct side and site, correct patient position, and availability of implants, special equipment and special requirements. After identifying the right L4 pedicle fluoroscopically with an oblique view, the skin was sterilely prepped and draped in the usual fashion using Chloraprep skin prep. The skin and subcutaneous tissues were anesthetized with 1% lidocaine. A 3.5 inch 22-gauge  spinal needle was then advanced under fluoroscopic guidance to the neural foramen. Appropriate foraminal depth was determined with a lateral fluoroscopic view, and AP visualization confirmed needle positioning at approximately the 6 o'clock position relative to the pedicle. After negative aspiration, Omnipaque 240 contrast was injected using live fluoroscopy confirming appropriate transforaminal spread without evidence of intravascular or intrathecal uptake.   Next, a 1.5 ml solution consisting of 20 mg Depo-Medrol and 0.25% bupivacaine was injected slowly and incrementally into the epidural space. Following the injection the needle was withdrawn slightly and flushed with lidocaine as it was fully extracted. The procedure was then repeated in the exact same way on the opposite side at the same level. The patient tolerated the procedure well and there were no apparent complications. The patient did not develop any new neurologic deficits. After appropriate observation, the patient was dismissed from the clinic in good condition under their own power.         SIGNATURE: Monse Kaufman, DO  DATE: August 23, 2023  TIME: 2:22 PM

## 2023-08-30 ENCOUNTER — TELEPHONE (OUTPATIENT)
Dept: PAIN MEDICINE | Facility: CLINIC | Age: 58
End: 2023-08-30

## 2023-10-13 ENCOUNTER — EVALUATION (OUTPATIENT)
Dept: PHYSICAL THERAPY | Facility: CLINIC | Age: 58
End: 2023-10-13
Payer: COMMERCIAL

## 2023-10-13 DIAGNOSIS — M16.11 OSTEOARTHRITIS OF RIGHT HIP, UNSPECIFIED OSTEOARTHRITIS TYPE: ICD-10-CM

## 2023-10-13 DIAGNOSIS — M25.551 RIGHT HIP PAIN: Primary | ICD-10-CM

## 2023-10-13 DIAGNOSIS — M76.891 HIP FLEXOR TENDONITIS, RIGHT: ICD-10-CM

## 2023-10-13 PROCEDURE — 97161 PT EVAL LOW COMPLEX 20 MIN: CPT

## 2023-10-13 NOTE — PROGRESS NOTES
PT Evaluation     Today's date: 10/13/2023  Patient name: Guillermina Dyson  : 1965  MRN: [id number]  Referring provider: Vern Alfred MD  Dx:   Encounter Diagnosis     ICD-10-CM    1. Right hip pain  M25.551       2. Osteoarthritis of right hip, unspecified osteoarthritis type  M16.11       3. Hip flexor tendonitis, right  M76.891           Start Time: 0845  Stop Time: 0940  Total time in clinic (min): 55 minutes    Assessment/Plan  Pt is a 61 y.o female who presents to skilled physical therapy with complaints of R hip pain that started approximately 2 months ago, has gotten slightly better but is no longer progressing. Pt demonstrated an antalgic and asymmetric gait pattern, impaired functional mechanics with squats and step ups/downs, decreased SLS, decreased hip MMT in all planes of motion, and restrictions of his R hip ROM with pain. Pt's hx and clinical findings are consistent with possible R hip osteoarthritis and hip flexor tendonitis. Pt was educated on her diagnosis, prognosis, POC, and HEP. Pt's pain and deficits are preventing her from performing self care, ADLS, and recreational activities without compensations. Pt would benefit from skilled physical therapy to reduce her pain, address her deficits, progress towards her goals, and return to her PLOF. Irritability: Moderate, Understanding of Diagnosis/Prognosis: Good  Prognosis: Good    Goals:  Short Term  1) Pt will initiate and progress her HEP in 3-4 weeks. 2) Pt will improve her hip MMT by 1 in all directions to improve ADLs in 3-4 weeks. 3) Pt will be able to achieve at least 80% hip A/PROM to improve gait in 3-4 weeks. Long Term  1) Pt will be able to ambulate for at least 30 mins without hip pain in 6-8 weeks. 2) Pt will be able to ambulate up and down 12 steps without hip pain in 6-8 weeks. 3) Pt will be able to perform ADLs with less than 2/10 hip pain in 6-8 weeks.   4) Pt will be able to ride her peloton and perform yoga for at least 15 mins without hip pain in 6-8 weeks. Plan  Patient would benefit from: skilled physical therapy, and PT eval  Planned Modalities: Cryotherapy, Thermotherapy  Planned Interventions: self care, manual therapy, joint mobilizations, balance, gait training, therapeutic exercise, therapeutic activity, graded exercise, neuromuscular re-education, patient education, postural training, body mechanics training, coordination, strengthening, flexibility, functional ROM exercises, stretching, and home exercise program  Frequency: 2x/week  Duration: 8 weeks  Treatment Plan: Discussed with patient      Subjective Evaluation    History of Present Illness  Mechanism of injury: Pt states she as having R anteiror hip pain into the muscle. She had been doing yoga and wasn't sure if she overstretched it. Pt received an epidural around August for her L4 herniated disc. Pt talked to Dr. Alexandria Adorno about her hip, was told to rest, ice, and use Alleve. It took 50% of the pain away but its still there. When she is sitting and goes to stand it hurts. Pt states walking feels fine as she goes, but then if she goes for a long time she is limping and it gets worse. Knife like pain but no numbness or tingling down her LE. Pt states having a slight labral tear in the past and had injections in the past for it. Pt denies any symptoms in her LLE, or R knee. Worse going up the steps than down. Pt denies parasthesias or problems going to the bathroom. Sleeping is painful regardless of position.    Patient Goals  Patient goals for therapy: decreased pain, increased motion, improved balance, independence with ADLs/IADLs, increased strength and return to sport/leisure activities  Patient goal: riding her [employer] yoga  Pain  Current pain ratin  At best pain ratin  At worst pain ratin  Location: anterior R hip  Quality: sharp, knife-like and tight  Relieving factors: ice and medications  Aggravating factors: standing, walking and stair climbing  Symptom course: better after injection but no improvement since. Social Support  Steps to enter house: yes  Stairs in house: yes   12  Lives in: multiple-level home  Lives with: adult children    Employment status: working  Exercise history: ema hannon          Objective     Active Range of Motion     Right Hip   Flexion: Right hip active flexion: 50% before pain. Strength  Right Hip  Flexion: 3+/5 w/pain  Extension: 3-/5 w/pain  Abduction: 3+/5 w/pain  Adduction: 3+/5    Special Tests:  Positive: FADIR, Distraction, Scour    Flexibility:  Slight tightness of R hip flexors and hamstrings    Ambulation   Weight-Bearing Status   Weight-Bearing Status (Left): full weight bearing   Weight-Bearing Status (Right): full weight-bearing    Assistive device used: none    Observational Gait   Gait: antalgic and asymmetric   Increased left stance time and right swing time. Decreased walking speed, stride length, right stance time, left swing time, left step length and right step length. Left foot contact pattern: foot flat  Right foot contact pattern: foot flat  Left arm swing: decreased  Right arm swing: decreased  Base of support: normal    Additional Observational Gait Details  R lateral shift of shoulders  Doesn't fully extend hip during stance phase    Functional Assessment      Squat    Left within functional limits, pain, sitting toward left side, right valgus and right tibial anterior translation beyond toes. Forward Step Up 8"     Right Leg  Pain, contralateral trunk side bending and increased contralateral push off.      Forward Step Down 8"   Left Leg  Contralateral toe touch first.     Right Leg  Pain and contralateral toe touch first.     Single Leg Stance   Left: 30 seconds  Right: 10 (pain) seconds                Precautions: None  Access Code [email or web address]     Date     10/13/23   Visit Number     1 (IE)   Manuals                                        Neuro Re-Ed Ther Ex                                                                        Ther Activity                        Gait Training                        Modalities

## 2023-10-16 ENCOUNTER — APPOINTMENT (OUTPATIENT)
Dept: PHYSICAL THERAPY | Facility: CLINIC | Age: 58
End: 2023-10-16
Payer: COMMERCIAL

## 2023-10-19 ENCOUNTER — OFFICE VISIT (OUTPATIENT)
Dept: PHYSICAL THERAPY | Facility: CLINIC | Age: 58
End: 2023-10-19
Payer: COMMERCIAL

## 2023-10-19 DIAGNOSIS — M25.551 RIGHT HIP PAIN: Primary | ICD-10-CM

## 2023-10-19 DIAGNOSIS — M76.891 HIP FLEXOR TENDONITIS, RIGHT: ICD-10-CM

## 2023-10-19 DIAGNOSIS — M16.11 OSTEOARTHRITIS OF RIGHT HIP, UNSPECIFIED OSTEOARTHRITIS TYPE: ICD-10-CM

## 2023-10-19 PROCEDURE — 97140 MANUAL THERAPY 1/> REGIONS: CPT

## 2023-10-19 PROCEDURE — 97110 THERAPEUTIC EXERCISES: CPT

## 2023-10-19 PROCEDURE — 97112 NEUROMUSCULAR REEDUCATION: CPT

## 2023-10-19 NOTE — PROGRESS NOTES
Daily Note     Today's date: 10/19/2023  Patient name: Charlette Parker  : 1965  MRN: [id number]  Referring provider: Omar Tellez MD  Dx:   Encounter Diagnosis     ICD-10-CM    1. Right hip pain  M25.551       2. Osteoarthritis of right hip, unspecified osteoarthritis type  M16.11       3. Hip flexor tendonitis, right  M76.891           Start Time: 3209  Stop Time: 1750  Total time in clinic (min): 45 minutes    Subjective: Pt reports that her R hip is in a lot of pain prior to the start of her session, she is limping and having a hard time putting all of her weight through her RLE. Pt states that her stretches feel good when doing them but it doesn't take away her pain. Pt spoke to Dr. Eamon Hilton and was told to have her PT notes sent to him. Pt's hip pain was increasing as the day was progressing. Pt rode her peloton this morning and felt it helped her earlier. Objective: See treatment diary below      Assessment: Tolerated treatment well. Patient demonstrated fatigue post treatment, exhibited good technique with therapeutic exercises, and would benefit from continued PT. Pt had significant limp and was PWB during gait when she arrived. Reassessed lumbar spine with no reduction of pain. Performed LAD and lateral belt mobilizations of her R hip which greatly reduced her hip pain to where she was able to FWB with 1-2/10 pain. Added hip abd/add exercises without any increase in pain. HEP was updated. Plan: Continue per plan of care. Progress treatment as tolerated.           Precautions: None  Access Code [email or web address]     Date    10/19 10/13/23   Visit Number    2 1 (IE)   Manuals        LAD    BT performed    Hip-belt mobilizaitons    Lateral BT performed                    Neuro Re-Ed         bridges        Supine march w/TA        Supine bent knee fallouts w/stab    YTB 10x B    Hip add w/ball squeeze    W/VC 15x                            Ther Ex        LAZARO str        Modified janine str Ther Activity                        Gait Training                        Modalities

## 2023-10-24 ENCOUNTER — APPOINTMENT (OUTPATIENT)
Dept: PHYSICAL THERAPY | Facility: CLINIC | Age: 58
End: 2023-10-24
Payer: COMMERCIAL

## 2023-10-24 NOTE — H&P (VIEW-ONLY)
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 left lower extremity radiculopathy and weakness secondary to notable left foraminal L4-5 disc extrusion  She has diminished sensation in the L4 dermatome and also myotomal weakness as well  She is also hyporeflexic of the left patella  We discussed left L4-5 transforaminal epidural steroid injection to help diminish the inflammatory component of her symptoms  Approach will be super neural as it appears the disc is pressing the nerve inferiorly in the foramen  Discussed with patient that given the degree of stenosis, that administration of the injectate may be uncomfortable for the patient, therefore will also plan for possible interlaminar approach at the L4-5 level  In regards pharmacological treatment options, patient has failed gabapentin in the past due to intolerance of the medication  Given intolerance to gabapentin, I will initiate Lyrica 75 mg t i d  on a dose titration schedule that was explained to the patient today  If tolerating this dose, may benefit from escalation in the dose moving forward  Given her focal weakness, she will also be seen by Dr Simi Ellison  Discussed with patient that injection will help with pain symptoms but will not help with weakness  Given the patient's symptoms, examination findings and imaging results noted above, I discussed the utility of proceeding with a left L4 transforaminal epidural steroid injection under fluoroscopic guidance, possible L4-5 interlmainar approach  Using an anatomical model, the procedure, as well as its potential risks, benefits, and reasonable alternatives were discussed in detail    Discussed risks of the procedure included but are not limited to bleeding, infection, allergic reaction, nerve damage, hematoma fomation, abscess formation, failure of the pain to improve and potential worsening of the pain  Since Ms Ivanna Wray has failed at least 6 weeks of conservative measures including over-the-counter pain medications, prescription medications, chiropractic therapy, physical therapy and a home exercise program it is reasonable to proceed with the above injection  The patient verbalized understanding to the potential risks, benefits, and reasonable alternatives to the above injection and wishes to proceed  Her response will help to further determine a treatment plan  [facility] Prescription Drug Monitoring Program report was reviewed and was appropriate     My impressions and treatment recommendations were discussed in detail with the patient who verbalized understanding and had no further questions  Discharge instructions were provided  I personally saw and examined the patient and I agree with the above discussed plan of care  Orders Placed This Encounter   Procedures    FL spine and pain procedure     Standing Status:   Future     Standing Expiration Date:   3/16/2026     Order Specific Question:   Reason for Exam:     Answer:   left L4 TFESI, possible L4-5 LESI     Order Specific Question:   Anticoagulant hold needed? Answer:   No     Order Specific Question:   Is the patient pregnant? Answer:   Unknown     New Medications Ordered This Visit   Medications    pregabalin (LYRICA) 75 mg capsule     Sig: Take 1 capsule (75 mg total) by mouth 3 (three) times a day Start by taking 1 tablet at bedtime for 1 day  Then increase to 1 tablet in the evening and 1 tablet at bedtime for 1 day  Then increase to 1 tablet three times a day thereafter  Dispense:  90 capsule     Refill:  1       History of Present Illness    Referring Provider: Self, Referral    Tegan Perdaza is a 61 y o  female who presents with a chief complaint of left lower extremity pain in weakness from hip to the ankle, inside the thigh as well as pain over the knee, shin    Associated with numbness and tingling  This is an acute issue that began on 02/28/2022  Patient denies any traumatic event  Patient is a nurse anesthetist  Niall Evans localize an isolated event  Over the past month, intensity the pain has been severe  Current pain is 4/10, but can escalate to a 12/10  Reports she cannot walk straight due to pain  Pain is nearly constant  Pain is worse at all times a day with no typical pattern  Described as burning, cramping, shooting, numbness, sharp, cutting, pins and needles, pressure-like, throbbing, dull/aching  Since this started, pain is not as instense when at rest  However, walking exacerbates her symptoms  Reports weakness in the left lower extremity only  She is ambulating with crutches  Pain is increased with standing, bending, sitting, walking, exercising  No change with coughing, sneezing  Decreased with relaxation and lying down  No previous pain management  Patient has MRI of the lumbar spine that is most notable for a new L4-5 foraminal disc extrusion causing mass effect on the L4 nerve root  No previous back surgery  Past surgical history includes bariatric surgery  Past medical history includes anxiety, high cholesterol, depression  Reports physical therapy in the past provided excellent relief  Moderate relief with heat therapy, and moderate relief with chiropractic manipulation  Does not smoke tobacco marijuana  Opioids have not provided relief  Currently using acetaminophen with relief  Also currently use Aleve with relief  Currently using Flexeril without relief  I have personally reviewed and/or updated the patient's past medical history, past surgical history, family history, social history, current medications, allergies, and vital signs today  Review of Systems   Constitutional: Negative for fever and unexpected weight change  HENT: Negative for trouble swallowing  Eyes: Negative for visual disturbance     Respiratory: Negative for shortness of breath and wheezing  Cardiovascular: Negative for chest pain and palpitations  Gastrointestinal: Negative for constipation, diarrhea, nausea and vomiting  Endocrine: Negative for cold intolerance, heat intolerance and polydipsia  Genitourinary: Negative for difficulty urinating and frequency  Musculoskeletal: Positive for arthralgias and myalgias  Negative for gait problem and joint swelling  Skin: Negative for rash  Neurological: Negative for dizziness, seizures, syncope, weakness and headaches  Hematological: Does not bruise/bleed easily  Psychiatric/Behavioral: Negative for dysphoric mood  All other systems reviewed and are negative        Patient Active Problem List   Diagnosis    Elevated parathyroid hormone    Postsurgical malabsorption    Secondary non-renal hyperparathyroidism ([facility] [address] )    Spinal stenosis    S/P gastric bypass    Arthritis of carpometacarpal (CMC) joint of left thumb       Past Medical History:   Diagnosis Date    Abnormal Pap smear of cervix 1987    Anxiety     High vitamin A level     History of anxiety     History of hypercholesterolemia     History of hypertension     History of obesity     HPV (human papilloma virus) infection 1987    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    Secondary hyperparathyroidism, non-renal ([facility] [address] )     Spinal stenosis        Past Surgical History:   Procedure Laterality Date    ABDOMINOPLASTY      BREAST CYST ASPIRATION      COLONOSCOPY      COLONOSCOPY  11/22/2021    Rlvazzedah - 11 y f/u     COLPOSCOPY  1987    COMBINED AUGMENTATION MAMMAPLASTY AND ABDOMINOPLASTY  2001    FACIAL COSMETIC SURGERY      FL INJECTION RIGHT HIP (NON ARTHROGRAM)  5/24/2019    GASTRIC BYPASS      TUBAL LIGATION         Family History   Problem Relation Age of Onset    Hypertension Mother     Heart disease Mother     Hypertension Father     Heart disease Father     No Known Problems Sister     No Known Problems Brother     No Known Problems Maternal Aunt     No Known Problems Maternal Uncle     No Known Problems Paternal Aunt     No Known Problems Paternal Uncle     No Known Problems Maternal Grandmother     No Known Problems Maternal Grandfather     No Known Problems Paternal Grandmother     No Known Problems Paternal Grandfather     ADD / ADHD Neg Hx     Anesthesia problems Neg Hx     Cancer Neg Hx     Clotting disorder Neg Hx     Collagen disease Neg Hx     Diabetes Neg Hx     Dislocations Neg Hx     Learning disabilities Neg Hx     Neurological problems Neg Hx     Osteoporosis Neg Hx     Rheumatologic disease Neg Hx     Scoliosis Neg Hx     Vascular Disease Neg Hx        Social History     Occupational History    Not on file   Tobacco Use    Smoking status: Never Smoker    Smokeless tobacco: Never Used   Vaping Use    Vaping Use: Never used   Substance and Sexual Activity    Alcohol use: No    Drug use: No    Sexual activity: Yes     Partners: Male     Birth control/protection: Female Sterilization       Current Outpatient Medications on File Prior to Visit   Medication Sig    Calcium Citrate 1040 MG TABS Take by mouth 3 (three) times a day    Multiple Vitamins-Minerals (BARIATRIC MULTIVITAMINS/IRON PO) Take by mouth daily    [DISCONTINUED] LORazepam (ATIVAN) 1 mg tablet     [DISCONTINUED] traZODone (DESYREL) 100 mg tablet      [DISCONTINUED] cyclobenzaprine (FLEXERIL) 5 mg tablet Take 1 tablet (5 mg total) by mouth 3 (three) times a day as needed for muscle spasms    [DISCONTINUED] predniSONE 10 mg tablet  (Patient not taking: Reported on 3/16/2022 )     No current facility-administered medications on file prior to visit  No Known Allergies    Physical Exam    Resp 18   Ht 5' 4" (1 626 m)   Wt 67 4 kg (148 lb 9 6 oz)   BMI 25 51 kg/m²     Constitutional: normal, well developed, well nourished, alert, in no distress and non-toxic and no overt pain behavior    Eyes: anicteric  HEENT: grossly intact  Neck: supple, symmetric, trachea midline and no masses   Pulmonary:even and unlabored  Cardiovascular:No edema or pitting edema present  Skin:Normal without rashes or lesions and well hydrated  Psychiatric:Mood and affect appropriate  Neurologic:Cranial Nerves II-XII grossly intact  Musculoskeletal:normal     Lumbar Spine Exam    Appearance:  Normal lordosis  Palpation/Tenderness:  left lumbar paraspinal tenderness  right lumbar paraspinal tenderness  Sensory:  no sensory deficits noted except: diminished left L4  Motor Strength:  Left hip flexion:  4/5  Left hip extension:  5/5  Right hip flexion:  5/5  Right hip extension:  5/5  Left knee flexion:  5/5  Left knee extension:  4/5  Right knee flexion:  5/5  Right knee extension:  5/5  Left foot dorsiflexion:  4/5  Left foot plantar flexion:  5/5  Right foot dorsiflexion:  5/5  Right foot plantar flexion:  5/5        Reflexes:  Left Patellar:  1+   Right Patellar:  2+   Left Achilles:  2+   Right Achilles:  2+   Special Tests:  Left Straight Leg Test:  positive  Right Straight Leg Test:  negative      DIAGNOSTIC IMAGING AND TEST RESULTS:  LUMBAR SPINE  03/07/2021     INDICATION:   M54 50: Low back pain, unspecified      COMPARISON:  6/30/2008     VIEWS:  XR SPINE LUMBAR MINIMUM 4 VIEWS NON INJURY  Images: 5     FINDINGS:     There are 5 non rib bearing lumbar vertebral bodies       There is no evidence of acute fracture or destructive osseous lesion      Alignment is unremarkable       Moderate degenerative disc disease T12-L1, L1-2, L2-3, L5-S1 mildly progressive     The pedicles appear intact      Soft tissues are unremarkable      IMPRESSION:  Mildly progressive multilevel degenerative spondylosis     No acute lumbar spinal abnormalities identified    LEFT HIP  03/07/2021     INDICATION:   M25 552: Pain in left hip      COMPARISON:  12/4/2018     VIEWS:  XR HIP/PELV 2-3 VWS LEFT  W PELVIS IF PERFORMED   Images: 3     FINDINGS:  Development of mild degenerative arthritis both hips     There is no acute fracture or dislocation      No lytic or blastic osseous lesion      Soft tissues are unremarkable  Left-sided abdominal staple line again evident     The visualized lumbar spine is unremarkable      IMPRESSION:  Development of mild degenerative arthritis both hips     No acute osseous abnormality  MRI LUMBAR SPINE WITHOUT CONTRAST  03/11/2022     INDICATION: M54 16: Radiculopathy, lumbar region  M41 9: Scoliosis, unspecified  Acute left lumbar radiculopathy  Patient reports severe low back pain radiating into the left leg with difficulty ambulating for 2 weeks  No trauma      COMPARISON:  7/24/2008     TECHNIQUE:  Sagittal T1, sagittal T2, sagittal inversion recovery, axial T1 and axial T2, coronal T2        IMAGE QUALITY:  Diagnostic     FINDINGS:     Vertebral bodies are numbered such that the 1st conical shape vertebral segment is called S1  Iliolumbar ligaments at L5      VERTEBRAL BODIES:    Alignment: Similar reversal of curvature in the upper lumbar region/thoracolumbar junction with otherwise preserved lordosis  Mild, increased retrolisthesis at L2-L3 and L3-L4  Mild thoracolumbar dextroscoliosis      Vertebral body heights: Preserved        Bone marrow: Multilevel degenerative endplate changes  No suspicious marrow edema or mass      SACRUM:  Preserved signal intensity  No fracture or mass      DISTAL CORD AND CONUS:  Conus and nerve roots are within normal limits  Conus terminates at L1      PARASPINAL SOFT TISSUES:  Within normal limits      LOWER THORACIC DISC SPACES:  Chronic loss of disc height at T11-T12 and T12-L1  Similar small T11-T12 disc bulge with improved disc herniation  Mild, improved central canal narrowing      LUMBAR DISC SPACES:  Diffuse desiccation  Loss of height at L1-L2 and L2-L3, increased at L2-L3      L1-L2:  Disc bulge and small right central disc protrusion    Mild central canal narrowing  Similar to the prior study      L2-L3:  Disc bulge and mild facet hypertrophy  Mild central canal and foraminal narrowing, mildly increased due to increased retrolisthesis      L3-L4:  New disc bulge and small right foraminal disc protrusion  Facet hypertrophy, small left greater than right facet effusions  Mild central canal and foraminal narrowing      L4-L5:  Similar small disc bulge and mild facet hypertrophy  New small left facet effusion  New left foraminal disc extrusion (6/17 resulting in marked left foraminal narrowing and mass effect on the exiting left L4 nerve root  Mild right foraminal   and central canal narrowing      L5-S1:  Similar small disc bulge and mild facet hypertrophy without stenosis      IMPRESSION:     Multilevel degenerative disc disease and lower lumbar facet osteoarthritis, predominantly mild stenoses  Mildly increased degenerative changes compared to 2008  Detailed description above      At L4-L5, new left-sided disc herniation impinging the left L4 nerve root      Increased, likely degenerative mild retrolisthesis at L2-L3 and L3-L4      Other chronic findings above  no palpitations/no peripheral edema/no syncope

## 2023-10-25 ENCOUNTER — OFFICE VISIT (OUTPATIENT)
Dept: PHYSICAL THERAPY | Facility: CLINIC | Age: 58
End: 2023-10-25
Payer: COMMERCIAL

## 2023-10-25 DIAGNOSIS — M16.11 OSTEOARTHRITIS OF RIGHT HIP, UNSPECIFIED OSTEOARTHRITIS TYPE: ICD-10-CM

## 2023-10-25 DIAGNOSIS — M76.891 HIP FLEXOR TENDONITIS, RIGHT: ICD-10-CM

## 2023-10-25 DIAGNOSIS — M25.551 RIGHT HIP PAIN: Primary | ICD-10-CM

## 2023-10-25 PROCEDURE — 97112 NEUROMUSCULAR REEDUCATION: CPT

## 2023-10-25 PROCEDURE — 97140 MANUAL THERAPY 1/> REGIONS: CPT

## 2023-10-25 NOTE — PROGRESS NOTES
Daily Note     Today's date: 10/25/2023  Patient name: Britney Martinez  : 1965  MRN: [id number]  Referring provider: Deborah Saenz MD  Dx:   Encounter Diagnosis     ICD-10-CM    1. Right hip pain  M25.551       2. Osteoarthritis of right hip, unspecified osteoarthritis type  M16.11       3. Hip flexor tendonitis, right  M76.891           Start Time: 1530  Stop Time: 1610  Total time in clinic (min): 40 minutes    Subjective: Britney Martinez arrived to scheduled PT treatment session with referred symptoms down the RLE and 5/10 pain intensity. Objective: See treatment diary below      Assessment: Tolerated treatment fair. Reviewed prior exercises with emphasis on pain free range of motion. Patient re-assessed for lumbar symptomology and reported pain with repeated extension, but decrease in peripheral symptoms. Referred pain continues to display with referral pattern from the right hip. Patient's pain did not exceed 5/10 pain intensity and was able to tolerated exercises in standing in order to load the RLE and focus on further strengthening. Patient demonstrated fatigue post treatment and would benefit from continued PT      Plan: Continue per plan of care. Progress treatment as tolerated.        Precautions: None  Access Code [email or web address]     Date   10/24 10/19 10/13/23   Visit Number   3 2 1 (IE)   Manuals        LAD   HT performed BT performed    Hip-belt mobilizaitons    Lateral BT performed                    Neuro Re-Ed         bridges        Supine march w/TA   20x B     TA SLR    10x B     Supine bent knee fallouts w/stab   YTB 20x B YTB 10x B    Hip add w/ball squeeze   2x10 w/ bridges W/VC 15x    Clamshells    20x RLE      Stand Hip ABD    20x RLE      Mini Squats    10x EOB     Side Steps    5 round trips at table     HS Curls    10x RLE     SLS    2x15" 1 UE support     Hip Ext    10x RLE     On/Off Horse   10x RLE     Step Ups    10x on 2" step  10x on 4" step     Ther Ex        LAZARO str Modified janine str                                                        Ther Activity                        Gait Training                        Modalities

## 2023-10-26 ENCOUNTER — OFFICE VISIT (OUTPATIENT)
Dept: PHYSICAL THERAPY | Facility: CLINIC | Age: 58
End: 2023-10-26
Payer: COMMERCIAL

## 2023-10-26 DIAGNOSIS — M16.11 OSTEOARTHRITIS OF RIGHT HIP, UNSPECIFIED OSTEOARTHRITIS TYPE: ICD-10-CM

## 2023-10-26 DIAGNOSIS — M25.551 RIGHT HIP PAIN: Primary | ICD-10-CM

## 2023-10-26 DIAGNOSIS — M76.891 HIP FLEXOR TENDONITIS, RIGHT: ICD-10-CM

## 2023-10-26 PROCEDURE — 97140 MANUAL THERAPY 1/> REGIONS: CPT

## 2023-10-26 PROCEDURE — 97112 NEUROMUSCULAR REEDUCATION: CPT

## 2023-10-26 NOTE — PROGRESS NOTES
Daily Note     Today's date: 10/26/2023  Patient name: Venancio Correa  : 1965  MRN: [id number]  Referring provider: Oliver Hernandes MD  Dx:   Encounter Diagnosis     ICD-10-CM    1. Right hip pain  M25.551       2. Osteoarthritis of right hip, unspecified osteoarthritis type  M16.11       3. Hip flexor tendonitis, right  M76.891           Start Time:   Stop Time:   Total time in clinic (min): 38 minutes    Subjective: Pt reports that she was in a lot of pain after her workday yesterday and her PT session. Had to go home to take medication and use ice to relax her hip pain. Pt states today was a better day with her hip, however is bothering her again prior to the start of her treatment session, moderate pain. Pt states continuing her HEP and stretching frequently. Pt [place] [facility] [place] her son. Objective: See treatment diary below      Assessment: Tolerated treatment well. Patient demonstrated fatigue post treatment, exhibited good technique with therapeutic exercises, and would benefit from continued PT. Pt continues to find significant relief with manual mobilizations of her R hip. Pt educated to unload her R hip as often as she can right now due to its high irritability, as well as take driving breaks on her long drive to [facility] this weekend. Pt instructed to perform her HEP and focus on pain free motion. Plan: Continue per plan of care. Progress treatment as tolerated.        Precautions: None  Access Code [email or web address]     Date  10/26 10/24 10/19 10/13/23   Visit Number  4 3 2 1 (IE)   Manuals        LAD  BT performed HT performed BT performed    Hip-belt mobilizaitons  Lateral and inferior-lateral BT performed  Lateral BT performed                    Neuro Re-Ed         bridges        Supine march w/TA  2x10 B 20x B     TA SLR   10x R 10x B     Supine bent knee fallouts w/stab   YTB 20x B YTB 10x B    Hip add w/ball squeeze  2x10 no bridge 2x10 w/ bridges W/VC 15x Clamshells   Supine bent knee fallouts 2x10 R 20x RLE      Stand Hip ABD    20x RLE      Mini Squats    10x EOB     Side Steps    5 round trips at table     HS Curls    10x RLE     SLS    2x15" 1 UE support     Hip Ext    10x RLE     On/Off Horse   10x RLE     Step Ups    10x on 2" step  10x on 4" step     Ther Ex        LAZARO str        Modified janine str                                                        Ther Activity                        Gait Training                        Modalities

## 2023-10-31 ENCOUNTER — APPOINTMENT (OUTPATIENT)
Dept: PHYSICAL THERAPY | Facility: CLINIC | Age: 58
End: 2023-10-31
Payer: COMMERCIAL

## 2023-11-02 ENCOUNTER — OFFICE VISIT (OUTPATIENT)
Dept: PHYSICAL THERAPY | Facility: CLINIC | Age: 58
End: 2023-11-02
Payer: COMMERCIAL

## 2023-11-02 DIAGNOSIS — M25.551 RIGHT HIP PAIN: Primary | ICD-10-CM

## 2023-11-02 DIAGNOSIS — M76.891 HIP FLEXOR TENDONITIS, RIGHT: ICD-10-CM

## 2023-11-02 DIAGNOSIS — M16.11 OSTEOARTHRITIS OF RIGHT HIP, UNSPECIFIED OSTEOARTHRITIS TYPE: ICD-10-CM

## 2023-11-02 PROCEDURE — 97112 NEUROMUSCULAR REEDUCATION: CPT

## 2023-11-02 NOTE — PROGRESS NOTES
Daily Note     Today's date: 2023  Patient name: Rosa Evans  : 1965  MRN: [id number]  Referring provider: Aric Best  Dx:   Encounter Diagnosis     ICD-10-CM    1. Right hip pain  M25.551       2. Osteoarthritis of right hip, unspecified osteoarthritis type  M16.11       3. 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 Pt states today was much better. Pt states that her pain has been more localized to the anterior R hip radiating down the thigh, as opposed to both sides of the hip. Noted compliance with the HEP. Objective: See treatment diary below      Assessment: Tolerated treatment well. Patient demonstrated fatigue post treatment, exhibited good technique with therapeutic exercises, and would benefit from continued PT      Plan: Continue per plan of care. Progress treatment as tolerated.        Precautions: None  Access Code [email or web address]     Date 11/2 10/26 10/24 10/19 10/13/23   Visit Number 5 4 3 2 1 (IE)   Manuals        LAD BT performed BT performed HT performed BT performed    Hip-belt mobilizaitons  Lateral and inferior-lateral BT performed  Lateral BT performed                    Neuro Re-Ed         bridges 2x10  1x10 w/LE ext       Supine march w/TA  2x10 B 20x B     TA SLR  10x R 10x R 10x B     Supine bent knee fallouts w/stab   YTB 20x B YTB 10x B    Hip add w/ball squeeze  2x10 no bridge 2x10 w/ bridges W/VC 15x    Clamshells  Supine bent knee fallouts 2x10 B Supine bent knee fallouts 2x10 R 20x RLE      Stand Hip ABD    20x RLE      Mini Squats    10x EOB     Side Steps    5 round trips at table     HS Curls    10x RLE     SLS    2x15" 1 UE support     Hip Ext    10x RLE     On/Off Horse   10x RLE     Step Ups    10x on 2" step  10x on 4" step     Ther Ex        LAZARO str Modified janine str        Hip ER/IR Log roll 2x10 ea       Standing hip ext 10x R       Standing hip abd 10x R                               Ther Activity                        Gait Training                        Modalities

## 2023-11-06 ENCOUNTER — OFFICE VISIT (OUTPATIENT)
Dept: PHYSICAL THERAPY | Facility: CLINIC | Age: 58
End: 2023-11-06
Payer: COMMERCIAL

## 2023-11-06 DIAGNOSIS — M25.551 RIGHT HIP PAIN: Primary | ICD-10-CM

## 2023-11-06 DIAGNOSIS — M16.11 OSTEOARTHRITIS OF RIGHT HIP, UNSPECIFIED OSTEOARTHRITIS TYPE: ICD-10-CM

## 2023-11-06 DIAGNOSIS — M76.891 HIP FLEXOR TENDONITIS, RIGHT: ICD-10-CM

## 2023-11-06 PROCEDURE — 97140 MANUAL THERAPY 1/> REGIONS: CPT

## 2023-11-06 PROCEDURE — 97112 NEUROMUSCULAR REEDUCATION: CPT

## 2023-11-06 PROCEDURE — 97110 THERAPEUTIC EXERCISES: CPT

## 2023-11-06 NOTE — PROGRESS NOTES
Daily Note     Today's date: 2023  Patient name: Faina Monroe  : 1965  MRN: [id number]  Referring provider: Balta Badillo  Dx:   Encounter Diagnosis     ICD-10-CM    1. Right hip pain  M25.551       2. Osteoarthritis of right hip, unspecified osteoarthritis type  M16.11       3. Hip flexor tendonitis, right  M76.891           Start Time: 1750  Stop Time: 1830  Total time in clinic (min): 40 minutes    Subjective: Pt reports that she was at a wedding this past weekend and was dancing so she was "feeling it" more today but still feels some improvement. Pt had 3/10 pain during the day and prior to the start of her session. Pt is leaving for vacation next week so won't be back for PT until afterward. Hasn't set up an appointment with Dr. Leeann espinoza. Objective: See treatment diary below      Assessment: Tolerated treatment well. Patient demonstrated fatigue post treatment, exhibited good technique with therapeutic exercises, and would benefit from continued PT. Pt continues to feel abolished pain with R hip manual techniques. Have been progressively trying to load pt's R hip with standing exercises to tolerable levels. Pt was educated to continue her stretches and slowly load/strengthen her hip as tolerated over the next 2 weeks. Plan: Continue per plan of care. Progress treatment as tolerated.        Precautions: None  Access Code [email or web address]     Date 11/6 11/2 10/26 10/24 10/19   Visit Number [phone] [id number]   Manuals        LAD BT performed BT performed BT performed HT performed BT performed   Hip-belt mobilizaitons Lateral and inferior-lateral BT performed  Lateral and inferior-lateral BT performed  Lateral BT performed                   Neuro Re-Ed         bridges 1x15 w/LE ext 2x10  1x10 w/LE ext      Supine march w/TA   2x10 B 20x B    TA SLR   10x R 10x R 10x B    Supine bent knee fallouts w/stab    YTB 20x B YTB 10x B   Hip add w/ball squeeze   2x10 no bridge 2x10 w/ bridges W/VC 15x Clamshells  Supine bent knee fallouts 2x10 B Supine bent knee fallouts 2x10 B Supine bent knee fallouts 2x10 R 20x RLE     Stand Hip ABD     20x RLE     Mini Squats     10x EOB    Side Steps  3 laps 10ft w/VC for hip hinge and proper WS   5 round trips at table    HS Curls     10x RLE    SLS     2x15" 1 UE support    Hip Ext     10x RLE    On/Off Horse    10x RLE    Step Ups     10x on 2" step  10x on 4" step    Ther Ex        LAZARO str        Modified janine str        Hip ER/IR  Log roll 2x10 ea      Standing hip ext 10x B 10x R      Standing hip abd 10x B 10x R                              Ther Activity                        Gait Training                        Modalities

## 2023-11-08 ENCOUNTER — APPOINTMENT (OUTPATIENT)
Dept: PHYSICAL THERAPY | Facility: CLINIC | Age: 58
End: 2023-11-08
Payer: COMMERCIAL

## 2023-11-28 ENCOUNTER — COSMETIC (OUTPATIENT)
Dept: PLASTIC SURGERY | Facility: CLINIC | Age: 58
End: 2023-11-28

## 2023-11-28 DIAGNOSIS — Z41.1 ENCOUNTER FOR COSMETIC PROCEDURE: Primary | ICD-10-CM

## 2023-11-28 PROCEDURE — BOTOX1U PR BOTOX BY THE UNIT: Performed by: SURGERY

## 2023-11-28 PROCEDURE — BOTOX2 TWO AREAS OR 50 UNITS: Performed by: SURGERY

## 2023-11-28 NOTE — PROGRESS NOTES
Joellen Gordon presents today for Botox, she is interested in treating the horizontal forehead creases as well as the lateral crows feet (left greater than right). She is familiar with Botox as she has had previous treatments from [facility] that the most recent may have been perhaps 1 year ago. We discussed how Botox works/mechanism of action, duration, as well as potential risk, complications and limitations. After informed consent was obtained, 35 units of Botox was administered in usual fashion, 17.5 units initially to treat the forehead, this was followed by an additional 17.5 units (7.5 units to the right and 10 units to the left, left greater than right due to increased animation and scar) to treat the lateral crows feet. Ice was applied posttreatment, and usual posttreatment instructions were given. She will be seen again in 3 to 4 months.     Botox 35 units/2 areas

## 2023-12-13 ENCOUNTER — APPOINTMENT (OUTPATIENT)
Dept: RADIOLOGY | Facility: CLINIC | Age: 58
End: 2023-12-13
Payer: COMMERCIAL

## 2023-12-13 ENCOUNTER — OFFICE VISIT (OUTPATIENT)
Dept: OBGYN CLINIC | Facility: CLINIC | Age: 58
End: 2023-12-13
Payer: COMMERCIAL

## 2023-12-13 VITALS
HEART RATE: 61 BPM | BODY MASS INDEX: 25.27 KG/M2 | HEIGHT: 64 IN | WEIGHT: 148 LBS | DIASTOLIC BLOOD PRESSURE: 89 MMHG | SYSTOLIC BLOOD PRESSURE: 151 MMHG

## 2023-12-13 DIAGNOSIS — M25.551 RIGHT HIP PAIN: ICD-10-CM

## 2023-12-13 DIAGNOSIS — M16.11 PRIMARY OSTEOARTHRITIS OF RIGHT HIP: Primary | ICD-10-CM

## 2023-12-13 PROCEDURE — 99214 OFFICE O/P EST MOD 30 MIN: CPT | Performed by: ORTHOPAEDIC SURGERY

## 2023-12-13 PROCEDURE — 73502 X-RAY EXAM HIP UNI 2-3 VIEWS: CPT

## 2023-12-13 RX ORDER — LORAZEPAM 0.5 MG/1
TABLET ORAL
COMMUNITY
Start: 2023-11-10

## 2023-12-13 NOTE — Clinical Note
[facility],   Please schedule this patient for an intraarticular right hip steroid injection with Dr. Esteban Beaver under [address] or [place] as she is a known patient to him.   Thanks

## 2023-12-13 NOTE — PROGRESS NOTES
Assessment/Plan:  1. Primary osteoarthritis of right hip        2. Right hip pain  XR hip/pelv 2-3 vws right if performed        Scribe Attestation      I,:  Benny Mccormack am acting as a scribe while in the presence of the attending physician.:       I,:  Pablo Geronimo, DO personally performed the services described in this documentation    as scribed in my presence.:           Noy Zarco is a very pleasant 79-year-old female who presents for initial evaluation of the right hip. After obtaining a thorough history, orthopedic exam, and reviewing imaging I believe her symptoms are consistent with sever osteoarthritis of the right hip. Together we reviewed the correlation of pain with quality of life in terms of when she may feel ready for a right total hip arthoplasty. In the meantime, I have provided her a referral to Dr. Carlota Merino for an intraarticular steroid injection of the right hip. I will see her back as needed for a follow-up evaluation after she receives the right hip injection. Subjective: Initial evaluation of right hip    Patient ID: Emma Manley is a 60 y.o. female who presents for initial evaluation of the right hip. She reports the right hip has bothered her since August 2023. Today, she reports an aching pain. She denies much pain at rest but can experience a 3-4/10 when she gets up from a seated position. She notes going up stairs, walking, and standing from a seated position bother the right hip. She attended 4 weeks of physical therapy for the right hip, which she feels went well. She states it improved her pain but did not resolve it. She does not recall any injury that may have caused her right hip pain. She denies any paresthesias. She has maintained an appropriate weight in her efforts to lessen her right hip pain. She takes Tumeric as she has experienced GI upset with over the counter analgesics, which she feels helps her pain. Review of Systems   Constitutional:  Positive for activity change. Negative for chills and fever. HENT:  Negative for ear pain and sore throat. Eyes:  Negative for pain and visual disturbance. Respiratory:  Negative for cough and shortness of breath. Cardiovascular:  Negative for chest pain and palpitations. Gastrointestinal:  Negative for abdominal pain and vomiting. Genitourinary:  Negative for dysuria and hematuria. Musculoskeletal:  Positive for arthralgias and myalgias. Negative for back pain. Skin:  Negative for color change and rash. Neurological:  Negative for seizures and syncope. All other systems reviewed and are negative. Past Medical History:   Diagnosis Date    Abnormal Pap smear of cervix 1987    Anxiety     Depression     Ear problems 1996    Fibrocystic breast     GERD (gastroesophageal reflux disease) 2025    High vitamin A level     History of anxiety     History of hypercholesterolemia     History of hypertension     History of obesity     HPV (human papilloma virus) infection 1987    Hyperlipidemia     Lordosis     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    Scoliosis     Secondary hyperparathyroidism, non-renal ([address])     Spinal stenosis     Tinnitus 1996       Past Surgical History:   Procedure Laterality Date    ABDOMINOPLASTY      BREAST CYST ASPIRATION Right     COLONOSCOPY      COLONOSCOPY  11/22/2021    Eyvazzedah - 5 y f/u     COLPOSCOPY  1987    COMBINED AUGMENTATION MAMMAPLASTY AND ABDOMINOPLASTY  2001    EPIDURAL BLOCK INJECTION Bilateral 8/23/2023    Procedure: Left L4-L5   TRANSFORAMINAL epidural steroid injection ( [id number]);   Surgeon: Nicolas Butler DO;  Location: [facility] MAIN OR;  Service: Pain Management     FACIAL COSMETIC SURGERY      FL INJECTION RIGHT HIP (NON ARTHROGRAM)  05/24/2019    GASTRIC BYPASS      TUBAL LIGATION         Family History   Problem Relation Age of Onset    Hypertension Mother     Heart disease Mother     Hypertension Father     Heart disease Father     No Known Problems Sister     No Known Problems Brother     No Known Problems Maternal Aunt     No Known Problems Maternal Uncle     No Known Problems Paternal Aunt     No Known Problems Paternal Uncle     No Known Problems Maternal Grandmother     No Known Problems Maternal Grandfather     No Known Problems Paternal Grandmother     No Known Problems Paternal Grandfather     ADD / ADHD Neg Hx     Anesthesia problems Neg Hx     Cancer Neg Hx     Clotting disorder Neg Hx     Collagen disease Neg Hx     Diabetes Neg Hx     Dislocations Neg Hx     Learning disabilities Neg Hx     Neurological problems Neg Hx     Osteoporosis Neg Hx     Rheumatologic disease Neg Hx     Scoliosis Neg Hx     Vascular Disease Neg Hx        Social History     Occupational History    Not on file   Tobacco Use    Smoking status: Never    Smokeless tobacco: Never   Vaping Use    Vaping status: Never Used   Substance and Sexual Activity    Alcohol use: Not Currently    Drug use: No    Sexual activity: Yes     Partners: Male     Birth control/protection: Surgical, Female Sterilization         Current Outpatient Medications:     Calcium Citrate 1040 MG TABS, Take by mouth 3 (three) times a day, Disp: , Rfl:     Desvenlafaxine Succinate ER 25 MG TB24, Take 25 mg by mouth, Disp: , Rfl:     LORazepam (ATIVAN) 0.5 mg tablet, , Disp: , Rfl:     Multiple Vitamins-Minerals (BARIATRIC MULTIVITAMINS/IRON PO), Take by mouth daily, Disp: , Rfl:     Turmeric (QC TUMERIC COMPLEX PO), Take by mouth, Disp: , Rfl:     Allergies   Allergen Reactions    Other Wheezing     Lobster when a teenager. Wheezing       Objective:  Vitals:    12/13/23 1459   BP: 151/89   Pulse: 61       Body mass index is 25.4 kg/m². Right Hip Exam     Tenderness   The patient is experiencing tenderness in the anterior.     Range of Motion   Abduction:  50   Right hip adduction: 35.   Extension:  0   Flexion:  130   External rotation:  50   Internal rotation:  30     Muscle Strength   Abduction: 5/5   Adduction: 5/5 Flexion: 5/5     Tests   LAZARO: negative    Comments:  Positive FADIR  Positive [facility]  No internal or external snapping hip      Left Hip Exam     Tenderness   The patient is experiencing no tenderness. Range of Motion   The patient has normal left hip ROM. Physical Exam  Vitals and nursing note reviewed. Constitutional:       Appearance: Normal appearance. HENT:      Head: Normocephalic and atraumatic. Right Ear: External ear normal.      Left Ear: External ear normal.      Nose: Nose normal.   Eyes:      General: No scleral icterus. Extraocular Movements: Extraocular movements intact. Conjunctiva/sclera: Conjunctivae normal.   Cardiovascular:      Rate and Rhythm: Normal rate. Pulmonary:      Effort: Pulmonary effort is normal. No respiratory distress. Musculoskeletal:      Cervical back: Normal range of motion and neck supple. Comments: See ortho exam   Skin:     General: Skin is warm and dry. Neurological:      Mental Status: She is alert and oriented to person, place, and time. Psychiatric:         Mood and Affect: Mood normal.         Behavior: Behavior normal.         I have personally reviewed pertinent films in PACS. X-rays of the right hip obtained on 12/13/2023 demonstrate severe osteoarthritis of the right hip. There are no acute fractures, dislocations, lytic or blastic lesions present. This document was created using speech voice recognition software. Grammatical errors, random word insertions, pronoun errors, and incomplete sentences are an occasional consequence of this system due to software limitations, ambient noise, and hardware issues. Any formal questions or concerns about content, text, or information contained within the body of this dictation should be directly addressed to the provider for clarification.

## 2023-12-14 ENCOUNTER — TELEPHONE (OUTPATIENT)
Dept: PAIN MEDICINE | Facility: CLINIC | Age: 58
End: 2023-12-14

## 2023-12-14 NOTE — TELEPHONE ENCOUNTER
Scheduled patient for right hip injection 01/24/24  Patient denies RX blood thinners/ NSAIDS  Nothing to eat or drink 1 hour prior to procedure  Needs to arrange transportation  Proper clothing for procedure  No vaccines 2 weeks prior or after procedure  If ill or place on antibiotics, please call to reschedule

## 2024-01-08 ENCOUNTER — NURSE TRIAGE (OUTPATIENT)
Dept: NEUROSURGERY | Facility: CLINIC | Age: 59
End: 2024-01-08

## 2024-01-08 NOTE — TELEPHONE ENCOUNTER
Forwarded:  Jael Fabian ([facility])  Manolo Luna, It’s Jael Fabian. I’m getting my 3rd epidural shot on January 24 by Dr.Kyle Colmenares. You mentioned that after I had a 3rd epidural shot you would do a minimal invasive surgery to clean out the L4/L5 left herniated disc.  So, I’m not sure where to go from here. Schedule an appointment to see you first, wait for pain to return after my 3 shot, or schedule surgery. The pain is so achey at night sometimes I can sleep.  Look forward to hearing from you, Kiki    Forwarded:  Yury Mercado([facility])  Hey. I can have the office reach out to you to schedule an appointment and schedule a quick appointment. Is your MRI up to date?

## 2024-01-08 NOTE — TELEPHONE ENCOUNTER
Called patient, EUGENIO to call back, she will need updated MRI.  Patient can be offered an appt with an AP to order updated imaging OR patient can see PCP to order updated MRI.      Please schedule solo next available with DKO after MRI is complete.

## 2024-01-10 ENCOUNTER — APPOINTMENT (OUTPATIENT)
Dept: RADIOLOGY | Facility: HOSPITAL | Age: 59
End: 2024-01-10
Payer: COMMERCIAL

## 2024-01-10 ENCOUNTER — HOSPITAL ENCOUNTER (OUTPATIENT)
Facility: AMBULARY SURGERY CENTER | Age: 59
Setting detail: OUTPATIENT SURGERY
Discharge: HOME | End: 2024-01-10
Attending: PHYSICAL MEDICINE & REHABILITATION | Admitting: PHYSICAL MEDICINE & REHABILITATION
Payer: COMMERCIAL

## 2024-01-10 VITALS
TEMPERATURE: 97.3 F | HEART RATE: 64 BPM | OXYGEN SATURATION: 98 % | RESPIRATION RATE: 16 BRPM | DIASTOLIC BLOOD PRESSURE: 102 MMHG | SYSTOLIC BLOOD PRESSURE: 181 MMHG

## 2024-01-10 PROBLEM — M25.551 PAIN OF RIGHT HIP: Status: ACTIVE | Noted: 2024-01-10

## 2024-01-10 PROCEDURE — 77002 NEEDLE LOCALIZATION BY XRAY: CPT

## 2024-01-10 PROCEDURE — 77002 NEEDLE LOCALIZATION BY XRAY: CPT | Performed by: PHYSICAL MEDICINE & REHABILITATION

## 2024-01-10 PROCEDURE — NC001 PR NO CHARGE: Performed by: PHYSICAL MEDICINE & REHABILITATION

## 2024-01-10 PROCEDURE — 20610 DRAIN/INJ JOINT/BURSA W/O US: CPT | Performed by: PHYSICAL MEDICINE & REHABILITATION

## 2024-01-10 RX ORDER — BUPIVACAINE HYDROCHLORIDE 2.5 MG/ML
INJECTION, SOLUTION EPIDURAL; INFILTRATION; INTRACAUDAL AS NEEDED
Status: DISCONTINUED | OUTPATIENT
Start: 2024-01-10 | End: 2024-01-10 | Stop reason: HOSPADM

## 2024-01-10 RX ORDER — METHYLPREDNISOLONE ACETATE 80 MG/ML
INJECTION, SUSPENSION INTRA-ARTICULAR; INTRALESIONAL; INTRAMUSCULAR; SOFT TISSUE AS NEEDED
Status: DISCONTINUED | OUTPATIENT
Start: 2024-01-10 | End: 2024-01-10 | Stop reason: HOSPADM

## 2024-01-10 RX ORDER — LIDOCAINE HYDROCHLORIDE 10 MG/ML
INJECTION, SOLUTION EPIDURAL; INFILTRATION; INTRACAUDAL; PERINEURAL AS NEEDED
Status: DISCONTINUED | OUTPATIENT
Start: 2024-01-10 | End: 2024-01-10 | Stop reason: HOSPADM

## 2024-01-10 NOTE — OP NOTE
OPERATIVE REPORT  PATIENT NAME: Jael Fabian    :  1965  MRN: [id number]  Pt Location: [facility] MINOR/PAIN ROOM 01    SURGERY DATE: 1/10/2024    Surgeons and Role:     * Mateo Colmenares DO - Primary    Preop Diagnosis:  Primary osteoarthritis of right hip [M16.11]    Post-Op Diagnosis Codes:     * Primary osteoarthritis of right hip [M16.11]    Procedure(s):  Right - RIGHT INTRA-ARTICULAR HIP INJECTION    Indication:  Hip pain  Preoperative diagnosis:                     Hip arthropathy  Postoperative diagnosis:                     Hip arthropathy     Procedure:  Fluoroscopically-guided right intraarticular hip injection     EBL:  none  Specimens:  not applicable        After discussing the risks, benefits, and alternatives to the procedure, the patient expressed understanding and wished to proceed.  The patient was brought to the fluoroscopic suite and placed in the supine position. Procedural pause conducted to verify:  correct patient identity, procedure to be performed, and as applicable, correct side and site, correct patient position, and availability of implants, special equipment and special requirements.  The femoral artery was palpated.  Using fluoroscopy, an AP view was utilized to visualize the hip joint.  Next, a point at the junction of the ipsilateral femoral head and femoral neck was identified, and noted to be a safe distance lateral to the pulsation of the aforementioned femoral artery.  The skin was sterilely prepped and draped in the usual fashion using Chloraprep skin prep.  The skin and subcutaneous tissues were anesthetized with 1% lidocaine.  Using fluoroscopic guidance, a 3.5 inch 22 gauge spinal needle was advanced into the joint capsule.  After negative aspiration, Omnipaque 240 contrast was injected which confirmed intra-articular placement without evidence of intravascular uptake. A 4 ml solution consisting of 80 mg of Depo-Medrol in 3 mL of 0.25% bupivacaine was injected.   The needle was withdrawn from the skin.  The patient tolerated the procedure well, and there were no apparent complications.  After appropriate observation, the patient was dismissed from the outpatient procedure area in good condition under their own power.                                  SIGNATURE: Mateo Colmenares, DO  DATE: January 10, 2024  TIME: 2:53 PM

## 2024-01-10 NOTE — H&P (VIEW-ONLY)
History of Present Illness: The patient is a 58 y.o. female who presents with complaints of hip pain    Past Medical History:   Diagnosis Date    Abnormal Pap smear of cervix 1987    Anxiety     Depression     Ear problems 1996    Fibrocystic breast     GERD (gastroesophageal reflux disease) 2025    High vitamin A level     History of anxiety     History of hypercholesterolemia     History of hypertension     History of obesity     HPV (human papilloma virus) infection 1987    Hyperlipidemia     Lordosis     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    Scoliosis     Secondary hyperparathyroidism, non-renal (HCC)     Spinal stenosis     Tinnitus 1996       Past Surgical History:   Procedure Laterality Date    ABDOMINOPLASTY      BREAST CYST ASPIRATION Right     COLONOSCOPY      COLONOSCOPY  11/22/2021    Eyvazzedah - 5 y f/u     COLPOSCOPY  1987    COMBINED AUGMENTATION MAMMAPLASTY AND ABDOMINOPLASTY  2001    EPIDURAL BLOCK INJECTION Bilateral 8/23/2023    Procedure: Left L4-L5   TRANSFORAMINAL epidural steroid injection ( [id number]);  Surgeon: Mateo Colmenares DO;  Location: [facility] MAIN OR;  Service: Pain Management     FACIAL COSMETIC SURGERY      FL INJECTION RIGHT HIP (NON ARTHROGRAM)  05/24/2019    GASTRIC BYPASS      TUBAL LIGATION         No current facility-administered medications for this encounter.    Allergies   Allergen Reactions    Other Wheezing     Lobster when a teenager.  Wheezing       Physical Exam:   Vitals:    01/10/24 1416   BP: (!) 157/103   Pulse: 69   Resp: 16   Temp: (!) 97.3 °F (36.3 °C)   SpO2: 95%     General: Awake, Alert, Oriented x 3, Mood and affect appropriate  Respiratory: Respirations even and unlabored  Cardiovascular: Peripheral pulses intact; no edema  Musculoskeletal Exam: Tenderness to palpation right lateral hip    ASA Score: 2       Assessment: Hip pain    Plan: Right hip injection

## 2024-01-10 NOTE — H&P
History of Present Illness: The patient is a 58 y.o. female who presents with complaints of hip pain    Past Medical History:   Diagnosis Date    Abnormal Pap smear of cervix 1987    Anxiety     Depression     Ear problems 1996    Fibrocystic breast     GERD (gastroesophageal reflux disease) 2025    High vitamin A level     History of anxiety     History of hypercholesterolemia     History of hypertension     History of obesity     HPV (human papilloma virus) infection 1987    Hyperlipidemia     Lordosis     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    Scoliosis     Secondary hyperparathyroidism, non-renal (HCC)     Spinal stenosis     Tinnitus 1996       Past Surgical History:   Procedure Laterality Date    ABDOMINOPLASTY      BREAST CYST ASPIRATION Right     COLONOSCOPY      COLONOSCOPY  11/22/2021    Eyvazzedah - 5 y f/u     COLPOSCOPY  1987    COMBINED AUGMENTATION MAMMAPLASTY AND ABDOMINOPLASTY  2001    EPIDURAL BLOCK INJECTION Bilateral 8/23/2023    Procedure: Left L4-L5   TRANSFORAMINAL epidural steroid injection ( [id number]);  Surgeon: Mateo Colmenares DO;  Location: [facility] MAIN OR;  Service: Pain Management     FACIAL COSMETIC SURGERY      FL INJECTION RIGHT HIP (NON ARTHROGRAM)  05/24/2019    GASTRIC BYPASS      TUBAL LIGATION         No current facility-administered medications for this encounter.    Allergies   Allergen Reactions    Other Wheezing     Lobster when a teenager.  Wheezing       Physical Exam:   Vitals:    01/10/24 1416   BP: (!) 157/103   Pulse: 69   Resp: 16   Temp: (!) 97.3 °F (36.3 °C)   SpO2: 95%     General: Awake, Alert, Oriented x 3, Mood and affect appropriate  Respiratory: Respirations even and unlabored  Cardiovascular: Peripheral pulses intact; no edema  Musculoskeletal Exam: Tenderness to palpation right lateral hip    ASA Score: 2       Assessment: Hip pain    Plan: Right hip injection

## 2024-01-17 ENCOUNTER — TELEPHONE (OUTPATIENT)
Dept: PAIN MEDICINE | Facility: CLINIC | Age: 59
End: 2024-01-17

## 2024-01-17 NOTE — TELEPHONE ENCOUNTER
Pt reports 90% improvement post inj   Pain level 2/10  Pt aware I will call next week for an update

## 2024-01-18 ENCOUNTER — OFFICE VISIT (OUTPATIENT)
Dept: NEUROSURGERY | Facility: CLINIC | Age: 59
End: 2024-01-18
Payer: COMMERCIAL

## 2024-01-18 VITALS
DIASTOLIC BLOOD PRESSURE: 96 MMHG | BODY MASS INDEX: 26.46 KG/M2 | TEMPERATURE: 98 F | SYSTOLIC BLOOD PRESSURE: 146 MMHG | OXYGEN SATURATION: 96 % | HEART RATE: 63 BPM | HEIGHT: 64 IN | WEIGHT: 155 LBS

## 2024-01-18 DIAGNOSIS — M54.16 LUMBAR RADICULOPATHY: Primary | ICD-10-CM

## 2024-01-18 PROCEDURE — 99213 OFFICE O/P EST LOW 20 MIN: CPT | Performed by: PHYSICIAN ASSISTANT

## 2024-01-18 NOTE — PROGRESS NOTES
Neurosurgery Office Note  Jael Fabian 58 y.o. 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  She came today complaining of new right lower extremity radiculopathy since August 2023.  Patient noticed some improvement with her left leg radiculopathy after the injections, but still complaining of pain on bilateral lower back pain.  Denies any associated numbness or paresthesia but continues achy pain in her right posterolateral thigh to the right outer calf region.  No weakness or bowel/bladder dysfunction, limping gait on the right side and walking.  Pain worsened with standing at workplace.  Patient is not taking any oral pain medication right now is because of side effects (she says she felt weird with Flexeril and gabapentin ) and she doesn't want to continue taking.    Exam-Patient A&OX3, strength 5/5 bilaterally, sensation to LT intact bilaterally. DTR 2+, no clonus in both ankles. Tenderness in the Lumbosacral region bilaterally. RLE limping gait noted    Hx, Exam, and previous images reviewed. Mx plan discussed. Patient developed right side Lumbar radiculopathy since August 2023, previously left LE Lumbar radiculopathy improved after injections. I would recommend up-to-date MRI of Lumbar spine to evaluate new discogenic disease/spondylotic changes on the right and check the status of  left side L4-5 disc herniation, possibly for surgical planning. Advised to continue follow up with Pain Dr for another injection.Already scheduled to have repeat injections in a few weeks. F/U with Dr. Wing after MRI results, to discuss surgical options, provided the next injection would not help her and based on the images results.  Questions and concerns were answered to patient's satisfaction. Patient expressed her understandings and agreed with the plan.    Plan:  "  MRI of lumbar spine with and without contrast  Continue follow-up with pain management for injection-scheduled to have REYNOLD on 1/24/2024.  Offered script for some pain meds until she sees her pain Mx, but patient declines  Follow-up with Dr. Gregg after MRI results discussed discussed for MIS  Call with question or concern    I have spent a total time of 45 minutes on 01/18/24 in caring for this patient including Diagnostic results, Prognosis, Risks and benefits of tx options, Instructions for management, Patient and family education, Importance of tx compliance, Risk factor reductions, Impressions, Counseling / Coordination of care, Documenting in the medical record, Reviewing / ordering tests, medicine, procedures  , and Obtaining or reviewing history  .      C/C: \" Here for clinical F/U of LLE radic and with new right RLE Lumbar radic\"    HPI  Patient is a 58 years old pleasant woman with past medical history of hyperparathyroidism, sensory hearing loss, arthritis, status post gastric bypass, postsurgical malabsorption, and chronic ongoing lumbar back pain with left lower extremity radiculopathy, now complaining additional right lower extremity radiculopathy since August 2023.  Patient had epidural steroid injections and also right hip intra-articular injection, patient  felt somewhat better with her left lower extremity radiculopathy, but now complaining right posterolateral thigh and right outer calf constant achy pain, worse with standing for long period of time especially at workplace, denies any associated numbness, paresthesia or weakness in the extremities.  Her back pain is across bilateral lumbosacral region, patient denies any bowel/bladder dysfunction or saddle anesthesia.  She has limping gait in the right leg.  She denies any fever, chills, rigors, cough or chest pain.  Currently, she is not taking any oral pain medications.  She tried gabapentin and Flexeril but she developed weird feelings and  " she discontinued.    Patient denies history of diabetes mellitus, hypertension, congestive heart failure, stroke, seizures, bleeding disorder or taking anticoagulant medications.       REVIEW OF SYSTEMS  Review of system personally reviewed and updated as follows  Review of Systems   Constitutional: Negative.    HENT: Negative.     Eyes: Negative.    Respiratory: Negative.     Cardiovascular: Negative.    Gastrointestinal: Negative.    Endocrine: Negative.    Genitourinary: Negative.    Musculoskeletal:  Positive for back pain.        Lumbar work up to order L/S MRI for DKO   Lumbar Stenosis/ Radiculopathy    B/L Lbp radiates to Right buttock, hip and lateral leg  Her pain is a constant, ache shooting pain and she rates it a 3/10 today.  Before injections patient symptoms were mostly on the left leg but now they are on the right.  + N/T/W on RLE and difficulty walking.   Has limping gait favoring her right leg. She denies any muscle spasm  leg cramping. Prolonged standing/walking aggravate her pain.   Laying down/ sitting provide some relief.    She denies falls or any B/B issues  Meds: Tylenol Arthritis 600 mg  PT Eval on 10/13/23 x 5 visit-- No Relief  Pain Man on 8/23/23  B/L  L4-L5 TFESI - 95% relief lasting  x 4 months only on left leg, nothing on right kleg  (R) intra-articular hip injection on 1/10/24   L/S MRI on March 2022     No AC/ Non Smoker/No previous back Sx     Skin: Negative.    Allergic/Immunologic: Negative.    Neurological:  Positive for weakness and numbness.   Hematological: Negative.    Psychiatric/Behavioral: Negative.     All other systems reviewed and are negative.      ROS obtained by MA. Reviewed. See HPI.     Meds/Allergies     Current Outpatient Medications   Medication Sig Dispense Refill    Calcium Citrate 1040 MG TABS Take by mouth 3 (three) times a day      Desvenlafaxine Succinate ER 25 MG TB24 Take 25 mg by mouth      LORazepam (ATIVAN) 0.5 mg tablet       Multiple  Vitamins-Minerals (BARIATRIC MULTIVITAMINS/IRON PO) Take by mouth daily      Turmeric (QC TUMERIC COMPLEX PO) Take by mouth       No current facility-administered medications for this visit.       Allergies   Allergen Reactions    Other Wheezing     Lobster when a teenager.  Wheezing       PAST HISTORY    Past Medical History:   Diagnosis Date    Abnormal Pap smear of cervix 1987    Anxiety     Depression     Ear problems 1996    Fibrocystic breast     GERD (gastroesophageal reflux disease) 2025    High vitamin A level     History of anxiety     History of hypercholesterolemia     History of hypertension     History of obesity     HPV (human papilloma virus) infection 1987    Hyperlipidemia     Lordosis     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    Scoliosis     Secondary hyperparathyroidism, non-renal (HCC)     Spinal stenosis     Tinnitus 1996       Past Surgical History:   Procedure Laterality Date    ABDOMINOPLASTY      BREAST CYST ASPIRATION Right     COLONOSCOPY      COLONOSCOPY  11/22/2021    Eyvazzedah - 5 y f/u     COLPOSCOPY  1987    COMBINED AUGMENTATION MAMMAPLASTY AND ABDOMINOPLASTY  2001    EPIDURAL BLOCK INJECTION Bilateral 8/23/2023    Procedure: Left L4-L5   TRANSFORAMINAL epidural steroid injection ( [id number]);  Surgeon: Mateo Colmenares DO;  Location: [facility] MAIN OR;  Service: Pain Management     FACIAL COSMETIC SURGERY      FL INJECTION RIGHT HIP (NON ARTHROGRAM)  05/24/2019    GASTRIC BYPASS      CA ARTHROCENTESIS ASPIR&/INJ MAJOR JT/BURSA W/O US Right 1/10/2024    Procedure: RIGHT INTRA-ARTICULAR HIP INJECTION;  Surgeon: Mateo Colmenares DO;  Location: [facility] MAIN OR;  Service: Pain Management     TUBAL LIGATION         Social History     Tobacco Use    Smoking status: Never    Smokeless tobacco: Never   Vaping Use    Vaping status: Never Used   Substance Use Topics    Alcohol use: Not Currently    Drug use: No       Family History   Problem Relation Age of Onset    Hypertension Mother      Heart disease Mother     Hypertension Father     Heart disease Father     No Known Problems Sister     No Known Problems Brother     No Known Problems Maternal Aunt     No Known Problems Maternal Uncle     No Known Problems Paternal Aunt     No Known Problems Paternal Uncle     No Known Problems Maternal Grandmother     No Known Problems Maternal Grandfather     No Known Problems Paternal Grandmother     No Known Problems Paternal Grandfather     ADD / ADHD Neg Hx     Anesthesia problems Neg Hx     Cancer Neg Hx     Clotting disorder Neg Hx     Collagen disease Neg Hx     Diabetes Neg Hx     Dislocations Neg Hx     Learning disabilities Neg Hx     Neurological problems Neg Hx     Osteoporosis Neg Hx     Rheumatologic disease Neg Hx     Scoliosis Neg Hx     Vascular Disease Neg Hx          Above history personally reviewed.       EXAM    Vitals:not currently breastfeeding.,There is no height or weight on file to calculate BMI.     Physical Exam  Constitutional:       Appearance: Normal appearance.   HENT:      Head: Normocephalic and atraumatic.   Pulmonary:      Effort: Pulmonary effort is normal.   Musculoskeletal:         General: Tenderness present.      Cervical back: Normal range of motion.   Neurological:      General: No focal deficit present.      Mental Status: She is alert and oriented to person, place, and time.      GCS: GCS eye subscore is 4. GCS verbal subscore is 5. GCS motor subscore is 6.      Cranial Nerves: Cranial nerves 2-12 are intact.      Sensory: Sensation is intact.      Motor: Motor function is intact.      Deep Tendon Reflexes: Reflexes are normal and symmetric.      Reflex Scores:       Tricep reflexes are 2+ on the right side and 2+ on the left side.       Bicep reflexes are 2+ on the right side and 2+ on the left side.       Brachioradialis reflexes are 2+ on the right side and 2+ on the left side.       Patellar reflexes are 2+ on the right side and 2+ on the left side.        Achilles reflexes are 2+ on the right side and 2+ on the left side.  Psychiatric:         Speech: Speech normal.         Neurologic Exam     Mental Status   Oriented to person, place, and time.   Speech: speech is normal   Level of consciousness: alert    Cranial Nerves   Cranial nerves II through XII intact.     CN III, IV, VI   Nystagmus: none     CN XI   CN XI normal.     Motor Exam   Muscle bulk: normal  Overall muscle tone: normal  Right arm tone: normal  Left arm tone: normal  Right arm pronator drift: absent  Left arm pronator drift: absent  Right leg tone: normal  Left leg tone: normal    Sensory Exam   Light touch normal.     Gait, Coordination, and Reflexes     Reflexes   Right brachioradialis: 2+  Left brachioradialis: 2+  Right biceps: 2+  Left biceps: 2+  Right triceps: 2+  Left triceps: 2+  Right patellar: 2+  Left patellar: 2+  Right achilles: 2+  Left achilles: 2+  Right : 2+  Left : 2+  Right Rueda: absent  Left Rueda: absent  Right ankle clonus: absent  Left ankle clonus: absent        MEDICAL DECISION MAKING    Imaging Studies:     FL spine and pain procedure    Result Date: 1/10/2024  Narrative: A spine and pain study was performed by the Department of Spine and Pain. Please refer to the report for the official interpretation. The images are stored for archival purposes only. Study images were not formally reviewed by the Radiology Department.      I have personally reviewed pertinent reports.   and I have personally reviewed pertinent films in PACS

## 2024-01-24 ENCOUNTER — HOSPITAL ENCOUNTER (OUTPATIENT)
Facility: AMBULARY SURGERY CENTER | Age: 59
Setting detail: OUTPATIENT SURGERY
Discharge: HOME | End: 2024-01-24
Attending: PHYSICAL MEDICINE & REHABILITATION | Admitting: PHYSICAL MEDICINE & REHABILITATION
Payer: COMMERCIAL

## 2024-01-24 ENCOUNTER — APPOINTMENT (OUTPATIENT)
Dept: RADIOLOGY | Facility: HOSPITAL | Age: 59
End: 2024-01-24
Payer: COMMERCIAL

## 2024-01-24 VITALS
OXYGEN SATURATION: 98 % | HEART RATE: 66 BPM | RESPIRATION RATE: 16 BRPM | DIASTOLIC BLOOD PRESSURE: 102 MMHG | TEMPERATURE: 97.5 F | SYSTOLIC BLOOD PRESSURE: 189 MMHG

## 2024-01-24 PROCEDURE — NC001 PR NO CHARGE: Performed by: PHYSICAL MEDICINE & REHABILITATION

## 2024-01-24 PROCEDURE — 64483 NJX AA&/STRD TFRM EPI L/S 1: CPT | Performed by: PHYSICAL MEDICINE & REHABILITATION

## 2024-01-24 RX ORDER — METHYLPREDNISOLONE ACETATE 40 MG/ML
INJECTION, SUSPENSION INTRA-ARTICULAR; INTRALESIONAL; INTRAMUSCULAR; SOFT TISSUE AS NEEDED
Status: DISCONTINUED | OUTPATIENT
Start: 2024-01-24 | End: 2024-01-24 | Stop reason: HOSPADM

## 2024-01-24 RX ORDER — LIDOCAINE HYDROCHLORIDE 10 MG/ML
INJECTION, SOLUTION EPIDURAL; INFILTRATION; INTRACAUDAL; PERINEURAL AS NEEDED
Status: DISCONTINUED | OUTPATIENT
Start: 2024-01-24 | End: 2024-01-24 | Stop reason: HOSPADM

## 2024-01-24 RX ORDER — BUPIVACAINE HYDROCHLORIDE 2.5 MG/ML
INJECTION, SOLUTION EPIDURAL; INFILTRATION; INTRACAUDAL AS NEEDED
Status: DISCONTINUED | OUTPATIENT
Start: 2024-01-24 | End: 2024-01-24 | Stop reason: HOSPADM

## 2024-01-24 NOTE — DISCHARGE INSTRUCTIONS

## 2024-01-24 NOTE — INTERVAL H&P NOTE
H&P reviewed. After examining the patient I find no changes in the patients condition since the H&P had been written.    Vitals:    01/24/24 1407   BP: 159/87   Pulse: 72   Resp: 16   Temp: 97.5 °F (36.4 °C)   SpO2: 97%

## 2024-01-24 NOTE — OP NOTE
OPERATIVE REPORT  PATIENT NAME: Jael Fabian    :  1965  MRN: [id number]  Pt Location: [facility] MINOR/PAIN ROOM 01    SURGERY DATE: 2024    Surgeons and Role:     * Mateo Colmenares DO - Primary    Preop Diagnosis:  Lumbar radiculopathy [M54.16]    Post-Op Diagnosis Codes:     * Lumbar radiculopathy [M54.16]    Procedure(s):  Bilateral - L4-L5 TRANSFORAMINAL EPIDURAL STEROID INJECTION    Indication: Leg pain  Preoperative diagnosis: Lumbar radiculitis  Postoperative diagnosis: Lumbar radiculitis  Procedure: Fluoroscopically-guided bilateral L4-L5 transforaminal epidural steroid injection under fluoroscopy  EBL: none  Specimens: not applicable  After discussing the risks, benefits, and alternatives to the procedure, the patient expressed understanding and wished to proceed. The patient was brought to the fluoroscopy suite and placed in the prone position. A procedural pause was conducted to verify: correct patient identity, procedure to be performed and as applicable, correct side and site, correct patient position, and availability of implants, special equipment and special requirements. After identifying the right L4 pedicle fluoroscopically with an oblique view, the skin was sterilely prepped and draped in the usual fashion using Chloraprep skin prep. The skin and subcutaneous tissues were anesthetized with 1% lidocaine. A 3.5 inch 22-gauge  spinal needle was then advanced under fluoroscopic guidance to the neural foramen. Appropriate foraminal depth was determined with a lateral fluoroscopic view, and AP visualization confirmed needle positioning at approximately the 6 o'clock position relative to the pedicle. After negative aspiration, Omnipaque 240 contrast was injected using live fluoroscopy confirming appropriate transforaminal spread without evidence of intravascular or intrathecal uptake.  Next, a 1.5 ml solution consisting of 20mg depomedrol and 0.25% bupivacaine was injected slowly and  incrementally into the epidural space. Following the injection the needle was withdrawn slightly and flushed with lidocaine as it was fully extracted.  The procedure was then repeated in the exact same way on the opposite side at the same level.  The patient tolerated the procedure well and there were no apparent complications. The patient did not develop any new neurologic deficits. After appropriate observation, the patient was dismissed from the clinic in good condition under their own power.          SIGNATURE: Mateo Colmenares, DO  DATE: January 24, 2024  TIME: 2:51 PM

## 2024-01-29 ENCOUNTER — HOSPITAL ENCOUNTER (OUTPATIENT)
Dept: RADIOLOGY | Facility: HOSPITAL | Age: 59
Discharge: HOME | End: 2024-01-29
Payer: COMMERCIAL

## 2024-01-29 DIAGNOSIS — M54.16 LUMBAR RADICULOPATHY: ICD-10-CM

## 2024-01-29 PROCEDURE — G1004 CDSM NDSC: HCPCS

## 2024-01-29 PROCEDURE — 72148 MRI LUMBAR SPINE W/O DYE: CPT

## 2024-01-31 ENCOUNTER — TELEPHONE (OUTPATIENT)
Dept: PAIN MEDICINE | Facility: CLINIC | Age: 59
End: 2024-01-31

## 2024-01-31 NOTE — TELEPHONE ENCOUNTER
Pt reports 80% improvement post inj   Pain level 4/10  Pt aware I will call next week for an update

## 2024-02-08 ENCOUNTER — DOCUMENTATION (OUTPATIENT)
Dept: NEUROSURGERY | Facility: CLINIC | Age: 59
End: 2024-02-08

## 2024-02-08 DIAGNOSIS — M54.16 LUMBAR RADICULOPATHY: Primary | ICD-10-CM

## 2024-02-08 RX ORDER — METHOCARBAMOL 500 MG/1
500 TABLET, FILM COATED ORAL 4 TIMES DAILY
Qty: 40 TABLET | Refills: 0 | Status: SHIPPED | OUTPATIENT
Start: 2024-02-08 | End: 2024-02-18

## 2024-02-08 RX ORDER — METHYLPREDNISOLONE 4 MG/1
TABLET ORAL
Qty: 1 EACH | Refills: 1 | Status: SHIPPED | OUTPATIENT
Start: 2024-02-08

## 2024-02-08 NOTE — PROGRESS NOTES
Called patient in response to a Tiger text from her.  She complained of severe low back pain with pain radiating to her left and right legs.  The pain is unrelenting.  She also complains of pain in her hip.  Her hip has been pretty easily assessed as demonstrating severe osteoarthropathy and she requires a hip replacement.  This is scheduled for January 2025.  She says that the pain does not allow her to sleep.  I did review her MRI.  She is scheduled to see me in the office on March 19.  She has had 2 epidural steroids neither of which has been helpful.  These were performed by Dr. Colmenares.    On examination she has no weakness however she has curtailed her activity tremendously given the severity of her discomfort.    She is experiencing muscle spasms.    I believe that she has a lumbar radiculopathy secondary to her known herniated disc and facet arthropathy.    I wrote a prescription for methocarbamol as well as a Medrol Dosepak.  She felt that both of these were reasonable medications for her to take.    She promised to call me should there be any further concerns

## 2024-03-04 ENCOUNTER — ANNUAL EXAM (OUTPATIENT)
Dept: OBGYN CLINIC | Facility: CLINIC | Age: 59
End: 2024-03-04
Payer: COMMERCIAL

## 2024-03-04 VITALS — SYSTOLIC BLOOD PRESSURE: 146 MMHG | DIASTOLIC BLOOD PRESSURE: 96 MMHG

## 2024-03-04 DIAGNOSIS — Z12.31 ENCOUNTER FOR SCREENING MAMMOGRAM FOR MALIGNANT NEOPLASM OF BREAST: ICD-10-CM

## 2024-03-04 DIAGNOSIS — Z11.51 SCREENING FOR HPV (HUMAN PAPILLOMAVIRUS): ICD-10-CM

## 2024-03-04 DIAGNOSIS — Z78.0 ASYMPTOMATIC MENOPAUSAL STATE: ICD-10-CM

## 2024-03-04 DIAGNOSIS — Z01.419 WELL WOMAN EXAM WITH ROUTINE GYNECOLOGICAL EXAM: Primary | ICD-10-CM

## 2024-03-04 DIAGNOSIS — Z12.4 ENCOUNTER FOR SCREENING FOR MALIGNANT NEOPLASM OF CERVIX: ICD-10-CM

## 2024-03-04 DIAGNOSIS — Z98.84 H/O GASTRIC BYPASS: ICD-10-CM

## 2024-03-04 PROCEDURE — G0476 HPV COMBO ASSAY CA SCREEN: HCPCS | Performed by: OBSTETRICS & GYNECOLOGY

## 2024-03-04 PROCEDURE — S0612 ANNUAL GYNECOLOGICAL EXAMINA: HCPCS | Performed by: OBSTETRICS & GYNECOLOGY

## 2024-03-04 PROCEDURE — G0145 SCR C/V CYTO,THINLAYER,RESCR: HCPCS | Performed by: OBSTETRICS & GYNECOLOGY

## 2024-03-04 NOTE — PROGRESS NOTES
ASSESSMENT & PLAN:   Diagnoses and all orders for this visit:    Well woman exam with routine gynecological exam  -     Liquid-based pap, screening    Screening for HPV (human papillomavirus)  -     Liquid-based pap, screening    Encounter for screening for malignant neoplasm of cervix  -     Liquid-based pap, screening    Encounter for screening mammogram for malignant neoplasm of breast  -     Mammo screening bilateral w 3d & cad; Future    Asymptomatic menopausal state  -     DXA bone density spine hip and pelvis; Future    H/O gastric bypass  -     DXA bone density spine hip and pelvis; Future          The following were reviewed in today's visit: ASCCP guidelines, Gardisil vaccination, STD testing breast self exam, mammography screening ordered, exercise, and DEXA ordered.    Patient to return to office in yearly for annual exam.     All questions have been answered to her satisfaction.        CC:  Annual Gynecologic Examination  Chief Complaint   Patient presents with    Gynecologic Exam     Pt is here for her annual exam. Pap and mammo due.       HPI: Jael Fabian is a 58 y.o.  who presents for annual gynecologic examination.  She has the following concerns:  would like dexa scan. H/o gastric bypass surgery.     Hip pain. She is going to have hip replacement.       Health Maintenance:    Exercise: frequently - swimming   Breast exams/breast awareness: yes  Last mammogram: 3/2023  Colorectal cancer screenin -  year f/u     Past Medical History:   Diagnosis Date    Abnormal Pap smear of cervix     Anxiety     Depression     Ear problems     Fibrocystic breast     GERD (gastroesophageal reflux disease)     Herpes 1987    High vitamin A level     History of anxiety     History of hypercholesterolemia     History of hypertension     History of obesity     HPV (human papilloma virus) infection     Hyperlipidemia     Lordosis     Postgastrectomy malabsorption 2017    Scoliosis      Secondary hyperparathyroidism, non-renal (HCC)     Spinal stenosis     Tinnitus 1996       Past Surgical History:   Procedure Laterality Date    ABDOMINOPLASTY      BREAST CYST ASPIRATION Right     COLONOSCOPY      COLONOSCOPY  11/22/2021    Eyvazzedah - 5 y f/u     COLPOSCOPY  1987    COMBINED AUGMENTATION MAMMAPLASTY AND ABDOMINOPLASTY  2001    EPIDURAL BLOCK INJECTION Bilateral 8/23/2023    Procedure: Left L4-L5   TRANSFORAMINAL epidural steroid injection ( [id number]);  Surgeon: Mateo Colmenares DO;  Location: [facility] MAIN OR;  Service: Pain Management     EPIDURAL BLOCK INJECTION Bilateral 1/24/2024    Procedure: L4-L5 TRANSFORAMINAL EPIDURAL STEROID INJECTION;  Surgeon: Mateo Colmenares DO;  Location: [facility] MAIN OR;  Service: Pain Management     FACIAL COSMETIC SURGERY      FL INJECTION RIGHT HIP (NON ARTHROGRAM)  05/24/2019    GASTRIC BYPASS      MA ARTHROCENTESIS ASPIR&/INJ MAJOR JT/BURSA W/O US Right 1/10/2024    Procedure: RIGHT INTRA-ARTICULAR HIP INJECTION;  Surgeon: Mateo Colmenares DO;  Location: [facility] MAIN OR;  Service: Pain Management     TUBAL LIGATION         Past OB/Gyn History:   No LMP recorded. Patient is postmenopausal.    Menopausal status: postmenopausal  Menopausal symptoms: None    Last Pap: 2021 : no abnormalities  History of abnormal Pap smear: in her 20s (HPV), normal since then    Patient is not currently sexually active.     Family History  Family History   Problem Relation Age of Onset    Hypertension Mother     Heart disease Mother     Hypertension Father     Heart disease Father     No Known Problems Sister     No Known Problems Brother     No Known Problems Maternal Aunt     No Known Problems Maternal Uncle     No Known Problems Paternal Aunt     No Known Problems Paternal Uncle     No Known Problems Maternal Grandmother     No Known Problems Maternal Grandfather     No Known Problems Paternal Grandmother     No Known Problems Paternal Grandfather     ADD / ADHD Neg Hx      Anesthesia problems Neg Hx     Cancer Neg Hx     Clotting disorder Neg Hx     Collagen disease Neg Hx     Diabetes Neg Hx     Dislocations Neg Hx     Learning disabilities Neg Hx     Neurological problems Neg Hx     Osteoporosis Neg Hx     Rheumatologic disease Neg Hx     Scoliosis Neg Hx     Vascular Disease Neg Hx        Family history of uterine or ovarian cancer: no  Family history of breast cancer: no  Family history of colon cancer: no    Social History:  Social History     Socioeconomic History    Marital status:      Spouse name: Not on file    Number of children: Not on file    Years of education: Not on file    Highest education level: Not on file   Occupational History    Not on file   Tobacco Use    Smoking status: Never    Smokeless tobacco: Never   Vaping Use    Vaping status: Never Used   Substance and Sexual Activity    Alcohol use: Not Currently    Drug use: No    Sexual activity: Not Currently     Partners: Male     Birth control/protection: Female Sterilization   Other Topics Concern    Not on file   Social History Narrative    Not on file     Social Determinants of Health     Financial Resource Strain: Not on file   Food Insecurity: Not on file   Transportation Needs: Not on file   Physical Activity: Not on file   Stress: Not on file   Social Connections: Not on file   Intimate Partner Violence: Not on file   Housing Stability: Not on file     Domestic violence screen: negative    Allergies:  Allergies   Allergen Reactions    Other Wheezing     Lobster when a teenager.  Wheezing       Medications:    Current Outpatient Medications:     Calcium Citrate 1040 MG TABS, Take by mouth 3 (three) times a day, Disp: , Rfl:     Desvenlafaxine Succinate ER 25 MG TB24, Take 25 mg by mouth, Disp: , Rfl:     LORazepam (ATIVAN) 0.5 mg tablet, , Disp: , Rfl:     Multiple Vitamins-Minerals (BARIATRIC MULTIVITAMINS/IRON PO), Take by mouth daily, Disp: , Rfl:     methocarbamol (ROBAXIN) 500 mg tablet,  Take 1 tablet (500 mg total) by mouth 4 (four) times a day for 10 days, Disp: 40 tablet, Rfl: 0    Review of Systems:  Review of Systems   Constitutional:  Negative for chills and fever.   Respiratory:  Negative for shortness of breath.    Cardiovascular:  Negative for chest pain.   Gastrointestinal:  Negative for abdominal distention, abdominal pain, blood in stool, constipation, nausea and vomiting.   Genitourinary:  Negative for difficulty urinating, dysuria, frequency, pelvic pain, urgency, vaginal bleeding, vaginal discharge and vaginal pain.        Some incontinence     Neurological:  Negative for headaches.         Physical Exam:  /96 (BP Location: Right arm, Patient Position: Sitting, Cuff Size: Standard)    Physical Exam  Constitutional:       General: She is awake.      Appearance: Normal appearance. She is well-developed.   Genitourinary:      Vulva, bladder and urethral meatus normal.      Right Labia: No rash, tenderness or lesions.     Left Labia: No tenderness, lesions or rash.     No labial fusion noted.      No vaginal discharge, erythema, tenderness or bleeding.      No vaginal prolapse present.     Mild vaginal atrophy present.       Right Adnexa: not tender, not full and no mass present.     Left Adnexa: not tender, not full and no mass present.     Cervix is parous.      No cervical motion tenderness, discharge, lesion or polyp.      Uterus is not enlarged, tender or irregular.      No uterine mass detected.     No urethral prolapse present.      Bladder is not tender.       Pelvic exam was performed with patient in the lithotomy position.   Breasts:     Right: No inverted nipple, mass, nipple discharge, skin change or tenderness.      Left: No inverted nipple, mass, nipple discharge, skin change or tenderness.      Breast exam comments: Scars present from prior reduction surgery. .  HENT:      Head: Normocephalic and atraumatic.   Cardiovascular:      Rate and Rhythm: Normal rate and  regular rhythm.      Heart sounds: Normal heart sounds.   Pulmonary:      Effort: Pulmonary effort is normal. No tachypnea or respiratory distress.      Breath sounds: Normal breath sounds.   Abdominal:      General: Abdomen is flat. There is no distension.      Palpations: Abdomen is soft.      Tenderness: There is no abdominal tenderness. There is no guarding or rebound.   Musculoskeletal:      Cervical back: Neck supple.   Lymphadenopathy:      Upper Body:      Right upper body: No supraclavicular or axillary adenopathy.      Left upper body: No supraclavicular or axillary adenopathy.   Neurological:      General: No focal deficit present.      Mental Status: She is alert and oriented to person, place, and time.   Psychiatric:         Mood and Affect: Mood normal.         Behavior: Behavior normal.         Thought Content: Thought content normal.         Judgment: Judgment normal.   Vitals reviewed.

## 2024-03-05 LAB
HPV HR 12 DNA CVX QL NAA+PROBE: NEGATIVE
HPV16 DNA CVX QL NAA+PROBE: NEGATIVE
HPV18 DNA CVX QL NAA+PROBE: NEGATIVE

## 2024-03-06 NOTE — RESULT ENCOUNTER NOTE
Lamonte Elder,     Your HPV testing is negative. Your pap is still pending, and will be updated soon. Please contact the office at [phone] with any questions.     Lewis

## 2024-03-08 LAB
LAB AP GYN PRIMARY INTERPRETATION: NORMAL
Lab: NORMAL

## 2024-03-12 ENCOUNTER — APPOINTMENT (EMERGENCY)
Dept: CT IMAGING | Facility: HOSPITAL | Age: 59
End: 2024-03-12
Payer: COMMERCIAL

## 2024-03-12 ENCOUNTER — HOSPITAL ENCOUNTER (INPATIENT)
Facility: HOSPITAL | Age: 59
LOS: 3 days | Discharge: HOME | End: 2024-03-15
Attending: STUDENT IN AN ORGANIZED HEALTH CARE EDUCATION/TRAINING PROGRAM | Admitting: STUDENT IN AN ORGANIZED HEALTH CARE EDUCATION/TRAINING PROGRAM
Payer: COMMERCIAL

## 2024-03-12 ENCOUNTER — HOSPITAL ENCOUNTER (EMERGENCY)
Facility: HOSPITAL | Age: 59
End: 2024-03-12
Attending: EMERGENCY MEDICINE | Admitting: EMERGENCY MEDICINE
Payer: COMMERCIAL

## 2024-03-12 ENCOUNTER — APPOINTMENT (EMERGENCY)
Dept: RADIOLOGY | Facility: HOSPITAL | Age: 59
End: 2024-03-12
Payer: COMMERCIAL

## 2024-03-12 VITALS
BODY MASS INDEX: 27.21 KG/M2 | TEMPERATURE: 98 F | OXYGEN SATURATION: 95 % | HEIGHT: 64 IN | WEIGHT: 159.39 LBS | SYSTOLIC BLOOD PRESSURE: 136 MMHG | HEART RATE: 102 BPM | RESPIRATION RATE: 18 BRPM | DIASTOLIC BLOOD PRESSURE: 80 MMHG

## 2024-03-12 DIAGNOSIS — Z00.8 MEDICAL CLEARANCE FOR PSYCHIATRIC ADMISSION: ICD-10-CM

## 2024-03-12 DIAGNOSIS — M54.16 LUMBAR RADICULOPATHY: ICD-10-CM

## 2024-03-12 DIAGNOSIS — I10 HTN (HYPERTENSION): ICD-10-CM

## 2024-03-12 DIAGNOSIS — F10.90 ALCOHOL USE DISORDER: ICD-10-CM

## 2024-03-12 DIAGNOSIS — K91.2 POSTSURGICAL MALABSORPTION: ICD-10-CM

## 2024-03-12 DIAGNOSIS — N39.0 UTI (URINARY TRACT INFECTION): ICD-10-CM

## 2024-03-12 DIAGNOSIS — M25.551 PAIN OF RIGHT HIP: ICD-10-CM

## 2024-03-12 DIAGNOSIS — T42.4X2A OVERDOSE OF BENZODIAZEPINE, INTENTIONAL SELF-HARM, INITIAL ENCOUNTER (HCC): Primary | ICD-10-CM

## 2024-03-12 DIAGNOSIS — F41.1 GAD (GENERALIZED ANXIETY DISORDER): ICD-10-CM

## 2024-03-12 DIAGNOSIS — G47.00 INSOMNIA: ICD-10-CM

## 2024-03-12 DIAGNOSIS — F33.2 SEVERE EPISODE OF RECURRENT MAJOR DEPRESSIVE DISORDER, WITHOUT PSYCHOTIC FEATURES (HCC): Primary | ICD-10-CM

## 2024-03-12 LAB
2HR DELTA HS TROPONIN: 1 NG/L
4HR DELTA HS TROPONIN: 1 NG/L
ALBUMIN SERPL BCP-MCNC: 4.3 G/DL (ref 3.5–5)
ALP SERPL-CCNC: 74 U/L (ref 34–104)
ALT SERPL W P-5'-P-CCNC: 20 U/L (ref 7–52)
AMPHETAMINES SERPL QL SCN: NEGATIVE
ANION GAP SERPL CALCULATED.3IONS-SCNC: 10 MMOL/L
APAP SERPL-MCNC: <2 UG/ML (ref 10–20)
APTT PPP: 23 SECONDS (ref 23–37)
AST SERPL W P-5'-P-CCNC: 31 U/L (ref 13–39)
ATRIAL RATE: 87 BPM
ATRIAL RATE: 87 BPM
ATRIAL RATE: 95 BPM
BACTERIA UR QL AUTO: ABNORMAL /HPF
BARBITURATES UR QL: NEGATIVE
BASOPHILS # BLD AUTO: 0.02 THOUSANDS/ÂΜL (ref 0–0.1)
BASOPHILS NFR BLD AUTO: 0 % (ref 0–1)
BENZODIAZ UR QL: NEGATIVE
BILIRUB SERPL-MCNC: 0.37 MG/DL (ref 0.2–1)
BILIRUB UR QL STRIP: NEGATIVE
BUN SERPL-MCNC: 8 MG/DL (ref 5–25)
CALCIUM SERPL-MCNC: 8.3 MG/DL (ref 8.4–10.2)
CARDIAC TROPONIN I PNL SERPL HS: 2 NG/L
CARDIAC TROPONIN I PNL SERPL HS: 3 NG/L
CARDIAC TROPONIN I PNL SERPL HS: 3 NG/L
CHLORIDE SERPL-SCNC: 102 MMOL/L (ref 96–108)
CK SERPL-CCNC: 339 U/L (ref 26–192)
CLARITY UR: CLEAR
CO2 SERPL-SCNC: 27 MMOL/L (ref 21–32)
COCAINE UR QL: NEGATIVE
COLOR UR: YELLOW
CREAT SERPL-MCNC: 0.72 MG/DL (ref 0.6–1.3)
EOSINOPHIL # BLD AUTO: 0.1 THOUSAND/ÂΜL (ref 0–0.61)
EOSINOPHIL NFR BLD AUTO: 2 % (ref 0–6)
ERYTHROCYTE [DISTWIDTH] IN BLOOD BY AUTOMATED COUNT: 13.2 % (ref 11.6–15.1)
ETHANOL SERPL-MCNC: <10 MG/DL
EXT PREGNANCY TEST URINE: NEGATIVE
EXT. CONTROL: NORMAL
GFR SERPL CREATININE-BSD FRML MDRD: 91 ML/MIN/1.73SQ M
GLUCOSE SERPL-MCNC: 112 MG/DL (ref 65–140)
GLUCOSE UR STRIP-MCNC: NEGATIVE MG/DL
HCT VFR BLD AUTO: 41.1 % (ref 34.8–46.1)
HGB BLD-MCNC: 13.6 G/DL (ref 11.5–15.4)
HGB UR QL STRIP.AUTO: NEGATIVE
IMM GRANULOCYTES # BLD AUTO: 0.01 THOUSAND/UL (ref 0–0.2)
IMM GRANULOCYTES NFR BLD AUTO: 0 % (ref 0–2)
INR PPP: 0.98 (ref 0.84–1.19)
KETONES UR STRIP-MCNC: NEGATIVE MG/DL
LACTATE SERPL-SCNC: 1.8 MMOL/L (ref 0.5–2)
LEUKOCYTE ESTERASE UR QL STRIP: ABNORMAL
LIPASE SERPL-CCNC: 67 U/L (ref 11–82)
LYMPHOCYTES # BLD AUTO: 0.93 THOUSANDS/ÂΜL (ref 0.6–4.47)
LYMPHOCYTES NFR BLD AUTO: 18 % (ref 14–44)
MAGNESIUM SERPL-MCNC: 2 MG/DL (ref 1.9–2.7)
MCH RBC QN AUTO: 31.1 PG (ref 26.8–34.3)
MCHC RBC AUTO-ENTMCNC: 33.1 G/DL (ref 31.4–37.4)
MCV RBC AUTO: 94 FL (ref 82–98)
METHADONE UR QL: NEGATIVE
MONOCYTES # BLD AUTO: 0.5 THOUSAND/ÂΜL (ref 0.17–1.22)
MONOCYTES NFR BLD AUTO: 10 % (ref 4–12)
NEUTROPHILS # BLD AUTO: 3.65 THOUSANDS/ÂΜL (ref 1.85–7.62)
NEUTS SEG NFR BLD AUTO: 70 % (ref 43–75)
NITRITE UR QL STRIP: POSITIVE
NON-SQ EPI CELLS URNS QL MICRO: ABNORMAL /HPF
NRBC BLD AUTO-RTO: 0 /100 WBCS
OPIATES UR QL SCN: NEGATIVE
OXYCODONE+OXYMORPHONE UR QL SCN: NEGATIVE
P AXIS: 22 DEGREES
P AXIS: 22 DEGREES
P AXIS: 50 DEGREES
PCP UR QL: NEGATIVE
PH UR STRIP.AUTO: 5 [PH]
PLATELET # BLD AUTO: 269 THOUSANDS/UL (ref 149–390)
PMV BLD AUTO: 8.6 FL (ref 8.9–12.7)
POTASSIUM SERPL-SCNC: 4 MMOL/L (ref 3.5–5.3)
PR INTERVAL: 142 MS
PR INTERVAL: 142 MS
PR INTERVAL: 154 MS
PROT SERPL-MCNC: 6.8 G/DL (ref 6.4–8.4)
PROT UR STRIP-MCNC: ABNORMAL MG/DL
PROTHROMBIN TIME: 13.6 SECONDS (ref 11.6–14.5)
QRS AXIS: 53 DEGREES
QRS AXIS: 53 DEGREES
QRS AXIS: 57 DEGREES
QRSD INTERVAL: 88 MS
QT INTERVAL: 340 MS
QT INTERVAL: 386 MS
QT INTERVAL: 386 MS
QTC INTERVAL: 427 MS
QTC INTERVAL: 464 MS
QTC INTERVAL: 464 MS
RBC # BLD AUTO: 4.37 MILLION/UL (ref 3.81–5.12)
RBC #/AREA URNS AUTO: ABNORMAL /HPF
SALICYLATES SERPL-MCNC: <5 MG/DL (ref 3–20)
SODIUM SERPL-SCNC: 139 MMOL/L (ref 135–147)
SP GR UR STRIP.AUTO: 1.03 (ref 1–1.03)
T WAVE AXIS: 34 DEGREES
T WAVE AXIS: 34 DEGREES
T WAVE AXIS: 40 DEGREES
THC UR QL: NEGATIVE
TSH SERPL DL<=0.05 MIU/L-ACNC: 2.03 UIU/ML (ref 0.45–4.5)
UROBILINOGEN UR STRIP-ACNC: <2 MG/DL
VENTRICULAR RATE: 87 BPM
VENTRICULAR RATE: 87 BPM
VENTRICULAR RATE: 95 BPM
WBC # BLD AUTO: 5.21 THOUSAND/UL (ref 4.31–10.16)
WBC #/AREA URNS AUTO: ABNORMAL /HPF

## 2024-03-12 PROCEDURE — 81025 URINE PREGNANCY TEST: CPT | Performed by: EMERGENCY MEDICINE

## 2024-03-12 PROCEDURE — 80053 COMPREHEN METABOLIC PANEL: CPT | Performed by: EMERGENCY MEDICINE

## 2024-03-12 PROCEDURE — 87086 URINE CULTURE/COLONY COUNT: CPT | Performed by: EMERGENCY MEDICINE

## 2024-03-12 PROCEDURE — 80143 DRUG ASSAY ACETAMINOPHEN: CPT

## 2024-03-12 PROCEDURE — 83605 ASSAY OF LACTIC ACID: CPT

## 2024-03-12 PROCEDURE — 99285 EMERGENCY DEPT VISIT HI MDM: CPT

## 2024-03-12 PROCEDURE — 84443 ASSAY THYROID STIM HORMONE: CPT | Performed by: EMERGENCY MEDICINE

## 2024-03-12 PROCEDURE — 71045 X-RAY EXAM CHEST 1 VIEW: CPT

## 2024-03-12 PROCEDURE — 82550 ASSAY OF CK (CPK): CPT

## 2024-03-12 PROCEDURE — 72125 CT NECK SPINE W/O DYE: CPT

## 2024-03-12 PROCEDURE — 82077 ASSAY SPEC XCP UR&BREATH IA: CPT

## 2024-03-12 PROCEDURE — 96366 THER/PROPH/DIAG IV INF ADDON: CPT

## 2024-03-12 PROCEDURE — 93005 ELECTROCARDIOGRAM TRACING: CPT

## 2024-03-12 PROCEDURE — 87186 SC STD MICRODIL/AGAR DIL: CPT | Performed by: EMERGENCY MEDICINE

## 2024-03-12 PROCEDURE — 84484 ASSAY OF TROPONIN QUANT: CPT | Performed by: EMERGENCY MEDICINE

## 2024-03-12 PROCEDURE — 99447 NTRPROF PH1/NTRNET/EHR 11-20: CPT | Performed by: EMERGENCY MEDICINE

## 2024-03-12 PROCEDURE — 85025 COMPLETE CBC W/AUTO DIFF WBC: CPT | Performed by: EMERGENCY MEDICINE

## 2024-03-12 PROCEDURE — 81001 URINALYSIS AUTO W/SCOPE: CPT | Performed by: EMERGENCY MEDICINE

## 2024-03-12 PROCEDURE — 87077 CULTURE AEROBIC IDENTIFY: CPT | Performed by: EMERGENCY MEDICINE

## 2024-03-12 PROCEDURE — 80179 DRUG ASSAY SALICYLATE: CPT

## 2024-03-12 PROCEDURE — 96365 THER/PROPH/DIAG IV INF INIT: CPT

## 2024-03-12 PROCEDURE — 93010 ELECTROCARDIOGRAM REPORT: CPT | Performed by: INTERNAL MEDICINE

## 2024-03-12 PROCEDURE — 70450 CT HEAD/BRAIN W/O DYE: CPT

## 2024-03-12 PROCEDURE — 36415 COLL VENOUS BLD VENIPUNCTURE: CPT

## 2024-03-12 PROCEDURE — 83690 ASSAY OF LIPASE: CPT

## 2024-03-12 PROCEDURE — 99291 CRITICAL CARE FIRST HOUR: CPT | Performed by: EMERGENCY MEDICINE

## 2024-03-12 PROCEDURE — 80307 DRUG TEST PRSMV CHEM ANLYZR: CPT

## 2024-03-12 PROCEDURE — 85730 THROMBOPLASTIN TIME PARTIAL: CPT

## 2024-03-12 PROCEDURE — 85610 PROTHROMBIN TIME: CPT

## 2024-03-12 PROCEDURE — 83735 ASSAY OF MAGNESIUM: CPT

## 2024-03-12 RX ORDER — AMOXICILLIN 250 MG
1 CAPSULE ORAL DAILY PRN
Status: DISCONTINUED | OUTPATIENT
Start: 2024-03-12 | End: 2024-03-15 | Stop reason: HOSPADM

## 2024-03-12 RX ORDER — HYDROXYZINE HYDROCHLORIDE 25 MG/1
25 TABLET, FILM COATED ORAL
Status: CANCELLED | OUTPATIENT
Start: 2024-03-12

## 2024-03-12 RX ORDER — HYDROXYZINE HYDROCHLORIDE 25 MG/1
25 TABLET, FILM COATED ORAL ONCE
Status: COMPLETED | OUTPATIENT
Start: 2024-03-12 | End: 2024-03-12

## 2024-03-12 RX ORDER — OLANZAPINE 5 MG/1
5 TABLET ORAL
Status: CANCELLED | OUTPATIENT
Start: 2024-03-12

## 2024-03-12 RX ORDER — HYDROXYZINE HYDROCHLORIDE 25 MG/1
25 TABLET, FILM COATED ORAL
Status: DISCONTINUED | OUTPATIENT
Start: 2024-03-12 | End: 2024-03-15 | Stop reason: HOSPADM

## 2024-03-12 RX ORDER — POLYETHYLENE GLYCOL 3350 17 G/17G
17 POWDER, FOR SOLUTION ORAL DAILY PRN
Status: CANCELLED | OUTPATIENT
Start: 2024-03-12

## 2024-03-12 RX ORDER — ACETAMINOPHEN 325 MG/1
650 TABLET ORAL EVERY 4 HOURS PRN
Status: CANCELLED | OUTPATIENT
Start: 2024-03-12

## 2024-03-12 RX ORDER — ACETAMINOPHEN 325 MG/1
975 TABLET ORAL EVERY 6 HOURS PRN
Status: DISCONTINUED | OUTPATIENT
Start: 2024-03-12 | End: 2024-03-15 | Stop reason: HOSPADM

## 2024-03-12 RX ORDER — LORAZEPAM 0.5 MG/1
0.5 TABLET ORAL
Status: DISCONTINUED | OUTPATIENT
Start: 2024-03-12 | End: 2024-03-15 | Stop reason: HOSPADM

## 2024-03-12 RX ORDER — LORAZEPAM 0.5 MG/1
0.5 TABLET ORAL
Status: CANCELLED | OUTPATIENT
Start: 2024-03-12

## 2024-03-12 RX ORDER — OLANZAPINE 2.5 MG/1
2.5 TABLET, FILM COATED ORAL
Status: DISCONTINUED | OUTPATIENT
Start: 2024-03-12 | End: 2024-03-15 | Stop reason: HOSPADM

## 2024-03-12 RX ORDER — ACETAMINOPHEN 325 MG/1
975 TABLET ORAL EVERY 6 HOURS PRN
Status: CANCELLED | OUTPATIENT
Start: 2024-03-12

## 2024-03-12 RX ORDER — OLANZAPINE 10 MG/2ML
5 INJECTION, POWDER, FOR SOLUTION INTRAMUSCULAR
Status: DISCONTINUED | OUTPATIENT
Start: 2024-03-12 | End: 2024-03-15 | Stop reason: HOSPADM

## 2024-03-12 RX ORDER — AMOXICILLIN 250 MG
1 CAPSULE ORAL DAILY PRN
Status: CANCELLED | OUTPATIENT
Start: 2024-03-12

## 2024-03-12 RX ORDER — NITROFURANTOIN 25; 75 MG/1; MG/1
100 CAPSULE ORAL 2 TIMES DAILY
Qty: 10 CAPSULE | Refills: 0 | Status: SHIPPED | OUTPATIENT
Start: 2024-03-12 | End: 2024-03-17

## 2024-03-12 RX ORDER — OLANZAPINE 10 MG/2ML
5 INJECTION, POWDER, FOR SOLUTION INTRAMUSCULAR
Status: CANCELLED | OUTPATIENT
Start: 2024-03-12

## 2024-03-12 RX ORDER — SODIUM CHLORIDE, SODIUM GLUCONATE, SODIUM ACETATE, POTASSIUM CHLORIDE, MAGNESIUM CHLORIDE, SODIUM PHOSPHATE, DIBASIC, AND POTASSIUM PHOSPHATE .53; .5; .37; .037; .03; .012; .00082 G/100ML; G/100ML; G/100ML; G/100ML; G/100ML; G/100ML; G/100ML
1000 INJECTION, SOLUTION INTRAVENOUS ONCE
Status: COMPLETED | OUTPATIENT
Start: 2024-03-12 | End: 2024-03-12

## 2024-03-12 RX ORDER — LORAZEPAM 2 MG/ML
1 INJECTION INTRAMUSCULAR
Status: DISCONTINUED | OUTPATIENT
Start: 2024-03-12 | End: 2024-03-15 | Stop reason: HOSPADM

## 2024-03-12 RX ORDER — LORAZEPAM 1 MG/1
1 TABLET ORAL
Status: CANCELLED | OUTPATIENT
Start: 2024-03-12

## 2024-03-12 RX ORDER — METHOCARBAMOL 500 MG/1
500 TABLET, FILM COATED ORAL EVERY 6 HOURS PRN
Status: DISCONTINUED | OUTPATIENT
Start: 2024-03-12 | End: 2024-03-15 | Stop reason: HOSPADM

## 2024-03-12 RX ORDER — LORAZEPAM 2 MG/ML
1 INJECTION INTRAMUSCULAR
Status: CANCELLED | OUTPATIENT
Start: 2024-03-12

## 2024-03-12 RX ORDER — OLANZAPINE 5 MG/1
5 TABLET ORAL
Status: DISCONTINUED | OUTPATIENT
Start: 2024-03-12 | End: 2024-03-15 | Stop reason: HOSPADM

## 2024-03-12 RX ORDER — TRAZODONE HYDROCHLORIDE 50 MG/1
50 TABLET ORAL
Status: CANCELLED | OUTPATIENT
Start: 2024-03-12

## 2024-03-12 RX ORDER — LORAZEPAM 1 MG/1
1 TABLET ORAL
Status: DISCONTINUED | OUTPATIENT
Start: 2024-03-12 | End: 2024-03-15 | Stop reason: HOSPADM

## 2024-03-12 RX ORDER — POLYETHYLENE GLYCOL 3350 17 G/17G
17 POWDER, FOR SOLUTION ORAL DAILY PRN
Status: DISCONTINUED | OUTPATIENT
Start: 2024-03-12 | End: 2024-03-15 | Stop reason: HOSPADM

## 2024-03-12 RX ORDER — ACETAMINOPHEN 325 MG/1
650 TABLET ORAL EVERY 4 HOURS PRN
Status: DISCONTINUED | OUTPATIENT
Start: 2024-03-12 | End: 2024-03-15 | Stop reason: HOSPADM

## 2024-03-12 RX ORDER — NITROFURANTOIN 25; 75 MG/1; MG/1
100 CAPSULE ORAL ONCE
Status: COMPLETED | OUTPATIENT
Start: 2024-03-12 | End: 2024-03-12

## 2024-03-12 RX ORDER — AMLODIPINE BESYLATE 5 MG/1
5 TABLET ORAL DAILY
Status: DISCONTINUED | OUTPATIENT
Start: 2024-03-12 | End: 2024-03-15 | Stop reason: HOSPADM

## 2024-03-12 RX ORDER — TRAZODONE HYDROCHLORIDE 50 MG/1
50 TABLET ORAL
Status: DISCONTINUED | OUTPATIENT
Start: 2024-03-12 | End: 2024-03-13

## 2024-03-12 RX ORDER — OLANZAPINE 2.5 MG/1
2.5 TABLET, FILM COATED ORAL
Status: CANCELLED | OUTPATIENT
Start: 2024-03-12

## 2024-03-12 RX ADMIN — AMLODIPINE BESYLATE 5 MG: 5 TABLET ORAL at 21:19

## 2024-03-12 RX ADMIN — ACETAMINOPHEN 650 MG: 325 TABLET ORAL at 21:19

## 2024-03-12 RX ADMIN — HYDROXYZINE HYDROCHLORIDE 25 MG: 25 TABLET ORAL at 13:37

## 2024-03-12 RX ADMIN — NITROFURANTOIN (MONOHYDRATE/MACROCRYSTALS) 100 MG: 75; 25 CAPSULE ORAL at 10:17

## 2024-03-12 RX ADMIN — SODIUM CHLORIDE, SODIUM GLUCONATE, SODIUM ACETATE, POTASSIUM CHLORIDE, MAGNESIUM CHLORIDE, SODIUM PHOSPHATE, DIBASIC, AND POTASSIUM PHOSPHATE 1000 ML: .53; .5; .37; .037; .03; .012; .00082 INJECTION, SOLUTION INTRAVENOUS at 07:09

## 2024-03-12 NOTE — ED NOTES
Insurance Authorization for admission:   Phone call placed to [facility].  Phone number: [phone] for Mental Health.     Automated system instructs users to submit clinical records via fax to [phone].  Authorization request form was accessed via [employer] and completed to include all requesting and admitting provider details and faxed along with clinical records as instructed with Crisis contact information.  Subsequently, confirmation of successful transmission form was auto-generated and this will be included in the transfer packet.    Days approved: TBD.  Level of care: inpatient mental health 201.  Review on: TBD.   Authorization #: [id number].       ,

## 2024-03-12 NOTE — ED NOTES
Patient asked to speak with Crisis and Crisis approached.  Patient remorseful.  Stating she had a moment of confusion about the situation and realizes she was being uncooperative, but would very much prefer to remain on the 201.  She realizes it is the better choice and is motivated to avoid a 302.  Spoke with ED Attending and Resident, who had not yet completed their portions of the initiated 302.  They are in agreement with admitting the patient on the 201 she signed originally, but we will retain the initiated 302 for use if necessary.

## 2024-03-12 NOTE — ED NOTES
Attempted to call report to [facility], no answer, will reattempt     Rekha Tran, ASH  03/12/24 [id number]

## 2024-03-12 NOTE — CONSULTS
INTERPROFESSIONAL (PHONE) CONSULTATION - Medical Toxicology  Jael Fabian 58 y.o. female MRN: [id number]  Unit/Bed#: ED-07 Encounter: [id number]      Reason for Consult / Principal Problem: Lorazepam ingestion    Inpatient consult to Toxicology  Consult performed by: Sadie Davis MD  Consult ordered by: Jennifer Castillo DO        03/12/24    ASSESSMENT:  Lorazepam ingestion  Alcohol ingestion  Toxic encephalopathy    RECOMMENDATIONS:  Continue supportive care measures, including airway monitoring, head of bed elevation, aspiration precautions, cardiac telemetry, and continuous pulse oximetry.    Alcohol and benzodiazepines both cause sedation in overdose; treatment is supportive with close airway monitoring.    Patient reports one day of alcohol use; she would be at low risk for alcohol withdrawal. There is also no concern for benzodiazepine misuse prior to ED presentation.    Patient should be observed for 6 hours; if after 6 hours she demonstrates normal vital signs, exam, and gait without residual signs of sedation or toxicity, patient is appropriate for disposition from a toxicology perspective.    For further questions, please contact the medical  on call via [place] [employer] between 8am and 9pm. If between 9pm and 8am, please reach out to the [facility]ison [facility] at [phone].     Please see additional teaching note below:    Medical Toxicology  [facility]  Benzodiazepines & Benzodiazpine-like Drugs      Background: Benzodiazepines are the most commonly prescribed sedatives.  They are used frequently for the treatment of anxiety, stress reactions, insomnia, muscle spasms, alcohol withdrawal, seizure control and other psychiatric disorders.  Benzodiazepines include a wide array of compounds which vary in potency, duration of effect, presence or absence of active metabolites and clinical use.  Other non-benzodiazepines include zolpidem and zaleplon, which are  benzodiazepine-like.  Death from isolated benzodiazepine overdose is rare.    Mechanism of Toxicity: Benzodiazepines enhance the action of the inhibitory neurotransmitter gamma-aminobutyric acid (TARAH) and inhibit other neuronal systems by poorly defined mechanisms.  This results in generalized depression of spinal reflexes and the reticular activating system causing CNS and respiratory depression.  Respiratory arrest is more common with shorter-acting benzodiazepines such as triazolam (Halcion), alprazolam (Xanax), and midazolam (Versed).      Cardiopulmonary arrest has been documented post rapid injection of diazepam, this was thought to be secondary to either its CNS depressant effects or because of the toxic effects of its diluent propylene glycol.  Propylene glycol has also been implicated in multiple case reports of metabolic acidosis, hyperosmolar states and cardiovascular compromise in patients who have received prolonged sedation with lorazepam.     Pharmacokinetics: Most of these agents are highly protein bound (%).  Variables such as time to peak blood level, elimination half-life, and the presence or absence of active metabolites vary widely among different compounds. Some benzodiazepines, like diazepam, undergo demethylation in the liver and yield active metabolites which have a prolonged half-life compared to the parent compound.  Often there is no correlation between therapeutic and biological half-lives. A few commonly used agents are listed below.  Remember, with supra-therapeutic ingestions these half-lives are unpredictable.    Drug Half-life (hours) Active Metabolite   Alprazolam 6.3-26.9 No   Clonazepam 18-50 No   Diazepam  Yes   Lorazepam 10-20 No   Midazolam 2.2-6.8 Yes     Toxic Dose: Benzodiazepines in general have a very wide therapeutic window; however, co-ingestion of other drugs with CNS with depressant effects like ethanol, barbiturates, antipsychotics or opioids, results in  a synergistic effect.      Clinical Presentation: Onset of CNS depression may be observed within  minutes of ingestion, depending upon the compound.  Lethargy, slurred speech, incoordination, ataxia, stupor, coma and respiratory arrest may occur.  Often patients with benzodiazepine-induced coma have hyporeflexia and mid-range or small pupils.  Hypothermia can also occur in overdose.  Complications after ingestion are more likely when short-acting or other depressant agents are involved.  Children often present with lethargy and CNS depression; however, in a case series in children with a mean age of 36 months, 17% presented with ataxia alone.     Diagnostic Testing: Consider benzdiazepine ingestion based upon history & presentation.  The differential should include other sedative-hypnotics, antidepressants, antipsychotics, and narcotics.  Coma and small pupils will not respond to naloxone administration, but may reverse with flumazenil.  Serum levels are often available from commercial laboratories, but are rarely of value.  Urine and blood qualitative screening may provide rapid confirmation of exposure.  However, immunoassays do not detect all benzodiazepines so, a negative screen does not exclude significant exposure.  Other useful tests include glucose, ABG/VBG or pulse oximetry.    Decontamination:  Administer activated charcoal with caution.  If the patient is sleepy, avoid administration and support symptomatically.    Treatment:  Symptomatic and supportive treatment is the mainstay of therapy.  Intubate immediately if the patient is not protecting his or her airway.  Hypotensive patients should be fluid resuscitated with volume expansion.  Profound hypotension is rare and should lead the provider to consider co-ingestions.  Use vasopressors when patients do not respond to IVF or in patients with a history of or clinical presentation consistent with CHF, cardiomyopathy or pulmonary edema.       Flumazenil is a specific benzodiazepine receptor antagonist that can rapidly reverse benzodiazepine effect; however, because overdose with this class of drug is rarely fatal, the role of flumazenil in routine management has yet to be established.  It is administered with a starting dose of 0.1-0.2mg IV and repeated as needed up to a maximum of 2mg.  Continuous IV infusion from 0.1-1mg/h in 0.9%NaCl or D5W may also be utilized.  The most important drawback to its administration is that flumazenil may induce seizures and dysrhythmias in patients with co-intoxication with cocaine or tricyclic antidepressants.  It does not reliably reverse respiratory depression, but does reverse CNS depression.  Flumazenil also effectively reverses the depressive effects caused by the non-benzodiazepines zolpidem and zaleplon.  Administration may induce acute withdrawal, including seizures and autonomic instability in those patients who are addicted to benzodiazepines.   Repeated doses or a continuous infusion are often required as the duration of action for most benzodiazepines is longer than flumazenil.  There is no role for diuresis, dialysis, or hemoperfusion.      References:    Peter.  Poisoning & Drug Overdose, 5th Ed (2007)  Caroline.  Caroline’s Toxicologic Emergencies, 8th Ed (2006)    Hx and PE limited by the dynamics of a phone consultation. I have not personally interviewed or evaluated the patient, but only advised based on the information provided to me. Primary provider is responsible for all clinical decisions.     Pertinent history, physical exam and clinical findings and course discussed: Jael Fabian is a 58 y.o. year old female who presents with intentional ingestion of lorazepam in the setting of acute alcohol intoxication. Patient reported to be sober for 2 years prior to last night.    Review of systems and physical exam not performed by me.    Historical Information   Past Medical History:   Diagnosis  Date    Abnormal Pap smear of cervix 1987    Anxiety     Depression     Ear problems 1996    Fibrocystic breast     GERD (gastroesophageal reflux disease) 2025    Herpes 1987    High vitamin A level     History of anxiety     History of hypercholesterolemia     History of hypertension     History of obesity     HPV (human papilloma virus) infection 1987    Hyperlipidemia     Lordosis     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    Scoliosis     Secondary hyperparathyroidism, non-renal (HCC)     Spinal stenosis     Tinnitus 1996     Past Surgical History:   Procedure Laterality Date    ABDOMINOPLASTY      BREAST CYST ASPIRATION Right     COLONOSCOPY      COLONOSCOPY  11/22/2021    Eyvazzedah - 5 y f/u     COLPOSCOPY  1987    COMBINED AUGMENTATION MAMMAPLASTY AND ABDOMINOPLASTY  2001    EPIDURAL BLOCK INJECTION Bilateral 8/23/2023    Procedure: Left L4-L5   TRANSFORAMINAL epidural steroid injection ( [id number]);  Surgeon: Mateo Colmenares DO;  Location: [facility] MAIN OR;  Service: Pain Management     EPIDURAL BLOCK INJECTION Bilateral 1/24/2024    Procedure: L4-L5 TRANSFORAMINAL EPIDURAL STEROID INJECTION;  Surgeon: Mateo Colmenares DO;  Location: [facility] MAIN OR;  Service: Pain Management     FACIAL COSMETIC SURGERY      FL INJECTION RIGHT HIP (NON ARTHROGRAM)  05/24/2019    GASTRIC BYPASS      CA ARTHROCENTESIS ASPIR&/INJ MAJOR JT/BURSA W/O US Right 1/10/2024    Procedure: RIGHT INTRA-ARTICULAR HIP INJECTION;  Surgeon: Mateo Colmenares DO;  Location: [facility] MAIN OR;  Service: Pain Management     TUBAL LIGATION       Social History   Social History     Substance and Sexual Activity   Alcohol Use Not Currently     Social History     Substance and Sexual Activity   Drug Use No     Social History     Tobacco Use   Smoking Status Never   Smokeless Tobacco Never     Family History   Problem Relation Age of Onset    Hypertension Mother     Heart disease Mother     Hypertension Father     Heart disease Father     No  Known Problems Sister     No Known Problems Brother     No Known Problems Maternal Aunt     No Known Problems Maternal Uncle     No Known Problems Paternal Aunt     No Known Problems Paternal Uncle     No Known Problems Maternal Grandmother     No Known Problems Maternal Grandfather     No Known Problems Paternal Grandmother     No Known Problems Paternal Grandfather     ADD / ADHD Neg Hx     Anesthesia problems Neg Hx     Cancer Neg Hx     Clotting disorder Neg Hx     Collagen disease Neg Hx     Diabetes Neg Hx     Dislocations Neg Hx     Learning disabilities Neg Hx     Neurological problems Neg Hx     Osteoporosis Neg Hx     Rheumatologic disease Neg Hx     Scoliosis Neg Hx     Vascular Disease Neg Hx         Prior to Admission medications    Medication Sig Start Date End Date Taking? Authorizing Provider   Calcium Citrate 1040 MG TABS Take by mouth 3 (three) times a day    Historical Provider, MD   Desvenlafaxine Succinate ER 25 MG TB24 Take 25 mg by mouth    Historical Provider, MD   LORazepam (ATIVAN) 0.5 mg tablet  11/10/23   Historical Provider, MD   methocarbamol (ROBAXIN) 500 mg tablet Take 1 tablet (500 mg total) by mouth 4 (four) times a day for 10 days 2/8/24 2/18/24  Yury Gregg MD   Multiple Vitamins-Minerals (BARIATRIC MULTIVITAMINS/IRON PO) Take by mouth daily    Historical Provider, MD       No current facility-administered medications for this encounter.       Allergies   Allergen Reactions    Other Wheezing     Lobster when a teenager.  Wheezing       Objective     No intake or output data in the 24 hours ending 03/12/24 1203    Invasive Devices:   Peripheral IV 03/12/24 Left;Proximal;Ventral (anterior) Forearm (Active)   Site Assessment WDL 03/12/24 0606   Dressing Change Due 03/15/24 03/12/24 0606       Vitals   Vitals:    03/12/24 1000 03/12/24 1030 03/12/24 1115 03/12/24 1200   BP: 143/94 144/89 159/79 136/80   TempSrc:       Pulse: 104 100 102 102   Resp:  18 18 18   Patient  "Position - Orthostatic VS:  Lying Sitting Sitting   Temp:             EKG, Pathology, and/or Other Studies: I have personally reviewed pertinent reports.        Lab Results: I have personally reviewed pertinent reports.      Labs:    Results from last 7 days   Lab Units 03/12/24  0607   WBC Thousand/uL 5.21   HEMOGLOBIN g/dL 13.6   HEMATOCRIT % 41.1   PLATELETS Thousands/uL 269   NEUTROS PCT % 70   LYMPHS PCT % 18   MONOS PCT % 10   EOS PCT % 2      Results from last 7 days   Lab Units 03/12/24  0607   SODIUM mmol/L 139   POTASSIUM mmol/L 4.0   CHLORIDE mmol/L 102   CO2 mmol/L 27   BUN mg/dL 8   CREATININE mg/dL 0.72   CALCIUM mg/dL 8.3*   ALK PHOS U/L 74   ALT U/L 20   AST U/L 31   MAGNESIUM mg/dL 2.0      Results from last 7 days   Lab Units 03/12/24  0607   INR  0.98   PTT seconds 23     Results from last 7 days   Lab Units 03/12/24  0656   LACTIC ACID mmol/L 1.8     No results found for: \"TROPONINI\"      Results from last 7 days   Lab Units 03/12/24  0656   ACETAMINOPHEN LVL ug/mL <2*   ETHANOL LVL mg/dL <10   SALICYLATE LVL mg/dL <5     Invalid input(s): \"EXTPREGUR\"      Imaging Studies: I have personally reviewed pertinent reports.      Counseling / Coordination of Care  Total time spent today 14 minutes. This was a phone consultation; greater than 50% of time spent in discussion with primary provider and coordination of care.  " O-L Flap Text: The defect edges were debeveled with a #15 scalpel blade.  Given the location of the defect, shape of the defect and the proximity to free margins an O-L flap was deemed most appropriate.  Using a sterile surgical marker, an appropriate advancement flap was drawn incorporating the defect and placing the expected incisions within the relaxed skin tension lines where possible.    The area thus outlined was incised deep to adipose tissue with a #15 scalpel blade.  The skin margins were undermined to an appropriate distance in all directions utilizing iris scissors.

## 2024-03-12 NOTE — PLAN OF CARE
Problem: Ineffective Coping  Goal: Cooperates with admission process  Description: Interventions:   - Complete admission process  Outcome: Completed  Goal: Identifies ineffective coping skills  Outcome: Not Progressing  Goal: Demonstrates healthy coping skills  Outcome: Not Progressing     Problem: Risk for Self Injury/Neglect  Goal: Treatment Goal: Remain safe during length of stay, learn and adopt new coping skills, and be free of self-injurious ideation, impulses and acts at the time of discharge  Outcome: Not Progressing  Goal: Verbalize thoughts and feelings  Description: Interventions:  - Assess and re-assess patient's lethality and potential for self-injury  - Engage patient in 1:1 interactions, daily, for a minimum of 15 minutes  - Encourage patient to express feelings, fears, frustrations, hopes  - Establish rapport/trust with patient   Outcome: Progressing     Problem: Anxiety  Goal: Anxiety is at manageable level  Description: Interventions:  - Assess and monitor patient's anxiety level.   - Monitor for signs and symptoms (heart palpitations, chest pain, shortness of breath, headaches, nausea, feeling jumpy, restlessness, irritable, apprehensive).   - Collaborate with interdisciplinary team and initiate plan and interventions as ordered.  - [place] patient to unit/surroundings  - Explain treatment plan  - Encourage participation in care  - Encourage verbalization of concerns/fears  - Identify coping mechanisms  - Assist in developing anxiety-reducing skills  - Administer/offer alternative therapies  - Limit or eliminate stimulants  Outcome: Not Progressing     Problem: PAIN - ADULT  Goal: Verbalizes/displays adequate comfort level or baseline comfort level  Description: Interventions:  - Encourage patient to monitor pain and request assistance  - Assess pain using appropriate pain scale  - Administer analgesics based on type and severity of pain and evaluate response  - Implement non-pharmacological  measures as appropriate and evaluate response  - Consider cultural and social influences on pain and pain management  - Notify physician/advanced practitioner if interventions unsuccessful or patient reports new pain  Outcome: Not Progressing     Problem: SAFETY ADULT  Goal: Patient will remain free of falls  Description: INTERVENTIONS:  - Educate patient/family on patient safety including physical limitations  - Instruct patient to call for assistance with activity   - Consult OT/PT to assist with strengthening/mobility   - Keep Call bell within reach  - Keep bed low and locked with side rails adjusted as appropriate  - Keep care items and personal belongings within reach  - Initiate and maintain comfort rounds  - Make Fall Risk Sign visible to staff  - Apply yellow socks and bracelet for high fall risk patients  - Consider moving patient to room near nurses station  Outcome: Progressing

## 2024-03-12 NOTE — ED NOTES
Patient was escorted out of the Department by CTS.  Initiated / incomplete 302 disposed of as patient was cooperative with 201.

## 2024-03-12 NOTE — ED NOTES
Insurance Authorization for admission:   See previous notes regarding requests for authorization via fax for [facility].  In response to which, a fax was received indicating the following:  IP admission approved for Psychiatric service type     3 days approved, 3/12/24-3/14/24.  Review on 3/14/24 (last covered day) or 3/15/24 (first uncovered); not specified.   Authorization #: [id number].    This fax was also forwarded to  via fax and enclosed in the transfer packet envelope.

## 2024-03-12 NOTE — ED NOTES
"Bedside staff began alerting crisis via [place] text that the patient was on the telephone with a friend asking them to come sign her out AMA.  She had also voiced her intent to leave the hospital to several staff members.  Luigi prepared a 302 for completion and approached the patient to advise her of this.  Patient's son was at bedside.  Luigi asked if the patient would rather speak without her son present and she indicated \"I do not care\" then stated \"I want want to leave AGAINST MEDICAL ADVICE\".  Luigi advised her that this was not going to be permitted given her overdose attempt.  Luigi advised her of the intent to petition a 302 which will act as a warrant to hold her here pending psychiatric transfer.  She was advised that if she attempted to leave we would involve security, and if necessary, police to enact the 302 until transferred to a psychiatric facility for up to 120 hours.  She did not seem to relent on her intent to leave and/or rescind the 201.  Luigi advised her of her rights by reading them aloud in presenting her copy of the 302 rights along with a patient's Bill of Rights.  She denied having questions at this time but rested her hand and her forehead and appeared upset.  302 will be faxed to [facility] and [facility] momentarily.  "

## 2024-03-12 NOTE — ED NOTES
Per ED Provider, Toxicology recommends observation until 1200 for medical clearance.  Intake aware.

## 2024-03-12 NOTE — ED NOTES
Luigi received a fax report from [employer] indicating that the request has been received and reference number: [id number] has been assigned.  UR / Crisis may call [phone] for status updates using this reference number while the request is being processed.  This form will also be added to transfer packet.

## 2024-03-12 NOTE — LETTER
[facility]  [address] [facility] [address]  Dept: [phone]      EMTALA TRANSFER CONSENT    NAME Jael OSPINA 1965                              MRN [id number]    I have been informed of my rights regarding examination, treatment, and transfer   by Dr. Jennifer Castillo DO    Benefits: Specialized equipment and/or services available at the receiving facility (Include comment)________________________, Continuity of care, Other benefits (Include comment)_______________________ (inpatient mental health 201)    Risks: Potential for delay in receiving treatment, Potential deterioration of medical condition, Possible worsening of condition or death during transfer, Increased discomfort during transfer      Consent for Transfer:  I acknowledge that my medical condition has been evaluated and explained to me by the emergency department physician or other qualified medical person and/or my attending physician, who has recommended that I be transferred to the service of  Accepting Physician: Dr. Benz at Accepting Facility Name, City & State : [address]. The above potential benefits of such transfer, the potential risks associated with such transfer, and the probable risks of not being transferred have been explained to me, and I fully understand them.  The doctor has explained that, in my case, the benefits of transfer outweigh the risks.  I agree to be transferred.      I authorize the performance of emergency medical procedures and treatments upon me in both transit and upon arrival at the receiving facility.  Additionally, I authorize the release of any and all medical records to the receiving facility and request they be transported with me, if possible.  I understand that the safest mode of transportation during a medical emergency is an ambulance and that the Hospital advocates the use of this mode of transport. Risks of  traveling to the receiving facility by car, including absence of medical control, life sustaining equipment, such as oxygen, and medical personnel has been explained to me and I fully understand them.      (ROSANNE CORRECT BOX BELOW)  [  ]  I consent to the stated transfer and to be transported by ambulance/helicopter.  [  ]  I consent to the stated transfer, but refuse transportation by ambulance and accept full responsibility for my transportation by car.  I understand the risks of non-ambulance transfers and I exonerate the Hospital and its staff from any deterioration in my condition that results from this refusal.    X___________________________________________    DATE  24  TIME________  Signature of patient or legally responsible individual signing on patient behalf           RELATIONSHIP TO PATIENT_________________________          Provider Certification    NAME Jael Fabian                                        [facility] 1965                              MRN [id number]    A medical screening exam was performed on the above named patient.  Based on the examination:    Condition Necessitating Transfer The primary encounter diagnosis was Overdose of benzodiazepine, intentional self-harm, initial encounter ([facility]). Diagnoses of UTI (urinary tract infection) and Medical clearance for psychiatric admission were also pertinent to this visit.    Patient Condition: The patient has been stabilized such that within reasonable medical probability, no material deterioration of the patient condition or the condition of the unborn child(wilder) is likely to result from the transfer    Reason for Transfer: Level of Care needed not available at this facility, No bed available at level of patient's needs, Other (Include comment)____________________, Patient/Family request (inpatient mental health 201)    Transfer Requirements: [facility]   Space available and qualified personnel available for treatment as acknowledged  by Crisis  Agreed to accept transfer and to provide appropriate medical treatment as acknowledged by       Dr. Benz  Appropriate medical records of the examination and treatment of the patient are provided at the time of transfer   STAFF INITIAL WHEN COMPLETED _______  Transfer will be performed by qualified personnel from [facility]  and appropriate transfer equipment as required, including the use of necessary and appropriate life support measures.    Provider Certification: I have examined the patient and explained the following risks and benefits of being transferred/refusing transfer to the patient/family:  General risk, such as traffic hazards, adverse weather conditions, rough terrain or turbulence, possible failure of equipment (including vehicle or aircraft), or consequences of actions of persons outside the control of the transport personnel, Unanticipated needs of medical equipment and personnel during transport, Risk of worsening condition, The possibility of a transport vehicle being unavailable, The patient is stable for psychiatric transfer because they are medically stable, and is protected from harming him/herself or others during transport      Based on these reasonable risks and benefits to the patient and/or the unborn child(wilder), and based upon the information available at the time of the patient’s examination, I certify that the medical benefits reasonably to be expected from the provision of appropriate medical treatments at another medical facility outweigh the increasing risks, if any, to the individual’s medical condition, and in the case of labor to the unborn child, from effecting the transfer.    X____________________________________________ DATE 03/12/24        TIME_______      ORIGINAL - SEND TO MEDICAL RECORDS   COPY - SEND WITH PATIENT DURING TRANSFER

## 2024-03-12 NOTE — ED ATTENDING ATTESTATION
3/12/2024  IJennifer DO, saw and evaluated the patient. I have discussed the patient with the resident/non-physician practitioner and agree with the resident's/non-physician practitioner's findings, Plan of Care, and MDM as documented in the resident's/non-physician practitioner's note, except where noted. All available labs and Radiology studies were reviewed.  I was present for key portions of any procedure(s) performed by the resident/non-physician practitioner and I was immediately available to provide assistance.       At this point I agree with the current assessment done in the Emergency Department.  I have conducted an independent evaluation of this patient a history and physical is as follows:    ED Course   58 year old female presents to the ED for evaluation after being found down this around 3 am by her son.  Patient's son heard her fall and he found her on the ground with bleeding from her nose.  It is unclear if this was the patient's first fall or if she had fallen earlier.  Patient is unable to provide details of the fall.  Upon further discussion the patient admits to taking 30 mg of lorazepam and drinking 12 cans of beer last night.  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  Patient received bad news regarding the death of a former significant other (alcohol related) and this triggered her to take the medication and drink the alcohol and attempt to not wake up today.    Past medical history: Lumbar radiculopathy, arthritis of the hip     Physical exam: Patient is somnolent but easily arouses to verbal stimuli.  Pupils are equal and reactive.  There is dried epistaxis around the nose, no active bleeding.  No septal hematoma.  No tenderness of the facial bones.  TMs intact.  No Guaman sign.  Neck is supple and nontender.  Heart is regular tachycardic, regular.  Chest wall is nontender.  Airway is intact.  Equal bilateral breath sounds noted.  Abdomen is soft, nontender,  nondistended with normoactive bowel sounds.  Patient moving all 4 extremities.  No lower extremity edema.  No focal motor or sensory deficits.    Assessment: 58-year-old female presents for evaluation after intentional ingestion of benzodiazepines and alcohol and falling.  Differential diagnosis includes was not limited to benzodiazepine overdose, alcohol intoxication, electrolyte abnormality, intracranial hemorrhage, nasal bone fracture, cervical spine injury, other drug toxicity.  Plan:  will check UDS, coma panel, CT head, CXR, electrolytes, place on telemery with capnography to monitor for sedation/hypopnea.  Will monitor for several hours for medical clearance for crisis evaluation.  Will offer 201 but will petition 302 if necessary.  critical Care Time  CriticalCare Time    Date/Time: 3/12/2024 9:15 AM    Performed by: Jennifer Castillo DO  Authorized by: Jennifer Castillo DO    Critical care provider statement:     Critical care time (minutes):  45    Critical care time was exclusive of:  Separately billable procedures and treating other patients and teaching time    Critical care was necessary to treat or prevent imminent or life-threatening deterioration of the following conditions:  Toxidrome    Critical care was time spent personally by me on the following activities:  Blood draw for specimens, obtaining history from patient or surrogate, development of treatment plan with patient or surrogate, discussions with consultants, evaluation of patient's response to treatment, examination of patient, review of old charts, re-evaluation of patient's condition, ordering and review of radiographic studies, ordering and review of laboratory studies and ordering and performing treatments and interventions    I assumed direction of critical care for this patient from another provider in my specialty: no

## 2024-03-12 NOTE — ED NOTES
Patient was advised of disposition and plan and voiced support.  EMTALA signed.  Transfer packet complete with copies and on chart.  ED Attending and RN advised.

## 2024-03-12 NOTE — NURSING NOTE
"Patient is a 201 admission from [facility] ED. She reports that her reason for admission is \"I had a lot on my mind and wasn't paying attention to how many Ativan I was taking. I guess I was popping them like candy and took half the bottle.\" Patient has poor insight into her overdose and minimizes the event. Patient informed ED staff that she drank 12 beers with 60 mg of Ativan but told this writer she drank 1 beer with 30 mg of Ativan. Patient reports that she fell at home when standing up after taking the Ativan and her son brought her to the ED. Patient reports that her stressors include a recent move and her son quitting school. She currently endorses anxiety and depression and is tearful throughout the admission process.     Patient reports needing a total right hip replacement and walks with a limp. Refuses to use a walker at this time. She agrees to contract for safety on the unit. Encouraged to inform staff of any needs or concerns.   "

## 2024-03-12 NOTE — Clinical Note
Jael Fabian should be transferred out to [facility] and has been medically cleared.

## 2024-03-12 NOTE — ED PROVIDER NOTES
History  Chief Complaint   Patient presents with    Fall     Pt got up tonight to go to the bathroom and got dizzy and fell. +HS -Thinner -Asa. Denies LOC. Very unsure of what happened just feels weak.      HPI 58yoF previously in recovery for alcohol use disorder who comes in after intentional overdose on lorazepam and alcohol in an attempt to self harm.  Had previously been in recovery from alcohol for 2 years prior to relapse yesterday.  States she found out yesterday that a person she was romantically involved in had passed away.  States she drank a 12 pack of beer from 12 PM to 12 AM, and took 60 tablets of Ativan 0.5 mg.  States she usually takes 1 tablet of Ativan nightly for sleep.  Denies any other coingestions.  Her son brought her in when he heard her fall this morning and she could not get up.  She denies any pain anywhere.  She does have a history of anxiety and depression, no prior suicide attempts before today she reports.    Prior to Admission Medications   Prescriptions Last Dose Informant Patient Reported? Taking?   Calcium Citrate 1040 MG TABS  Self Yes No   Sig: Take by mouth 3 (three) times a day   Desvenlafaxine Succinate ER 25 MG TB24  Self Yes No   Sig: Take 25 mg by mouth   LORazepam (ATIVAN) 0.5 mg tablet  Self Yes No   Sig: Take 0.5 mg by mouth every 8 (eight) hours as needed for anxiety   Multiple Vitamins-Minerals (BARIATRIC MULTIVITAMINS/IRON PO)  Self Yes No   Sig: Take by mouth daily   methocarbamol (ROBAXIN) 500 mg tablet   No No   Sig: Take 1 tablet (500 mg total) by mouth 4 (four) times a day for 10 days   Patient not taking: Reported on 3/12/2024      Facility-Administered Medications: None       Past Medical History:   Diagnosis Date    Abnormal Pap smear of cervix 1987    Anxiety     Depression     Ear problems 1996    Fibrocystic breast     GERD (gastroesophageal reflux disease) 2025    Herpes 1987    High vitamin A level     History of anxiety     History of  hypercholesterolemia     History of hypertension     History of obesity     HPV (human papilloma virus) infection 1987    Hyperlipidemia     Lordosis     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    Scoliosis     Secondary hyperparathyroidism, non-renal (HCC)     Spinal stenosis     Tinnitus 1996       Past Surgical History:   Procedure Laterality Date    ABDOMINOPLASTY      BREAST CYST ASPIRATION Right     COLONOSCOPY      COLONOSCOPY  11/22/2021    [place] - 5 y f/u     COLPOSCOPY  1987    COMBINED AUGMENTATION MAMMAPLASTY AND ABDOMINOPLASTY  2001    EPIDURAL BLOCK INJECTION Bilateral 8/23/2023    Procedure: Left L4-L5   TRANSFORAMINAL epidural steroid injection ( [id number]);  Surgeon: Mateo Colmenares DO;  Location: [facility] MAIN OR;  Service: Pain Management     EPIDURAL BLOCK INJECTION Bilateral 1/24/2024    Procedure: L4-L5 TRANSFORAMINAL EPIDURAL STEROID INJECTION;  Surgeon: Mateo Colmenares DO;  Location: [facility] MAIN OR;  Service: Pain Management     FACIAL COSMETIC SURGERY      FL INJECTION RIGHT HIP (NON ARTHROGRAM)  05/24/2019    GASTRIC BYPASS      MD ARTHROCENTESIS ASPIR&/INJ MAJOR JT/BURSA W/O US Right 1/10/2024    Procedure: RIGHT INTRA-ARTICULAR HIP INJECTION;  Surgeon: Mateo Colmenares DO;  Location: [facility] MAIN OR;  Service: Pain Management     TUBAL LIGATION         Family History   Problem Relation Age of Onset    Hypertension Mother     Heart disease Mother     Hypertension Father     Heart disease Father     No Known Problems Sister     No Known Problems Brother     No Known Problems Maternal Aunt     No Known Problems Maternal Uncle     No Known Problems Paternal Aunt     No Known Problems Paternal Uncle     No Known Problems Maternal Grandmother     No Known Problems Maternal Grandfather     No Known Problems Paternal Grandmother     No Known Problems Paternal Grandfather     ADD / ADHD Neg Hx     Anesthesia problems Neg Hx     Cancer Neg Hx     Clotting disorder Neg Hx     Collagen  disease Neg Hx     Diabetes Neg Hx     Dislocations Neg Hx     Learning disabilities Neg Hx     Neurological problems Neg Hx     Osteoporosis Neg Hx     Rheumatologic disease Neg Hx     Scoliosis Neg Hx     Vascular Disease Neg Hx      I have reviewed and agree with the history as documented.    E-Cigarette/Vaping    E-Cigarette Use Never User      E-Cigarette/Vaping Substances    Nicotine No     THC No     CBD No     Flavoring No     Other No     Unknown No      Social History     Tobacco Use    Smoking status: Never    Smokeless tobacco: Never   Vaping Use    Vaping status: Never Used   Substance Use Topics    Alcohol use: Not Currently    Drug use: No        Review of Systems   Constitutional:  Negative for chills and fever.   HENT:  Negative for ear pain and sore throat.    Eyes:  Negative for visual disturbance.   Respiratory:  Negative for cough and shortness of breath.    Cardiovascular:  Negative for chest pain and leg swelling.   Gastrointestinal:  Negative for abdominal pain, blood in stool, constipation, diarrhea, nausea and vomiting.   Genitourinary:  Negative for dysuria and hematuria.   Musculoskeletal:  Negative for neck pain and neck stiffness.   Neurological:  Negative for dizziness, syncope, weakness and light-headedness.   Psychiatric/Behavioral:  Positive for suicidal ideas. The patient is nervous/anxious.    All other systems reviewed and are negative.      Physical Exam  ED Triage Vitals [03/12/24 0604]   Temperature Pulse Respirations Blood Pressure SpO2   98 °F (36.7 °C) (!) 116 20 150/81 97 %      Temp Source Heart Rate Source Patient Position - Orthostatic VS BP Location FiO2 (%)   Oral Monitor Sitting Right arm --      Pain Score       10 - Worst Possible Pain             Orthostatic Vital Signs  Vitals:    03/12/24 1000 03/12/24 1030 03/12/24 1115 03/12/24 1200   BP: 143/94 144/89 159/79 136/80   Pulse: 104 100 102 102   Patient Position - Orthostatic VS:  Lying Sitting Sitting        Physical Exam  Vitals and nursing note reviewed.   Constitutional:       General: She is not in acute distress.     Appearance: She is well-developed.   HENT:      Head: Normocephalic.      Comments: No pain with palpation of the bones in the face.  No evidence of damage to the teeth.  Able to open and close jaw without pain.     Nose:      Comments: Dried blood in the naris.  No evidence of septal hematoma.  No active bleeding.  No pain with palpation of the nose.     Mouth/Throat:      Mouth: Mucous membranes are moist.      Pharynx: Oropharynx is clear.   Eyes:      Extraocular Movements: Extraocular movements intact.      Conjunctiva/sclera: Conjunctivae normal.      Pupils: Pupils are equal, round, and reactive to light.   Neck:      Comments: No midline bony cervical pain with palpation.  Normal range of motion of the neck.  Cardiovascular:      Rate and Rhythm: Regular rhythm. Tachycardia present.      Pulses: Normal pulses.      Heart sounds: Normal heart sounds. No murmur heard.     Comments: 2+ radial, DP and PT pulses.  Skin is warm and well-perfused.  Pulmonary:      Effort: Pulmonary effort is normal. No respiratory distress.      Breath sounds: Normal breath sounds. No stridor. No wheezing, rhonchi or rales.   Abdominal:      Palpations: Abdomen is soft.      Tenderness: There is no abdominal tenderness. There is no guarding or rebound.   Musculoskeletal:         General: No swelling or tenderness. Normal range of motion.      Cervical back: Normal range of motion and neck supple. No rigidity or tenderness.      Comments: Able to range all extremities passively and actively without pain.  No T or L-spine tenderness with palpation.   Skin:     General: Skin is warm and dry.      Capillary Refill: Capillary refill takes less than 2 seconds.      Coloration: Skin is not jaundiced.      Findings: No bruising or lesion.   Neurological:      General: No focal deficit present.      Mental Status: She is  alert and oriented to person, place, and time.      Cranial Nerves: No cranial nerve deficit.      Sensory: No sensory deficit.      Motor: No weakness.   Psychiatric:         Mood and Affect: Mood is depressed. Affect is tearful (intoxicated).         Behavior: Behavior is cooperative.         Thought Content: Thought content includes suicidal ideation. Thought content includes suicidal plan.      Comments: intoxicated         ED Medications  Medications   multi-electrolyte (ISOLYTE-S PH 7.4) bolus 1,000 mL (0 mL Intravenous Stopped 3/12/24 1220)   nitrofurantoin (MACROBID) extended-release capsule 100 mg (100 mg Oral Given 3/12/24 1017)   hydrOXYzine HCL (ATARAX) tablet 25 mg (25 mg Oral Given 3/12/24 1337)       Diagnostic Studies  Results Reviewed       Procedure Component Value Units Date/Time    Urine culture [[id number]]  (Abnormal) Collected: 03/12/24 0928    Lab Status: Preliminary result Specimen: Urine, Clean Catch Updated: 03/13/24 1035     Urine Culture >100,000 cfu/ml Escherichia coli    Rapid drug screen, urine [[id number]]  (Normal) Collected: 03/12/24 0925    Lab Status: Final result Specimen: Urine, Clean Catch Updated: 03/12/24 1105     Amph/Meth UR Negative     Barbiturate Ur Negative     Benzodiazepine Urine Negative     Cocaine Urine Negative     Methadone Urine Negative     Opiate Urine Negative     PCP Ur Negative     THC Urine Negative     Oxycodone Urine Negative    Narrative:      FOR MEDICAL PURPOSES ONLY.   IF CONFIRMATION NEEDED PLEASE CONTACT THE LAB WITHIN 5 DAYS.    Drug Screen Cutoff Levels:  AMPHETAMINE/METHAMPHETAMINES  1000 ng/mL  BARBITURATES     200 ng/mL  BENZODIAZEPINES     200 ng/mL  COCAINE      300 ng/mL  METHADONE      300 ng/mL  OPIATES      300 ng/mL  PHENCYCLIDINE     25 ng/mL  THC       50 ng/mL  OXYCODONE      100 ng/mL    HS Troponin I 4hr [[id number]]  (Normal) Collected: 03/12/24 1018    Lab Status: Final result Specimen: Blood from Arm, Left Updated: 03/12/24 1052      hs TnI 4hr 3 ng/L      Delta 4hr hsTnI 1 ng/L     POCT pregnancy, urine [[id number]]  (Normal) Resulted: 03/12/24 1016    Lab Status: Final result Updated: 03/12/24 1016     EXT Preg Test, Ur Negative     Control Valid    Urine Microscopic [[id number]]  (Abnormal) Collected: 03/12/24 0928    Lab Status: Final result Specimen: Urine, Clean Catch Updated: 03/12/24 0945     RBC, UA 1-2 /hpf      WBC, UA 10-20 /hpf      Epithelial Cells Occasional /hpf      Bacteria, UA Occasional /hpf     UA w Reflex to Microscopic w Reflex to Culture [[id number]]  (Abnormal) Collected: 03/12/24 0928    Lab Status: Final result Specimen: Urine, Clean Catch Updated: 03/12/24 0939     Color, UA Yellow     Clarity, UA Clear     Specific Gravity, UA 1.029     pH, UA 5.0     Leukocytes, UA Small     Nitrite, UA Positive     Protein, UA Trace mg/dl      Glucose, UA Negative mg/dl      Ketones, UA Negative mg/dl      Urobilinogen, UA <2.0 mg/dl      Bilirubin, UA Negative     Occult Blood, UA Negative    TSH, 3rd generation with Free T4 reflex [[id number]]  (Normal) Collected: 03/12/24 0607    Lab Status: Final result Specimen: Blood from Arm, Left Updated: 03/12/24 0922     TSH 3RD GENERATON 2.026 uIU/mL     HS Troponin I 2hr [[id number]]  (Normal) Resulted: 03/12/24 0750    Lab Status: Final result Specimen: Blood Updated: 03/12/24 0750     hs TnI 2hr 3 ng/L      Delta 2hr hsTnI 1 ng/L     Salicylate level [[id number]]  (Normal) Collected: 03/12/24 0656    Lab Status: Final result Specimen: Blood from Arm, Left Updated: 03/12/24 0748     Salicylate Lvl <5 mg/dL     Acetaminophen level-If concentration is detectable, please discuss with medical  on call. [[id number]]  (Abnormal) Collected: 03/12/24 0656    Lab Status: Final result Specimen: Blood from Arm, Left Updated: 03/12/24 0748     Acetaminophen Level <2 ug/mL     Lactic acid, plasma (w/reflex if result > 2.0) [[id number]]  (Normal) Collected: 03/12/24 0656    Lab Status: Final  result Specimen: Blood from Arm, Left Updated: 03/12/24 0725     LACTIC ACID 1.8 mmol/L     Narrative:      Result may be elevated if tourniquet was used during collection.    Ethanol [[id number]]  (Normal) Collected: 03/12/24 0656    Lab Status: Final result Specimen: Blood from Arm, Left Updated: 03/12/24 0725     Ethanol Lvl <10 mg/dL     CK [[id number]]  (Abnormal) Collected: 03/12/24 0607    Lab Status: Final result Specimen: Blood from Arm, Left Updated: 03/12/24 0725     Total  U/L     Lipase [[id number]]  (Normal) Collected: 03/12/24 0607    Lab Status: Final result Specimen: Blood from Arm, Left Updated: 03/12/24 0725     Lipase 67 u/L     Magnesium [[id number]]  (Normal) Collected: 03/12/24 0607    Lab Status: Final result Specimen: Blood from Arm, Left Updated: 03/12/24 0725     Magnesium 2.0 mg/dL     Protime-INR [[id number]]  (Normal) Collected: 03/12/24 0607    Lab Status: Final result Specimen: Blood from Arm, Left Updated: 03/12/24 0702     Protime 13.6 seconds      INR 0.98    APTT [[id number]]  (Normal) Collected: 03/12/24 0607    Lab Status: Final result Specimen: Blood from Arm, Left Updated: 03/12/24 0702     PTT 23 seconds     HS Troponin 0hr (reflex protocol) [[id number]]  (Normal) Collected: 03/12/24 0607    Lab Status: Final result Specimen: Blood from Arm, Left Updated: 03/12/24 0635     hs TnI 0hr 2 ng/L     Comprehensive metabolic panel [[id number]]  (Abnormal) Collected: 03/12/24 0607    Lab Status: Final result Specimen: Blood from Arm, Left Updated: 03/12/24 0630     Sodium 139 mmol/L      Potassium 4.0 mmol/L      Chloride 102 mmol/L      CO2 27 mmol/L      ANION GAP 10 mmol/L      BUN 8 mg/dL      Creatinine 0.72 mg/dL      Glucose 112 mg/dL      Calcium 8.3 mg/dL      AST 31 U/L      ALT 20 U/L      Alkaline Phosphatase 74 U/L      Total Protein 6.8 g/dL      Albumin 4.3 g/dL      Total Bilirubin 0.37 mg/dL      eGFR 91 ml/min/1.73sq m     Narrative:      National Kidney Disease  Foundation guidelines for Chronic Kidney Disease (CKD):     Stage 1 with normal or high GFR (GFR > 90 mL/min/1.73 square meters)    Stage 2 Mild CKD (GFR = 60-89 mL/min/1.73 square meters)    Stage 3A Moderate CKD (GFR = 45-59 mL/min/1.73 square meters)    Stage 3B Moderate CKD (GFR = 30-44 mL/min/1.73 square meters)    Stage 4 Severe CKD (GFR = 15-29 mL/min/1.73 square meters)    Stage 5 End Stage CKD (GFR <15 mL/min/1.73 square meters)  Note: GFR calculation is accurate only with a steady state creatinine    CBC and differential [[id number]]  (Abnormal) Collected: 03/12/24 0607    Lab Status: Final result Specimen: Blood from Arm, Left Updated: 03/12/24 0622     WBC 5.21 Thousand/uL      RBC 4.37 Million/uL      Hemoglobin 13.6 g/dL      Hematocrit 41.1 %      MCV 94 fL      MCH 31.1 pg      MCHC 33.1 g/dL      RDW 13.2 %      MPV 8.6 fL      Platelets 269 Thousands/uL      nRBC 0 /100 WBCs      Neutrophils Relative 70 %      Immature Grans % 0 %      Lymphocytes Relative 18 %      Monocytes Relative 10 %      Eosinophils Relative 2 %      Basophils Relative 0 %      Neutrophils Absolute 3.65 Thousands/µL      Absolute Immature Grans 0.01 Thousand/uL      Absolute Lymphocytes 0.93 Thousands/µL      Absolute Monocytes 0.50 Thousand/µL      Eosinophils Absolute 0.10 Thousand/µL      Basophils Absolute 0.02 Thousands/µL                    CT spine cervical without contrast   Final Result by Rafael Donaldson MD (03/12 0804)      No cervical spine fracture or traumatic malalignment.                  Workstation performed: [id number]         CT head without contrast   Final Result by Alexis Polanco MD (03/12 0727)      No acute intracranial abnormality.                  Workstation performed: [id number]         XR chest 1 view portable   Final Result by Alexis Polanco MD (03/12 0724)      No acute cardiopulmonary disease.            Workstation performed: [id number]                Procedures  Procedures      ED Course  ED Course as of 03/13/24 1347   Tue Mar 12, 2024   0813 IMPRESSION:     No cervical spine fracture or traumatic malalignment.   0814 IMPRESSION:     No acute intracranial abnormality.   0815 IMPRESSION:     No acute cardiopulmonary disease.   1003 Nitrite, UA(!): Positive  Will initiate treatment for UTI.    1003 Patient willing signed 201 after speaking with SW.    1004 Spoke with , Dr. Davis, who recommends obs for 6 hours (12p), if stable can be medically cleared.    1121 Patient remains calm, stable, awake and alert. Normal RR. O2 sat 96% on RA.    1200 Remains stable at 6hrs observation. Awake, normal work of breathing. O2sat 95% on RA. Medically cleared for psychiatric care.    1202 Blood Pressure: 136/80   1202 Pulse: 102   1202 Respirations: 18   1202 SpO2: 95 %   1202 Patient accepted at [facility].                             SBIRT 20yo+      Flowsheet Row Most Recent Value   Initial Alcohol Screen: US AUDIT-C     1. How often do you have a drink containing alcohol? 0 Filed at: 03/12/2024 0605   2. How many drinks containing alcohol do you have on a typical day you are drinking?  0 Filed at: 03/12/2024 0605   3a. Male UNDER 65: How often do you have five or more drinks on one occasion? 0 Filed at: 03/12/2024 0605   3b. FEMALE Any Age, or MALE 65+: How often do you have 4 or more drinks on one occassion? 0 Filed at: 03/12/2024 0605   Audit-C Score 0 Filed at: 03/12/2024 0605   PAYTON: How many times in the past year have you...    Used an illegal drug or used a prescription medication for non-medical reasons? Never Filed at: 03/12/2024 0605                  Medical Decision Making  58yoF presents to the ED for evaluation after intentional ingestion of benzodiazepines and etoh in an attempt to end her life. She denies other co ingestions. She denies prior hx of suicide attempt. She does have hx of anxiety, depression, and etoh use disorder. Was previously in  recovery from etoh for 2 years up until last night.     Discussed case with network . Patient was observed in the department for 6 hours and remained hemodynamically stable, awake, with normal work of breathing and O2 saturations. She met with our ED crisis worker, and patient willing signed a 201. She has been medically cleared for continued psychiatric care.     Amount and/or Complexity of Data Reviewed  Labs: ordered. Decision-making details documented in ED Course.  Radiology: ordered.    Risk  Prescription drug management.  Decision regarding hospitalization.          Disposition  Final diagnoses:   Overdose of benzodiazepine, intentional self-harm, initial encounter ([facility])   UTI (urinary tract infection)     Time reflects when diagnosis was documented in both MDM as applicable and the Disposition within this note       Time User Action Codes Description Comment    3/12/2024  6:42 AM Mary Schulte Add [T42.4X2A] Overdose of benzodiazepine, intentional self-harm, initial encounter ([facility])     3/12/2024 10:03 AM Mary Schulte Add [N39.0] UTI (urinary tract infection)     3/12/2024  1:09 PM Meenakshi Abarca Add [Z00.8] Medical clearance for psychiatric admission           ED Disposition       ED Disposition   Transfer to Behavioral Health Condition   --    Date/Time   Tue Mar 12, 2024 1342    Comment   Jael Fabian should be transferred out to [facility] and has been medically cleared.               MD Documentation      Flowsheet Row Most Recent Value   Patient Condition The patient has been stabilized such that within reasonable medical probability, no material deterioration of the patient condition or the condition of the unborn child(wilder) is likely to result from the transfer   Reason for Transfer Level of Care needed not available at this facility, No bed available at level of patient's needs, Other (Include comment)____________________, Patient/Family request  [inpatient mental health 201]    Benefits of Transfer Specialized equipment and/or services available at the receiving facility (Include comment)________________________, Continuity of care, Other benefits (Include comment)_______________________  [inpatient mental health 201]   Risks of Transfer Potential for delay in receiving treatment, Potential deterioration of medical condition, Possible worsening of condition or death during transfer, Increased discomfort during transfer   Accepting Physician Dr. Benz   Accepting Facility Name, [address]    (Name & Tel number) Crisis   Transported by (Company and Unit #) [facility]   Sending MD Jennifer Castillo D.O.   Provider Certification General risk, such as traffic hazards, adverse weather conditions, rough terrain or turbulence, possible failure of equipment (including vehicle or aircraft), or consequences of actions of persons outside the control of the transport personnel, Unanticipated needs of medical equipment and personnel during transport, Risk of worsening condition, The possibility of a transport vehicle being unavailable, The patient is stable for psychiatric transfer because they are medically stable, and is protected from harming him/herself or others during transport          RN Documentation      Flowsheet Row Most Recent Value   Accepting Facility Name, [address]   Bed Assignment Per RN    (Name & Tel number) Crisis   Report Given to RN to RN   Medications Reviewed with Next Provider of Service Yes   Transport Mode --  [CTS]   Transported by (Company and Unit #) [facility]   Level of Care Other (Comment)  [CTS]   Copies of Medical Records Sent History and Physical, Orders, Progress note, Transfer form, Nursing note, Med Rec form, Other (comment)  [201]   Patient Belongings Disposition Sent with patient   Transfer Date 03/12/24   Transfer Time 1430          Follow-up Information    None         Discharge Medication List as of 3/12/2024   2:41 PM        START taking these medications    Details   nitrofurantoin (MACROBID) 100 mg capsule Take 1 capsule (100 mg total) by mouth 2 (two) times a day for 5 days, Starting Tue 3/12/2024, Until Sun 3/17/2024, Print           CONTINUE these medications which have NOT CHANGED    Details   Calcium Citrate 1040 MG TABS Take by mouth 3 (three) times a day, Historical Med      Desvenlafaxine Succinate ER 25 MG TB24 Take 25 mg by mouth, Historical Med      LORazepam (ATIVAN) 0.5 mg tablet Historical Med      methocarbamol (ROBAXIN) 500 mg tablet Take 1 tablet (500 mg total) by mouth 4 (four) times a day for 10 days, Starting Thu 2/8/2024, Until Sun 2/18/2024, Normal      Multiple Vitamins-Minerals (BARIATRIC MULTIVITAMINS/IRON PO) Take by mouth daily, Historical Med           No discharge procedures on file.    PDMP Review         Value Time User    PDMP Reviewed  Yes 3/13/2024  8:11 AM Koko Benz MD             ED Provider  Attending physically available and evaluated Jael Fabian. I managed the patient along with the ED Attending.    Electronically Signed by           Mary Schulte MD  03/13/24 [id number]       Mary Schulte MD  03/13/24 [id number]

## 2024-03-12 NOTE — ED NOTES
Patient is accepted at [facility].  Patient is accepted by Dr. Benz with orders by Meenakshi Bass.     Transportation is arranged with [employer].     Transportation is scheduled for 1430 with [facility].  Intake aware.  Transfer packet prepared.   Patient may go to the floor pending requested  time of 1345 or after.          Nurse report is to be called to [phone] prior to patient transfer.

## 2024-03-12 NOTE — ED NOTES
The patient is a 58-year-old female who arrived to the emergency department via family vehicle.  Her 23-year-old son transported her after finding her on the floor with a bloody nose.  The patient later revealed that she had purposefully ingested 60 Ativan 0.5mg along with 12 cans of beer in an attempt to take her life.  The patient has been medically cleared.  She is alert and oriented.  Her speech is somewhat slow and she initially appears to be intent on going home, citing upcoming appointments and her obligation to work.  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  She currently resides in [place] with her 23-year-old son.  She does have another son who lives out of the home.  She reports that her  passed away in 2021.  She she reportedly began drinking heavily following his death and reports abusing alcohol for a year before becoming sober in 2022.  She had been sober since that time and had been dating someone she was acquainted with through .  They broke up in July.  She reports that she recently discovered that another friend from  passed away and that prompted her to begin drinking yesterday.  She reports that she continued drinking and then ingested the pills in a suicide attempt.  She apparently fell to the floor and her nose was bleeding.  She still has dried blood on her hands and face.  The patient indicated that she wished her son had not brought her to the hospital.  Crisis asked her to clarify and she stated she would have just lied on the floor.  The patient has a flat affect.  She does appear depressed.  She reports feeling depressed and reports feeling anxious about an upcoming move.  She reports that she is downsizing from a 5000 square foot home to a 2 bedroom apartment.  She also implies that her sons are not very helpful and that she is preparing to do this move independently and it is causing her a lot of stress.  The patient denies any past suicide attempts.   "She denies past suicidal plans or actions.  She denies any past self-injurious behavior.  The patient denies any current or past homicidal ideas, plan, or intent.  She denies any history of violence or aggression to others.  She denies any hallucinations or delusions.  She denies paranoid thinking.  Her thinking is clear and logical, but her insight is somewhat impaired as she appeared to believe that she would be discharged home following the overdose attempt.  The patient does report difficulties with sleep.  She is able to fall asleep but struggles to stay asleep.  She estimates sleeping 4 hours per night.  She reports a loss of appetite.  Despite this she has gained approximately 15 pounds in the last few months.  The patient reports feeling safe at home.  She denies any abuse or traumatic events.  She does imply significant psychosocial loss and limited supports.  The patient reports that she has never been admitted psychiatrically and has no formal mental health providers.  Her PCP prescribes her Ativan.  The patient denies other drug use.  The patient was offered a 201.  She was hesitant.  Crisis did explain the options involving a 201 versus a 302.  She initially agreed to sign the voluntary but when presented she stated \"I am not signing that\".  Peter is again advised her of the implications of a 302 which would be pursued in this case.  She did agree to sign a 201 for voluntary treatment.  She is requesting to stay local, specifically [facility].  Referral has been faxed to intake.  "

## 2024-03-13 PROBLEM — F10.90 ALCOHOL USE DISORDER: Status: ACTIVE | Noted: 2024-03-13

## 2024-03-13 PROBLEM — F33.2 SEVERE EPISODE OF RECURRENT MAJOR DEPRESSIVE DISORDER, WITHOUT PSYCHOTIC FEATURES (HCC): Status: ACTIVE | Noted: 2024-03-13

## 2024-03-13 PROBLEM — Z00.8 MEDICAL CLEARANCE FOR PSYCHIATRIC ADMISSION: Status: ACTIVE | Noted: 2024-03-13

## 2024-03-13 PROBLEM — M16.11 OSTEOARTHRITIS OF RIGHT HIP: Status: ACTIVE | Noted: 2024-03-13

## 2024-03-13 PROBLEM — N39.0 UTI (URINARY TRACT INFECTION): Status: ACTIVE | Noted: 2024-03-13

## 2024-03-13 PROBLEM — M16.0 OSTEOARTHRITIS OF BOTH HIPS: Status: ACTIVE | Noted: 2024-03-13

## 2024-03-13 PROBLEM — D72.819 LEUKOPENIA: Status: ACTIVE | Noted: 2024-03-13

## 2024-03-13 LAB
25(OH)D3 SERPL-MCNC: 43.5 NG/ML (ref 30–100)
ALBUMIN SERPL BCP-MCNC: 3.7 G/DL (ref 3.5–5)
ALP SERPL-CCNC: 77 U/L (ref 34–104)
ALT SERPL W P-5'-P-CCNC: 19 U/L (ref 7–52)
ANION GAP SERPL CALCULATED.3IONS-SCNC: 8 MMOL/L (ref 4–13)
AST SERPL W P-5'-P-CCNC: 35 U/L (ref 13–39)
BASOPHILS # BLD AUTO: 0.02 THOUSANDS/ÂΜL (ref 0–0.1)
BASOPHILS NFR BLD AUTO: 1 % (ref 0–1)
BILIRUB SERPL-MCNC: 0.66 MG/DL (ref 0.2–1)
BUN SERPL-MCNC: 11 MG/DL (ref 5–25)
CALCIUM SERPL-MCNC: 7.9 MG/DL (ref 8.4–10.2)
CHLORIDE SERPL-SCNC: 104 MMOL/L (ref 96–108)
CO2 SERPL-SCNC: 27 MMOL/L (ref 21–32)
CREAT SERPL-MCNC: 0.68 MG/DL (ref 0.6–1.3)
EOSINOPHIL # BLD AUTO: 0.09 THOUSAND/ÂΜL (ref 0–0.61)
EOSINOPHIL NFR BLD AUTO: 4 % (ref 0–6)
ERYTHROCYTE [DISTWIDTH] IN BLOOD BY AUTOMATED COUNT: 12.9 % (ref 11.6–15.1)
FOLATE SERPL-MCNC: 14 NG/ML
GFR SERPL CREATININE-BSD FRML MDRD: 96 ML/MIN/1.73SQ M
GLUCOSE P FAST SERPL-MCNC: 83 MG/DL (ref 65–99)
GLUCOSE SERPL-MCNC: 83 MG/DL (ref 65–140)
HCT VFR BLD AUTO: 36.9 % (ref 34.8–46.1)
HGB BLD-MCNC: 12.5 G/DL (ref 11.5–15.4)
IMM GRANULOCYTES # BLD AUTO: 0 THOUSAND/UL (ref 0–0.2)
IMM GRANULOCYTES NFR BLD AUTO: 0 % (ref 0–2)
LYMPHOCYTES # BLD AUTO: 0.8 THOUSANDS/ÂΜL (ref 0.6–4.47)
LYMPHOCYTES NFR BLD AUTO: 33 % (ref 14–44)
MAGNESIUM SERPL-MCNC: 2.5 MG/DL (ref 1.9–2.7)
MCH RBC QN AUTO: 31.4 PG (ref 26.8–34.3)
MCHC RBC AUTO-ENTMCNC: 33.9 G/DL (ref 31.4–37.4)
MCV RBC AUTO: 93 FL (ref 82–98)
MONOCYTES # BLD AUTO: 0.35 THOUSAND/ÂΜL (ref 0.17–1.22)
MONOCYTES NFR BLD AUTO: 15 % (ref 4–12)
NEUTROPHILS # BLD AUTO: 1.15 THOUSANDS/ÂΜL (ref 1.85–7.62)
NEUTS SEG NFR BLD AUTO: 47 % (ref 43–75)
NRBC BLD AUTO-RTO: 0 /100 WBCS
PHOSPHATE SERPL-MCNC: 3.3 MG/DL (ref 2.7–4.5)
PLATELET # BLD AUTO: 204 THOUSANDS/UL (ref 149–390)
PMV BLD AUTO: 9.2 FL (ref 8.9–12.7)
POTASSIUM SERPL-SCNC: 3.8 MMOL/L (ref 3.5–5.3)
PROT SERPL-MCNC: 5.8 G/DL (ref 6.4–8.4)
RBC # BLD AUTO: 3.98 MILLION/UL (ref 3.81–5.12)
SODIUM SERPL-SCNC: 139 MMOL/L (ref 135–147)
TSH SERPL DL<=0.05 MIU/L-ACNC: 1.68 UIU/ML (ref 0.45–4.5)
VIT B12 SERPL-MCNC: 522 PG/ML (ref 180–914)
WBC # BLD AUTO: 2.41 THOUSAND/UL (ref 4.31–10.16)

## 2024-03-13 PROCEDURE — 84443 ASSAY THYROID STIM HORMONE: CPT

## 2024-03-13 PROCEDURE — 82306 VITAMIN D 25 HYDROXY: CPT

## 2024-03-13 PROCEDURE — 99223 1ST HOSP IP/OBS HIGH 75: CPT | Performed by: STUDENT IN AN ORGANIZED HEALTH CARE EDUCATION/TRAINING PROGRAM

## 2024-03-13 PROCEDURE — 83735 ASSAY OF MAGNESIUM: CPT

## 2024-03-13 PROCEDURE — 80053 COMPREHEN METABOLIC PANEL: CPT

## 2024-03-13 PROCEDURE — 99222 1ST HOSP IP/OBS MODERATE 55: CPT | Performed by: NURSE PRACTITIONER

## 2024-03-13 PROCEDURE — 84100 ASSAY OF PHOSPHORUS: CPT

## 2024-03-13 PROCEDURE — 85025 COMPLETE CBC W/AUTO DIFF WBC: CPT

## 2024-03-13 PROCEDURE — 82746 ASSAY OF FOLIC ACID SERUM: CPT

## 2024-03-13 PROCEDURE — 82607 VITAMIN B-12: CPT

## 2024-03-13 RX ORDER — LANOLIN ALCOHOL/MO/W.PET/CERES
100 CREAM (GRAM) TOPICAL DAILY
Status: DISCONTINUED | OUTPATIENT
Start: 2024-03-13 | End: 2024-03-15 | Stop reason: HOSPADM

## 2024-03-13 RX ORDER — GABAPENTIN 100 MG/1
100 CAPSULE ORAL
Status: DISCONTINUED | OUTPATIENT
Start: 2024-03-13 | End: 2024-03-14

## 2024-03-13 RX ORDER — CEPHALEXIN 500 MG/1
500 CAPSULE ORAL EVERY 12 HOURS SCHEDULED
Status: DISCONTINUED | OUTPATIENT
Start: 2024-03-13 | End: 2024-03-15 | Stop reason: HOSPADM

## 2024-03-13 RX ORDER — FOLIC ACID 1 MG/1
1 TABLET ORAL DAILY
Status: DISCONTINUED | OUTPATIENT
Start: 2024-03-13 | End: 2024-03-15 | Stop reason: HOSPADM

## 2024-03-13 RX ORDER — TRAZODONE HYDROCHLORIDE 100 MG/1
100 TABLET ORAL
Status: DISCONTINUED | OUTPATIENT
Start: 2024-03-13 | End: 2024-03-15 | Stop reason: HOSPADM

## 2024-03-13 RX ORDER — DESVENLAFAXINE SUCCINATE 50 MG/1
50 TABLET, EXTENDED RELEASE ORAL DAILY
Status: DISCONTINUED | OUTPATIENT
Start: 2024-03-13 | End: 2024-03-15 | Stop reason: HOSPADM

## 2024-03-13 RX ADMIN — DESVENLAFAXINE 50 MG: 50 TABLET, FILM COATED, EXTENDED RELEASE ORAL at 11:56

## 2024-03-13 RX ADMIN — THIAMINE HCL TAB 100 MG 100 MG: 100 TAB at 09:53

## 2024-03-13 RX ADMIN — GABAPENTIN 100 MG: 100 CAPSULE ORAL at 21:51

## 2024-03-13 RX ADMIN — CEPHALEXIN 500 MG: 500 CAPSULE ORAL at 12:13

## 2024-03-13 RX ADMIN — MULTIPLE VITAMINS W/ MINERALS TAB 1 TABLET: TAB ORAL at 09:53

## 2024-03-13 RX ADMIN — AMLODIPINE BESYLATE 5 MG: 5 TABLET ORAL at 09:53

## 2024-03-13 RX ADMIN — ACETAMINOPHEN 650 MG: 325 TABLET ORAL at 09:53

## 2024-03-13 RX ADMIN — ACETAMINOPHEN 650 MG: 325 TABLET ORAL at 18:57

## 2024-03-13 RX ADMIN — HYDROXYZINE HYDROCHLORIDE 25 MG: 25 TABLET, FILM COATED ORAL at 18:57

## 2024-03-13 RX ADMIN — FOLIC ACID 1 MG: 1 TABLET ORAL at 09:53

## 2024-03-13 RX ADMIN — TRAZODONE HYDROCHLORIDE 100 MG: 100 TABLET ORAL at 22:27

## 2024-03-13 RX ADMIN — CEPHALEXIN 500 MG: 500 CAPSULE ORAL at 22:02

## 2024-03-13 NOTE — CASE MANAGEMENT
Patient asked to see this writer to see if her meds can be called in on Thursday since her pharmacy takes a day or two to fill the scripts, advised this writer would talk to the psychiatrist.

## 2024-03-13 NOTE — ASSESSMENT & PLAN NOTE
Follows orthopedic surgery for severe arthritis of the right hip for which she was recommended a right total hip arthroplasty  In the meantime, she was referred to Dr. Colmenares with pain management for an intra-articular steroid injection of the right hip  Continue Tylenol as needed  Outpatient follow-up with orthopedic surgery

## 2024-03-13 NOTE — CASE MANAGEMENT
Sent an in basket message to Christie Jean asking to schedule the patient with Dr Tuyet CORDOVA on 3/22 at 2:30 pm in person as a new intake with a virtual f/u on 4/25 at 2 pm.

## 2024-03-13 NOTE — CASE MANAGEMENT
Called patient's son, Shane Fabian ([phone]), to introduce this writer as SW and give contact information for him to reach out with any questions. Did advise that she will be discharging on Friday, he said that she called him and told him so he was aware.

## 2024-03-13 NOTE — NURSING NOTE
Patient is pleasant and cooperative with the care and medications. Patient is isolative in her room resting in the bed reading book. Patient denies SI<HI,A,V/H.

## 2024-03-13 NOTE — CONSULTS
[facility]  Consult  Name: Jael Fabian 58 y.o. female I MRN: [id number]  Unit/Bed#: [facility] 644-02 I Date of Admission: 3/12/2024   Date of Service: 3/13/2024 I Hospital Day: 1    Inpatient consult for Medical Clearance for  patient  Consult performed by: KYLEE Vargas  Consult ordered by: KYLEE Ching        Assessment/Plan   Medical clearance for psychiatric admission  Assessment & Plan  Patient is medically cleared for admission to [facility] and treatment of underlying psychiatric illness based on available results  EKG: Normal sinus rhythm, heart rate 87, QTc 464  No acute medical needs. Medicine will sign off. Please call with additional questions or concerns    Alcohol use disorder  Assessment & Plan  Reports history of alcohol abuse.  Very vague on her details at this time.  Guarded during examination.  Monitor on CIWA  Encourage absolute alcohol abstinence  Start thiamine, folic acid, and multivitamin    UTI (urinary tract infection)  Assessment & Plan  UA abnormal, suspicious for UTI  Start Keflex 500 mg twice daily for 7 days    Osteoarthritis of right hip  Assessment & Plan  Follows orthopedic surgery for severe arthritis of the right hip for which she was recommended a right total hip arthroplasty  In the meantime, she was referred to Dr. Colmenares with pain management for an intra-articular steroid injection of the right hip  Continue Tylenol as needed  Outpatient follow-up with orthopedic surgery    Leukopenia  Assessment & Plan  Present on admission and also noted 1 year ago, afebrile, likely due to bone marrow suppression from alcohol use  Monitor fever curve and CBC with differential as needed  Absolute alcohol abstinence    * Severe episode of recurrent major depressive disorder, without psychotic features (HCC)  Assessment & Plan  [facility] primary service              Recommendations for Discharge:  SLIM will sign off - please  call with questions or concerns.  Follow up with PCP upon discharge.     Counseling / Coordination of Care Time: 30 minutes.  Greater than 50% of total time spent on patient counseling and coordination of care.    Collaboration of Care: Were Recommendations Directly Discussed with Primary Treatment Team? - Yes     History of Present Illness:    Jael Fabian is a 58 y.o. female with a past medical history including anxiety, depression, alcohol abuse, osteoarthritis of hip who is originally admitted to the psychiatric service due to worsening depression with intentional overdose. We are consulted for medical clearance for psychiatric hospitalization and medical management.  Patient presented to [facility] after intentional overdose on lorazepam and alcohol in an attempt to self-harm.  She was monitored for 6 hours per toxicology recommendations.  She was medically stabilized and transferred to behavioral health at [facility].  Workup includes an abnormal UA suspicious for UTI for which she is on Keflex.  Currently, she is resting comfortably at edge of bed.  She offers no complaints at this time.    Review of Systems:    Review of Systems   Constitutional:  Negative for appetite change and chills.   HENT:  Negative for congestion and sore throat.    Eyes:  Negative for visual disturbance.   Respiratory:  Negative for cough and shortness of breath.    Cardiovascular:  Negative for chest pain.   Gastrointestinal:  Negative for abdominal pain, constipation, diarrhea, nausea and vomiting.   Genitourinary:  Negative for difficulty urinating.   Musculoskeletal:  Negative for gait problem.   Skin:  Negative for wound.   Neurological:  Negative for dizziness, light-headedness and headaches.   Psychiatric/Behavioral:  The patient is nervous/anxious.        Past Medical and Surgical History:     Past Medical History:   Diagnosis Date    Abnormal Pap smear of cervix 1987    Anxiety     Depression     Ear problems  1996    Fibrocystic breast     GERD (gastroesophageal reflux disease) 2025    Herpes 1987    High vitamin A level     History of anxiety     History of hypercholesterolemia     History of hypertension     History of obesity     HPV (human papilloma virus) infection 1987    Hyperlipidemia     Lordosis     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    Scoliosis     Secondary hyperparathyroidism, non-renal (HCC)     Spinal stenosis     Tinnitus 1996       Past Surgical History:   Procedure Laterality Date    ABDOMINOPLASTY      BREAST CYST ASPIRATION Right     COLONOSCOPY      COLONOSCOPY  11/22/2021    Eyvazzedah - 5 y f/u     COLPOSCOPY  1987    COMBINED AUGMENTATION MAMMAPLASTY AND ABDOMINOPLASTY  2001    EPIDURAL BLOCK INJECTION Bilateral 8/23/2023    Procedure: Left L4-L5   TRANSFORAMINAL epidural steroid injection ( [id number]);  Surgeon: Mateo Colmenares DO;  Location: [facility] MAIN OR;  Service: Pain Management     EPIDURAL BLOCK INJECTION Bilateral 1/24/2024    Procedure: L4-L5 TRANSFORAMINAL EPIDURAL STEROID INJECTION;  Surgeon: Mateo Colmenares DO;  Location: [facility] MAIN OR;  Service: Pain Management     FACIAL COSMETIC SURGERY      FL INJECTION RIGHT HIP (NON ARTHROGRAM)  05/24/2019    GASTRIC BYPASS      NJ ARTHROCENTESIS ASPIR&/INJ MAJOR JT/BURSA W/O US Right 1/10/2024    Procedure: RIGHT INTRA-ARTICULAR HIP INJECTION;  Surgeon: Mateo Colmenares DO;  Location: [facility] MAIN OR;  Service: Pain Management     TUBAL LIGATION         Meds/Allergies:    all medications and allergies reviewed    Allergies:   Allergies   Allergen Reactions    Other Wheezing     Lobster when a teenager.  Wheezing       Social History:     Marital Status:     Substance Use History:   Social History     Substance and Sexual Activity   Alcohol Use Not Currently     Social History     Tobacco Use   Smoking Status Never   Smokeless Tobacco Never     Social History     Substance and Sexual Activity   Drug Use No       Family  "History:    Family History   Problem Relation Age of Onset    Hypertension Mother     Heart disease Mother     Hypertension Father     Heart disease Father     No Known Problems Sister     No Known Problems Brother     No Known Problems Maternal Aunt     No Known Problems Maternal Uncle     No Known Problems Paternal Aunt     No Known Problems Paternal Uncle     No Known Problems Maternal Grandmother     No Known Problems Maternal Grandfather     No Known Problems Paternal Grandmother     No Known Problems Paternal Grandfather     ADD / ADHD Neg Hx     Anesthesia problems Neg Hx     Cancer Neg Hx     Clotting disorder Neg Hx     Collagen disease Neg Hx     Diabetes Neg Hx     Dislocations Neg Hx     Learning disabilities Neg Hx     Neurological problems Neg Hx     Osteoporosis Neg Hx     Rheumatologic disease Neg Hx     Scoliosis Neg Hx     Vascular Disease Neg Hx        Physical Exam:     Vitals:   Blood Pressure: 134/88 (03/13/24 0748)  Pulse: 68 (03/13/24 0748)  Temperature: 98.3 °F (36.8 °C) (03/13/24 0748)  Temp Source: Temporal (03/13/24 0748)  Respirations: 18 (03/13/24 0748)  Height: 5' 4\" (162.6 cm) (03/12/24 1550)  Weight - Scale: 70.4 kg (155 lb 3.2 oz) (03/12/24 1550)  SpO2: 91 % (03/13/24 0748)    Physical Exam  Vitals and nursing note reviewed.   Constitutional:       General: She is not in acute distress.     Appearance: She is not toxic-appearing or diaphoretic.   HENT:      Head: Normocephalic.      Mouth/Throat:      Mouth: Mucous membranes are moist.   Eyes:      Conjunctiva/sclera: Conjunctivae normal.   Cardiovascular:      Rate and Rhythm: Normal rate.   Pulmonary:      Effort: Pulmonary effort is normal.      Breath sounds: Normal breath sounds.   Abdominal:      General: Bowel sounds are normal.      Palpations: Abdomen is soft.   Musculoskeletal:         General: Normal range of motion.      Cervical back: Normal range of motion.      Right lower leg: No edema.      Left lower leg: No edema. "   Skin:     General: Skin is warm and dry.      Capillary Refill: Capillary refill takes less than 2 seconds.   Neurological:      Mental Status: She is alert and oriented to person, place, and time. Mental status is at baseline.   Psychiatric:         Mood and Affect: Mood is anxious and depressed.         Speech: Speech normal.         Behavior: Behavior is cooperative.         Cognition and Memory: Cognition normal.         Additional Data:     Lab Results:     Results from last 7 days   Lab Units 03/13/24  0516   WBC Thousand/uL 2.41*   HEMOGLOBIN g/dL 12.5   HEMATOCRIT % 36.9   PLATELETS Thousands/uL 204   NEUTROS PCT % 47   LYMPHS PCT % 33   MONOS PCT % 15*   EOS PCT % 4     Results from last 7 days   Lab Units 03/13/24  0516   SODIUM mmol/L 139   POTASSIUM mmol/L 3.8   CHLORIDE mmol/L 104   CO2 mmol/L 27   BUN mg/dL 11   CREATININE mg/dL 0.68   ANION GAP mmol/L 8   CALCIUM mg/dL 7.9*   ALBUMIN g/dL 3.7   TOTAL BILIRUBIN mg/dL 0.66   ALK PHOS U/L 77   ALT U/L 19   AST U/L 35   GLUCOSE RANDOM mg/dL 83     Results from last 7 days   Lab Units 03/12/24  0607   INR  0.98         Lab Results   Component Value Date/Time    HGBA1C 5.3 04/08/2022 07:13 AM         Results from last 7 days   Lab Units 03/12/24  0656   LACTIC ACID mmol/L 1.8       Imaging: I have personally reviewed pertinent reports.      No orders to display       EKG, Pathology, and Other Studies Reviewed on Admission:   EKG: see above documentation    ** Please Note: This note has been constructed using a voice recognition system. **

## 2024-03-13 NOTE — CMS CERTIFICATION NOTE
Certification: Based upon physical, mental and social evaluations, I certify that inpatient psychiatric services are medically necessary for this patient for a duration of 21 midnights for the treatment of Severe episode of recurrent major depressive disorder, without psychotic features (HCC)  Available alternative community resources do not meet the patient's mental health care needs.  I further attest that an established written individualized plan of care has been implemented and is outlined in the patient's medical records.

## 2024-03-13 NOTE — ASSESSMENT & PLAN NOTE
Patient is medically cleared for admission to [facility] and treatment of underlying psychiatric illness based on available results  EKG: Normal sinus rhythm, heart rate 87, QTc 464  No acute medical needs. Medicine will sign off. Please call with additional questions or concerns

## 2024-03-13 NOTE — NURSING NOTE
Patient c/o 5/10 right hip pain and requested pain medication. PRN Tylenol 650 mg administered at 2119.

## 2024-03-13 NOTE — PLAN OF CARE
Problem: Ineffective Coping  Goal: Cooperates with admission process  Description: Interventions:   - Complete admission process  Outcome: Completed  Goal: Identifies ineffective coping skills  3/13/2024 0533 by Alec Aguiar RN  Outcome: Not Progressing  3/12/2024 1635 by Alec Aguiar RN  Outcome: Not Progressing  Goal: Demonstrates healthy coping skills  3/13/2024 0533 by Alec Aguiar RN  Outcome: Progressing  3/12/2024 1635 by Alec Aguiar RN  Outcome: Not Progressing     Problem: Risk for Self Injury/Neglect  Goal: Treatment Goal: Remain safe during length of stay, learn and adopt new coping skills, and be free of self-injurious ideation, impulses and acts at the time of discharge  3/13/2024 0533 by Alec Aguiar RN  Outcome: Progressing  3/12/2024 1635 by Alec Aguiar RN  Outcome: Not Progressing  Goal: Verbalize thoughts and feelings  Description: Interventions:  - Assess and re-assess patient's lethality and potential for self-injury  - Engage patient in 1:1 interactions, daily, for a minimum of 15 minutes  - Encourage patient to express feelings, fears, frustrations, hopes  - Establish rapport/trust with patient   3/13/2024 0533 by Alec Aguiar RN  Outcome: Progressing  3/12/2024 1635 by Alec Aguiar RN  Outcome: Progressing     Problem: Anxiety  Goal: Anxiety is at manageable level  Description: Interventions:  - Assess and monitor patient's anxiety level.   - Monitor for signs and symptoms (heart palpitations, chest pain, shortness of breath, headaches, nausea, feeling jumpy, restlessness, irritable, apprehensive).   - Collaborate with interdisciplinary team and initiate plan and interventions as ordered.  - [place] patient to unit/surroundings  - Explain treatment plan  - Encourage participation in care  - Encourage verbalization of concerns/fears  - Identify coping mechanisms  - Assist in developing anxiety-reducing skills  - Administer/offer alternative therapies  - Limit or  eliminate stimulants  3/13/2024 0533 by Alec Aguiar RN  Outcome: Progressing  3/12/2024 1635 by Alec Aguiar RN  Outcome: Not Progressing     Problem: PAIN - ADULT  Goal: Verbalizes/displays adequate comfort level or baseline comfort level  Description: Interventions:  - Encourage patient to monitor pain and request assistance  - Assess pain using appropriate pain scale  - Administer analgesics based on type and severity of pain and evaluate response  - Implement non-pharmacological measures as appropriate and evaluate response  - Consider cultural and social influences on pain and pain management  - Notify physician/advanced practitioner if interventions unsuccessful or patient reports new pain  Outcome: Not Progressing     Problem: SAFETY ADULT  Goal: Patient will remain free of falls  Description: INTERVENTIONS:  - Educate patient/family on patient safety including physical limitations  - Instruct patient to call for assistance with activity   - Consult OT/PT to assist with strengthening/mobility   - Keep Call bell within reach  - Keep bed low and locked with side rails adjusted as appropriate  - Keep care items and personal belongings within reach  - Initiate and maintain comfort rounds  - Make Fall Risk Sign visible to staff    - Apply yellow socks and bracelet for high fall risk patients  - Consider moving patient to room near nurses station  Outcome: Progressing

## 2024-03-13 NOTE — PROGRESS NOTES
03/13/24 0805   Team Meeting   Meeting Type Daily Rounds   Initial Conference Date 03/13/24   Next Conference Date 03/14/24   Team Members Present   Team Members Present Physician;Nurse;;   Physician Team Member Dr Benz, Dr Welch, Dr Mcmullen, MADAY Levy   Nursing Team Member Christina Gaona   Care Management Team Member Alisson   Social Work Team Member Bhumi   Patient/Family Present   Patient Present No   Patient's Family Present No     201, new admit, PRN tylenol given for right hip pain, OD on ativan and alcohol, son brought to ED for fall after taking ativan, endorses anxiety/depression, could benefit from a walker but states she is too young for that, spouse passed in 2021, drank after and then became sober since 2022 and went to  and met a significant other in  and they broke up in July, she then relapsed one time, discharge pending.

## 2024-03-13 NOTE — PROGRESS NOTES
03/13/24 1100   Activity/Group Checklist   Group Wellness  (guided imagery relaxation,[place] walk.)   Attendance Attended   Attendance Duration (min) 31-45   Interactions Interacted appropriately  (Pt. expressed awareness that meditation/relaxation can be done almost anywhere.Pt. left session just prior to its conclusion.)   Affect/Mood Normal range   Goals Achieved Able to listen to others

## 2024-03-13 NOTE — PROGRESS NOTES
03/13/24 1621   Admission   Release of Information Signed Yes  (SLPA; [facility]; Clint Fabian (son) [phone]; Jayshreevitaliy Fabian (son) [phone])

## 2024-03-13 NOTE — TREATMENT TEAM
03/12/24 1730   Provider Notification   Reason for Communication Admission   Provider Name Heide and Dr. Cha   Provider Role Attending physician  (and Martina)   Method of Communication Other (Comment)  (Tiger Text)   Response See orders   Notification Time 1730     PTA medications verified with patient. Medical and psych provider notified. Medication orders placed.

## 2024-03-13 NOTE — ASSESSMENT & PLAN NOTE
Present on admission and also noted 1 year ago, afebrile, likely due to bone marrow suppression from alcohol use  Monitor fever curve and CBC with differential as needed  Absolute alcohol abstinence

## 2024-03-13 NOTE — CASE MANAGEMENT
Case Management Assessment    Patient name Jael Fabian  Location /OABHU 644-02 MRN [id number]  : 1965 Date 3/13/2024       Current Admission Date: 3/12/2024  Current Admission Diagnosis:Severe episode of recurrent major depressive disorder, without psychotic features (HCC)   Patient Active Problem List    Diagnosis Date Noted    Alcohol use disorder 2024    Severe episode of recurrent major depressive disorder, without psychotic features (HCC) 2024    Leukopenia 2024    Medical clearance for psychiatric admission 2024    Osteoarthritis of right hip 2024    UTI (urinary tract infection) 2024    Pain of right hip 01/10/2024    Sensorineural hearing loss (SNHL), bilateral 2023    Lumbar radiculopathy 2022    Lumbar herniated disc 2022    Arthritis of carpometacarpal (CMC) joint of left thumb 2020    S/P gastric bypass 2018    Secondary non-renal hyperparathyroidism (HCC) 2015    Postsurgical malabsorption 2015    Spinal stenosis 2012      LOS (days): 1  Geometric Mean LOS (GMLOS) (days):   Days to GMLOS:     OBJECTIVE:    Risk of Unplanned Readmission Score: 12.12         Current admission status: Inpatient Psych  Referral Reason: Psych    Preferred Pharmacy:   [id number] - [place], [place] - [address]  [address] [address]  Phone: [phone] Fax: [phone]    [facility] ([place], [place] - [address]  [address] [address]  Phone: [phone] Fax: [phone]    [id number] [place] - [address]  [address] [address]  Phone: [phone] Fax: [phone]    Primary Care Provider: Rafael Snyder MD    Primary Insurance: [place]  Secondary Insurance:     ASSESSMENT:  Active Health Care Proxies    There are no active Health Care Proxies on file.                 Readmission Root  Cause  30 Day Readmission: No    Patient Information  Mental Status: Alert  Primary Caregiver: Self              Patient Information Continued  Income Source: Employed  Current Status:: 201         Means of Transportation  Means of Transport to Appts:: Drives Self      Social Determinants of Health (SDOH)      Flowsheet Row Most Recent Value   Housing Stability    In the last 12 months, was there a time when you were not able to pay the mortgage or rent on time? N   In the last 12 months, how many places have you lived? 1   In the last 12 months, was there a time when you did not have a steady place to sleep or slept in a shelter (including now)? N   Transportation Needs    In the past 12 months, has lack of transportation kept you from medical appointments or from getting medications? no   In the past 12 months, has lack of transportation kept you from meetings, work, or from getting things needed for daily living? No   Food Insecurity    Within the past 12 months, you worried that your food would run out before you got the money to buy more. Never true   Within the past 12 months, the food you bought just didn't last and you didn't have money to get more. Never true   Utilities    In the past 12 months has the electric, gas, oil, or water company threatened to shut off services in your home? No          Biopsychosocial Assessment  Older Adult Behavioral Health Unit  Social Work Case Management Note  Alisson Torres LMSW    Summary:   CM met with the patient to initiate assessment. Reviewed the admission and the role of CM. Patient was calm and cooperative throughout the assessment. 58 y.o.  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) who presented to the ED on 3/12/2024 BIB family to the ED on 3/12/2024 after intentional overdose on Ativan and alcohol. The patient signed 201 and got admitted to the inpatient psychiatry unit 6B for further psychiatric stabilization.      She reported that she is \"embarrassed\" of her overdose, stated: \"I am a professional.  It was so embarrassing\".  She reported that she doesn't want her job or the nursing board to know that she was here or in the PHP program if she does that. She noted that she has been feeling \"overwhelmed\" due to multiple recent stressors including her hip pain and upcoming hip replacement surgery, selling the house, being notified that her 23-year-old son is dropping classes in college as well as the news about one of her AA friends who recently passed away.  She noted that she was living in a 5000 sqft house with 5 acre land for past 20 years and it has been very high maintenance and she decided to downsize but was not \"emotionally ready\" as the house was sold just 3 days after was listed.  She noted that she was frustrated with removing the staff from the house for donations, packing/unpacking and moving to a new place while dealing with pain and multiple stressors.  She reported that she has a very good support system including AA friends, her 27-year-old son who recently  and other friends as well as her sisters.  She mentioned good function at work and noted that she loves her job.      Patient Name: Jael Fabian    Address: [address] [place] [address]    [id number] [place] [address] [address]     /Age: 1965, 58 y.o.    Admission Date: 3/12/24    Diagnosis/D&A: Severe episode of recurrent major depressive disorder, without psychotic features    County: [facility]    Commitment: 201     Admitted from: [facility] " ED    POA/Guardian: none    Living Situation: lives at home with her 23 year old son with ASD    Access to firearms: denies    Presenting Problem: Per ED, “58-year-old female who arrived to the emergency department via family vehicle.  Her 23-year-old son transported her after finding her on the floor with a bloody nose.  The patient later revealed that she had purposefully ingested 60 Ativan 0.5mg along with 12 cans of beer in an attempt to take her life.  The patient has been medically cleared.  She is alert and oriented.  Her speech is somewhat slow and she initially appears to be intent on going home, citing upcoming appointments and her obligation to work.  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  She currently resides in [place] with her 23-year-old son.  She does have another son who lives out of the home.  She reports that her  passed away in 2021.  She she reportedly began drinking heavily following his death and reports abusing alcohol for a year before becoming sober in 2022.  She had been sober since that time and had been dating someone she was acquainted with through .  They broke up in July.  She reports that she recently discovered that another friend from  passed away and that prompted her to begin drinking yesterday.  She reports that she continued drinking and then ingested the pills in a suicide attempt.  She apparently fell to the floor and her nose was bleeding.  She still has dried blood on her hands and face.  The patient indicated that she wished her son had not brought her to the hospital.  Crisis asked her to clarify and she stated she would have just lied on the floor.  The patient has a flat affect.  She does appear depressed.  She reports feeling depressed and reports feeling anxious about an upcoming move.  She reports that she is downsizing from a 5000 square foot home to a 2 bedroom apartment.  She also implies that her sons are not very helpful and  "that she is preparing to do this move independently and it is causing her a lot of stress.  The patient denies any past suicide attempts.  She denies past suicidal plans or actions.  She denies any past self-injurious behavior.  The patient denies any current or past homicidal ideas, plan, or intent.  She denies any history of violence or aggression to others.  She denies any hallucinations or delusions.  She denies paranoid thinking.  Her thinking is clear and logical, but her insight is somewhat impaired as she appeared to believe that she would be discharged home following the overdose attempt.  The patient does report difficulties with sleep.  She is able to fall asleep but struggles to stay asleep.  She estimates sleeping 4 hours per night.  She reports a loss of appetite.  Despite this she has gained approximately 15 pounds in the last few months.  The patient reports feeling safe at home.  She denies any abuse or traumatic events.  She does imply significant psychosocial loss and limited supports.  The patient reports that she has never been admitted psychiatrically and has no formal mental health providers.  Her PCP prescribes her Ativan.  The patient denies other drug use.  The patient was offered a 201.  She was hesitant.  Crisis did explain the options involving a 201 versus a 302.  She initially agreed to sign the voluntary but when presented she stated \"I am not signing that\".  Peter is again advised her of the implications of a 302 which would be pursued in this case.  She did agree to sign a 201 for voluntary treatment.  She is requesting to stay local, specifically [facility].”      Triggers for Hospitalization: recent downsizing to home, sons not helpful    Signs, symptoms, decompensation pattern: bickering with others when that is not her baseline, swearing and irritable    Previous psychiatric treatment: [facility]     Psychiatrist: None    Therapist: [facility] " [facility]ing    ACT/ICM/CPS/WRT/SC: none    PCP: [facility]    Psychiatric Medication history: Pristiq, Ativan    Family mental health history: son has ASD    History of physical aggression/violence: denies    History of self-harming behaviors, suicide attempts: SA via OD on Ativan and beer    Current family, children, significant relationships: 2 sisters, 2 sons (1 [place], 1 [place]), friend from AA, AA Sponsor    Childhood/Adolescent history: nothing significant    Cultural/Spiritual/Worldview considerations: Presybeterian    Legal Issues (past/present): denies    : 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    Strengths/Supports/Protective Factors: sons, sisters, AA sponsor, AA friends    Barriers/Limitations in Recovery: self-sabotaging    Patient identified goals for treatment: to get out of the hospital     LAKHWINDER's obtained: SL[facility]; [facility]; Clint Sheppardmiguel (son) [phone]; Shane Shepparddanyatata (son) [phone]    Discharge Disposition: attend AA meetings, referral for outpatient drug and alcohol counseling, referral for outpatient medication management with a psychiatrist, referral to partial hospitalization program, return to previous living arrangement.

## 2024-03-13 NOTE — TREATMENT TEAM
Pt completed admission self assessment and signed.  Copy in chart.  Reviewed group schedule and encouraged when able.      03/13/24 0930   Activity/Group Checklist   Group Admission/Discharge   Attendance Attended   Attendance Duration (min) 16-30   Interactions Interacted appropriately   Affect/Mood Constricted   Goals Achieved Identified feelings;Able to listen to others;Able to engage in interactions;Able to self-disclose;Able to recieve feedback

## 2024-03-13 NOTE — TREATMENT PLAN
TREATMENT PLAN REVIEW - Behavioral Health Jael Fabian 58 y.o. 1965 female MRN: [id number]    [facility] 6B OABHU Room / Bed: [facility] 644/[facility] 644-02 Encounter: [id number]          Admit Date/Time:  3/12/2024  3:21 PM    Treatment Team:   MD Alec Meade, KONG Willett LMSW Terry L Trittenbach, COTA Petra Stuckey, RN Karissa Marie Kormandy, CRNP    Diagnosis: Principal Problem:    Severe episode of recurrent major depressive disorder, without psychotic features (HCC)  Active Problems:    Alcohol use disorder      Patient Strengths/Assets: ability for insight, cooperative, communication skills, financial means, patient is on a voluntary commitment, supportive family/friends, well educated, work skills    Patient Barriers/Limitations: difficulty adapting, poor physical health, substance abuse, family conflicts    Short Term Goals: decrease in depressive symptoms, decrease in anxiety symptoms, decrease in suicidal thoughts, decrease in self abusive behaviors, ability to stay safe on the unit, improvement in ability to express basic needs, improvement in insight, improvement in reality testing, sleep improvement, mood stabilization, increase in group attendance    Long Term Goals: improvement in depression, stabilization of mood, free of suicidal thoughts, free of homicidal thoughts, acceptance of need for psychiatric medications, acceptance of need for psychiatric follow up after discharge    Progress Towards Goals: starting psychiatric medications as prescribed    Recommended Treatment: medication management, patient medication education, group therapy, milieu therapy, continued Behavioral Health psychiatric evaluation/assessment process    Treatment Frequency: daily medication monitoring, group and milieu therapy daily, monitoring through interdisciplinary  rounds, monitoring through weekly patient care conferences    Expected Discharge Date:  7 days    Discharge Plan: attend AA meetings, referral for outpatient drug and alcohol counseling, referral for outpatient medication management with a psychiatrist, referral for outpatient psychotherapy, referral to partial hospitalization program, return to previous living arrangement    Treatment Plan Created/Updated By: Koko Benz MD

## 2024-03-13 NOTE — ASSESSMENT & PLAN NOTE
Reports history of alcohol abuse.  Very vague on her details at this time.  Guarded during examination.  Monitor on CIWA  Encourage absolute alcohol abstinence  Start thiamine, folic acid, and multivitamin

## 2024-03-13 NOTE — NURSING NOTE
PRN Tylenol effective. Patient currently resting in bed with no outward signs of distress. Respirations even and unlabored.

## 2024-03-13 NOTE — DISCHARGE INSTR - OTHER ORDERS
You are being discharged to [address] [place] [address].     Triggers you have identified during your hospitalization that led to your admission include suicidal ideation, and distressed mood related to feelings of being overwhelmed with psychosocial stressors. Coping skills you have identified during your hospitalization include praying, meditating, swimming, and exercising. If you are unable to deal with your distressed mood alone please contact your therapist Magda with [facility] or your psychiatrist with [facility]. If that is not effective and you continue to have suicidal ideation, distressed mood, feeling overwhelmed, and/or in crisis please contact [facility] Crisis at [phone], dial 911 or go to the nearest emergency center.     [place] Warm Line: [phone]    A warm line is a phone service for people who are looking for support, engagement, and hope during non-emergency mental health struggles or distress. A warm line is staffed by peers who have personal or lived experience with mental health disorders and who can listen, share, and offer a sense of recovery. A warm line is different from a crisis line or hotline, which are intended for emergency situations    National Suicide Hotline: 159.814.7517    Crisis Text Line: 273149      [facility] Alcoholics Anonymous  1-807.576.6046  24 Hours a Day / 7 Days a Week    Marcella or Sonia, our Behavioral Health Nurse Navigators, will be calling you after your discharge, on the phone number that you provided.  They will be available as an additional support, if needed.     If you wish to speak with one of them, you may contact Marcella at [phone] or Sonia at [phone].

## 2024-03-13 NOTE — PROGRESS NOTES
03/13/24 1620   Referral Data   Referral Reason Psych   County Information   County of Residence [facility]   Readmission Root Cause   30 Day Readmission No   Patient Information   Mental Status Alert   Primary Caregiver Self   Support System Immediate family;Community;Friends;Religion   Judaism/Cultural Requests Baptist   Legal Information   Tx Plan Signed Yes   Current Status: 201   Legal Issues denies   Health Care Proxy Appointed No   Activities of Daily Living Prior to Admission   Functional Status Independent   Assistive Device No device needed   Living Arrangement House;Lives with someone  (Shane (son) age 23)   Ambulation Independent   Access to Firearms   Access to Firearms No   Income Information   Income Source Employed   Means of Transportation   Means of Transport to Appts: Drives Self

## 2024-03-13 NOTE — RESULT ENCOUNTER NOTE
Urine culture results were reviewed.  They are greater than 100,000 colony count of E. coli.  Will await sensitivities.  Patient is not on antibiotics.

## 2024-03-14 ENCOUNTER — TELEPHONE (OUTPATIENT)
Dept: PSYCHIATRY | Facility: CLINIC | Age: 59
End: 2024-03-14

## 2024-03-14 LAB — BACTERIA UR CULT: ABNORMAL

## 2024-03-14 PROCEDURE — 99232 SBSQ HOSP IP/OBS MODERATE 35: CPT | Performed by: STUDENT IN AN ORGANIZED HEALTH CARE EDUCATION/TRAINING PROGRAM

## 2024-03-14 RX ORDER — DESVENLAFAXINE SUCCINATE 50 MG/1
50 TABLET, EXTENDED RELEASE ORAL DAILY
Qty: 30 TABLET | Refills: 0 | Status: SHIPPED | OUTPATIENT
Start: 2024-03-15 | End: 2024-03-22 | Stop reason: SDUPTHER

## 2024-03-14 RX ORDER — LANOLIN ALCOHOL/MO/W.PET/CERES
100 CREAM (GRAM) TOPICAL DAILY
Qty: 30 TABLET | Refills: 0 | Status: SHIPPED | OUTPATIENT
Start: 2024-03-15 | End: 2024-04-14

## 2024-03-14 RX ORDER — METHOCARBAMOL 500 MG/1
500 TABLET, FILM COATED ORAL EVERY 6 HOURS PRN
Qty: 40 TABLET | Refills: 0 | Status: SHIPPED | OUTPATIENT
Start: 2024-03-14 | End: 2024-03-24

## 2024-03-14 RX ORDER — GABAPENTIN 100 MG/1
100 CAPSULE ORAL 3 TIMES DAILY
Qty: 90 CAPSULE | Refills: 0 | Status: SHIPPED | OUTPATIENT
Start: 2024-03-14 | End: 2024-03-22 | Stop reason: SDUPTHER

## 2024-03-14 RX ORDER — CEPHALEXIN 500 MG/1
500 CAPSULE ORAL EVERY 12 HOURS SCHEDULED
Qty: 11 CAPSULE | Refills: 0 | Status: SHIPPED | OUTPATIENT
Start: 2024-03-14 | End: 2024-03-20

## 2024-03-14 RX ORDER — HYDROXYZINE HYDROCHLORIDE 25 MG/1
25 TABLET, FILM COATED ORAL 3 TIMES DAILY PRN
Qty: 90 TABLET | Refills: 0 | Status: SHIPPED | OUTPATIENT
Start: 2024-03-14 | End: 2024-04-13

## 2024-03-14 RX ORDER — AMLODIPINE BESYLATE 5 MG/1
5 TABLET ORAL DAILY
Qty: 30 TABLET | Refills: 0 | Status: SHIPPED | OUTPATIENT
Start: 2024-03-15 | End: 2024-04-14

## 2024-03-14 RX ORDER — GABAPENTIN 100 MG/1
100 CAPSULE ORAL 3 TIMES DAILY
Status: DISCONTINUED | OUTPATIENT
Start: 2024-03-14 | End: 2024-03-15 | Stop reason: HOSPADM

## 2024-03-14 RX ORDER — TRAZODONE HYDROCHLORIDE 100 MG/1
100 TABLET ORAL
Qty: 30 TABLET | Refills: 0 | Status: SHIPPED | OUTPATIENT
Start: 2024-03-14 | End: 2024-03-22 | Stop reason: SDUPTHER

## 2024-03-14 RX ORDER — FOLIC ACID 1 MG/1
1 TABLET ORAL DAILY
Qty: 30 TABLET | Refills: 0 | Status: SHIPPED | OUTPATIENT
Start: 2024-03-15 | End: 2024-04-14

## 2024-03-14 RX ADMIN — FOLIC ACID 1 MG: 1 TABLET ORAL at 08:53

## 2024-03-14 RX ADMIN — CEPHALEXIN 500 MG: 500 CAPSULE ORAL at 08:52

## 2024-03-14 RX ADMIN — DESVENLAFAXINE 50 MG: 50 TABLET, FILM COATED, EXTENDED RELEASE ORAL at 08:53

## 2024-03-14 RX ADMIN — GABAPENTIN 100 MG: 100 CAPSULE ORAL at 21:25

## 2024-03-14 RX ADMIN — THIAMINE HCL TAB 100 MG 100 MG: 100 TAB at 08:52

## 2024-03-14 RX ADMIN — GABAPENTIN 100 MG: 100 CAPSULE ORAL at 16:55

## 2024-03-14 RX ADMIN — AMLODIPINE BESYLATE 5 MG: 5 TABLET ORAL at 08:52

## 2024-03-14 RX ADMIN — GABAPENTIN 100 MG: 100 CAPSULE ORAL at 10:20

## 2024-03-14 RX ADMIN — CEPHALEXIN 500 MG: 500 CAPSULE ORAL at 21:25

## 2024-03-14 RX ADMIN — MULTIPLE VITAMINS W/ MINERALS TAB 1 TABLET: TAB ORAL at 08:53

## 2024-03-14 NOTE — TREATMENT TEAM
03/13/24 1348   Pain Assessment   Pain Assessment Tool 0-10   Pain Score 4   Pain Location/Orientation Orientation: Right;Location: Hip     Patient reported pain in her Right hip. PRN Tylenol 650 mg given PO.

## 2024-03-14 NOTE — PROGRESS NOTES
03/14/24 0959   Team Meeting   Meeting Type Tx Team Meeting   Initial Conference Date 03/13/24   Next Conference Date 04/12/24   Team Members Present   Team Members Present Physician;Nurse;   Physician Team Member Dr Benz   Nursing Team Member Mony   Care Management Team Member Alisson   Patient/Family Present   Patient Present Yes   Patient's Family Present No   OTHER   Team Meeting - Additional Comments Met with the patient to go over her treatment plan. Goals discussed were to have a decrease in depressive symptoms, decrease in anxiety symptoms, decrease in suicidal thoughts, decrease in self abusive behaviors, ability to stay safe on the unit, improvement in ability to express basic needs, improvement in insight, improvement in reality testing, sleep improvement, mood stabilization, and an increase in group attendance. She can attain these goals with medication management and group/milieu therapy. Jael is in agreeent with her treatment plan and signed.

## 2024-03-14 NOTE — PLAN OF CARE
Problem: Risk for Self Injury/Neglect  Goal: Treatment Goal: Remain safe during length of stay, learn and adopt new coping skills, and be free of self-injurious ideation, impulses and acts at the time of discharge  Outcome: Progressing  Goal: Verbalize thoughts and feelings  Description: Interventions:  - Assess and re-assess patient's lethality and potential for self-injury  - Engage patient in 1:1 interactions, daily, for a minimum of 15 minutes  - Encourage patient to express feelings, fears, frustrations, hopes  - Establish rapport/trust with patient   Outcome: Progressing     Problem: Anxiety  Goal: Anxiety is at manageable level  Description: Interventions:  - Assess and monitor patient's anxiety level.   - Monitor for signs and symptoms (heart palpitations, chest pain, shortness of breath, headaches, nausea, feeling jumpy, restlessness, irritable, apprehensive).   - Collaborate with interdisciplinary team and initiate plan and interventions as ordered.  - [place] patient to unit/surroundings  - Explain treatment plan  - Encourage participation in care  - Encourage verbalization of concerns/fears  - Identify coping mechanisms  - Assist in developing anxiety-reducing skills  - Administer/offer alternative therapies  - Limit or eliminate stimulants  Outcome: Progressing     Problem: SAFETY ADULT  Goal: Patient will remain free of falls  Description: INTERVENTIONS:  - Educate patient/family on patient safety including physical limitations  - Instruct patient to call for assistance with activity   - Consult OT/PT to assist with strengthening/mobility   - Keep Call bell within reach  - Keep bed low and locked with side rails adjusted as appropriate  - Keep care items and personal belongings within reach  - Initiate and maintain comfort rounds  - Make Fall Risk Sign visible to staff  - Offer Toileting every 2 Hours, in advance of need  - Initiate/Maintain bed alarm  - Obtain necessary fall risk management  equipment: walker  - Apply yellow socks and bracelet for high fall risk patients  - Consider moving patient to room near nurses station  Outcome: Progressing

## 2024-03-14 NOTE — TREATMENT TEAM
Pt completed  relapse prevention plan in anticipation of d.c tomorrow.  Pt signed and copy in chart.  Crisis, warmline and 988 numbers provided.  Pharmacy [facility] in [place] and provider Dr Lechuga.  Reviewed group schedule, encouraged pt to attend groups and make needs known.      03/14/24 0930   Activity/Group Checklist   Group Admission/Discharge   Attendance Attended   Attendance Duration (min) 16-30   Interactions Interacted appropriately   Affect/Mood Appropriate   Goals Achieved Identified feelings;Identified relapse prevention strategies;Discussed coping strategies;Discussed safety plan;Discussed discharge plans;Able to listen to others;Able to engage in interactions;Able to self-disclose

## 2024-03-14 NOTE — PROGRESS NOTES
03/14/24 1548   Discharge Planning   Living Arrangements Lives w/ Children   Support Systems Self;Family members;Other (Comment);Organized support group (Comment);Psychiatrist;Therapist   Type of Current Residence Private residence   Current Home Care Services No   Other Referral/Resources/Interventions Provided:   Referrals Provided: Crisis Hotline;Psychiatrist   Discharge Communications   Discharge planning discussed with: Patient and her son Shane   Freedom of Choice Yes   IMM Given (Date):   (n/a)   Transportation at Discharge? No   Transport at Discharge  Auto with designated    Contacts   Patient Contacts Shane Riu   Relationship to Patient: Family   Contact Method Phone   Phone Number [phone]   Reason/Outcome Continuity of Care;Emergency Contact;Discharge Planning   [facility]   Would you like to participate in our [facility] service program?   No - Declined

## 2024-03-14 NOTE — DISCHARGE INSTR - APPOINTMENTS
[facility] [place] [address]  [place], [address]  [phone]     Provider Name: Dr Koko Benz  Appointment Date & Time: 4/25/24 @ 2 pm as VIRTUAL

## 2024-03-14 NOTE — NURSING NOTE
Patient Was alert and in the milieu during meals. Pleasant on approach, social with peers but with drawn to room at time. Reports anxiety no depression, denies all other psych s/s. Medication compliant and cooperative with care. Safety precautions maintained.

## 2024-03-14 NOTE — TELEPHONE ENCOUNTER
Per Dr. Benz pt has been scheduled as a Hospital DC on dates provided. 3/22/24 NP appt as well as 4 wk Follow up.

## 2024-03-14 NOTE — NURSING NOTE
Patient is alert, calm and cooperative. She denies anxiety and depression at time. She has been in her room reading most of the day quietly.  She denies SI/HI/AVH. She is withdrawn to herself however will interact with staff when approached. She is medication compliant and cooperative with care. Will continue to monitor while safety precautions are maintained.

## 2024-03-14 NOTE — PHYSICAL THERAPY NOTE
PT screen       03/14/24 0744   PT Last Visit   PT Visit Date 03/14/24   Note Type   Note type Screen   Additional Comments Spoke with patient who reports 5/10 R hip pain causing difficulty with ambulation.  Pt reports that she has completed PT in the past without relief and she has a pre-op appointment next wk for a hip replacement. Pt agrees with d/c PT order.     Ginny Jeffrey

## 2024-03-14 NOTE — CASE MANAGEMENT
Case Management Discharge Planning Note    Patient name Jael Fabian  Location /-02 MRN [id number]  : 1965 Date 3/14/2024       Current Admission Date: 3/12/2024  Current Admission Diagnosis:Severe episode of recurrent major depressive disorder, without psychotic features (HCC)   Patient Active Problem List    Diagnosis Date Noted    Alcohol use disorder 2024    Severe episode of recurrent major depressive disorder, without psychotic features (HCC) 2024    Leukopenia 2024    Medical clearance for psychiatric admission 2024    Osteoarthritis of right hip 2024    UTI (urinary tract infection) 2024    Pain of right hip 01/10/2024    Sensorineural hearing loss (SNHL), bilateral 2023    Lumbar radiculopathy 2022    Lumbar herniated disc 2022    Arthritis of carpometacarpal (CMC) joint of left thumb 2020    S/P gastric bypass 2018    Secondary non-renal hyperparathyroidism (HCC) 2015    Postsurgical malabsorption 2015    Spinal stenosis 2012      LOS (days): 2  Geometric Mean LOS (GMLOS) (days):   Days to GMLOS:     OBJECTIVE:  Risk of Unplanned Readmission Score: 13.4         Current admission status: Inpatient Psych   Preferred Pharmacy:   [id number] - [place], [place] - [address]  [address] [address]  Phone: [phone] Fax: [phone]    [facility] ([place], [place] - [address]  [address] [address]  Phone: [phone] Fax: [phone]    [id number] - [place] - [address]  [address] [address]  Phone: [phone] Fax: [phone]    Primary Care Provider: Rafael Snyder MD    Primary Insurance: [employer]  Secondary Insurance:     DISCHARGE DETAILS:    Discharge planning discussed with:: Patient and her son Shane  Freedom of Choice: Yes                    Contacts  Patient Contacts: Shane Fabian  Relationship to Patient:: Family  Contact Method: Phone  Phone Number: [phone]  Reason/Outcome: Continuity of Care, Emergency Contact, Discharge Planning              Other Referral/Resources/Interventions Provided:  Referrals Provided:: Crisis Hotline, Psychiatrist    Would you like to participate in our [facility] service program?  : No - Declined          Transport at Discharge : Auto with designated                              IMM Given (Date)::  (n/a)             CM met with PT for PT check in. PT was pleasant in conversation, reviewed discharge plan along with follow up care and supports, PT in agreement with all. PT denies si/hi/ah/vh anxiety and depression. PT expressed gratitude. She was going to do PHP but she was not able to since she is located In [place] and our providers are not credentialed with [place].     Aftercare:  [facility] - 3/22/24 @ 2:30 in person with Dr Tuyet HERNANDEZ - 4/25/24 @ 2 pm VIRTUAL with Dr Benz  [facility] - she will schedule with Magda when discharged

## 2024-03-14 NOTE — TREATMENT TEAM
03/13/24 1857   [place] Anxiety Scale   Anxious Mood 3   Tension 2   Fears 2   Insomnia 0   Intellectual 2   Depressed Mood 2   Somatic Complaints: Muscular 0   Somatic Complaints: Sensory 0   Cardiovascular Symptoms 0   Respiratory Symptoms 0   Gastrointestinal Symptoms 0   Genitourinary Symptoms 0   Autonomic Symptoms 0   Behavior at Interview 4   [place] Anxiety Score 15     Patient was feeling anxious. PRN Atarax 25 mg given PO.

## 2024-03-14 NOTE — PROGRESS NOTES
03/14/24 0740   Team Meeting   Meeting Type Daily Rounds   Initial Conference Date 03/14/24   Next Conference Date 03/15/24   Team Members Present   Team Members Present Physician;Nurse;;   Physician Team Member Dr Benz, Dr Welch, Dr Mcmullen, MADAY Kebede   Nursing Team Member Christina Gaona   Care Management Team Member Alisson   Social Work Team Member Bhumi   Patient/Family Present   Patient Present No   Patient's Family Present No     Discharge tomorrow at 11 am, would like scripts called in today, atarax, tylenol for hip pain, isolative in room, overwhelmed with psychosocial stressors, relapsed on alcohol, pristiq to 50 mg, small dose of gabapentin.

## 2024-03-14 NOTE — TELEPHONE ENCOUNTER
----- Message from Alisson Torres LMSW sent at 3/13/2024  2:07 PM EDT -----  Good afternoon,     Can you please schedule this patient, Jael Fabian,  [birth year] with Dr Benz on 3/22 at 2:30 pm in person as a new intake with a virtual f/u on  at 2 pm? This was confirmed by Dr Benz.     Thank you,   Alisson

## 2024-03-14 NOTE — PROGRESS NOTES
Progress Note - Behavioral Health   Jael Fabian 58 y.o. female MRN: [id number]  Unit/Bed#: [facility] 644-02 Encounter: [id number]       Patient was visited on unit for continuing care; chart reviewed and discussed with multidisciplinary treatment team. On approach, the patient was calm, pleasant and cooperative. Reported better mood but continues to report feeling anxious about selling her house and packing things for moving. Denied any changes in appetite, and energy level. No problem initiating and maintaining sleep after took Trazodone PRN. Denied A/VH currently. Vehemently denied SI/HI, intent or plan upon direct inquiry at this time and was remorseful about the OD. Appeared goal directed and future oriented, looking forward to getting back home to be with her family who reportedly has been calling her and remained in touch with her during the hospital stay. Mentioned her family and AA friends as the main protective factors.    Patient continues to be intermittently visible in the milieu and interacts with select staff and peers. No reports of aggression or self-injurious behavior on unit.  Received Atarax 25 mg p.o. as needed at 1857 yesterday for anxiety and trazodone 100 p.o. as needed for insomnia at 2227.    Patient accepted all offered medications and reported feeling better. No adverse effects of medications noted or reported.  Pristiq was uptitrated to 50 mg daily yesterday and Neurontin is being uptitrated to 100 mg TID.  Tentative discharge tomorrow with outpatient psychiatric follow-up at [facility] with the writer scheduled on 3/22/2024 and will continue individual psychotherapy and AA meetings.        Current Mental Status Evaluation:  Appearance and attitude: appeared as stated age, casually dressed, wearing eyeglasses, with good hygiene, reading a book in her room  Eye contact: good  Motor Function: within normal limits, No PMA/PMR  Gait/station: Not observed  Speech: normal for rate, rhythm, volume,  latency, amount  Language: No overt abnormality  Mood/affect: less anxious, less depressed / Affect was constricted but reactive, mood congruent  Thought Processes: sequential and goal-directed  Thought content: denies suicidal ideation or homicidal ideation; no delusions or first rank symptoms  Associations: intact associations  Perceptual disturbances: denies Auditory/Visual/Tactile Hallucinations  Orientation: oriented to time, person, place and to the situational context  Cognitive Function: intact  Memory: recent and remote memory grossly intact  Intellect: average  Fund of knowledge: aware of current events, aware of past history, and vocabulary average  Impulse control: good  Insight/judgment: fair/good    Pain: reported having pain in her hip  Pain scale: 6      Vital signs in last 24 hours:    Temp:  [97.5 °F (36.4 °C)-97.6 °F (36.4 °C)] 97.6 °F (36.4 °C)  HR:  [65-77] 74  Resp:  [16-18] 16  BP: (133-136)/(76-92) 136/92    Laboratory results: I have personally reviewed all pertinent laboratory/tests results    Results from the past 24 hours: No results found for this or any previous visit (from the past 24 hour(s)).    Progress Toward Goals: making gradual improvement    Assessment:  Principal Problem:    Severe episode of recurrent major depressive disorder, without psychotic features (HCC)  Active Problems:    Alcohol use disorder    Leukopenia    Medical clearance for psychiatric admission    Osteoarthritis of right hip    UTI (urinary tract infection)        Plan:  - f/u [facility] recs regarding the medical problems  - Continue medication titration and treatment plan; adjust medication to optimize treatment response and as clinically indicated.     Scheduled medications:  Current Facility-Administered Medications   Medication Dose Route Frequency Provider Last Rate    acetaminophen  650 mg Oral Q4H PRN KYLEE Ching      acetaminophen  650 mg Oral Q4H PRN KYLEE Ching      acetaminophen  975  mg Oral Q6H PRN Meenakshi Abarca, CRNP      amLODIPine  5 mg Oral Daily Reina Verdugosantos Jaeger, CRNP      cephalexin  500 mg Oral Q12H EFREN Reina Nathalia Jaeger, CRNP      desvenlafaxine succinate  50 mg Oral Daily Koko Benz MD      folic acid  1 mg Oral Daily Koko Benz MD      gabapentin  100 mg Oral TID Koko Benz MD      hydrOXYzine HCL  25 mg Oral Q6H PRN Max 4/day Meenakshigely Abarca, CRNP      LORazepam  1 mg Intramuscular Q6H PRN Max 3/day Meenakshigely Abarca, CRNP      LORazepam  0.5 mg Oral Q6H PRN Max 4/day Meenakshigely Abarca, CRNP      LORazepam  0.5 mg Oral HS PRN Atiya Cha MD      LORazepam  1 mg Oral Q6H PRN Max 3/day Meenakshigely Abarca, CRNP      methocarbamol  500 mg Oral Q6H PRN Reina Jaeger, CRNP      multivitamin-minerals  1 tablet Oral Daily Reina Jaeger, CRNP      OLANZapine  5 mg Intramuscular Q3H PRN Max 3/day Meenakshigely Abarca, CRNP      OLANZapine  2.5 mg Oral Q4H PRN Max 6/day Meenakshigely Abarca, CRNP      OLANZapine  5 mg Oral Q4H PRN Max 3/day Meenakshigely Abarca, CRNP      OLANZapine  5 mg Oral Q3H PRN Max 3/day Meeankshigely Abarca, CRNP      polyethylene glycol  17 g Oral Daily PRN Meenakshi Abarca, CRNP      senna-docusate sodium  1 tablet Oral Daily PRN Meenakshi Abarca, CRNP      thiamine  100 mg Oral Daily Koko Benz MD      traZODone  100 mg Oral HS PRN Koko Benz MD          PRN:    acetaminophen    acetaminophen    acetaminophen    hydrOXYzine HCL    LORazepam    LORazepam    LORazepam    LORazepam    methocarbamol    OLANZapine    OLANZapine    OLANZapine    OLANZapine    polyethylene glycol    senna-docusate sodium    traZODone    - Observation: routine    - VS: as per unit protocol  - Diet: Regular diet  - Psychoeducation (benefits and potential risks) discussed, importance of compliance with the psychiatric treatment reiterated, and the patient verbalized understanding of the matter  - Encourage group attendance and milieu therapy    - The pt was educated  and agreed to verbalize any suicidal thoughts, frustrations or concerns to the nursing staff, immediately.    - Dispo: TBD       Next of Kin  Extended Emergency Contact Information  Primary Emergency Contact: Clint Alex  Mobile Phone: [phone]  Relation: Son  Secondary Emergency Contact: mitra alex  Mobile Phone: [phone]  Relation: Son      Counseling / Coordination of Care  Patient's progress discussed with staff in treatment team meeting.  Medications, treatment progress and treatment plan reviewed with patient.  Medication changes discussed with patient.  Medication education provided to patient.  Coping skills reviewed with patient.  Supportive therapy provided to patient.  Cognitive techniques utilized during the session.  Reassurance and supportive therapy provided.  Encouraged participation in milieu and group therapy on the unit.  Crisis/safety plan discussed with patient.  Discharge plan discussed with patient.     Koko Benz MD  Attending Psychiatrist   [facility]       This note was completed in part utilizing Dragon dictation Software. Grammatical, translation, syntax errors, random word insertions, spelling mistakes, and incomplete sentences may be an occasional consequence of this system secondary to software limitations with voice recognition, ambient noise, and hardware issues. If you have any questions or concerns about the content, text, or information contained within the body of this dictation, please contact the provider for clarification.

## 2024-03-15 ENCOUNTER — TELEPHONE (OUTPATIENT)
Dept: NEUROSURGERY | Facility: CLINIC | Age: 59
End: 2024-03-15

## 2024-03-15 VITALS
TEMPERATURE: 97.8 F | WEIGHT: 155.2 LBS | RESPIRATION RATE: 16 BRPM | HEIGHT: 64 IN | OXYGEN SATURATION: 96 % | SYSTOLIC BLOOD PRESSURE: 148 MMHG | BODY MASS INDEX: 26.5 KG/M2 | DIASTOLIC BLOOD PRESSURE: 80 MMHG | HEART RATE: 70 BPM

## 2024-03-15 PROCEDURE — 99238 HOSP IP/OBS DSCHRG MGMT 30/<: CPT | Performed by: STUDENT IN AN ORGANIZED HEALTH CARE EDUCATION/TRAINING PROGRAM

## 2024-03-15 RX ADMIN — THIAMINE HCL TAB 100 MG 100 MG: 100 TAB at 09:02

## 2024-03-15 RX ADMIN — MULTIPLE VITAMINS W/ MINERALS TAB 1 TABLET: TAB ORAL at 09:02

## 2024-03-15 RX ADMIN — DESVENLAFAXINE 50 MG: 50 TABLET, FILM COATED, EXTENDED RELEASE ORAL at 09:02

## 2024-03-15 RX ADMIN — AMLODIPINE BESYLATE 5 MG: 5 TABLET ORAL at 08:42

## 2024-03-15 RX ADMIN — FOLIC ACID 1 MG: 1 TABLET ORAL at 09:03

## 2024-03-15 RX ADMIN — CEPHALEXIN 500 MG: 500 CAPSULE ORAL at 09:02

## 2024-03-15 RX ADMIN — GABAPENTIN 100 MG: 100 CAPSULE ORAL at 09:02

## 2024-03-15 NOTE — PLAN OF CARE
Problem: Ineffective Coping  Goal: Identifies ineffective coping skills  Outcome: Adequate for Discharge  Goal: Demonstrates healthy coping skills  Outcome: Adequate for Discharge     Problem: Risk for Self Injury/Neglect  Goal: Treatment Goal: Remain safe during length of stay, learn and adopt new coping skills, and be free of self-injurious ideation, impulses and acts at the time of discharge  Outcome: Adequate for Discharge  Goal: Verbalize thoughts and feelings  Description: Interventions:  - Assess and re-assess patient's lethality and potential for self-injury  - Engage patient in 1:1 interactions, daily, for a minimum of 15 minutes  - Encourage patient to express feelings, fears, frustrations, hopes  - Establish rapport/trust with patient   Outcome: Adequate for Discharge     Problem: Anxiety  Goal: Anxiety is at manageable level  Description: Interventions:  - Assess and monitor patient's anxiety level.   - Monitor for signs and symptoms (heart palpitations, chest pain, shortness of breath, headaches, nausea, feeling jumpy, restlessness, irritable, apprehensive).   - Collaborate with interdisciplinary team and initiate plan and interventions as ordered.  - [place] patient to unit/surroundings  - Explain treatment plan  - Encourage participation in care  - Encourage verbalization of concerns/fears  - Identify coping mechanisms  - Assist in developing anxiety-reducing skills  - Administer/offer alternative therapies  - Limit or eliminate stimulants  Outcome: Adequate for Discharge     Problem: PAIN - ADULT  Goal: Verbalizes/displays adequate comfort level or baseline comfort level  Description: Interventions:  - Encourage patient to monitor pain and request assistance  - Assess pain using appropriate pain scale  - Administer analgesics based on type and severity of pain and evaluate response  - Implement non-pharmacological measures as appropriate and evaluate response  - Consider cultural and social  influences on pain and pain management  - Notify physician/advanced practitioner if interventions unsuccessful or patient reports new pain  Outcome: Adequate for Discharge     Problem: SAFETY ADULT  Goal: Patient will remain free of falls  Description: INTERVENTIONS:  - Educate patient/family on patient safety including physical limitations  - Instruct patient to call for assistance with activity   - Consult OT/PT to assist with strengthening/mobility   - Keep Call bell within reach  - Keep bed low and locked with side rails adjusted as appropriate  - Keep care items and personal belongings within reach  - Initiate and maintain comfort rounds  - Make Fall Risk Sign visible to staff  - Apply yellow socks and bracelet for high fall risk patients  - Consider moving patient to room near nurses station  Outcome: Adequate for Discharge     Problem: DISCHARGE PLANNING - CARE MANAGEMENT  Goal: Discharge to post-acute care or home with appropriate resources  Description: INTERVENTIONS:  - Conduct assessment to determine patient/family and health care team treatment goals, and need for post-acute services based on payer coverage, community resources, and patient preferences, and barriers to discharge  - Address psychosocial, clinical, and financial barriers to discharge as identified in assessment in conjunction with the patient/family and health care team  - Arrange appropriate level of post-acute services according to patient’s   needs and preference and payer coverage in collaboration with the physician and health care team  - Communicate with and update the patient/family, physician, and health care team regarding progress on the discharge plan  - Arrange appropriate transportation to post-acute venues  Outcome: Adequate for Discharge     Problem: SAFETY ADULT  Goal: Patient will remain free of falls  Description: INTERVENTIONS:  - Educate patient/family on patient safety including physical limitations  - Instruct patient  to call for assistance with activity   - Consult OT/PT to assist with strengthening/mobility   - Keep Call bell within reach  - Keep bed low and locked with side rails adjusted as appropriate  - Keep care items and personal belongings within reach  - Initiate and maintain comfort rounds  - Make Fall Risk Sign visible to staff  -  Obtain necessary fall risk management equipment:   Problem: DISCHARGE PLANNING - CARE MANAGEMENT  Goal: Discharge to post-acute care or home with appropriate resources  Description: INTERVENTIONS:  - Conduct assessment to determine patient/family and health care team treatment goals, and need for post-acute services based on payer coverage, community resources, and patient preferences, and barriers to discharge  - Address psychosocial, clinical, and financial barriers to discharge as identified in assessment in conjunction with the patient/family and health care team  - Arrange appropriate level of post-acute services according to patient’s   needs and preference and payer coverage in collaboration with the physician and health care team  - Communicate with and update the patient/family, physician, and health care team regarding progress on the discharge plan  - Arrange appropriate transportation to post-acute venues  Outcome: Adequate for Discharge     - Apply yellow socks and bracelet for high fall risk patients  - Consider moving patient to room near nurses station  Outcome: Adequate for Discharge

## 2024-03-15 NOTE — TREATMENT TEAM
03/15/24 0739   Team Meeting   Meeting Type Daily Rounds   Initial Conference Date 03/15/24   Team Members Present   Team Members Present Physician;Nurse;;   Physician Team Member Dr. Yuri Bess   Nursing Team Member [facility] Team Member Shelia   Social Work Team Member Bhumi   Patient/Family Present   Patient Present No   Patient's Family Present No     Discharge today at 11h.

## 2024-03-15 NOTE — BH TRANSITION RECORD
"Contact Information: If you have any questions, concerns, pended studies, tests and/or procedures, or emergencies regarding your inpatient behavioral health visit. Please contact [facility] acute care unit [phone] and ask to speak to a , nurse or physician. A contact is available 24 hours/ 7 days a week at this number.     Summary of Procedures Performed During your Stay:  Below is a list of major procedures performed during your hospital stay and a summary of results:  - Cardiac Procedures/Studies: EKG see below.  - Major Imaging Studies: Head CT and CR spine (see below).      WBC   Date Value Ref Range Status   03/13/2024 2.41 (L) 4.31 - 10.16 Thousand/uL Final   05/01/2015 4.58 4.31 - 10.16 Thousand/uL Final     WBC, UA   Date Value Ref Range Status   03/12/2024 10-20 (A) None Seen, 1-2 /hpf Final     MCV   Date Value Ref Range Status   03/13/2024 93 82 - 98 fL Final   07/15/2019 91.4 80.0 - 100.0 fL Final   05/01/2015 89 82 - 98 fL Final     Lab Results   Component Value Date    BUN 11 03/13/2024    SODIUM 139 03/13/2024    CO2 27 03/13/2024     Lab Results   Component Value Date    ALKPHOS 77 03/13/2024     No results found for: \"CPK\", \"CKMB\"  No results found for: \"TSH\"  INR   Date Value Ref Range Status   03/12/2024 0.98 0.84 - 1.19 Final     No results found for: \"APTT\"  No results found for: \"PHENO\"  Sodium   Date Value Ref Range Status   03/13/2024 139 135 - 147 mmol/L Final   07/15/2019 140 135 - 146 mmol/L Final     BUN   Date Value Ref Range Status   03/13/2024 11 5 - 25 mg/dL Final   07/15/2019 10 7 - 25 mg/dL Final     SL AMB BUN/CREATININE RATIO   Date Value Ref Range Status   07/15/2019 NOT APPLICABLE 6 - 22 (calc) Final     Creatinine   Date Value Ref Range Status   03/13/2024 0.68 0.60 - 1.30 mg/dL Final     Comment:     Standardized to IDMS reference method   06/08/2015 0.65 0.60 - 1.30 mg/dL Final     Comment:     Standardized to IDMS reference method     TSH 3RD " "GENERATON   Date Value Ref Range Status   03/13/2024 1.681 0.450 - 4.500 uIU/mL Final     Comment:     The recommended reference ranges for TSH during pregnancy are as follows:   First trimester 0.100 to 2.500 uIU/mL   Second trimester  0.200 to 3.000 uIU/mL   Third trimester 0.300 to 3.000 uIU/m    Note: Normal ranges may not apply to patients who are transgender, non-binary, or whose legal sex, sex at birth, and gender identity differ.  Adult TSH (3rd generation) reference range follows the recommended guidelines of the American Thy[employer] Association, January, 2020.     WBC   Date Value Ref Range Status   03/13/2024 2.41 (L) 4.31 - 10.16 Thousand/uL Final   05/01/2015 4.58 4.31 - 10.16 Thousand/uL Final     WBC, UA   Date Value Ref Range Status   03/12/2024 10-20 (A) None Seen, 1-2 /hpf Final     No components found for: \"B12\"  Lab Results   Component Value Date    FOLATE 14.0 03/13/2024     No results found for: \"RPR\"    Imaging  CT Head 3/12/24: unremarkable  CT Spine & Cervical 3/12/24: unremarkable    EKG 3/12/24: normal EKG, normal sinus rhythm, unchanged from previous tracings.      Pending Studies (From admission, onward)      None          Please follow up on the above pending studies with your PCP and/or referring provider.  "

## 2024-03-15 NOTE — DISCHARGE SUMMARY
"Discharge Summary - Behavioral Health   Jael Fabian 58 y.o. female MRN: [id number]  Unit/Bed#: [facility] 644-02 Encounter: [id number]     Admission Date:   Admission Orders (From admission, onward)       Ordered        03/12/24 1607  ED TO DIFFERENT CAMPUS [facility] UNIT or INPATIENT MEDICAL UNIT to [facility] UNIT (using Discharge Readmit Navigator) - Admit Patient to IP Behavioral Health Unit  Once                                Discharge Date: 03/15/24     Attending Psychiatrist: Koko Benz MD    Reason for Admission/HPI:   History of Present Illness  (as per Koko Benz MD on 3/13/24:)  Jael Fabian is a 58 y.o.  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, w/ PMH of hip arthritis, lumbar radiculopathy, spinal stenosis, s/p gastric bypass, secondary hyperparathyroidism, SNHL and PPH of alcohol use disorder, depression and anxiety, 1 prior psychiatric admission (in June 2021 due to depressive symptoms following her 's death),no prior SA (except the recent overdose which led to this admission), no h/o self-injurious behavior, currently in therapy ([facility]) and on Pristiq 25 mg daily, trazodone 100 mg nightly and Ativan 0.5 mg twice daily as needed prescribed by her PCP who presented to the ED on 3/12/2024 BIB family to the ED on 3/12/2024 after intentional overdose on Ativan and alcohol. The patient signed 201 and got admitted to the inpatient psychiatry unit 6B for further psychiatric stabilization.       As per ED CW's note on 3/12/24: \"The patient is a 58-year-old female who arrived to the emergency department via family vehicle.  Her 23-year-old son transported her after finding her on the floor with a bloody nose.  The patient later revealed that she had purposefully ingested 60 Ativan 0.5mg along with 12 cans of beer in an attempt to take her life.  The patient has been " medically cleared.  She is alert and oriented.  Her speech is somewhat slow and she initially appears to be intent on going home, citing upcoming appointments and her obligation to work.  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  She currently resides in [place] with her 23-year-old son.  She does have another son who lives out of the home.  She reports that her  passed away in 2021.  She she reportedly began drinking heavily following his death and reports abusing alcohol for a year before becoming sober in 2022.  She had been sober since that time and had been dating someone she was acquainted with through .  They broke up in July.  She reports that she recently discovered that another friend from  passed away and that prompted her to begin drinking yesterday.  She reports that she continued drinking and then ingested the pills in a suicide attempt.  She apparently fell to the floor and her nose was bleeding.  She still has dried blood on her hands and face.  The patient indicated that she wished her son had not brought her to the hospital.  Crisis asked her to clarify and she stated she would have just lied on the floor.  The patient has a flat affect.  She does appear depressed.  She reports feeling depressed and reports feeling anxious about an upcoming move.  She reports that she is downsizing from a 5000 square foot home to a 2 bedroom apartment.  She also implies that her sons are not very helpful and that she is preparing to do this move independently and it is causing her a lot of stress.  The patient denies any past suicide attempts.  She denies past suicidal plans or actions.  She denies any past self-injurious behavior.  The patient denies any current or past homicidal ideas, plan, or intent.  She denies any history of violence or aggression to others.  She denies any hallucinations or delusions.  She denies paranoid thinking.  Her thinking is clear and logical, but her  "insight is somewhat impaired as she appeared to believe that she would be discharged home following the overdose attempt.  The patient does report difficulties with sleep.  She is able to fall asleep but struggles to stay asleep.  She estimates sleeping 4 hours per night.  She reports a loss of appetite.  Despite this she has gained approximately 15 pounds in the last few months.  The patient reports feeling safe at home.  She denies any abuse or traumatic events.  She does imply significant psychosocial loss and limited supports.  The patient reports that she has never been admitted psychiatrically and has no formal mental health providers.  Her PCP prescribes her Ativan.  The patient denies other drug use.  The patient was offered a 201.  She was hesitant.  Crisis did explain the options involving a 201 versus a 302.  She initially agreed to sign the voluntary but when presented she stated \"I am not signing that\".  Peter is again advised her of the implications of a 302 which would be pursued in this case.  She did agree to sign a 201 for voluntary treatment.  She is requesting to stay local, specifically [facility].  Referral has been faxed to intake.\"     The pt was visited on the unit; chart reviewed. Presented calm, cooperative and well related, dressed in hospital attire, w/ fair hygiene, good eye contact, depressed and anxious mood, constricted but reactive affect, tearful at times while talking about her stressors and her late , talking in normal tone, volume and amount, w/ linear thought process, fair insight and judgement.  She reported that she is \"embarrassed\" of her overdose, stated: \"I am a professional.  It was so embarrassing\".  She noted that she has been feeling \"overwhelmed\" due to multiple recent stressors including her hip pain and upcoming hip replacement surgery, selling the house, being notified that her 23-year-old son is dropping classes in college as well as the news about one of her AA " "friends who recently passed away.  She noted that she was living in a large house with 5 acre land for past 20 years and it has been very high maintenance and she decided to downsize but was not \"emotionally ready\" as the house was sold just 3 days after was listed.  She noted that she was frustrated of removing the staff from the house make them ready for donation as well as packing/unpacking and moving to a new place while dealing with pain and multiple stressors.  She reportedly was in good mood until recently and mentioned that she has a very good support system including AA friends, her 27-year-old son who recently  and other friends.  She mentioned good function at work and noted that she loves her job.       The patient relapsed on drinking about 2 weeks ago after being sober since , and reportedly has been drinking 6 pack of beers in 2 days.  She mentioned that on the night she had a fall, she opened up a beer and then took 1 Ativan to feel relaxed and then was not sure if she had taken the Ativan or not and took another one, and then could not recall if she took any more because she just blacked out.  She mentioned that she feels remorseful about the overdose and mentioned that she does not want to die, noted that her son was crying after that and she feels sorry about making her family worried.  She reported depressed and anxious mood with interrupted sleep lately.  Denied any changes in appetite and reported decreased energy level. Denied A/VH. No manic sxs, paranoid ideations or fixed delusions were elicited. Denied any history of eating disorder or obsessive/compulsive sxs.  Vehemently denied SI/HI, intent or plan upon direct inquiry at this time. The patient agreed to verbalize any negative thoughts or concerns to the nursing staff, immediately.         Denied any prior h/o self-injurious behavior or SA. No known FH of psychiatric problems. Her sister who was a nurse  in  due to OD " on propofol (unclear if it was intentional or accidental OD).  Denied h/o physical or sexual abuse.  She reportedly found her  dead on the floor in 2021 and as per patient he passed away due to reaction to COVID vaccine.  She reported recurrent memory of that day but denied any flashbacks, nightmares or other dissociative symptoms.     She noted that she was started on Pristiq in 2018 by her PCP as reportedly her 23-year-old son has been taking Pristiq as well.  She has never been on any other psychiatric medication and endorsed good response to Pristiq which was 50 mg at some point but the dose was decreased as her symptoms improved.      Hospital Course:   The patient was admitted to the inpatient psychiatric unit and started on every 15 minutes precautions.    A treatment plan was formed with focus on pharmacotherapy and milieu therapy, group therapy and individual psychotherapy when indicated.     Psychiatric medications were titrated over the hospital stay and milieu therapy was utilized.   To address depressive symptoms and potential alcohol withdrawal symptoms the patient was started on antidepressant Pristiq and dose uptitrated to 50 mg daily and also on Neurontin 100 mg nightly which uptitrated to 100 mg TID. Medication doses were titrated during the hospital course.      The patient did not show any changes in his vital signs or symptoms suggestive of alcohol withdrawal.  Patient gained more insight into drinking pattern and how it is affecting all aspects of  life.     Patient's symptoms improved gradually over the hospital course. At the end of treatment the patient was doing well. Mood was stable at the time of discharge. The patient denied suicidal ideation, intent or plan at the time of discharge and denied homicidal ideation, plan or intent at the time of discharge. There was no overt psychosis at the time of discharge. There were no signs or symptoms of PTSD or panic attacks.   Sleep and  "appetite were improved. The patient was tolerating medications and was not reporting any significant side effects at the time of discharge.    Patient was discharged on Pristiq 50 mg po daily, Neurontin 100 mg po TID, Trazodone 100 mg po nightly PRN, Atarax 25 mg po TID PRN, Folic acid, thiamine and MVI    At present, as discussed with the team, the patient has maximized treatment at the acute inpatient setting.  The patient can continue treatment at the outpatient setting.  At present,the patient does not pose any acute apparent danger to self or others.  Denied suicidal or homicidal ideation, intent or plan upon direct inquiry at this time; no episode of agitation or self-injurious behavior in the unit.  The patient has been informed of medication regimen and treatment follow-up schedule, reiterated the importance of medication and outpatient follow-up adherence and verbalized understanding of the matter. The patient agrees with current treatment plan and [facility] (appointment on 3/22/24) and will continue AA meetings.    Mental Status at time of Discharge:   Appearance and attitude: appeared as stated age, cooperative and attentive, casually dressed, wearing eyeglasses, with good hygiene  Eye contact: good  Motor Function: within normal limits, No PMA/PMR  Gait/station: limping due to R hip pain  Speech: normal for rate, rhythm, volume, latency, amount  Language: No overt abnormality  Mood/affect: \"good\" / Affect was constricted but reactive, mood congruent  Thought Processes: sequential and goal-directed  Thought content: denies suicidal ideation or homicidal ideation; no delusions or first rank symptoms  Associations: intact associations  Perceptual disturbances: denies Auditory/Visual/Tactile Hallucinations  Orientation: oriented to time, person, place and to the situational context  Cognitive Function: intact  Memory: recent and remote memory grossly intact  Intellect: " average  Fund of knowledge: aware of current events, aware of past history, and vocabulary average  Impulse control: good  Insight/judgment: fair/good    Discharge Diagnosis:   Principal Problem:    Severe episode of recurrent major depressive disorder, without psychotic features (HCC)  Active Problems:    Alcohol use disorder    Leukopenia    Medical clearance for psychiatric admission    Osteoarthritis of right hip    UTI (urinary tract infection)      Medical Problems       Resolved Problems  Date Reviewed: 3/15/2024   None             Discharge Medications:  See after visit summary for reconciled discharge medications provided to patient and family.      Discharge instructions/Information to patient and family:   See after visit summary for information provided to patient and family.      Provisions for Follow-Up Care:  See after visit summary for information related to follow-up care and any pertinent home health orders.      Discharge Statement   I spent 30 minutes discharging the patient. This time was spent on the day of discharge. I had direct contact with the patient on the day of discharge. Additional documentation is required if more than 30 minutes were spent on discharge.

## 2024-03-15 NOTE — NURSING NOTE
Patient discharged from the unit on this day. Denied SI/HI, psychosis and/or A/V.  No questions verbalized. Patient is in agreement with discharge.  Patient provided with after care appointment, discharge instructions and medication regime reviewed. Affect bright.   Accompanied to the lobby where pt was met by  son. All belongings returned.

## 2024-03-20 ENCOUNTER — OFFICE VISIT (OUTPATIENT)
Dept: OBGYN CLINIC | Facility: CLINIC | Age: 59
End: 2024-03-20
Payer: COMMERCIAL

## 2024-03-20 VITALS
WEIGHT: 158 LBS | DIASTOLIC BLOOD PRESSURE: 79 MMHG | HEART RATE: 73 BPM | BODY MASS INDEX: 26.98 KG/M2 | SYSTOLIC BLOOD PRESSURE: 123 MMHG | HEIGHT: 64 IN

## 2024-03-20 DIAGNOSIS — M25.551 RIGHT HIP PAIN: ICD-10-CM

## 2024-03-20 DIAGNOSIS — Z01.818 PRE-OPERATIVE EXAMINATION: ICD-10-CM

## 2024-03-20 DIAGNOSIS — F10.11 ALCOHOL ABUSE, IN REMISSION: ICD-10-CM

## 2024-03-20 DIAGNOSIS — E21.1 SECONDARY NON-RENAL HYPERPARATHYROIDISM (HCC): ICD-10-CM

## 2024-03-20 DIAGNOSIS — K91.2 POSTSURGICAL MALABSORPTION: ICD-10-CM

## 2024-03-20 DIAGNOSIS — Z98.84 S/P GASTRIC BYPASS: ICD-10-CM

## 2024-03-20 DIAGNOSIS — M16.11 PRIMARY OSTEOARTHRITIS OF RIGHT HIP: Primary | ICD-10-CM

## 2024-03-20 PROBLEM — F10.90 ALCOHOL USE DISORDER: Status: RESOLVED | Noted: 2024-03-13 | Resolved: 2024-03-20

## 2024-03-20 PROBLEM — Z00.8 MEDICAL CLEARANCE FOR PSYCHIATRIC ADMISSION: Status: RESOLVED | Noted: 2024-03-13 | Resolved: 2024-03-20

## 2024-03-20 PROCEDURE — 99215 OFFICE O/P EST HI 40 MIN: CPT | Performed by: ORTHOPAEDIC SURGERY

## 2024-03-20 RX ORDER — ACETAMINOPHEN 325 MG/1
975 TABLET ORAL ONCE
OUTPATIENT
Start: 2024-03-20 | End: 2024-03-20

## 2024-03-20 RX ORDER — GABAPENTIN 300 MG/1
300 CAPSULE ORAL ONCE
OUTPATIENT
Start: 2024-03-20 | End: 2024-03-20

## 2024-03-20 RX ORDER — ZINC SULFATE 50(220)MG
220 CAPSULE ORAL DAILY
Qty: 30 CAPSULE | Refills: 0 | Status: SHIPPED | OUTPATIENT
Start: 2024-03-20

## 2024-03-20 RX ORDER — MELATONIN
1000 DAILY
Qty: 30 TABLET | Refills: 0 | Status: SHIPPED | OUTPATIENT
Start: 2024-03-20

## 2024-03-20 RX ORDER — FERROUS SULFATE 324(65)MG
324 TABLET, DELAYED RELEASE (ENTERIC COATED) ORAL
Qty: 30 TABLET | Refills: 0 | Status: SHIPPED | OUTPATIENT
Start: 2024-03-20

## 2024-03-20 RX ORDER — FOLIC ACID 1 MG/1
1 TABLET ORAL DAILY
Qty: 30 TABLET | Refills: 0 | Status: SHIPPED | OUTPATIENT
Start: 2024-03-20 | End: 2024-04-19

## 2024-03-20 RX ORDER — CHLORHEXIDINE GLUCONATE ORAL RINSE 1.2 MG/ML
15 SOLUTION DENTAL ONCE
OUTPATIENT
Start: 2024-03-20 | End: 2024-03-20

## 2024-03-20 NOTE — PROGRESS NOTES
Assessment/Plan:  1. Primary osteoarthritis of right hip  zinc sulfate (ZINCATE) 220 mg capsule    ferrous sulfate 324 (65 Fe) mg    folic acid (FOLVITE) 1 mg tablet    cholecalciferol (VITAMIN D3) 1,000 units tablet    ascorbic Acid (VITAMIN C) 500 MG CPCR    Case request operating room: ARTHROPLASTY HIP TOTAL ANTERIOR,NAVIGATED - same day    Hemoglobin A1C W/EAG Estimation    MRSA culture    Ambulatory referral to Family Practice    Ambulatory referral to Physical Therapy    EKG 12 lead    Case request operating room: ARTHROPLASTY HIP TOTAL ANTERIOR,NAVIGATED - same day      2. Right hip pain  zinc sulfate (ZINCATE) 220 mg capsule    ferrous sulfate 324 (65 Fe) mg    folic acid (FOLVITE) 1 mg tablet    cholecalciferol (VITAMIN D3) 1,000 units tablet    ascorbic Acid (VITAMIN C) 500 MG CPCR    Case request operating room: ARTHROPLASTY HIP TOTAL ANTERIOR,NAVIGATED - same day    Hemoglobin A1C W/EAG Estimation    MRSA culture    Ambulatory referral to Family Practice    Ambulatory referral to Physical Therapy    EKG 12 lead    Case request operating room: ARTHROPLASTY HIP TOTAL ANTERIOR,NAVIGATED - same day      3. S/P gastric bypass  Ambulatory referral to Family Practice      4. Postsurgical malabsorption  Ambulatory referral to Family Practice      5. Secondary non-renal hyperparathyroidism (HCC)  Ambulatory referral to Family Practice      6. Alcohol abuse, in remission        7. 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  Unfortunately, she has now demonstrated failure of all forms of conservative treatment with Tylenol, weight loss, ambulatory assistive devices, home exercises, and a recent intra-articular cortisone injection.  She continues to be limited in her ability to perform age-appropriate activities of daily living, enjoyment, and employment.  As such, we feel that she is a candidate for a total hip arthroplasty.  Pre-, peña-, and postoperative expectations were discussed.  Risk of surgery including but not limited to bleeding, infection, stiffness, need for further surgery, persistent limp, persistent pain, leg length discrepancy, damage to vessels or nerves, blood clots, heart attack, stroke, and death were discussed.  Consents were signed and placed into the chart for a direct anterior right total hip arthroplasty.  She denies any history of malignancy, DVT or PE, gastric ulcer or GI bleed, smoking or use of nicotine products, or use of over-the-counter supplements such as tumeric.  She does have a history of MRSA infection near the ear.  She does have a history of gastric bypass, and therefore would not be eligible for NSAIDs postoperatively.  She states that she has support at home with her son, and is therefore a candidate for same-day discharge and as well as outpatient physical therapy.  She will need clearance from her primary care physician prior to the intended procedure.  In addition, regarding the patient's recent ER admission and subsequent care related to it, I discussed her current care and status with her psychiatrist and received psychiatric clearance for her total hip procedure.  She would not be eligible for surgery until after April 10 due to her recent injection.  She was introduced today to our surgical scheduler for further planning, and we will forward to taking care of her in the near future    Subjective: Right hip  follow up    Patient ID: Jael Fabian is a 58 y.o. female very well-known to our practice for her activity related right hip and groin pain due to her severe underlying osteoarthritis.  She underwent an intra-articular right hip cortisone injection on January 10, which she states provided only temporary relief.  She has had a return of her activity related discomfort up to 8 out of 10 on a daily basis, which is limited her ability to perform activities of daily living and enjoyment.  She is not eligible for NSAIDs due to history of bariatric surgery, and has not had any relief from Tylenol.  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  Patient admits that she had a recent ER admission.  This admission was reviewed.    Review of Systems   Constitutional:  Positive for activity change.   HENT: Negative.     Eyes: Negative.    Respiratory: Negative.     Cardiovascular:  Positive for leg swelling.   Gastrointestinal: Negative.    Endocrine: Negative.    Genitourinary: Negative.    Musculoskeletal:  Positive for arthralgias, gait problem, joint swelling and myalgias.   Skin: Negative.    Allergic/Immunologic: Negative.    Hematological: Negative.    Psychiatric/Behavioral: Negative.       Past Medical History:   Diagnosis Date    Abnormal Pap smear of cervix 1987    Anxiety     Depression     Ear problems 1996    Fibrocystic breast     GERD (gastroesophageal reflux disease) 2025    Herpes 1987    High vitamin A level     History of anxiety     History of hypercholesterolemia     History of hypertension     History of obesity     HPV (human papilloma virus) infection 1987    Hyperlipidemia     Lordosis     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    Scoliosis     Secondary hyperparathyroidism, non-renal (HCC)     Spinal stenosis     Tinnitus 1996       Past Surgical History:   Procedure Laterality Date    ABDOMINOPLASTY      BREAST CYST ASPIRATION Right      COLONOSCOPY      COLONOSCOPY  11/22/2021    Eyvazzedah - 5 y f/u     COLPOSCOPY  1987    COMBINED AUGMENTATION MAMMAPLASTY AND ABDOMINOPLASTY  2001    EPIDURAL BLOCK INJECTION Bilateral 8/23/2023    Procedure: Left L4-L5   TRANSFORAMINAL epidural steroid injection ( [id number]);  Surgeon: Mateo Colmenares DO;  Location: [facility] MAIN OR;  Service: Pain Management     EPIDURAL BLOCK INJECTION Bilateral 1/24/2024    Procedure: L4-L5 TRANSFORAMINAL EPIDURAL STEROID INJECTION;  Surgeon: Mateo Colmenares DO;  Location: [facility] MAIN OR;  Service: Pain Management     FACIAL COSMETIC SURGERY      FL INJECTION RIGHT HIP (NON ARTHROGRAM)  05/24/2019    GASTRIC BYPASS      MT ARTHROCENTESIS ASPIR&/INJ MAJOR JT/BURSA W/O US Right 1/10/2024    Procedure: RIGHT INTRA-ARTICULAR HIP INJECTION;  Surgeon: Mateo Colmenares DO;  Location: [facility] MAIN OR;  Service: Pain Management     TUBAL LIGATION         Family History   Problem Relation Age of Onset    Hypertension Mother     Heart disease Mother     Hypertension Father     Heart disease Father     No Known Problems Sister     No Known Problems Brother     No Known Problems Maternal Aunt     No Known Problems Maternal Uncle     No Known Problems Paternal Aunt     No Known Problems Paternal Uncle     No Known Problems Maternal Grandmother     No Known Problems Maternal Grandfather     No Known Problems Paternal Grandmother     No Known Problems Paternal Grandfather     ADD / ADHD Neg Hx     Anesthesia problems Neg Hx     Cancer Neg Hx     Clotting disorder Neg Hx     Collagen disease Neg Hx     Diabetes Neg Hx     Dislocations Neg Hx     Learning disabilities Neg Hx     Neurological problems Neg Hx     Osteoporosis Neg Hx     Rheumatologic disease Neg Hx     Scoliosis Neg Hx     Vascular Disease Neg Hx        Social History     Occupational History    Not on file   Tobacco Use    Smoking status: Never    Smokeless tobacco: Never   Vaping Use    Vaping status: Never Used    Substance and Sexual Activity    Alcohol use: Not Currently    Drug use: No    Sexual activity: Not Currently     Partners: Male     Birth control/protection: Female Sterilization         Current Outpatient Medications:     amLODIPine (NORVASC) 5 mg tablet, Take 1 tablet (5 mg total) by mouth daily, Disp: 30 tablet, Rfl: 0    ascorbic Acid (VITAMIN C) 500 MG CPCR, Take 1 capsule (500 mg total) by mouth 2 (two) times a day, Disp: 60 capsule, Rfl: 0    Calcium Citrate 1040 MG TABS, Take by mouth 3 (three) times a day, Disp: , Rfl:     cholecalciferol (VITAMIN D3) 1,000 units tablet, Take 1 tablet (1,000 Units total) by mouth daily, Disp: 30 tablet, Rfl: 0    ferrous sulfate 324 (65 Fe) mg, Take 1 tablet (324 mg total) by mouth daily before breakfast, Disp: 30 tablet, Rfl: 0    folic acid (FOLVITE) 1 mg tablet, Take 1 tablet (1 mg total) by mouth daily, Disp: 30 tablet, Rfl: 0    folic acid (FOLVITE) 1 mg tablet, Take 1 tablet (1 mg total) by mouth daily, Disp: 30 tablet, Rfl: 0    hydrOXYzine HCL (ATARAX) 25 mg tablet, Take 1 tablet (25 mg total) by mouth 3 (three) times a day as needed for anxiety (anxiety), Disp: 90 tablet, Rfl: 0    methocarbamol (ROBAXIN) 500 mg tablet, Take 1 tablet (500 mg total) by mouth every 6 (six) hours as needed for muscle spasms for up to 10 days, Disp: 40 tablet, Rfl: 0    Multiple Vitamins-Minerals (BARIATRIC MULTIVITAMINS/IRON PO), Take by mouth daily, Disp: , Rfl:     thiamine 100 MG tablet, Take 1 tablet (100 mg total) by mouth daily, Disp: 30 tablet, Rfl: 0    zinc sulfate (ZINCATE) 220 mg capsule, Take 1 capsule (220 mg total) by mouth daily, Disp: 30 capsule, Rfl: 0    [START ON 4/12/2024] desvenlafaxine succinate (PRISTIQ) 50 mg 24 hr tablet, Take 1 tablet (50 mg total) by mouth daily Do not start before April 12, 2024., Disp: 30 tablet, Rfl: 0    gabapentin (Neurontin) 100 mg capsule, Take 1 capsule (100 mg total) by mouth daily at bedtime Take 100 mg three times a day and 100  mg extra at night (100/100/200 mg), Disp: 30 capsule, Rfl: 0    [START ON 4/12/2024] gabapentin (NEURONTIN) 100 mg capsule, Take 1 capsule (100 mg total) by mouth 3 (three) times a day Take 100 mg three times a day and 100 mg extra at night (100/100/200 mg) Do not start before April 12, 2024., Disp: 90 capsule, Rfl: 0    [START ON 4/12/2024] traZODone (DESYREL) 100 mg tablet, Take 1 tablet (100 mg total) by mouth daily at bedtime as needed for sleep Do not start before April 12, 2024., Disp: 30 tablet, Rfl: 0    Allergies   Allergen Reactions    Other Wheezing     Lobster when a teenager.  Wheezing       Objective:  Vitals:    03/20/24 1511   BP: 123/79   Pulse: 73       Body mass index is 27.12 kg/m².    Right Hip Exam     Tenderness   The patient is experiencing tenderness in the anterior.    Range of Motion   Abduction:  50 normal   Adduction:  normal Right hip adduction: 35.  Extension:  0 normal   Flexion:  130 normal   External rotation:  50 normal   Internal rotation:  30     Muscle Strength   Abduction: 5/5   Adduction: 5/5   Flexion: 5/5     Tests   LAZARO: negative  Melba: negative    Other   Erythema: absent  Scars: absent  Sensation: normal  Pulse: present    Comments:  Positive FADIR  Positive Stinchfield  No internal or external snapping hip  Thigh and calf soft and nontender  Grossly NVI  Ambulates without assistive device  Skin over anterior hip without abrasion, laceration, ulceration, erythema, or other significant abnormality.  No significant overhanging abdominal pannus            Physical Exam  Vitals and nursing note reviewed.   Constitutional:       Appearance: Normal appearance. She is well-developed.      Comments: Body mass index is 27.12 kg/m².   HENT:      Head: Normocephalic and atraumatic.      Right Ear: External ear normal.      Left Ear: External ear normal.      Nose: Nose normal.      Mouth/Throat:      Mouth: Mucous membranes are moist.   Eyes:      Extraocular Movements:  Extraocular movements intact.      Conjunctiva/sclera: Conjunctivae normal.   Cardiovascular:      Rate and Rhythm: Normal rate.      Pulses: Normal pulses.   Pulmonary:      Effort: Pulmonary effort is normal.   Abdominal:      Palpations: Abdomen is soft.   Musculoskeletal:      Cervical back: Normal range of motion.      Comments: See ortho exam   Skin:     General: Skin is warm and dry.   Neurological:      General: No focal deficit present.      Mental Status: She is alert and oriented to person, place, and time. Mental status is at baseline.   Psychiatric:         Mood and Affect: Mood normal.         Behavior: Behavior normal.         Thought Content: Thought content normal.         Judgment: Judgment normal.         I have personally reviewed pertinent films in PACS of the previously taken mag marker x-rays of her pelvis and right hip which demonstrate severe degenerative changes with joint space narrowing, sclerosis of the acetabulum, marginal osteophytosis, and early subchondral cyst remission.  There is no evidence of avascular necrosis.    This document was created using speech voice recognition software.   Grammatical errors, random word insertions, pronoun errors, and incomplete sentences are an occasional consequence of this system due to software limitations, ambient noise, and hardware issues.   Any formal questions or concerns about content, text, or information contained within the body of this dictation should be directly addressed to the provider for clarification.

## 2024-03-20 NOTE — H&P (VIEW-ONLY)
Assessment/Plan:  1. Primary osteoarthritis of right hip  zinc sulfate (ZINCATE) 220 mg capsule    ferrous sulfate 324 (65 Fe) mg    folic acid (FOLVITE) 1 mg tablet    cholecalciferol (VITAMIN D3) 1,000 units tablet    ascorbic Acid (VITAMIN C) 500 MG CPCR    Case request operating room: ARTHROPLASTY HIP TOTAL ANTERIOR,NAVIGATED - same day    Hemoglobin A1C W/EAG Estimation    MRSA culture    Ambulatory referral to Family Practice    Ambulatory referral to Physical Therapy    EKG 12 lead    Case request operating room: ARTHROPLASTY HIP TOTAL ANTERIOR,NAVIGATED - same day      2. Right hip pain  zinc sulfate (ZINCATE) 220 mg capsule    ferrous sulfate 324 (65 Fe) mg    folic acid (FOLVITE) 1 mg tablet    cholecalciferol (VITAMIN D3) 1,000 units tablet    ascorbic Acid (VITAMIN C) 500 MG CPCR    Case request operating room: ARTHROPLASTY HIP TOTAL ANTERIOR,NAVIGATED - same day    Hemoglobin A1C W/EAG Estimation    MRSA culture    Ambulatory referral to Family Practice    Ambulatory referral to Physical Therapy    EKG 12 lead    Case request operating room: ARTHROPLASTY HIP TOTAL ANTERIOR,NAVIGATED - same day      3. S/P gastric bypass  Ambulatory referral to Family Practice      4. Postsurgical malabsorption  Ambulatory referral to Family Practice      5. Secondary non-renal hyperparathyroidism (HCC)  Ambulatory referral to Family Practice      6. Alcohol abuse, in remission        7. 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  Unfortunately, she has now demonstrated failure of all forms of conservative treatment with Tylenol, weight loss, ambulatory assistive devices, home exercises, and a recent intra-articular cortisone injection.  She continues to be limited in her ability to perform age-appropriate activities of daily living, enjoyment, and employment.  As such, we feel that she is a candidate for a total hip arthroplasty.  Pre-, peña-, and postoperative expectations were discussed.  Risk of surgery including but not limited to bleeding, infection, stiffness, need for further surgery, persistent limp, persistent pain, leg length discrepancy, damage to vessels or nerves, blood clots, heart attack, stroke, and death were discussed.  Consents were signed and placed into the chart for a direct anterior right total hip arthroplasty.  She denies any history of malignancy, DVT or PE, gastric ulcer or GI bleed, smoking or use of nicotine products, or use of over-the-counter supplements such as tumeric.  She does have a history of MRSA infection near the ear.  She does have a history of gastric bypass, and therefore would not be eligible for NSAIDs postoperatively.  She states that she has support at home with her son, and is therefore a candidate for same-day discharge and as well as outpatient physical therapy.  She will need clearance from her primary care physician prior to the intended procedure.  In addition, regarding the patient's recent ER admission and subsequent care related to it, I discussed her current care and status with her psychiatrist and received psychiatric clearance for her total hip procedure.  She would not be eligible for surgery until after April 10 due to her recent injection.  She was introduced today to our surgical scheduler for further planning, and we will forward to taking care of her in the near future    Subjective: Right hip  follow up    Patient ID: Jael Fabian is a 58 y.o. female very well-known to our practice for her activity related right hip and groin pain due to her severe underlying osteoarthritis.  She underwent an intra-articular right hip cortisone injection on January 10, which she states provided only temporary relief.  She has had a return of her activity related discomfort up to 8 out of 10 on a daily basis, which is limited her ability to perform activities of daily living and enjoyment.  She is not eligible for NSAIDs due to history of bariatric surgery, and has not had any relief from Tylenol.  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  Patient admits that she had a recent ER admission.  This admission was reviewed.    Review of Systems   Constitutional:  Positive for activity change.   HENT: Negative.     Eyes: Negative.    Respiratory: Negative.     Cardiovascular:  Positive for leg swelling.   Gastrointestinal: Negative.    Endocrine: Negative.    Genitourinary: Negative.    Musculoskeletal:  Positive for arthralgias, gait problem, joint swelling and myalgias.   Skin: Negative.    Allergic/Immunologic: Negative.    Hematological: Negative.    Psychiatric/Behavioral: Negative.       Past Medical History:   Diagnosis Date    Abnormal Pap smear of cervix 1987    Anxiety     Depression     Ear problems 1996    Fibrocystic breast     GERD (gastroesophageal reflux disease) 2025    Herpes 1987    High vitamin A level     History of anxiety     History of hypercholesterolemia     History of hypertension     History of obesity     HPV (human papilloma virus) infection 1987    Hyperlipidemia     Lordosis     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    Scoliosis     Secondary hyperparathyroidism, non-renal (HCC)     Spinal stenosis     Tinnitus 1996       Past Surgical History:   Procedure Laterality Date    ABDOMINOPLASTY      BREAST CYST ASPIRATION Right      COLONOSCOPY      COLONOSCOPY  11/22/2021    Eyvazzedah - 5 y f/u     COLPOSCOPY  1987    COMBINED AUGMENTATION MAMMAPLASTY AND ABDOMINOPLASTY  2001    EPIDURAL BLOCK INJECTION Bilateral 8/23/2023    Procedure: Left L4-L5   TRANSFORAMINAL epidural steroid injection ( [id number]);  Surgeon: Mateo Colmenares DO;  Location: [facility] MAIN OR;  Service: Pain Management     EPIDURAL BLOCK INJECTION Bilateral 1/24/2024    Procedure: L4-L5 TRANSFORAMINAL EPIDURAL STEROID INJECTION;  Surgeon: Mateo Colmenares DO;  Location: [facility] MAIN OR;  Service: Pain Management     FACIAL COSMETIC SURGERY      FL INJECTION RIGHT HIP (NON ARTHROGRAM)  05/24/2019    GASTRIC BYPASS      DC ARTHROCENTESIS ASPIR&/INJ MAJOR JT/BURSA W/O US Right 1/10/2024    Procedure: RIGHT INTRA-ARTICULAR HIP INJECTION;  Surgeon: Mateo Colmenares DO;  Location: [facility] MAIN OR;  Service: Pain Management     TUBAL LIGATION         Family History   Problem Relation Age of Onset    Hypertension Mother     Heart disease Mother     Hypertension Father     Heart disease Father     No Known Problems Sister     No Known Problems Brother     No Known Problems Maternal Aunt     No Known Problems Maternal Uncle     No Known Problems Paternal Aunt     No Known Problems Paternal Uncle     No Known Problems Maternal Grandmother     No Known Problems Maternal Grandfather     No Known Problems Paternal Grandmother     No Known Problems Paternal Grandfather     ADD / ADHD Neg Hx     Anesthesia problems Neg Hx     Cancer Neg Hx     Clotting disorder Neg Hx     Collagen disease Neg Hx     Diabetes Neg Hx     Dislocations Neg Hx     Learning disabilities Neg Hx     Neurological problems Neg Hx     Osteoporosis Neg Hx     Rheumatologic disease Neg Hx     Scoliosis Neg Hx     Vascular Disease Neg Hx        Social History     Occupational History    Not on file   Tobacco Use    Smoking status: Never    Smokeless tobacco: Never   Vaping Use    Vaping status: Never Used    Substance and Sexual Activity    Alcohol use: Not Currently    Drug use: No    Sexual activity: Not Currently     Partners: Male     Birth control/protection: Female Sterilization         Current Outpatient Medications:     amLODIPine (NORVASC) 5 mg tablet, Take 1 tablet (5 mg total) by mouth daily, Disp: 30 tablet, Rfl: 0    ascorbic Acid (VITAMIN C) 500 MG CPCR, Take 1 capsule (500 mg total) by mouth 2 (two) times a day, Disp: 60 capsule, Rfl: 0    Calcium Citrate 1040 MG TABS, Take by mouth 3 (three) times a day, Disp: , Rfl:     cholecalciferol (VITAMIN D3) 1,000 units tablet, Take 1 tablet (1,000 Units total) by mouth daily, Disp: 30 tablet, Rfl: 0    ferrous sulfate 324 (65 Fe) mg, Take 1 tablet (324 mg total) by mouth daily before breakfast, Disp: 30 tablet, Rfl: 0    folic acid (FOLVITE) 1 mg tablet, Take 1 tablet (1 mg total) by mouth daily, Disp: 30 tablet, Rfl: 0    folic acid (FOLVITE) 1 mg tablet, Take 1 tablet (1 mg total) by mouth daily, Disp: 30 tablet, Rfl: 0    hydrOXYzine HCL (ATARAX) 25 mg tablet, Take 1 tablet (25 mg total) by mouth 3 (three) times a day as needed for anxiety (anxiety), Disp: 90 tablet, Rfl: 0    methocarbamol (ROBAXIN) 500 mg tablet, Take 1 tablet (500 mg total) by mouth every 6 (six) hours as needed for muscle spasms for up to 10 days, Disp: 40 tablet, Rfl: 0    Multiple Vitamins-Minerals (BARIATRIC MULTIVITAMINS/IRON PO), Take by mouth daily, Disp: , Rfl:     thiamine 100 MG tablet, Take 1 tablet (100 mg total) by mouth daily, Disp: 30 tablet, Rfl: 0    zinc sulfate (ZINCATE) 220 mg capsule, Take 1 capsule (220 mg total) by mouth daily, Disp: 30 capsule, Rfl: 0    [START ON 4/12/2024] desvenlafaxine succinate (PRISTIQ) 50 mg 24 hr tablet, Take 1 tablet (50 mg total) by mouth daily Do not start before April 12, 2024., Disp: 30 tablet, Rfl: 0    gabapentin (Neurontin) 100 mg capsule, Take 1 capsule (100 mg total) by mouth daily at bedtime Take 100 mg three times a day and 100  mg extra at night (100/100/200 mg), Disp: 30 capsule, Rfl: 0    [START ON 4/12/2024] gabapentin (NEURONTIN) 100 mg capsule, Take 1 capsule (100 mg total) by mouth 3 (three) times a day Take 100 mg three times a day and 100 mg extra at night (100/100/200 mg) Do not start before April 12, 2024., Disp: 90 capsule, Rfl: 0    [START ON 4/12/2024] traZODone (DESYREL) 100 mg tablet, Take 1 tablet (100 mg total) by mouth daily at bedtime as needed for sleep Do not start before April 12, 2024., Disp: 30 tablet, Rfl: 0    Allergies   Allergen Reactions    Other Wheezing     Lobster when a teenager.  Wheezing       Objective:  Vitals:    03/20/24 1511   BP: 123/79   Pulse: 73       Body mass index is 27.12 kg/m².    Right Hip Exam     Tenderness   The patient is experiencing tenderness in the anterior.    Range of Motion   Abduction:  50 normal   Adduction:  normal Right hip adduction: 35.  Extension:  0 normal   Flexion:  130 normal   External rotation:  50 normal   Internal rotation:  30     Muscle Strength   Abduction: 5/5   Adduction: 5/5   Flexion: 5/5     Tests   LAZARO: negative  Melba: negative    Other   Erythema: absent  Scars: absent  Sensation: normal  Pulse: present    Comments:  Positive FADIR  Positive Stinchfield  No internal or external snapping hip  Thigh and calf soft and nontender  Grossly NVI  Ambulates without assistive device  Skin over anterior hip without abrasion, laceration, ulceration, erythema, or other significant abnormality.  No significant overhanging abdominal pannus            Physical Exam  Vitals and nursing note reviewed.   Constitutional:       Appearance: Normal appearance. She is well-developed.      Comments: Body mass index is 27.12 kg/m².   HENT:      Head: Normocephalic and atraumatic.      Right Ear: External ear normal.      Left Ear: External ear normal.      Nose: Nose normal.      Mouth/Throat:      Mouth: Mucous membranes are moist.   Eyes:      Extraocular Movements:  Extraocular movements intact.      Conjunctiva/sclera: Conjunctivae normal.   Cardiovascular:      Rate and Rhythm: Normal rate.      Pulses: Normal pulses.   Pulmonary:      Effort: Pulmonary effort is normal.   Abdominal:      Palpations: Abdomen is soft.   Musculoskeletal:      Cervical back: Normal range of motion.      Comments: See ortho exam   Skin:     General: Skin is warm and dry.   Neurological:      General: No focal deficit present.      Mental Status: She is alert and oriented to person, place, and time. Mental status is at baseline.   Psychiatric:         Mood and Affect: Mood normal.         Behavior: Behavior normal.         Thought Content: Thought content normal.         Judgment: Judgment normal.         I have personally reviewed pertinent films in PACS of the previously taken mag marker x-rays of her pelvis and right hip which demonstrate severe degenerative changes with joint space narrowing, sclerosis of the acetabulum, marginal osteophytosis, and early subchondral cyst remission.  There is no evidence of avascular necrosis.    This document was created using speech voice recognition software.   Grammatical errors, random word insertions, pronoun errors, and incomplete sentences are an occasional consequence of this system due to software limitations, ambient noise, and hardware issues.   Any formal questions or concerns about content, text, or information contained within the body of this dictation should be directly addressed to the provider for clarification.

## 2024-03-21 NOTE — PSYCH
" PSYCHIATRIC EVALUATION     [facility] - PSYCHIATRIC ASSOCIATES    Name and Date of Birth:  Jael Fabian 58 y.o. 1965 MRN: [id number]    Date of Visit: March 22, 2024    Reason for visit:   Chief Complaint   Patient presents with    Psychiatric Evaluation    Medication Management    Depression    Anxiety    Insomnia    Alcohol Problem       Visit Time  Visit Start Time: 2:20 PM  Visit Stop Time: 3:13 PM  Total Visit Duration: 53 minutes    HPI:     Jael Fabian is a 58 y.o.  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, w/ PMH of hip arthritis, lumbar radiculopathy, spinal stenosis, s/p gastric bypass, secondary hyperparathyroidism, SNHL and PPH of alcohol use disorder, depression and anxiety, 2 prior psychiatric admissions (in June 2021 due to depressive symptoms following her 's death and most recently at [facility] [place] from 3/12/2024 until 3/15/2024 after intentional overdose on Ativan and alcohol), one prior SA (intensional overdose on 3/12/24), no h/o self-injurious behavior, currently in therapy (Magda at [facility]), Pristiq 50 mg po daily, Neurontin 100 mg po TID, Trazodone 100 mg po nightly PRN, Atarax 25 mg po TID PRN, Folic acid, thiamine and MVI who presented to the mental health clinic for the initial intake and psychiatric evaluation.      The pt who is well known to the writer from recent inpatient admission was visited in the clinic; chart reviewed. Presented calm, cooperative and well related, casually dressed w/ good hygiene, good eye contact, \"good\" mood, euthymic, full range, reactive and mood congruent affect, talking in normal tone, volume and amount, w/ linear thought process, good insight and judgement. She endorsed \"good\" mood since being discharge but reported being in a lot of pain and having \"some anxiety\" sxs which \"comes and goes\", mostly " "late afternoon/early evening. She is going back to work next week. She was originally off until the surgery, but as she had a Steroid shot, her surgery was postponed, and she does not want to be off work for a long time and thinks that working is therapeutic to her.  Past Tuesday, she was able to throw away her unnecessary stuff, and almost everything is ready to move to the new house. She has been receiving good support from her family and friends. Denied any full blown panic attacks, and atarax helps with the breakthrough anxiety. She denied any drinking since 3/11 and has been sober and attending AA. She mentioned that her pain and moving to the new house are her main concerns. She also has \"chronic concerns\" about her son who currently does not go to school and does not work. She has been praying and meditating and feels better. Endorsed good appetite and fair energy level. Reported initial insomnia at times, giancarlo if forgets to take Trazodone, but generally sleeps well. Denied anhedonia, hopelessness, worthlessness or feeling guilty. Denied A/VH. No manic sxs, paranoid ideations or fixed delusions were elicited. Denied any history of eating disorder or obsessive/compulsive sxs. Vehemently denied SI/HI, intent or plan upon direct inquiry at this time and consented for safety.      Review Of Systems:  Pertinent items are noted in HPI; all others are negative; no recent changes in medications or health status reported.   PHQ-2/9 Depression Screening    Little interest or pleasure in doing things: 0 - not at all  Feeling down, depressed, or hopeless: 0 - not at all  Trouble falling or staying asleep, or sleeping too much: 1 - several days  Feeling tired or having little energy: 0 - not at all  Poor appetite or overeatin - not at all  Feeling bad about yourself - or that you are a failure or have let yourself or your family down: 0 - not at all  Trouble concentrating on things, such as reading the newspaper or " watching television: 0 - not at all  Moving or speaking so slowly that other people could have noticed. Or the opposite - being so fidgety or restless that you have been moving around a lot more than usual: 0 - not at all  Thoughts that you would be better off dead, or of hurting yourself in some way: 0 - not at all  PHQ-9 Score: 1  PHQ-9 Interpretation: No or Minimal depression         CANDICE-7 Flowsheet Screening      Flowsheet Row Most Recent Value   Over the last 2 weeks, how often have you been bothered by any of the following problems?    Feeling nervous, anxious, or on edge 2   Not being able to stop or control worrying 0   Worrying too much about different things 0   Trouble relaxing 1   Being so restless that it is hard to sit still 1   Becoming easily annoyed or irritable 0   Feeling afraid as if something awful might happen 0   CANDICE-7 Total Score 4              Past Psychiatric History:     Past Inpatient Psychiatric Treatment:   2 prior psychiatric admissions (in June 2021 due to depressive symptoms following her 's death and most recently at [facility] from 3/12/2024 until 3/15/2024 after intentional overdose on Ativan and alcohol)  Past Outpatient Psychiatric Treatment:    Most recently in outpatient psychiatric treatment with a family physician  Has a therapist at [facility] (Magda)  Past Suicide Attempts: yes, by overdose on alcohol and medication (3/12/2024)  Past Violent Behavior: no  Past Psychiatric Medication Trials:  Pristiq, Lyrica, Trazodone, Ativan    Traumatic History:     Abuse: no history of physical or sexual abuse  Other Traumatic Events:  Reportedly found her  dead on the floor in 2021      Family Psychiatric History:     Family History   Problem Relation Age of Onset    Hypertension Mother     Heart disease Mother     Hypertension Father     Heart disease Father     No Known Problems Sister     No Known Problems Brother     No Known Problems Maternal Aunt     No Known  Problems Maternal Uncle     No Known Problems Paternal Aunt     No Known Problems Paternal Uncle     No Known Problems Maternal Grandmother     No Known Problems Maternal Grandfather     No Known Problems Paternal Grandmother     No Known Problems Paternal Grandfather     ADD / ADHD Neg Hx     Anesthesia problems Neg Hx     Cancer Neg Hx     Clotting disorder Neg Hx     Collagen disease Neg Hx     Diabetes Neg Hx     Dislocations Neg Hx     Learning disabilities Neg Hx     Neurological problems Neg Hx     Osteoporosis Neg Hx     Rheumatologic disease Neg Hx     Scoliosis Neg Hx     Vascular Disease Neg Hx        Substance Use History:  The patient reported history of binge drinking alcohol after lost her  in 2021 and reportedly has been active in AA meetings and was sober since 2022 until recently relapsed on drinking alcohol about 2 weeks ago; reportedly has been drinking a six-pack of beers over 2 days.  Denied history of DUI.  Denied smoking cigarettes or other illicit substance abuse.    Social History     Substance and Sexual Activity   Alcohol Use Not Currently     Social History     Substance and Sexual Activity   Drug Use No       Social History:  Education:  Masters degree  Marital history:   Children: 27-year-old son who is  and 23-year-old son with Asperger's who lives with the patient  Living arrangement, social support: son  Occupational History: Anesthesiology nurse at [employer]  Access to firearms: Denied    Social History     Socioeconomic History    Marital status:      Spouse name: Not on file    Number of children: Not on file    Years of education: Not on file    Highest education level: Not on file   Occupational History    Not on file   Tobacco Use    Smoking status: Never    Smokeless tobacco: Never   Vaping Use    Vaping status: Never Used   Substance and Sexual Activity    Alcohol use: Not Currently    Drug use: No    Sexual activity: Not  Currently     Partners: Male     Birth control/protection: Female Sterilization   Other Topics Concern    Not on file   Social History Narrative    Not on file     Social Determinants of Health     Financial Resource Strain: Low Risk  (3/13/2024)    Overall Financial Resource Strain (CARDIA)     Difficulty of Paying Living Expenses: Not hard at all   Food Insecurity: No Food Insecurity (3/13/2024)    Hunger Vital Sign     Worried About Running Out of Food in the Last Year: Never true     Ran Out of Food in the Last Year: Never true   Transportation Needs: No Transportation Needs (3/13/2024)    PRAPARE - Transportation     Lack of Transportation (Medical): No     Lack of Transportation (Non-Medical): No   Physical Activity: Not on file   Stress: Not on file   Social Connections: Not on file   Intimate Partner Violence: Not At Risk (3/13/2024)    Humiliation, Afraid, Rape, and Kick questionnaire     Fear of Current or Ex-Partner: No     Emotionally Abused: No     Physically Abused: No     Sexually Abused: No   Housing Stability: Low Risk  (3/13/2024)    Housing Stability Vital Sign     Unable to Pay for Housing in the Last Year: No     Number of Places Lived in the Last Year: 1     Unstable Housing in the Last Year: No       Past Medical History:    Past Medical History:   Diagnosis Date    Abnormal Pap smear of cervix 1987    Anxiety     Depression     Ear problems 1996    Fibrocystic breast     GERD (gastroesophageal reflux disease) 2025    Herpes 1987    High vitamin A level     History of anxiety     History of hypercholesterolemia     History of hypertension     History of obesity     HPV (human papilloma virus) infection 1987    Hyperlipidemia     Lordosis     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    Scoliosis     Secondary hyperparathyroidism, non-renal (HCC)     Spinal stenosis     Tinnitus 1996        Past Surgical History:   Procedure Laterality Date    ABDOMINOPLASTY      BREAST CYST ASPIRATION Right      "COLONOSCOPY      COLONOSCOPY  11/22/2021    Eyvazzedah - 5 y f/u     COLPOSCOPY  1987    COMBINED AUGMENTATION MAMMAPLASTY AND ABDOMINOPLASTY  2001    EPIDURAL BLOCK INJECTION Bilateral 8/23/2023    Procedure: Left L4-L5   TRANSFORAMINAL epidural steroid injection ( [id number]);  Surgeon: Mateo Colmenares DO;  Location: [facility] MAIN OR;  Service: Pain Management     EPIDURAL BLOCK INJECTION Bilateral 1/24/2024    Procedure: L4-L5 TRANSFORAMINAL EPIDURAL STEROID INJECTION;  Surgeon: Mateo Colmenares DO;  Location: [facility] MAIN OR;  Service: Pain Management     FACIAL COSMETIC SURGERY      FL INJECTION RIGHT HIP (NON ARTHROGRAM)  05/24/2019    GASTRIC BYPASS      MA ARTHROCENTESIS ASPIR&/INJ MAJOR JT/BURSA W/O US Right 1/10/2024    Procedure: RIGHT INTRA-ARTICULAR HIP INJECTION;  Surgeon: Mateo Colmenares DO;  Location: [facility] MAIN OR;  Service: Pain Management     TUBAL LIGATION       Allergies   Allergen Reactions    Other Wheezing     Lobster when a teenager.  Wheezing       History Review:    The following portions of the patient's history were reviewed and updated as appropriate: allergies, current medications, past family history, past medical history, past social history, past surgical history, and problem list.    OBJECTIVE:    Vital signs in last 24 hours:    Vitals:    03/22/24 1418   Weight: 71.7 kg (158 lb)   Height: 5' 4\" (1.626 m)       Mental Status Evaluation:  Appearance and attitude: appeared as stated age, cooperative and attentive, casually dressed, wearing eyeglasses, with good hygiene  Eye contact: good  Motor Function: within normal limits, intact gait, No PMA/PMR  Gait/station: normal gait/station and normal balance  Speech: normal for rate, rhythm, volume, latency, amount  Language: No overt abnormality  Mood/affect: euthymic / Affect was euthymic, reactive, in full range, normal intensity and mood congruent  Thought Processes: sequential and goal-directed  Thought content: denies " "suicidal ideation or homicidal ideation; no delusions or first rank symptoms  Associations: intact associations  Perceptual disturbances: denies Auditory/Visual/Tactile Hallucinations  Orientation: oriented to time, person, place and to the situational context  Cognitive Function: intact  Memory: recent and remote memory grossly intact  Intellect: average  Fund of knowledge: aware of current events, aware of past history, and vocabulary average  Impulse control: good  Insight/judgment: fair/good    Lab Results: I have personally reviewed pertinent lab results.        WBC   Date Value Ref Range Status   03/13/2024 2.41 (L) 4.31 - 10.16 Thousand/uL Final   05/01/2015 4.58 4.31 - 10.16 Thousand/uL Final     WBC, UA   Date Value Ref Range Status   03/12/2024 10-20 (A) None Seen, 1-2 /hpf Final     MCV   Date Value Ref Range Status   03/13/2024 93 82 - 98 fL Final   07/15/2019 91.4 80.0 - 100.0 fL Final   05/01/2015 89 82 - 98 fL Final     Lab Results   Component Value Date    BUN 11 03/13/2024    SODIUM 139 03/13/2024    CO2 27 03/13/2024     Lab Results   Component Value Date    ALKPHOS 77 03/13/2024     No results found for: \"CPK\", \"CKMB\"  No results found for: \"TSH\"  INR   Date Value Ref Range Status   03/12/2024 0.98 0.84 - 1.19 Final     No results found for: \"APTT\"  No results found for: \"PHENO\"  Sodium   Date Value Ref Range Status   03/13/2024 139 135 - 147 mmol/L Final   07/15/2019 140 135 - 146 mmol/L Final     BUN   Date Value Ref Range Status   03/13/2024 11 5 - 25 mg/dL Final   07/15/2019 10 7 - 25 mg/dL Final     SL AMB BUN/CREATININE RATIO   Date Value Ref Range Status   07/15/2019 NOT APPLICABLE 6 - 22 (calc) Final     Creatinine   Date Value Ref Range Status   03/13/2024 0.68 0.60 - 1.30 mg/dL Final     Comment:     Standardized to IDMS reference method   06/08/2015 0.65 0.60 - 1.30 mg/dL Final     Comment:     Standardized to IDMS reference method     TSH 3RD GENERATON   Date Value Ref Range Status " "  03/13/2024 1.681 0.450 - 4.500 uIU/mL Final     Comment:     The recommended reference ranges for TSH during pregnancy are as follows:   First trimester 0.100 to 2.500 uIU/mL   Second trimester  0.200 to 3.000 uIU/mL   Third trimester 0.300 to 3.000 uIU/m    Note: Normal ranges may not apply to patients who are transgender, non-binary, or whose legal sex, sex at birth, and gender identity differ.  Adult TSH (3rd generation) reference range follows the recommended guidelines of the American Thyroid Association, January, 2020.     WBC   Date Value Ref Range Status   03/13/2024 2.41 (L) 4.31 - 10.16 Thousand/uL Final   05/01/2015 4.58 4.31 - 10.16 Thousand/uL Final     WBC, UA   Date Value Ref Range Status   03/12/2024 10-20 (A) None Seen, 1-2 /hpf Final     No components found for: \"B12\"  Lab Results   Component Value Date    FOLATE 14.0 03/13/2024     No results found for: \"RPR\"    Imaging Studies: reviewed  EKG, Pathology, and Other Studies: reviewed    Suicide/Homicide Risk Assessment:    Risk of Harm to Self:  The following ratings are based on assessment at the time of the interview  Demographic risk factors include: ,  status  Historical Risk Factors include: history of depression, history of anxiety, h/o alcohol abuse  Recent Specific Risk Factors include: recent hospital discharge, recent suicide attempt, chronic pain, health problems  Protective Factors: no current suicidal ideation, access to mental health treatment, being a parent, compliant with medications, connection to own children, effective coping skills, having a sense of purpose or meaning in life, personal beliefs about the meaning and value of life, stable living environment, stable job, sobriety, supportive family, supportive friends  Weapons: none. The following steps have been taken to ensure weapons are properly secured: not applicable  Based on today's assessment, Jael presents the following risk of harm to self:  chronically " elevated risk; low at this time - consented for safety    Risk of Harm to Others:  The following ratings are based on assessment at the time of the interview  Demographic Risk Factors include: none.  Historical Risk Factors include: none.  Recent Specific Risk Factors include: none.  Protective Factors: no current homicidal ideation  Weapons: none. The following steps have been taken to ensure weapons are properly secured: not applicable  Based on today's assessment, Jael presents the following risk of harm to others: minimal    The following interventions are recommended: contracts for safety at present - agrees to go to ED if feeling unsafe, contracts for safety at present - agrees to call Crisis Intervention Service if feeling unsafe, continue AA meetings and individual psychotherapy    Assessment/Plan:   In summary, the patient is a 58 y.o.  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, w/ PMH of hip arthritis, lumbar radiculopathy, spinal stenosis, s/p gastric bypass, secondary hyperparathyroidism, SNHL and PPH of alcohol use disorder, depression and anxiety, 2 prior psychiatric admission (in June 2021 due to depressive symptoms following her 's death and most recently at [facility] from 3/12/2024 until 3/15/2024 after intentional overdose on Ativan and alcohol), one prior SA (intensional overdose on 3/12/24), no h/o self-injurious behavior, currently in therapy (Magda at [facility]), Pristiq 50 mg po daily, Neurontin 100 mg po TID, Trazodone 100 mg po nightly PRN, Atarax 25 mg po TID PRN, Folic acid, thiamine and MVI who presented to the mental health clinic for the initial intake and psychiatric evaluation on 3/22/24. Presented w/ good mood and improved anxiety sxs. Maintained sober since last admission and has been attending AA meeting regularly. Denied SI/HI, intent or plan upon direct  inquiry at this time. CANDICE-7: 4; PHQ-9: 1. Her current presentation meets criteria for MDD, CANDICE and alcohol use disorder in early remission. Currently she is not at risk for suicide, homicide, self-injury, aggressive behaviors, self-neglect, or neglect of dependents or children. Given this presentation, the patient will benefit from further outpatient follow up for management of her symptoms. Maintained on Pristiq 50 mg daily, Atarax 25 mg TID PRN, Trazodone 100 mg nightly PRN, and Neurontin increased from 100/200 mg to 100/100/200 mg; doses to be adjusted as indicated. Will continue AA meeting and individual psychotherapy.      Diagnoses and all orders for this visit:    Severe episode of recurrent major depressive disorder, without psychotic features (HCC)  -     desvenlafaxine succinate (PRISTIQ) 50 mg 24 hr tablet; Take 1 tablet (50 mg total) by mouth daily Do not start before April 12, 2024.  -     traZODone (DESYREL) 100 mg tablet; Take 1 tablet (100 mg total) by mouth daily at bedtime as needed for sleep Do not start before April 12, 2024.    CANDICE (generalized anxiety disorder)  -     gabapentin (Neurontin) 100 mg capsule; Take 1 capsule (100 mg total) by mouth daily at bedtime Take 100 mg three times a day and 100 mg extra at night (100/100/200 mg)    Insomnia, unspecified type  -     traZODone (DESYREL) 100 mg tablet; Take 1 tablet (100 mg total) by mouth daily at bedtime as needed for sleep Do not start before April 12, 2024.    Alcohol use disorder, mild, in early remission    Pain of right hip  -     gabapentin (Neurontin) 100 mg capsule; Take 1 capsule (100 mg total) by mouth daily at bedtime Take 100 mg three times a day and 100 mg extra at night (100/100/200 mg)  -     gabapentin (NEURONTIN) 100 mg capsule; Take 1 capsule (100 mg total) by mouth 3 (three) times a day Take 100 mg three times a day and 100 mg extra at night (100/100/200 mg) Do not start before April 12, 2024.    Lumbar radiculopathy  -      gabapentin (NEURONTIN) 100 mg capsule; Take 1 capsule (100 mg total) by mouth 3 (three) times a day Take 100 mg three times a day and 100 mg extra at night (100/100/200 mg) Do not start before April 12, 2024.            TREATMENT AND RECOMMENDATIONS:  - Continue Pristiq 50 mg po daily for depression and anxiety  - Increase Neurontin 100/200 mg to 100/100/200 mg for alcohol abuse, pain and anxiety; doses to be adjusted as indicated  - Continue Trazodone 100 mg po nightly PRN for insomnia  - Continue Atarax 25 mg po TID for breakthrough anxiety / panic attacks  - Continue vit D supplements  - Continue AA meetings and individual psychotherapy ([facility] at [facility])  - Psychoeducation regarding medication benefits and risks, side effects, indications  and alternatives provided to the patient and the importance of compliance with psychiatric medication reiterated. The pt verbalized understanding and agreed with the plan    - RTC in one month  - The patient was educated about 24 hour and weekend coverage for urgent situations accessed by calling [facility] main practice number  - Patient was educated to call [facility] Suicide Prevention Lifeline (4-648-830-SVBE [6088]) for behavioral crisis at anytime or 911 for any safety concerns, or go to nearest ER if her symptoms become overwhelming or unmanageable.    Medications Risks/Benefits:      Risks, Benefits And Possible Side Effects Of Medications:    Risks, benefits, and possible side effects of medications explained to Jael and she verbalizes understanding and agreement for treatment.    Controlled Medication Discussion:     Jael has been filling controlled prescriptions on time as prescribed according to [place] Prescription Drug Monitoring Program  Discussed with Jael the risks of sedation, respiratory depression, impairment of ability to drive and potential for abuse and addiction related to treatment with benzodiazepine medications.  She understands risk of treatment with benzodiazepine medications, agrees to not drive if feels impaired and agrees to take medications as prescribed  Discussed with Jael Rodriguez warning on concurrent use of benzodiazepines and opioid medications including sedation, respiratory depression, coma and death. She understands the risk of treatment with benzodiazepines in addition to opioids and wants to continue taking those medications    Treatment Plan:    Completed and signed during the session: Yes - with Jael Benz MD 03/22/24    This note was not shared with the patient due to patient requested      This note was completed in part utilizing Dragon dictation Software. Grammatical, translation, syntax errors, random word insertions, spelling mistakes, and incomplete sentences may be an occasional consequence of this system secondary to software limitations with voice recognition, ambient noise, and hardware issues. If you have any questions or concerns about the content, text, or information contained within the body of this dictation, please contact the provider for clarification.

## 2024-03-22 ENCOUNTER — APPOINTMENT (OUTPATIENT)
Dept: LAB | Facility: CLINIC | Age: 59
End: 2024-03-22
Payer: COMMERCIAL

## 2024-03-22 ENCOUNTER — OFFICE VISIT (OUTPATIENT)
Dept: PSYCHIATRY | Facility: CLINIC | Age: 59
End: 2024-03-22

## 2024-03-22 VITALS — BODY MASS INDEX: 26.98 KG/M2 | HEIGHT: 64 IN | WEIGHT: 158 LBS

## 2024-03-22 DIAGNOSIS — Z01.818 PRE-OPERATIVE EXAMINATION: ICD-10-CM

## 2024-03-22 DIAGNOSIS — M25.551 RIGHT HIP PAIN: ICD-10-CM

## 2024-03-22 DIAGNOSIS — M54.16 LUMBAR RADICULOPATHY: ICD-10-CM

## 2024-03-22 DIAGNOSIS — G47.00 INSOMNIA, UNSPECIFIED TYPE: ICD-10-CM

## 2024-03-22 DIAGNOSIS — M25.551 PAIN OF RIGHT HIP: ICD-10-CM

## 2024-03-22 DIAGNOSIS — F10.11 ALCOHOL USE DISORDER, MILD, IN EARLY REMISSION: ICD-10-CM

## 2024-03-22 DIAGNOSIS — M16.11 PRIMARY OSTEOARTHRITIS OF RIGHT HIP: ICD-10-CM

## 2024-03-22 DIAGNOSIS — F41.1 GAD (GENERALIZED ANXIETY DISORDER): ICD-10-CM

## 2024-03-22 DIAGNOSIS — F33.2 SEVERE EPISODE OF RECURRENT MAJOR DEPRESSIVE DISORDER, WITHOUT PSYCHOTIC FEATURES (HCC): Primary | ICD-10-CM

## 2024-03-22 PROCEDURE — 83036 HEMOGLOBIN GLYCOSYLATED A1C: CPT

## 2024-03-22 PROCEDURE — 36415 COLL VENOUS BLD VENIPUNCTURE: CPT

## 2024-03-22 PROCEDURE — 87081 CULTURE SCREEN ONLY: CPT

## 2024-03-22 RX ORDER — GABAPENTIN 100 MG/1
100 CAPSULE ORAL
Qty: 30 CAPSULE | Refills: 0 | Status: SHIPPED | OUTPATIENT
Start: 2024-03-22 | End: 2024-04-21

## 2024-03-22 RX ORDER — DESVENLAFAXINE SUCCINATE 50 MG/1
50 TABLET, EXTENDED RELEASE ORAL DAILY
Qty: 30 TABLET | Refills: 0 | Status: SHIPPED | OUTPATIENT
Start: 2024-04-12 | End: 2024-05-12

## 2024-03-22 RX ORDER — TRAZODONE HYDROCHLORIDE 100 MG/1
100 TABLET ORAL
Qty: 30 TABLET | Refills: 0 | Status: SHIPPED | OUTPATIENT
Start: 2024-04-12 | End: 2024-05-12

## 2024-03-22 RX ORDER — GABAPENTIN 100 MG/1
100 CAPSULE ORAL 3 TIMES DAILY
Qty: 90 CAPSULE | Refills: 0 | Status: SHIPPED | OUTPATIENT
Start: 2024-04-12 | End: 2024-05-12

## 2024-03-22 NOTE — BH TREATMENT PLAN
"TREATMENT PLAN (Medication Management Only)        [facility]    Name and Date of Birth:  Jael Fabian 58 y.o. 1965  Date of Treatment Plan: March 22, 2024  Diagnosis/Diagnoses:  No diagnosis found.  Strengths/Personal Resources for Self-Care: \"supportive family, AA friends, coping skills, meditation and mindfulness, working and access to therapy\".  Area/Areas of need (in own words): \"depression, anxiety and maintaining sobriety\"  1. Long Term Goal: continue improvement in depression, anxiety and maintaining sobriety  Target Date:6 months - 9/22/2024  Person/Persons responsible for completion of goal: Jael  2.  Short Term Objective (s) - How will we reach this goal?:   A. Provider new recommended medication/dosage changes and/or continue medication(s): continue current medications as prescribed.  B. Attend AA meetings regularly.  C. Attend psychotherapy regularly.  Target Date:6 months - 9/22/2024  Person/Persons Responsible for Completion of Goal: Jael  Progress Towards Goals: initiating treatment  Treatment Modality: medication management every 6 months, continue psychotherapy with own therapist, continue AA meetings  Review due 180 days from date of this plan: 6 months - 9/22/2024  Expected length of service: maintenance  My Physician/PA/NP and I have developed this plan together and I agree to work on the goals and objectives. I understand the treatment goals that were developed for my treatment.      "

## 2024-03-22 NOTE — BH CRISIS PLAN
"Client Name: Jael Fabian       Client YOB: 1965    [place] Safety Plan      Creation Date: 3/22/24 Update Date: 3/22/24   Created By: Koko Benz MD Last Updated By: Koko Benz MD      Step 1: Warning Signs:   Warning Signs   \"I would want to drink\"   \"beoming isolative\"            Step 2: Internal Coping Strategies:   Internal Coping Strategies   Meditation and praying   Walking and exercise   Yoga   Watching TV   Reading            Step 3: People and social settings that provide distraction:   Name Contact Information   Sons saved in cell   AA friends saved in cell   Friends outside AA saved in cell    Places   going to AA meetings           Step 4: People whom I can ask for help during a crisis:      Name Contact Information    Sons saved in cell    Friends (AA and outside AA) saved in cell    Sister saved in cell      Step 5: Professionals or agencies I can contact during a crisis:      [facility]ican/Agency Name Phone Emergency Contact    Koko Benz MD (psychiatrist) [phone]     Magda (Therapist at [facility]) [phone]       Local Emergency Department Emergency Department Phone Emergency Department Address    [facility] [phone] [address] [place] [id number]        Crisis Phone Numbers:   Suicide Prevention Lifeline: Call or Text  839 Crisis Text Line: Text HOME to 706-354   Please note: Some [facility] do not have a separate number for Child/Adolescent specific crisis. If your county is not listed under Child/Adolescent, please call the adult number for your county      Adult Crisis Numbers: Child/Adolescent Crisis Numbers   [facility]: [phone] [facility]: [phone]   [facility]: [phone] [facility]: [phone]   [facility]: [phone] [place]: [phone]   [facility]: [phone] [place] [facility]: [phone]   [place] [facility]: [phone]   [facility]: " [phone]   [facility]: [phone]   [facility]: [phone] (daytime) [phone] (after hours, weekends, holidays)      Step 6: Making the environment safer (plan for lethal means safety):   Patient did not identify any lethal methods: Yes     Optional: What is most important to me and worth living for?      [place] Safety Plan. Kaur Pandya and Parth Magallon. Used with permission of the authors.

## 2024-03-23 LAB
EST. AVERAGE GLUCOSE BLD GHB EST-MCNC: 105 MG/DL
HBA1C MFR BLD: 5.3 %
MRSA NOSE QL CULT: NORMAL

## 2024-03-26 ENCOUNTER — ANESTHESIA EVENT (OUTPATIENT)
Dept: PERIOP | Facility: HOSPITAL | Age: 59
End: 2024-03-26
Payer: COMMERCIAL

## 2024-03-27 ENCOUNTER — TELEPHONE (OUTPATIENT)
Dept: OBGYN CLINIC | Facility: HOSPITAL | Age: 59
End: 2024-03-27

## 2024-03-27 ENCOUNTER — COSMETIC (OUTPATIENT)
Dept: PLASTIC SURGERY | Facility: CLINIC | Age: 59
End: 2024-03-27

## 2024-03-27 DIAGNOSIS — Z41.1 ENCOUNTER FOR COSMETIC PROCEDURE: Primary | ICD-10-CM

## 2024-03-27 PROCEDURE — BOTOX2 TWO AREAS OR 50 UNITS: Performed by: SURGERY

## 2024-03-27 PROCEDURE — BOTOX1U PR BOTOX BY THE UNIT: Performed by: SURGERY

## 2024-03-27 NOTE — TELEPHONE ENCOUNTER
Caller: Dr. Rafael Snyder Office    Doctor/Office: Derek/Mati    Call regarding :  Looking for any pre-op testing patient had done, patient is there in the office for clearance.      Call was transferred to: Warm Transferred to Ana TAPIA Surgery Scheduler/Mati

## 2024-03-27 NOTE — PROGRESS NOTES
Jael returns for Botox, she is pleased with her prior treatment, other than she did notice some depression/dissent of the eyebrows, she would therefore prefer to defer treatment to the forehead today, she will consider this in the future, I suggested that if interested in the future we adjust the pattern and inject the greater distance superior to the brow than previously.  At today's visit she would like to limit the treatment the lateral crows feet.  Given the subtle asymmetry that she has noticed previously, and she was happy with her prior treatment we again today injected a total of 17.5 units of Botox in the usual fashion, 10 units were administered to treat the left lateral crows feet, and 7.5 units were then utilized to address the right lateral crows feet.  Ice was applied posttreatment, and usual posttreatment instructions were given.  She will return in 3 months for additional treatment, sooner should the need arise.    Botox 17.5 units/2 areas

## 2024-04-02 NOTE — PRE-PROCEDURE INSTRUCTIONS
Pre-Surgery Instructions:   Medication Instructions    amLODIPine (NORVASC) 5 mg tablet Take day of surgery.    ascorbic Acid (VITAMIN C) 500 MG CPCR Hold day of surgery.    Calcium Citrate 1040 MG TABS Stop taking 7 days prior to surgery.    cholecalciferol (VITAMIN D3) 1,000 units tablet Hold day of surgery.    [START ON 4/12/2024] desvenlafaxine succinate (PRISTIQ) 50 mg 24 hr tablet Take day of surgery.    ferrous sulfate 324 (65 Fe) mg Hold day of surgery.    folic acid (FOLVITE) 1 mg tablet Hold day of surgery.    gabapentin (Neurontin) 100 mg capsule Take day of surgery.    hydrOXYzine HCL (ATARAX) 25 mg tablet Uses PRN- OK to take day of surgery    Multiple Vitamins-Minerals (BARIATRIC MULTIVITAMINS/IRON PO) Stop taking 7 days prior to surgery.    thiamine 100 MG tablet Stop taking 7 days prior to surgery.    [START ON 4/12/2024] traZODone (DESYREL) 100 mg tablet Uses PRN- OK to take day of surgery    zinc sulfate (ZINCATE) 220 mg capsule Hold day of surgery.   Medication instructions for day surgery reviewed. Please use only a sip of water to take your instructed medications. Avoid all over the counter vitamins, supplements and NSAIDS for one week prior to surgery per anesthesia guidelines. Tylenol is ok to take as needed.     You will receive a call one business day prior to surgery with an arrival time and hospital directions. If your surgery is scheduled on a Monday, the hospital will be calling you on the Friday prior to your surgery. If you have not heard from anyone by 8pm, please call the hospital supervisor through the hospital  at [phone]. ([place] [phone] or [place] [phone]).    Do not eat or drink anything after midnight the night before your surgery, including candy, mints, lifesavers, or chewing gum. Do not drink alcohol 24hrs before your surgery. Try not to smoke at least 24hrs before your surgery.       Follow the pre surgery showering instructions as listed in the  “My Surgical Experience Booklet” or otherwise provided by your surgeon's office. Do not use a blade to shave the surgical area 1 week before surgery. It is okay to use a clean electric clippers up to 24 hours before surgery. Do not apply any lotions, creams, including makeup, cologne, deodorant, or perfumes after showering on the day of your surgery. Do not use dry shampoo, hair spray, hair gel, or any type of hair products.     No contact lenses, eye make-up, or artificial eyelashes. Remove nail polish, including gel polish, and any artificial, gel, or acrylic nails if possible. Remove all jewelry including rings and body piercing jewelry.     Wear causal clothing that is easy to take on and off. Consider your type of surgery.    Keep any valuables, jewelry, piercings at home. Please bring any specially ordered equipment (sling, braces) if indicated.    Arrange for a responsible person to drive you to and from the hospital on the day of your surgery. Please confirm the visitor policy for the day of your procedure when you receive your phone call with an arrival time.     Call the surgeon's office with any new illnesses, exposures, or additional questions prior to surgery.    Please reference your “My Surgical Experience Booklet” for additional information to prepare for your upcoming surgery.    Pt. Verbalized an understanding of all instructions reviewed and offers no concerns at this time.    Will follow instructions for enema per office.

## 2024-04-04 ENCOUNTER — TELEPHONE (OUTPATIENT)
Dept: OBGYN CLINIC | Facility: HOSPITAL | Age: 59
End: 2024-04-04

## 2024-04-04 NOTE — TELEPHONE ENCOUNTER
Preoperative Elective Admission Assessment- spoke to patient     Living Situation:    Who does pt live with: relative, Son   What kind of home: multi-level  How do they enter the home: front  How many levels in home: 2 level home   # of steps to enter home: 2 to enter   # of steps to second floor: 7 then landing, and adttl 5   Are there handrails: Yes  Are there landings: Yes  Sleeping arrangement: first/entry floor  Where is Bathroom: Walk in shower on the first floor      First Floor Setup:   Is there a bathroom: Yes  Where would pt sleep: recliner     DME: cane, ordering RW today. Instructed to bring DOS.     We discussed clearing pathways in the home and making sure there is accessibly to use the walker, for example, removing throw rugs.      Patient's Current Level of Function: Ambulates: Independently and ADLs: Independent    Post-op Caregiver: child  Currently receive any HHC/aides/community supports: No     Post-op Transport: child  To/from hospital: child  To/from PT 2-3x/week: child  Uses community transport now: No     Outpatient Physical Therapy Site:  Site: [place]   pre and post-op appts scheduled? Yes     Medication Management: self  Preferred Pharmacy for Post-op Medications: [id number] - [place], [place] - [address] [[id number]]   Blood Management Vitamin Regimen: Pt confirms taking as prescribed  Post-op anticoagulant: to be determined by surgical team postoperatively     DC Plan: Pt plans to be discharged home    Barriers to DC identified preoperatively: none identified    BMI: 26.43    Patient Education:  Pt educated on post-op pain, early mobilization (POD0), LOS goals, OP PT goals, and preoperative bathing. Patient educated that our goal is to appropriately discharge patient based off their post-op function while striving to maintain maximal independence. The goal is to discharge patient to home and for them to attend outpatient physical therapy.    Assigned to care team?  Yes

## 2024-04-05 LAB
DME PARACHUTE DELIVERY DATE ACTUAL: NORMAL
DME PARACHUTE DELIVERY DATE REQUESTED: NORMAL
DME PARACHUTE ITEM DESCRIPTION: NORMAL
DME PARACHUTE ORDER STATUS: NORMAL
DME PARACHUTE SUPPLIER NAME: NORMAL
DME PARACHUTE SUPPLIER PHONE: NORMAL

## 2024-04-08 ENCOUNTER — EVALUATION (OUTPATIENT)
Dept: PHYSICAL THERAPY | Facility: CLINIC | Age: 59
End: 2024-04-08
Payer: COMMERCIAL

## 2024-04-08 DIAGNOSIS — M25.551 RIGHT HIP PAIN: ICD-10-CM

## 2024-04-08 DIAGNOSIS — Z01.818 PRE-OPERATIVE EXAMINATION: ICD-10-CM

## 2024-04-08 DIAGNOSIS — M16.11 PRIMARY OSTEOARTHRITIS OF RIGHT HIP: Primary | ICD-10-CM

## 2024-04-08 PROCEDURE — 97161 PT EVAL LOW COMPLEX 20 MIN: CPT | Performed by: PHYSICAL THERAPIST

## 2024-04-08 NOTE — PROGRESS NOTES
PT Evaluation     Today's date: 2024  Patient name: Jael Fabian  : 1965  MRN: [id number]  Referring provider: Javier Reed DO  Dx:   Encounter Diagnosis     ICD-10-CM    1. Primary osteoarthritis of right hip  M16.11 Ambulatory referral to Physical Therapy      2. Right hip pain  M25.551 Ambulatory referral to Physical Therapy      3. Pre-operative examination  Z01.818 Ambulatory referral to Physical Therapy          Start Time: 845  Stop Time: 920  Total time in clinic (min): 35 minutes    Assessment  Assessment details: Discussed DOS and patient's questions were answered to patient's satisfaction. Mobility/ROM results per above. Strength results per above. Balance/Gait (including Timed Up & Go) per above. Home Assessment was reviewed with patient. Home Preparation Checklist was reviewed with patient including identification of care partner and encouragement of single level set-up. Post-operative pain management expectations discussed to the patient's satisfaction. Post-operative gait training for level ground, stairs, and car transfers was performed. Patient demonstrated competence with immediate post-operative home exercise program.    Impairments: abnormal gait, abnormal muscle firing, abnormal or restricted ROM, abnormal movement, impaired physical strength, lacks appropriate home exercise program and pain with function    Symptom irritability: lowUnderstanding of Dx/Px/POC: good   Prognosis: good    Plan  Patient would benefit from: skilled physical therapy  Planned modality interventions: cryotherapy  Planned therapy interventions: joint mobilization, manual therapy, patient education, postural training, strengthening, stretching, therapeutic activities, therapeutic exercise, home exercise program, neuromuscular re-education, flexibility, functional ROM exercises, balance and gait training  Frequency: 2x week  Duration in weeks: 10  Treatment plan discussed with: patient        Subjective  Evaluation    History of Present Illness  Mechanism of injury: Patient reports right gluteal region and groin region described as knife-like sensation. Pain with transitions also reported and with ambulation. Had injection and therapy in past without relief. Denies numbness/tingling. Does have sleep disturbances reported and has trouble either sleeping or staying asleep. 16 steps inside home. Will be moving 2 weeks after her surgery. Shower and recliner on 1st floor. Performs step to gait pattern ascending, reciprocal descending. Son will be staying with patient. Has a cane at home and walker will be delivered. Walk in shower present. No shower chair yet.           Recurrent probem    Quality of life: good    Patient Goals  Patient goals for therapy: independence with ADLs/IADLs, return to sport/leisure activities, increased strength, decreased pain and increased motion  Patient goal: no pain with walking and stair navigation and work without pain  Pain  Current pain ratin  At best pain rating: 3  At worst pain ratin  Quality: knife-like  Relieving factors: change in position and ice  Aggravating factors: sitting, stair climbing, standing and walking  Progression: no change    Social Support    Employment status: working (nurse anesthetist)  Treatments  Previous treatment: injection treatment and physical therapy      Short Term: FROM DOS  Pt will report decreased levels of pain by at least 2 subjective ratings in 4 weeks  Pt will demonstrate improved ROM by at least 10 degrees in 4 weeks  Pt will demonstrate improved strength by 1/2 grade in 4 weeks.  Pt will be able to ambulate without AD in 4 weeks.  Pt will demonstrate reciprocal stair navigation in 4 weeks.  Long Term:   Pt will be independent in their HEP in 8 weeks  Pt will be pain free with IADL's in 8 weeks.  Pt will demonstrate 5/5 MMT in 8 weeks.  Pt will ambulate> 30 minutes without symptoms in 8 weeks.     Objective    GAIT: right antalgic  pattern  Squat assess: 50% depth Right groin pain  TUG: < 12 sec. No AD  SLS: RLE: 6 sec., LLE: 8 sec.           MMT         AROM          PROM    Hip         R          L         R        L          R         L   Flex. 3+ 4   95 105   Extn. 3+ 4       Abd. 3 4-   35p! 40   Add. 4- 4-       IR. 4 4+   8p! 18   ER. 4 4+   15 22                 MMT    Knee      R          L   Flex. 5 5   Extn. 3+ 5                     MMT    Ankle         R          L   PF 5 5   DF bent knee 5 5   EHL 5 5   Inv. 5 5   Ever. 5 5        Insurance:  AMA/CMS Eval/ Re-eval POC expires FOTO Auth #/ Referral # Total    Start date  Expiration date Extension  Visit limitation?  PT only or  PT+OT? Co-Insurance   CMS 4.8 [phone]  needed                            AUTH #:  Date 4.8              Authed: Used 1               Remaining                '  Precautions: standard precautions  Patient provided verbal consent to treatment plan and recommended interventions.    DOS: Right BRIGITTE 4/15/24    Manuals 4.8                                            Neuro Re-Ed                                                                        Ther Ex         LAQ         SAQ         Glute set         Ankle pump         Supine hip abd.         Heel slide         Quad set                                             Ther Activity         PT ed FB                 Gait Training         AD RW FB                 Modalities

## 2024-04-12 NOTE — DISCHARGE INSTR - AVS FIRST PAGE
Direct Anterior Total Hip Replacement     WHAT YOU SHOULD KNOW:   Minimally invasive total hip replacement is surgery to replace a damaged hip joint with an implant.          AFTER YOU LEAVE:     Medicines:   Anticoagulants  are a type of blood thinner medicine that helps prevent clots. Clots can cause strokes, heart attacks, and death. These medicines may cause you to bleed or bruise more easily.  You will be on aspirin 81mg twice a day for 6 weeks.  Along with this, he will be given Protonix 40 mg to take daily    Watch for bleeding from your gums or nose. Watch for blood in your urine and bowel movements. Use a soft washcloth and a soft toothbrush. If you shave, use an electric razor. Avoid activities that can cause bruising or bleeding.    Tell your healthcare provider about all medicines you take because many medicines cannot be used with anticoagulants. Do not start or stop any medicines unless your healthcare provider tells you to. Tell your dentist and other healthcare providers that you take anticoagulants. Wear a bracelet or necklace that says you take this medicine.     Prescription pain medicine  You should take 975mg of over the counter acetaminophen (tylenol) every 8 hours for baseline pain control. You will also be given oxycodone 5mg tablets. Take 1-2 by mouth every 6 hours as needed for pain.  Due to your other prescriptions, you will also receive a prescription for Narcan    Stool softeners  make it easier for you to have a bowel movement. You may need this medicine to treat or prevent constipation.  You will be given Colace 100mg to take twice a day    Take your medicine as directed.  Contact your orthopedist if you think your medicine is not helping or if you have side effects. Tell him if you are allergic to any medicine. Keep a list of the medicines, vitamins, and herbs you take. Include the amounts, and when and why you take them. Bring the list or the pill bottles to follow-up visits. Carry  your medicine list with you in case of an emergency.    Follow up with your orthopedist as directed:  You may need to return to have your wound checked. Write down your questions so you remember to ask them during your visits.  Please schedule an appointment to see Dr. Reed 2 weeks after surgery.    Physical therapy:  A physical therapist teaches you exercises to help improve movement and strength, and to decrease pain.  You will begin outpatient physical therapy later this week as planned.    Wound Care:  Please leave the Mepilex dressing in place for 7 days after surgery. After 7 days, you may remove the dressing and leave the incision open to air.  If the incision is uncomfortable under clothing after the Mepilex is removed, you may cover it with a a dry sterile dressing, but should remain dry at all times. Once the dressing is off, please keep the incision dry for another week until your follow up appointment    Self-care:   Use a cane, walker, or crutches as directed.  These devices will help decrease your risk of falling. Your therapist will guide you.     Use ice:  Ice helps decrease swelling and pain. Ice may also help prevent tissue damage. Use an ice pack or put crushed ice in a plastic bag. Cover it with a towel, and place it on your knee for 15 to 20 minutes every hour as directed.    Prevent dislocation of your hip implant:      Avoid extremes of external rotation of the leg    Contact your orthopedist if:  You have a fever over 101.0°F.    You have trouble moving or bending your joint.    You have increased pain and swelling in your joint, even after you take pain medicine.    You have back pain or lower leg pain when you bend your foot upwards.    You have questions or concerns about your condition or care.    Blood soaks through/saturates your bandage.    Your incision comes apart.    Your incision is red, swollen, or draining pus.    You cannot walk or move your joint, or the limb is numb.    Your  leg feels warm, tender, and painful. It may look swollen and red.     Seek immediate care or call 911 if:   You feel like you are going to faint.    You have a seizure or feel confused.     You feel lightheaded, short of breath, and have chest pain.    You have chest pain when you take a deep breath or cough. You may cough up blood.      © 2014 [employer]. Information is for End User's use only and may not be sold, redistributed or otherwise used for commercial purposes. All illustrations and images included in CareNotes® are the copyrighted property of [employer], [employer]. or [employer].  The above information is an  only. It is not intended as medical advice for individual conditions or treatments. Talk to your doctor, nurse or pharmacist before following any medical regimen to see if it is safe and effective for you.

## 2024-04-15 ENCOUNTER — ANESTHESIA (OUTPATIENT)
Dept: PERIOP | Facility: HOSPITAL | Age: 59
End: 2024-04-15
Payer: COMMERCIAL

## 2024-04-15 ENCOUNTER — APPOINTMENT (OUTPATIENT)
Dept: RADIOLOGY | Facility: HOSPITAL | Age: 59
End: 2024-04-15
Payer: COMMERCIAL

## 2024-04-15 ENCOUNTER — HOSPITAL ENCOUNTER (OUTPATIENT)
Facility: HOSPITAL | Age: 59
Setting detail: OUTPATIENT SURGERY
Discharge: HOME | End: 2024-04-15
Attending: ORTHOPAEDIC SURGERY | Admitting: ORTHOPAEDIC SURGERY
Payer: COMMERCIAL

## 2024-04-15 VITALS
HEIGHT: 64 IN | TEMPERATURE: 97.6 F | HEART RATE: 68 BPM | SYSTOLIC BLOOD PRESSURE: 130 MMHG | WEIGHT: 153.88 LBS | DIASTOLIC BLOOD PRESSURE: 74 MMHG | OXYGEN SATURATION: 96 % | BODY MASS INDEX: 26.27 KG/M2 | RESPIRATION RATE: 16 BRPM

## 2024-04-15 DIAGNOSIS — Z96.641 STATUS POST TOTAL REPLACEMENT OF RIGHT HIP: Primary | ICD-10-CM

## 2024-04-15 LAB
GLUCOSE SERPL-MCNC: 103 MG/DL (ref 65–140)
GLUCOSE SERPL-MCNC: 89 MG/DL (ref 65–140)

## 2024-04-15 PROCEDURE — 82948 REAGENT STRIP/BLOOD GLUCOSE: CPT

## 2024-04-15 PROCEDURE — 0054T BONE SRGRY CMPTR FLUOR IMAGE: CPT | Performed by: ORTHOPAEDIC SURGERY

## 2024-04-15 PROCEDURE — C1776 JOINT DEVICE (IMPLANTABLE): HCPCS | Performed by: ORTHOPAEDIC SURGERY

## 2024-04-15 PROCEDURE — C9290 INJ, BUPIVACAINE LIPOSOME: HCPCS | Performed by: ANESTHESIOLOGY

## 2024-04-15 PROCEDURE — 97535 SELF CARE MNGMENT TRAINING: CPT

## 2024-04-15 PROCEDURE — 73501 X-RAY EXAM HIP UNI 1 VIEW: CPT

## 2024-04-15 PROCEDURE — 97110 THERAPEUTIC EXERCISES: CPT

## 2024-04-15 PROCEDURE — 97167 OT EVAL HIGH COMPLEX 60 MIN: CPT

## 2024-04-15 PROCEDURE — 97163 PT EVAL HIGH COMPLEX 45 MIN: CPT

## 2024-04-15 PROCEDURE — 27130 TOTAL HIP ARTHROPLASTY: CPT | Performed by: PHYSICIAN ASSISTANT

## 2024-04-15 PROCEDURE — 27130 TOTAL HIP ARTHROPLASTY: CPT | Performed by: ORTHOPAEDIC SURGERY

## 2024-04-15 DEVICE — ACTIS DUOFIX HIP PROSTHESIS (FEMORAL STEM 12/14 TAPER CEMENTLESS SIZE 6 STD COLLAR)  CE
Type: IMPLANTABLE DEVICE | Site: HIP | Status: FUNCTIONAL
Brand: ACTIS

## 2024-04-15 DEVICE — APEX HOLE ELIMINATOR - PS
Type: IMPLANTABLE DEVICE | Site: HIP | Status: FUNCTIONAL
Brand: APEX

## 2024-04-15 DEVICE — PINNACLE POROCOAT ACETABULAR SHELL SECTOR II 52MM OD
Type: IMPLANTABLE DEVICE | Site: HIP | Status: FUNCTIONAL
Brand: PINNACLE POROCOAT

## 2024-04-15 DEVICE — BIOLOX DELTA CERAMIC FEMORAL HEAD +8.5 36MM DIA 12/14 TAPER
Type: IMPLANTABLE DEVICE | Site: HIP | Status: FUNCTIONAL
Brand: BIOLOX DELTA

## 2024-04-15 RX ORDER — DESVENLAFAXINE SUCCINATE 50 MG/1
50 TABLET, EXTENDED RELEASE ORAL DAILY
Status: DISCONTINUED | OUTPATIENT
Start: 2024-04-16 | End: 2024-04-15 | Stop reason: HOSPADM

## 2024-04-15 RX ORDER — TRAZODONE HYDROCHLORIDE 100 MG/1
100 TABLET ORAL
Status: DISCONTINUED | OUTPATIENT
Start: 2024-04-15 | End: 2024-04-15 | Stop reason: HOSPADM

## 2024-04-15 RX ORDER — HYDROMORPHONE HCL/PF 1 MG/ML
0.5 SYRINGE (ML) INJECTION EVERY 2 HOUR PRN
Status: DISCONTINUED | OUTPATIENT
Start: 2024-04-15 | End: 2024-04-15 | Stop reason: HOSPADM

## 2024-04-15 RX ORDER — PROPOFOL 10 MG/ML
INJECTION, EMULSION INTRAVENOUS CONTINUOUS PRN
Status: DISCONTINUED | OUTPATIENT
Start: 2024-04-15 | End: 2024-04-15

## 2024-04-15 RX ORDER — METOCLOPRAMIDE HYDROCHLORIDE 5 MG/ML
5 INJECTION INTRAMUSCULAR; INTRAVENOUS ONCE AS NEEDED
Status: DISCONTINUED | OUTPATIENT
Start: 2024-04-15 | End: 2024-04-15 | Stop reason: HOSPADM

## 2024-04-15 RX ORDER — ASPIRIN 81 MG/1
81 TABLET, CHEWABLE ORAL 2 TIMES DAILY
Status: DISCONTINUED | OUTPATIENT
Start: 2024-04-15 | End: 2024-04-15 | Stop reason: HOSPADM

## 2024-04-15 RX ORDER — AMLODIPINE BESYLATE 5 MG/1
5 TABLET ORAL DAILY
Status: DISCONTINUED | OUTPATIENT
Start: 2024-04-16 | End: 2024-04-15 | Stop reason: HOSPADM

## 2024-04-15 RX ORDER — TRANEXAMIC ACID 10 MG/ML
1000 INJECTION, SOLUTION INTRAVENOUS ONCE
Status: COMPLETED | OUTPATIENT
Start: 2024-04-15 | End: 2024-04-15

## 2024-04-15 RX ORDER — OXYCODONE HYDROCHLORIDE 5 MG/1
10 TABLET ORAL EVERY 4 HOURS PRN
Status: DISCONTINUED | OUTPATIENT
Start: 2024-04-15 | End: 2024-04-15 | Stop reason: HOSPADM

## 2024-04-15 RX ORDER — ACETAMINOPHEN 325 MG/1
975 TABLET ORAL EVERY 8 HOURS
Status: DISCONTINUED | OUTPATIENT
Start: 2024-04-15 | End: 2024-04-15 | Stop reason: HOSPADM

## 2024-04-15 RX ORDER — ONDANSETRON 2 MG/ML
4 INJECTION INTRAMUSCULAR; INTRAVENOUS ONCE AS NEEDED
Status: DISCONTINUED | OUTPATIENT
Start: 2024-04-15 | End: 2024-04-15 | Stop reason: HOSPADM

## 2024-04-15 RX ORDER — DEXAMETHASONE SODIUM PHOSPHATE 10 MG/ML
INJECTION, SOLUTION INTRAMUSCULAR; INTRAVENOUS AS NEEDED
Status: DISCONTINUED | OUTPATIENT
Start: 2024-04-15 | End: 2024-04-15

## 2024-04-15 RX ORDER — BUPIVACAINE HYDROCHLORIDE 7.5 MG/ML
INJECTION, SOLUTION INTRASPINAL AS NEEDED
Status: DISCONTINUED | OUTPATIENT
Start: 2024-04-15 | End: 2024-04-15

## 2024-04-15 RX ORDER — OXYCODONE HCL 10 MG/1
10 TABLET, FILM COATED, EXTENDED RELEASE ORAL ONCE
Status: COMPLETED | OUTPATIENT
Start: 2024-04-15 | End: 2024-04-15

## 2024-04-15 RX ORDER — HYDROXYZINE HYDROCHLORIDE 25 MG/1
25 TABLET, FILM COATED ORAL 3 TIMES DAILY PRN
Status: DISCONTINUED | OUTPATIENT
Start: 2024-04-15 | End: 2024-04-15 | Stop reason: HOSPADM

## 2024-04-15 RX ORDER — SODIUM CHLORIDE, SODIUM LACTATE, POTASSIUM CHLORIDE, CALCIUM CHLORIDE 600; 310; 30; 20 MG/100ML; MG/100ML; MG/100ML; MG/100ML
125 INJECTION, SOLUTION INTRAVENOUS CONTINUOUS
Status: DISCONTINUED | OUTPATIENT
Start: 2024-04-15 | End: 2024-04-15

## 2024-04-15 RX ORDER — PANTOPRAZOLE SODIUM 40 MG/1
40 TABLET, DELAYED RELEASE ORAL DAILY
Qty: 42 TABLET | Refills: 0 | Status: SHIPPED | OUTPATIENT
Start: 2024-04-15

## 2024-04-15 RX ORDER — MIDAZOLAM HYDROCHLORIDE 2 MG/2ML
INJECTION, SOLUTION INTRAMUSCULAR; INTRAVENOUS AS NEEDED
Status: DISCONTINUED | OUTPATIENT
Start: 2024-04-15 | End: 2024-04-15

## 2024-04-15 RX ORDER — HYDROMORPHONE HCL IN WATER/PF 6 MG/30 ML
0.2 PATIENT CONTROLLED ANALGESIA SYRINGE INTRAVENOUS
Status: DISCONTINUED | OUTPATIENT
Start: 2024-04-15 | End: 2024-04-15 | Stop reason: HOSPADM

## 2024-04-15 RX ORDER — GABAPENTIN 300 MG/1
300 CAPSULE ORAL ONCE
Status: COMPLETED | OUTPATIENT
Start: 2024-04-15 | End: 2024-04-15

## 2024-04-15 RX ORDER — MAGNESIUM HYDROXIDE/ALUMINUM HYDROXICE/SIMETHICONE 120; 1200; 1200 MG/30ML; MG/30ML; MG/30ML
30 SUSPENSION ORAL EVERY 6 HOURS PRN
Status: DISCONTINUED | OUTPATIENT
Start: 2024-04-15 | End: 2024-04-15 | Stop reason: HOSPADM

## 2024-04-15 RX ORDER — ACETAMINOPHEN 325 MG/1
975 TABLET ORAL ONCE
Status: COMPLETED | OUTPATIENT
Start: 2024-04-15 | End: 2024-04-15

## 2024-04-15 RX ORDER — FENTANYL CITRATE 50 UG/ML
INJECTION, SOLUTION INTRAMUSCULAR; INTRAVENOUS AS NEEDED
Status: DISCONTINUED | OUTPATIENT
Start: 2024-04-15 | End: 2024-04-15

## 2024-04-15 RX ORDER — GABAPENTIN 100 MG/1
200 CAPSULE ORAL 2 TIMES DAILY
Status: DISCONTINUED | OUTPATIENT
Start: 2024-04-15 | End: 2024-04-15 | Stop reason: HOSPADM

## 2024-04-15 RX ORDER — OXYCODONE HYDROCHLORIDE 5 MG/1
5 TABLET ORAL EVERY 4 HOURS PRN
Status: DISCONTINUED | OUTPATIENT
Start: 2024-04-15 | End: 2024-04-15 | Stop reason: HOSPADM

## 2024-04-15 RX ORDER — LIDOCAINE HYDROCHLORIDE 10 MG/ML
INJECTION, SOLUTION EPIDURAL; INFILTRATION; INTRACAUDAL; PERINEURAL AS NEEDED
Status: DISCONTINUED | OUTPATIENT
Start: 2024-04-15 | End: 2024-04-15

## 2024-04-15 RX ORDER — NALOXONE HYDROCHLORIDE 4 MG/.1ML
SPRAY NASAL
Qty: 1 EACH | Refills: 1 | Status: SHIPPED | OUTPATIENT
Start: 2024-04-15 | End: 2025-04-15

## 2024-04-15 RX ORDER — DOCUSATE SODIUM 100 MG/1
100 CAPSULE, LIQUID FILLED ORAL 2 TIMES DAILY
Status: DISCONTINUED | OUTPATIENT
Start: 2024-04-15 | End: 2024-04-15 | Stop reason: HOSPADM

## 2024-04-15 RX ORDER — CEFAZOLIN SODIUM 2 G/50ML
2000 SOLUTION INTRAVENOUS ONCE
Status: COMPLETED | OUTPATIENT
Start: 2024-04-15 | End: 2024-04-15

## 2024-04-15 RX ORDER — PANTOPRAZOLE SODIUM 40 MG/1
40 TABLET, DELAYED RELEASE ORAL DAILY
Status: DISCONTINUED | OUTPATIENT
Start: 2024-04-16 | End: 2024-04-15 | Stop reason: HOSPADM

## 2024-04-15 RX ORDER — ACETAMINOPHEN 325 MG/1
975 TABLET ORAL EVERY 8 HOURS
Start: 2024-04-15

## 2024-04-15 RX ORDER — CEFAZOLIN SODIUM 2 G/50ML
2000 SOLUTION INTRAVENOUS EVERY 6 HOURS
Status: COMPLETED | OUTPATIENT
Start: 2024-04-15 | End: 2024-04-15

## 2024-04-15 RX ORDER — ONDANSETRON 2 MG/ML
INJECTION INTRAMUSCULAR; INTRAVENOUS AS NEEDED
Status: DISCONTINUED | OUTPATIENT
Start: 2024-04-15 | End: 2024-04-15

## 2024-04-15 RX ORDER — BISACODYL 10 MG
10 SUPPOSITORY, RECTAL RECTAL DAILY PRN
Status: DISCONTINUED | OUTPATIENT
Start: 2024-04-15 | End: 2024-04-15 | Stop reason: HOSPADM

## 2024-04-15 RX ORDER — ONDANSETRON 2 MG/ML
4 INJECTION INTRAMUSCULAR; INTRAVENOUS EVERY 6 HOURS PRN
Status: DISCONTINUED | OUTPATIENT
Start: 2024-04-15 | End: 2024-04-15 | Stop reason: HOSPADM

## 2024-04-15 RX ORDER — CHLORHEXIDINE GLUCONATE ORAL RINSE 1.2 MG/ML
15 SOLUTION DENTAL ONCE
Status: COMPLETED | OUTPATIENT
Start: 2024-04-15 | End: 2024-04-15

## 2024-04-15 RX ORDER — SODIUM CHLORIDE, SODIUM LACTATE, POTASSIUM CHLORIDE, CALCIUM CHLORIDE 600; 310; 30; 20 MG/100ML; MG/100ML; MG/100ML; MG/100ML
75 INJECTION, SOLUTION INTRAVENOUS CONTINUOUS
Status: DISCONTINUED | OUTPATIENT
Start: 2024-04-15 | End: 2024-04-15 | Stop reason: HOSPADM

## 2024-04-15 RX ORDER — OXYCODONE HYDROCHLORIDE 5 MG/1
TABLET ORAL
Qty: 40 TABLET | Refills: 0 | Status: SHIPPED | OUTPATIENT
Start: 2024-04-15

## 2024-04-15 RX ORDER — PROPOFOL 10 MG/ML
INJECTION, EMULSION INTRAVENOUS AS NEEDED
Status: DISCONTINUED | OUTPATIENT
Start: 2024-04-15 | End: 2024-04-15

## 2024-04-15 RX ORDER — FENTANYL CITRATE/PF 50 MCG/ML
50 SYRINGE (ML) INJECTION
Status: COMPLETED | OUTPATIENT
Start: 2024-04-15 | End: 2024-04-15

## 2024-04-15 RX ORDER — BUPIVACAINE HYDROCHLORIDE 5 MG/ML
INJECTION, SOLUTION EPIDURAL; INTRACAUDAL
Status: DISCONTINUED | OUTPATIENT
Start: 2024-04-15 | End: 2024-04-15

## 2024-04-15 RX ORDER — DOCUSATE SODIUM 100 MG/1
100 CAPSULE, LIQUID FILLED ORAL 2 TIMES DAILY
Qty: 60 CAPSULE | Refills: 0 | Status: SHIPPED | OUTPATIENT
Start: 2024-04-15 | End: 2024-04-25

## 2024-04-15 RX ORDER — MEPERIDINE HYDROCHLORIDE 25 MG/ML
12.5 INJECTION INTRAMUSCULAR; INTRAVENOUS; SUBCUTANEOUS
Status: DISCONTINUED | OUTPATIENT
Start: 2024-04-15 | End: 2024-04-15 | Stop reason: HOSPADM

## 2024-04-15 RX ORDER — MAGNESIUM HYDROXIDE 1200 MG/15ML
LIQUID ORAL AS NEEDED
Status: DISCONTINUED | OUTPATIENT
Start: 2024-04-15 | End: 2024-04-15 | Stop reason: HOSPADM

## 2024-04-15 RX ORDER — ASPIRIN 81 MG/1
81 TABLET, CHEWABLE ORAL 2 TIMES DAILY
Qty: 84 TABLET | Refills: 0 | Status: SHIPPED | OUTPATIENT
Start: 2024-04-15 | End: 2024-05-27

## 2024-04-15 RX ADMIN — LIDOCAINE HYDROCHLORIDE 5 ML: 10 INJECTION, SOLUTION EPIDURAL; INFILTRATION; INTRACAUDAL at 09:26

## 2024-04-15 RX ADMIN — ACETAMINOPHEN 975 MG: 325 TABLET ORAL at 07:11

## 2024-04-15 RX ADMIN — BUPIVACAINE HYDROCHLORIDE 20 ML: 5 INJECTION, SOLUTION EPIDURAL; INTRACAUDAL; PERINEURAL at 11:45

## 2024-04-15 RX ADMIN — HYDROMORPHONE HYDROCHLORIDE 0.2 MG: 0.2 INJECTION, SOLUTION INTRAMUSCULAR; INTRAVENOUS; SUBCUTANEOUS at 12:36

## 2024-04-15 RX ADMIN — CEFAZOLIN SODIUM 2000 MG: 2 SOLUTION INTRAVENOUS at 09:33

## 2024-04-15 RX ADMIN — GABAPENTIN 300 MG: 300 CAPSULE ORAL at 07:11

## 2024-04-15 RX ADMIN — OXYCODONE HYDROCHLORIDE 10 MG: 5 TABLET ORAL at 13:25

## 2024-04-15 RX ADMIN — PROPOFOL 100 MCG/KG/MIN: 10 INJECTION, EMULSION INTRAVENOUS at 09:30

## 2024-04-15 RX ADMIN — FENTANYL CITRATE 50 MCG: 50 INJECTION INTRAMUSCULAR; INTRAVENOUS at 09:26

## 2024-04-15 RX ADMIN — CEFAZOLIN SODIUM 2000 MG: 2 SOLUTION INTRAVENOUS at 15:14

## 2024-04-15 RX ADMIN — ONDANSETRON 4 MG: 2 INJECTION INTRAMUSCULAR; INTRAVENOUS at 09:39

## 2024-04-15 RX ADMIN — BUPIVACAINE HYDROCHLORIDE IN DEXTROSE 1.8 ML: 7.5 INJECTION, SOLUTION SUBARACHNOID at 09:24

## 2024-04-15 RX ADMIN — HYDROMORPHONE HYDROCHLORIDE 0.2 MG: 0.2 INJECTION, SOLUTION INTRAMUSCULAR; INTRAVENOUS; SUBCUTANEOUS at 12:23

## 2024-04-15 RX ADMIN — TRANEXAMIC ACID 1000 MG: 10 INJECTION, SOLUTION INTRAVENOUS at 09:27

## 2024-04-15 RX ADMIN — BUPIVACAINE 10 ML: 13.3 INJECTION, SUSPENSION, LIPOSOMAL INFILTRATION at 11:45

## 2024-04-15 RX ADMIN — SODIUM CHLORIDE, SODIUM LACTATE, POTASSIUM CHLORIDE, AND CALCIUM CHLORIDE 125 ML/HR: .6; .31; .03; .02 INJECTION, SOLUTION INTRAVENOUS at 07:23

## 2024-04-15 RX ADMIN — HYDROMORPHONE HYDROCHLORIDE 0.2 MG: 0.2 INJECTION, SOLUTION INTRAMUSCULAR; INTRAVENOUS; SUBCUTANEOUS at 12:03

## 2024-04-15 RX ADMIN — HYDROMORPHONE HYDROCHLORIDE 0.2 MG: 0.2 INJECTION, SOLUTION INTRAMUSCULAR; INTRAVENOUS; SUBCUTANEOUS at 12:43

## 2024-04-15 RX ADMIN — FENTANYL CITRATE 50 MCG: 50 INJECTION INTRAMUSCULAR; INTRAVENOUS at 11:50

## 2024-04-15 RX ADMIN — PROPOFOL 30 MG: 10 INJECTION, EMULSION INTRAVENOUS at 09:29

## 2024-04-15 RX ADMIN — ACETAMINOPHEN 975 MG: 325 TABLET ORAL at 15:13

## 2024-04-15 RX ADMIN — MIDAZOLAM 2 MG: 1 INJECTION INTRAMUSCULAR; INTRAVENOUS at 09:12

## 2024-04-15 RX ADMIN — CHLORHEXIDINE GLUCONATE 15 ML: 1.2 RINSE ORAL at 07:12

## 2024-04-15 RX ADMIN — DEXAMETHASONE SODIUM PHOSPHATE 10 MG: 10 INJECTION, SOLUTION INTRAMUSCULAR; INTRAVENOUS at 09:39

## 2024-04-15 RX ADMIN — PROPOFOL 50 MG: 10 INJECTION, EMULSION INTRAVENOUS at 09:26

## 2024-04-15 RX ADMIN — FENTANYL CITRATE 50 MCG: 50 INJECTION INTRAMUSCULAR; INTRAVENOUS at 09:20

## 2024-04-15 RX ADMIN — ONDANSETRON 4 MG: 2 INJECTION INTRAMUSCULAR; INTRAVENOUS at 13:00

## 2024-04-15 RX ADMIN — FENTANYL CITRATE 50 MCG: 50 INJECTION INTRAMUSCULAR; INTRAVENOUS at 11:39

## 2024-04-15 RX ADMIN — SODIUM CHLORIDE, SODIUM LACTATE, POTASSIUM CHLORIDE, AND CALCIUM CHLORIDE: .6; .31; .03; .02 INJECTION, SOLUTION INTRAVENOUS at 11:07

## 2024-04-15 RX ADMIN — OXYCODONE HYDROCHLORIDE 10 MG: 10 TABLET, FILM COATED, EXTENDED RELEASE ORAL at 07:11

## 2024-04-15 RX ADMIN — PROPOFOL 30 MG: 10 INJECTION, EMULSION INTRAVENOUS at 11:06

## 2024-04-15 NOTE — INTERVAL H&P NOTE
H&P reviewed. After examining the patient I find no changes in the patients condition since the H&P had been written.  Patient reports that there have been no interval changes in her overall orthopedic health since last examination.  Interval changes in her medical health have been addressed with their respective subspecialist prior to surgery.  Currently she denies any recent fever, chills, nausea, vomiting, headache, chest pain, trouble breathing.  The patient be a good candidate for aspirin 81 mg p.o. twice daily with Protonix postoperatively for DVT prophylaxis.  The patient be a good candidate for outpatient physical therapy following her discharge from the hospital today.  All questions addressed this morning.  Proceed the OR for direct anterior right total of arthroplasty.    Vitals:    04/15/24 0706   BP: 140/80   Pulse: 78   Resp: 16   Temp: 98.2 °F (36.8 °C)   SpO2: 99%     Javier Reed D.O.  Division of Adult Reconstruction  Department of Orthopaedic Surgery  [facility]

## 2024-04-15 NOTE — OCCUPATIONAL THERAPY NOTE
Occupational Therapy Evaluation/Treatment       04/15/24 6140   OT Last Visit   OT Visit Date 04/15/24   Note Type   Note type Evaluation   Pain Assessment   Pain Assessment Tool 0-10   Pain Score 1   Pain Location/Orientation Orientation: Right;Location: Hip   Restrictions/Precautions   RLE Weight Bearing Per Order WBAT   Other Precautions Fall Risk;Pain   Home Living   Type of Home House   Home Layout Multi-level;Able to live on main level with bedroom/bathroom;Other (Comment)  (Walk-in shower on first and second floor. Pt bedroom is on the second floor, but she can sleep in her recliner on the first floor)   Bathroom Shower/Tub Walk-in shower   Bathroom Toilet Standard   Home Equipment Cane;Walker   Prior Function   Level of [place] Independent with ADLs;Independent with functional mobility;Independent with IADLS   Lives With Son   IADLs Independent with driving;Independent with meal prep;Independent with medication management   Vocational Full time employment  (Nurse anaesthetist)   Comments Pt is POD#0 s/p R BRIGITTE, anterior approach, WBAT   Lifestyle   Reciprocal Relationships lives with son   Intrinsic Gratification reading, exercising on her [place] bike, walking, swimming   ADL   Eating Assistance 7  Independent   Grooming Assistance 7  Independent   UB Bathing Assistance 5  Supervision/Setup   LB Bathing Assistance 5  Supervision/Setup   UB Dressing Assistance 5  Supervision/Setup   LB Dressing Assistance 5  Supervision/Setup   Toileting Assistance  5  Supervision/Setup   Transfers   Sit to Stand 5  Supervision   Stand to Sit 5  Supervision   Toilet transfer 5  Supervision   Additional items Standard toilet   Functional Mobility   Functional Mobility 5  Supervision   Additional Comments Pt. ambulated to bathroom with RW   Balance   Static Sitting Normal   Dynamic Sitting Normal   Static Standing Fair +   Dynamic Standing Fair   Activity Tolerance   Activity Tolerance Patient tolerated treatment well    Medical Staff Made Aware Dr. Reed   Nurse Made Aware yes   RUE Assessment   RUE Assessment WFL   LUE Assessment   LUE Assessment WFL   Cognition   Overall Cognitive Status WFL   Arousal/Participation Alert;Responsive;Cooperative   Attention Within functional limits   Orientation Level Oriented X4   Following Commands Follows all commands and directions without difficulty   Assessment   Limitation Decreased ADL status;Decreased Safe judgement during ADL;Decreased endurance;Decreased self-care trans;Decreased high-level ADLs  (decreased balance and mobility)   Prognosis Good   Assessment Patient evaluated by Occupational Therapy.  Patient admitted with Status post total replacement of right hip.  The patients occupational profile, medical and therapy history includes a extensive additional review of physical, cognitive, or psychosocial history related to current functional performance.  Comorbidities affecting functional mobility and ADLS include: anxiety, depression, and spinal stenosis.  Prior to admission, patient was independent with functional mobility without assistive device, independent with ADLS, and independent with IADLS.  The evaluation identifies the following performance deficits: weakness, impaired balance, decreased endurance, increased fall risk, new onset of impairment of functional mobility, decreased ADLS, decreased IADLS, pain, decreased activity tolerance, decreased safety awareness, and decreased strength, that result in activity limitations and/or participation restrictions. This evaluation requires clinical decision making of high complexity, because the patient presents with comorbidites that affect occupational performance and required significant modification of tasks or assistance with consideration of multiple treatment options.  The Barthel Index was used as a functional outcome tool presenting with a score of Barthel Index Score: 75, indicating minimal limitations of functional  mobility and ADLS.  The patient's raw score on the AM-PAC Daily Activity Inpatient Short Form is 21. A raw score of greater than or equal to 19 suggests the patient may benefit from discharge to home. Please refer to the recommendation of the Occupational Therapist for safe discharge planning.  Patient will benefit from skilled Occupational Therapy services to address above deficits and facilitate a safe return to prior level of function.   Goals   Patient Goals to work without pain   STG Time Frame   (1-3 days)   Short Term Goal  Goals established to promote Patient Goals: to work without pain:  Grooming: independent standing at sink; Bathing: independent;  Lower Body Dressing: independent; Toileting: independent; Patient will increase ambulatory standard toilet transfer to independent with rolling walker to increase performance and safety with ADLS and functional mobility; Patient will increase standing tolerance to 5 minutes during ADL task to decrease assistance level and decrease fall risk; Patient will increase bed mobility to independent in preparation for ADLS and transfers; Patient will increase functional mobility to and from bathroom with rolling walker independently to increase performance with ADLS and to use a toilet; Patient will improve functional activity tolerance to 10 minutes of sustained functional tasks to increase participation in basic self-care and decrease assistance level;  Patient will increase dynamic standing balance to fair+ to improve postural stability and decrease fall risk during standing ADLS and transfers.   LTG Time Frame   (4-7 days)   Long Term Goal Patient will increase standing tolerance to 10 minutes during ADL task to decrease assistance level and decrease fall risk;  Patient will improve functional activity tolerance to 20 minutes of sustained functional tasks to increase participation in basic self-care and decrease assistance level;  Patient will increase dynamic  standing balance to good to improve postural stability and decrease fall risk during standing ADLS and transfers.   Pt will score >/= 24/24 on AM-PAC Daily Activity Inpatient scale to promote safe independence with ADLs and functional mobility; Pt will score >/= 100/100 on Barthel Index in order to decrease caregiver assistance needed and increase ability to perform ADLs and functional mobility.   Plan   Treatment Interventions ADL retraining;Functional transfer training;Endurance training;Patient/family training;Equipment evaluation/education;Activityengagement;Compensatory technique education   Goal Expiration Date 04/22/24   OT Frequency Other (comment)  (5x/week)   Discharge Recommendation   Rehab Resource Intensity Level, OT No post-acute rehabilitation needs   AM-PAC Daily Activity Inpatient   Lower Body Dressing 3   Bathing 3   Toileting 3   Upper Body Dressing 4   Grooming 4   Eating 4   Daily Activity Raw Score 21   Daily Activity Standardized Score (Calc for Raw Score >=11) 44.27   AM-PAC Applied Cognition Inpatient   Following a Speech/Presentation 4   Understanding Ordinary Conversation 4   Taking Medications 4   Remembering Where Things Are Placed or Put Away 4   Remembering List of 4-5 Errands 4   Taking Care of Complicated Tasks 4   Applied Cognition Raw Score 24   Applied Cognition Standardized Score 62.21   Barthel Index   Feeding 10   Bathing 0   Grooming Score 5   Dressing Score 5   Bladder Score 10   Bowels Score 10   Toilet Use Score 10   Transfers (Bed/Chair) Score 10   Mobility (Level Surface) Score 10   Stairs Score 5   Barthel Index Score 75   Additional Treatment Session   Start Time 1500   End Time 1510   Treatment Assessment S: 1/10 pain  R hip O: Patient completed don shirt independently.  Patient donned underwear with Supervision without assistive device and pants with Supervision without assistive device.  Patient doffed B socks with Supervision without assistive device, donned B socks  with Supervision without assistive device and donned slip-on shoes with Supervision without assistive device seated. Patient ambulated short household distance from transfer to/from standard toilet with RW and Supervision; Educated patient on safe car transfer technique, safe tub/shower transfer technique via demonstration/explanation by therapist; patient verbalized good understanding A: Tolerated well.  Cooperative, pleasant and motivated to return home today.  Patient is OK from an OT standpoint for discharge today.  P: Home with family assist; no OT discharge needs, plans to attend outpatient PT upon discharge   Licensure   [place] License Number  Joy Ortiz MS OTR/L [id number]

## 2024-04-15 NOTE — ANESTHESIA PROCEDURE NOTES
Spinal Block    Patient location during procedure: OR  Start time: 4/15/2024 9:24 AM  Reason for block: procedure for pain, at surgeon's request and primary anesthetic  Staffing  Performed by: Isabel Machado CRNA  Authorized by: Sierra Santiago MD    Preanesthetic Checklist  Completed: patient identified, IV checked, site marked, risks and benefits discussed, surgical consent, monitors and equipment checked, pre-op evaluation and timeout performed  Spinal Block  Patient position: sitting  Prep: ChloraPrep and site prepped and draped  Patient monitoring: frequent blood pressure checks, continuous pulse ox and heart rate  Approach: midline  Location: L2-3  Needle  Needle type: Pencil Point   Needle gauge: 25 G  Needle length: 4 in  Assessment  Sensory level: T4  Injection Assessment:  negative aspiration for heme, no paresthesia on injection and positive aspiration for clear CSF.  Post-procedure:  site cleaned

## 2024-04-15 NOTE — PERIOPERATIVE NURSING NOTE
Tolerated diet without difficulty. Right hip dsg dry,intact with ice pack on. Right pedal pulse palpable.

## 2024-04-15 NOTE — PHYSICAL THERAPY NOTE
"       PHYSICAL THERAPY EVALUATION/TREATMENT     04/15/24 1430   PT Last Visit   PT Visit Date 04/15/24   Note Type   Note type Evaluation   Pain Assessment   Pain Assessment Tool 0-10   Pain Score 2   Pain Location/Orientation Orientation: Right;Location: Hip  (Pt had pain meds prior to PT)   Pain Onset/Description Descriptor: Aching   Restrictions/Precautions   Weight Bearing Precautions Per Order Yes   RLE Weight Bearing Per Order WBAT   Other Precautions Pain   Home Living   Type of Home House   Home Layout Able to live on main level with bedroom/bathroom;Two level  (2 CAR)   Home Equipment Walker;Cane   Prior Function   Level of [place] Independent with ADLs;Independent with functional mobility   Lives With Son   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   Cognition   Overall Cognitive Status WFL   Subjective   Subjective \"I feel good\"   RLE Assessment   RLE Assessment   (ROM WFL, MMT hip at least 2/5, knee/ankle at least 3/5)   LLE Assessment   LLE Assessment WFL   Bed Mobility   Supine to Sit 5  Supervision   Transfers   Sit to Stand 5  Supervision   Additional items Verbal cues  (for hand placement)   Stand to Sit 5  Supervision   Ambulation/Elevation   Gait pattern Step through pattern  (steady)   Gait Assistance 5  Supervision   Assistive Device Rolling walker   Distance 2x100 feet   Stair Management Assistance 5  Supervision   Stair Management Technique One rail L;With cane;Step to pattern   Number of Stairs 4   Balance   Static Sitting Good   Static Standing Fair +   Assessment   Prognosis Good   Problem List Decreased strength;Decreased mobility;Pain   Assessment Pt is 58 y.o. female seen for PT evaluation s/p [facility] to [facility]15[facility]24 w/ Status post total replacement of right hip. PT consulted to assess pt's functional mobility and d/c needs. Order placed for PT eval " "and tx, w/ WBAT R LE order.  Co-morbidities affecting patient's physical performance include: spinal stenosis/lumbar radiculopathy.  Personal factors affecting patient at time of initial evaluation include: lives in 2 story house, stairs to enter home, and inability to perform current job functions. Prior to admission, patient was independent with functional mobility without assistive device, independent with ADLS, and works full time.  Upon evaluation: Pt was able to ambulate with a walker 100 feet with supervision assist with a steady gait.   Please find objective findings from Physical Therapy assessment regarding body systems outlined above with impairments and limitations including weakness, pain, decreased activity tolerance, and fall risk.The Barthel Index was used as a functional outcome tool presenting with a score of Barthel Index Score: 70 today indicating moderate limitations of functional mobility and ADLS.  Patient's clinical presentation is currently unstable/unpredictable as seen in patient's presentation of changing level of pain, increased fall risk, new onset of impairment of functional mobility, and new onset of weakness. Pt would benefit from continued Physical Therapy treatment to address deficits as defined above and maximize level of functional mobility.     As demonstrated by objective findings, the assigned level of complexity for this evaluation is high.The patient's [facility] Basic Mobility Inpatient Short Form Raw Score is 20. A Raw score of greater than 16 suggests the patient may benefit from discharge to home. Please also refer to the recommendation of the Physical Therapist for safe discharge planning.   Goals   Patient Goals \"be able to work without pain because I love my job\"   [facility] Expiration Date 04/22/24   Short Term Goal #1 1.  Independent transfers   2.  Independent ambulation and unlevel surfaces with a rolling walker with a steady nonantalgic gait 150 feet   3.  Independent with " "home exercise program designed to improve patient's right lower extremity strength and range of motion,   4. Independent up-and-down 1 flight of steps the patient can get to her second floor bed and bathroom 5.  Patient will be independent in home exercise program designed to improve the range of motion and strength of her right lower extremity   LTG Expiration Date 04/29/24   Long Term Goal #1 1.  Independent ambulation after level surfaces with least restrictive device 500 feet with a steady nonantalgic gait   2.  Improve right lower extremity strength at least 4 out of 5 to prepare patient to walk without an assistive device and to eventually return to work   Plan   Treatment/Interventions Functional transfer training;LE strengthening/ROM;Elevations;Therapeutic exercise;Gait training;Bed mobility   PT Frequency Twice a day   Discharge Recommendation   Rehab Resource Intensity Level, PT III (Minimum Resource Intensity)   Equipment Recommended   (Pt has a cane and walker)   AM-PAC Basic Mobility Inpatient   Turning in Flat Bed Without Bedrails 4   Lying on Back to Sitting on Edge of Flat Bed Without Bedrails 4   Moving Bed to Chair 3   Standing Up From Chair Using Arms 3   Walk in Room 3   Climb 3-5 Stairs With Railing 3   Basic Mobility Inpatient Raw Score 20   Basic Mobility Standardized Score 43.99   [facility] Highest Level Of Mobility   -HLM Goal 6: Walk 10 steps or more   -HLM Achieved 7: Walk 25 feet or more   Barthel Index   Feeding 10   Bathing 0   Grooming Score 5   Dressing Score 5   Bladder Score 10   Bowels Score 10   Toilet Use Score 5   Transfers (Bed/Chair) Score 10   Mobility (Level Surface) Score 10   Stairs Score 5   Barthel Index Score 70   Additional Treatment Session   Start Time 1415   End Time 1430   Treatment Assessment S: \"I feel good\"   O: x 10 each ankle pumps, quad set, glut set, heel slides, LAQ, supine hip abd. HEP handout issued.    A:  Pt demosntrates good ability to actively " move her R LE POD zero s/pT TKA.  Pt is ready to go home from the PT point of view to start OP PT later this week.   End of Consult   Patient Position at End of Consult All needs within reach;Bedside chair   Licensure   [place] License Number  Marine Rojo PT [id number]

## 2024-04-15 NOTE — OP NOTE
OPERATIVE REPORT  PATIENT NAME: Jael Fabian  : 1965  MRN: [id number]  Pt Location:  [facility] ROOM 03    Surgery Date: 4/15/2024    Surgeons and Role:     * Javier Reed, DO - Primary     * Alexey Garcia PA-C - Assisting, no qualified resident was available an assistant was needed for patient positioning, prepping and draping, soft tissue retraction, protection of vital structures throughout the case, exposure of the femur and acetabulum for preparation and implantation of final prosthesis, superficial closure, and to complete the case safely.    * Jeremy Nguyễn,  ATC/OTC - 2nd Assist    Preop Diagnosis:  Primary osteoarthritis of right hip [M16.11]  Right hip pain [M25.551]    Post-Op Diagnosis Codes:     * Primary osteoarthritis of right hip [M16.11]     * Right hip pain [M25.551]    Procedure(s):  Right - ARTHROPLASTY HIP TOTAL ANTERIOR.NAVIGATED    Specimens:  * No specimens in log *    Estimated Blood Loss:   350 mL    Drains:  * No LDAs found *    Anesthesia Type:   Spinal with sedation, postoperative Sam block with Exparel    Intravenous fluids: 1 L    Antibiotics: Ancef 2 g    Urine output: No Yi    Implant Name Type Inv. Item Serial No.  Lot No. LRB No. Used Action   SHELL ACETABULAR 52MM POROUS SOLID LCK RING PINNACLE - [id number]  SHELL ACETABULAR 52MM POROUS SOLID LCK RING PINNACLE  DEPUY [id number] Right 1 Implanted   ELIMINATOR ACTB THRD POS STOP - [id number]  ELIMINATOR ACTB THRD POS STOP  DEPUY [id number] Right 1 Implanted   LINER ACTB ULT LNK PE 36 X 52MM 0DEG NEUTRAL ALTRX - [id number]  LINER ACTB ULT LNK PE 36 X 52MM 0DEG NEUTRAL ALTRX  DEPUY [id number] Right 1 Implanted   STEM FEM SZ 6 TAPER CMNTLS STD COLLAR ACTIS - [id number]  STEM FEM SZ 6 TAPER CMNTLS STD COLLAR ACTIS  DEPUY [id number] Right 1 Implanted   HEAD FEMORAL CMNTLS 12/14 TPR 36MM +8.5MM BIOLOX DELTA ARTICULEZE - [id number]  HEAD FEMORAL CMNTLS 12/14 TPR 36MM +8.5MM BIOLOX DELTA ARTICULEZE  [id number]  Right 1 Implanted     Operative Indications:  Primary osteoarthritis of right hip [M16.11]  Right hip pain [M25.551]  Patient is a very pleasant 58-year-old female known to me for treatment of her activity related right hip and groin pain.  The patient has attempted multiple forms of conservative measure treatment and all have failed to provide long-lasting relief of her discomfort.  With failed conservative treatment, persistent activity related pain, and end-stage osteoarthritis of the right hip I recommended that she undergo direct anterior right total arthroplasty.  She was agreeable to this.  Consents were signed and placed in the chart.  Patient was optimized by her medical subspecialist in preparation for the procedure.  Ultimately the patient underwent direct anterior right total of arthroplasty on 4/15/2024.    Operative Findings:  Intraoperatively there was evidence of grade 4 degenerative change of both sides and hip articulation.  The hip navigation software was utilized throughout the case.  A press-fit acetabular component was implanted in the pelvis with excellent press-fit.  Its final position was in 44 degrees of inclination and 21 degrees of anteversion.  The polyethylene liner was locked into place.  The press-fit femoral component was implanted down into the proximal femur and approximately 5 to 7 degrees of anteversion.  At its final station it had excellent rotational and axial stability.  The final construct had excellent stability to 50 degrees of internal rotation, 110 degrees of external rotation, and 50 degrees of external rotation with the leg lowered to the floor.  The operative extremity was lengthened approximately 2 mm from its preoperative state in accordance with the preoperative template and clinical exam.  Patient tolerated the procedure well.  There were no complications.    Complications:   None      Hip Approach: Direct anterior    Procedure and Technique:  The patient was  identified and marked in the preoperative holding area, and the correct operative extremity and intended procedure was confirmed. The consents were visualized and confirmed for correct procedure and laterality. The patient was then taken back to the operating room where there were administered spinal anesthesia by the anesthesia staff. The patient was administered 2 g of Ancef intravenously by the anesthesia staff. The patient was then transferred to the HANA table for the procedure. After the patient was appropriately positioned, a AP of the pelvis was obtained using fluoroscopy to evaluate preoperative leg lengths. It was found that the right lower extremity was approximately 3 mm longer than the left lower extremity, however on multiple clinical exams she was approximately 3 to 4 mm shorter on the right lower extremity compared to the left.  The patient states that she felt equal in terms of her leg lengths preoperatively.  The right lower extremity was prepped and draped in a standard sterile fashion. After a timeout was performed and all members of the operating room team were in agreement of the correct patient, and correct intended procedure the bony landmarks were identified using marking pen. Tranexamic acid was administered by anesthesia.  A modified Dawson-Rivera incision was delineated obliquely starting 2 cm distal and 2 cm laterally to the ASIS.  Sharp dissection was carried down through the subcutaneous tissues to the investing fascia of the tensor fascia alex muscle. Electrocautery was used to maintain hemostasis in the subcutaneous tissues. The investing fascia of the tensor fascia alex was incised in line with the fibers in this compartment was entered bluntly and the muscle was retracted laterally. The perforating circumflex femoral vessels were carefully identified and cauterized using electrocautery and the Aquamantis. Dissection was then carried down to the right hip capsule, and the  pre-capsular fat was excised. A capsulotomy was carried out across the edge of the acetabulum superiorly down the femoral neck and into the intra-articular trochanteric line. Both sides of the capsulotomy were tagged with a Ethibond suture.  Dissection was carried out medially around the medial calcar. It was also carried out laterally to expose the saddle. The femoral neck osteotomy was templated and carried out using an oscillating saw and finished with a osteotome in accordance with preoperative templating. The femoral head was extracted from the acetabulum using a corkscrew.  There were grade 4 changes diffusely with osteophytes peripherally.  The femoral head was sized.  There were grade 4 changes diffusely in the acetabulum.  The acetabulum was cleaned of debris and pulvinar, the CATRINA was well-visualized, the remaining labrum was removed, and reaming began. The acetabulum was reamed starting with a size 49 reamer and carried up in 1-2 mm increments until a size 51 was reached. A size 51 acetabular trial was impacted into the acetabulum and demonstrated an appropriate fit.  The hip navigation software was utilized throughout the case.  The appropriate abduction and anteversion of approximately 45° and 20° respectively was confirmed using fluoroscopy. The trial implant was removed, post bone was touched with a size 52 reamer, and a size 52 [place] 2 cluster hole final acetabular component was impacted into place under direct visualization with fluoroscopy. This demonstrated excellent scratch fit.  This final position was in 44 degrees of inclination and 21 degrees of anteversion.  The acetabular opponent was collinear with the native anterior acetabulum.  On inspection the component was well-seated at its apex.  The final 36 mm/52 mm Altrx highly cross-linked polyethylene insert was impacted into the acetabular component.  The liner was locked in its position on visual and tactile inspection.    Attention  was then turned back to the proximal femur.  The appropriate proximal femoral releases were utilized as needed to mobilize the femur, ensuring not to release the obturator externus.  The soft tissue was removed from the region between the femoral neck and the greater trochanter a box osteotome was used to cut into the lateral aspect of the proximal femur.  The small starter broach was then inserted into the proximal femur adding proximally 5-7° of anteversion while preparing the proximal femur. Broaching was carried up in sequence until a size 6 broach demonstrated excellent fit and rotational stability. The calcar planer was used to bring the femoral neck osteotomy coplanar with the broach. Trialing was performed.  A trial construct with a standard neck and a +1.5 36 mm femoral head showed a very good stability on 110 degrees of external rotation, 50 degrees of internal rotation, and 50 degrees of external rotation with extension of the hip to the floor. Leg lengths were evaluated on fluoroscopy images and the hip navigation software.  On leg length evaluation the operative extremity was approximately 2 mm short and based off of the [employer] software data the 8.5 mm 36 head would appropriately recreate offset as well as increased leg length by proxy 2 mm which was desired based off of the combination of preoperative radiographs and clinical exam.  This was the the final construct.  The trial components were removed from the proximal femur and the final Depuy Actis size 6 standard offset femoral component was inserted into the femur by hand and impacted into place. The trunnion was cleaned and dried and the final Biolox delta ceramic +8.5 36 mm head was impacted upon the final femoral stem. Hip was reduced and taken through stability testing. The patient demonstrated excellent stability with 110 degrees of external rotation, 50 degrees of internal rotation, and 50 degrees of external rotation and extension of the  hip to the floor.  There was no lateral subluxation of the hip on manual testing.  Leg lengths were again evaluated using the hip navigation software and was found to be 2 mm longer than preoperative state.      The wound was copiously irrigated with Irrisept followed by normal saline solution, the capsule was closed using a #2 Ethibond suture. The wound was again irrigated.  The investing fascia of the tensor fascia alex muscle was closed using a bidirectional barbed #1 barbed suture.  The deep subcutaneous tissues were reapproximated using an undyed 0 Vicryl, the superficial subcutaneous tissues were reapproximated using an undyed 2-0 Vicryl, the skin edges were reapproximated using a 3-0 bidirectional barbed Monocryl suture, and the skin edges sealed with Exofin.  After the skin adhesive had dried a silver impregnated Mepilex dressing was applied over the surgical incision.  The patient was awakened from spinal anesthesia with sedation and transferred to PACU in stable condition.     I was present for all critical portions of the procedure.    Patient Disposition:  PACU         SIGNATURE: Javier Reed DO  DATE: April 15, 2024  TIME: 3:22 PM

## 2024-04-15 NOTE — PERIOPERATIVE NURSING NOTE
Cleared by PT/OT , OOB in recliner . Unable to void at present.  Discharge instructions reviewed with pt, verbalizes understanding.

## 2024-04-15 NOTE — ANESTHESIA PREPROCEDURE EVALUATION
Procedure:  ARTHROPLASTY HIP TOTAL ANTERIOR,NAVIGATED - same day (Right: Hip)    Relevant Problems   ENDO   (+) Secondary non-renal hyperparathyroidism (HCC)      MUSCULOSKELETAL   (+) Arthritis of carpometacarpal (CMC) joint of left thumb   (+) Osteoarthritis of right hip      NEURO/PSYCH   (+) Severe episode of recurrent major depressive disorder, without psychotic features (HCC)        Physical Exam    Airway    Mallampati score: II  TM Distance: >3 FB  Neck ROM: full     Dental   No notable dental hx     Cardiovascular  Cardiovascular exam normal    Pulmonary  Pulmonary exam normal     Other Findings  post-pubertal.      Anesthesia Plan  ASA Score- 2     Anesthesia Type- spinal with ASA Monitors.         Additional Monitors:     Airway Plan:            Plan Factors-Exercise tolerance (METS): >4 METS.    Chart reviewed.   Existing labs reviewed. Patient summary reviewed.    Patient is not a current smoker.              Induction-     Postoperative Plan-     Informed Consent- Anesthetic plan and risks discussed with patient.  I personally reviewed this patient with the CRNA. Discussed and agreed on the Anesthesia Plan with the CRNA..

## 2024-04-15 NOTE — ANESTHESIA POSTPROCEDURE EVALUATION
Post-Op Assessment Note    CV Status:  Stable  Pain Score: 4    Pain management: adequate    Multimodal analgesia used between 6 hours prior to anesthesia start to PACU discharge    Mental Status:  Alert and awake   Hydration Status:  Stable and euvolemic   PONV Controlled:  None   Airway Patency:  Patent  Two or more mitigation strategies used for obstructive sleep apnea   Post Op Vitals Reviewed: Yes    No anethesia notable event occurred.    Staff: CRNA               BP   127/78   Temp 96.7   Pulse 61   Resp 18   SpO2 99

## 2024-04-15 NOTE — ANESTHESIA PROCEDURE NOTES
Peripheral Block    Patient location during procedure: PACU  Start time: 4/15/2024 11:45 AM  Reason for block: at surgeon's request and post-op pain management  Staffing  Performed by: Sierra Santiago MD  Authorized by: Sierra Santiago MD    Preanesthetic Checklist  Completed: patient identified, IV checked, site marked, risks and benefits discussed, surgical consent, monitors and equipment checked, pre-op evaluation and timeout performed  Peripheral Block  Patient position: supine  Prep: ChloraPrep  Patient monitoring: continuous pulse oximetry, frequent blood pressure checks and heart rate  Block type: PENG  Laterality: right  Injection technique: single-shot  Procedures: ultrasound guided, Ultrasound guidance required for the procedure to increase accuracy and safety of medication placement and decrease risk of complications.  Ultrasound permanent image savedbupivacaine liposomal (EXPAREL) 1.3 % injection 20 mL - Perineural   10 mL - 4/15/2024 11:45:00 AM  bupivacaine (PF) (MARCAINE) 0.5 % injection 20 mL - Perineural   20 mL - 4/15/2024 11:45:00 AM  Needle  Needle type: Stimuplex   Needle gauge: 22 G  Needle length: 4 in  Needle localization: ultrasound guidance  Assessment  Injection assessment: frequent aspiration, incremental injection, needle tip visualized at all times, injected with ease, negative aspiration, negative for heart rate change, no symptoms of intraneural/intravenous injection and no paresthesia on injection  Paresthesia pain: none  Post-procedure:  site cleaned  patient tolerated the procedure well with no immediate complications

## 2024-04-15 NOTE — PERIOPERATIVE NURSING NOTE
Received from Pacu. VSS Right hip dsg dry,intact. Ice to right hip.  Right pedal pulse palpable, strong. Given water, coffee at pt's request. 7/10 pain in right hip awaiting pharm for pain med approval.

## 2024-04-16 ENCOUNTER — TELEPHONE (OUTPATIENT)
Dept: OBGYN CLINIC | Facility: HOSPITAL | Age: 59
End: 2024-04-16

## 2024-04-17 NOTE — TELEPHONE ENCOUNTER
"Patient contacted for a postoperative follow up assessment. Patient reports doing  \"sore, doing okay.\" She is ambulating with the RW. She has OP PT tomorrow.   Patient reports increase in swelling, to the thigh, and reports improved from yesterday, and dressing is clean, dry and intact. Patient is icing the site regularly, and we discussed continuing to ICE areas of pain and swelling, especially after increased activity.     We reviewed patients AVS medication list. Patient is taking Tylenol 1000mg every 8 hours, Oxycodone 5mg PRN, ASA 81mg BID, Protonix 40mg daily, and Colace 100mg BID. Patient has not yet had a BM but is passing gas, we discussed continuing to take the colace until going regularly.     Patient denies nausea, vomiting, abdominal pain, chest pain, shortness of breath, fever, dizziness and calf pain. Patient does not have any other questions or concerns at this time. Pt was encouraged to call with any questions, concerns or issues.    "

## 2024-04-18 ENCOUNTER — OFFICE VISIT (OUTPATIENT)
Dept: PHYSICAL THERAPY | Facility: CLINIC | Age: 59
End: 2024-04-18
Payer: COMMERCIAL

## 2024-04-18 DIAGNOSIS — M25.551 RIGHT HIP PAIN: ICD-10-CM

## 2024-04-18 DIAGNOSIS — M16.11 PRIMARY OSTEOARTHRITIS OF RIGHT HIP: Primary | ICD-10-CM

## 2024-04-18 PROCEDURE — 97110 THERAPEUTIC EXERCISES: CPT | Performed by: PHYSICAL THERAPIST

## 2024-04-18 NOTE — PROGRESS NOTES
Re-evaluation    Today's date: 2024  Patient name: Jael Fabian  : 1965  MRN: [id number]  Referring provider: Javier Reed DO  Dx:   Encounter Diagnosis     ICD-10-CM    1. Primary osteoarthritis of right hip  M16.11       2. Right hip pain  M25.551       Start Time: 1330  Stop Time: 1405  Total time in clinic (min): 35 minutes  Subjective: Patient presenting 3 days post op R BRIGITTE. Patient reports trouble with dressing and bathing. Patient reports that she is using cling wrap when showering. Has been taking pain medication as instructed. Has not had bowel movement yet. Has been icing as instructed.     History of Present Illness  Mechanism of injury: Patient reports right gluteal region and groin region described as knife-like sensation. Pain with transitions also reported and with ambulation. Had injection and therapy in past without relief. Denies numbness/tingling. Does have sleep disturbances reported and has trouble either sleeping or staying asleep. 16 steps inside home. Will be moving 2 weeks after her surgery. Shower and recliner on 1st floor. Performs step to gait pattern ascending, reciprocal descending. Son will be staying with patient. Has a cane at home and walker will be delivered. Walk in shower present. No shower chair yet.        Quality of life: good     Patient Goals  Patient goals for therapy: independence with ADLs/IADLs, return to sport/leisure activities, increased strength, decreased pain and increased motion  Patient goal: no pain with walking and stair navigation and work without pain  Pain  Current pain ratin  At best pain ratin  At worst pain rating: 10  Quality: aching and burning  Relieving factors: change in position and ice  Aggravating factors: sitting, stair climbing, standing and walking, sleeping  Progression: improved     Social Support     Employment status: working (nurse anesthetist)  Treatments  Previous treatment: injection treatment and physical  therapy     Short Term: FROM DOS  Pt will report decreased levels of pain by at least 2 subjective ratings in 4 weeks  Pt will demonstrate improved ROM by at least 10 degrees in 4 weeks  Pt will demonstrate improved strength by 1/2 grade in 4 weeks.  Pt will be able to ambulate without AD in 4 weeks.  Pt will demonstrate reciprocal stair navigation in 4 weeks.  Long Term:   Pt will be independent in their HEP in 8 weeks  Pt will be pain free with IADL's in 8 weeks.  Pt will demonstrate 5/5 MMT in 8 weeks.  Pt will ambulate> 30 minutes without symptoms in 8 weeks.    Objective     GAIT: RW slow gait speed  Squat assess: 50% depth Right groin pain  TU sec with RW  SLS: RLE: 6 sec., LLE: 8 sec.               MMT          AROM           PROM     Hip         R          L         R        L          R         L   Flex. 3+ 4     95 105   Extn. 3+ 4           Abd.  4-     35p! 40   Add.  4-           IR. 4 4+     15p! 20   ER. 4 4+     15 22                  MMT     Knee      R          L   Flex. 4 5   Extn. 4 5                           MMT     Ankle         R          L   PF 5 5   DF  5 5   EHL 5 5   Inv. 5 5   Ever. 5 5     Assessment: Tolerated treatment well. Patient  reported hip burning with standing hip abduction exercise. Occasional cueing needed for proper exercise performance. Good Rom demonstrated at this time.   Plan: Continue per plan of care.      Insurance:  AMA/CMS Eval/ Re-eval POC expires FOTO Auth #/ Referral # Total    Start date  Expiration date Extension  Visit limitation?  PT only or  PT+OT? Co-Insurance   CMS 4.8 24  needed 12v 4.8 10.4.24                       AUTH #:  Date              Authed: Used 1 2              Remaining  11 10               Precautions: standard precautions  Patient provided verbal consent to treatment plan and recommended interventions.    DOS: Right BRIGITTE 4/15/24    Manuals 4.8 4.       visit 1 2                                  Neuro Re-Ed                                              Ther Ex         LAQ  10x       SAQ HEP        Glute set HEP        Ankle pump HEP        Supine hip abd.  HEP       Heel slide  HEP       Quad set  HEP       Stand hip flex  10x       Calf raises  2*10       Stand hip abd  10x ea       Mini squats  10x       Nu step for ROM  5' st 7       Ther Activity         PT ed FB FB                Gait Training         AD RW FB        Stair navigation         Modalities

## 2024-04-22 ENCOUNTER — OFFICE VISIT (OUTPATIENT)
Dept: PHYSICAL THERAPY | Facility: CLINIC | Age: 59
End: 2024-04-22
Payer: COMMERCIAL

## 2024-04-22 DIAGNOSIS — M25.551 RIGHT HIP PAIN: ICD-10-CM

## 2024-04-22 DIAGNOSIS — M16.11 PRIMARY OSTEOARTHRITIS OF RIGHT HIP: Primary | ICD-10-CM

## 2024-04-22 PROCEDURE — 97110 THERAPEUTIC EXERCISES: CPT | Performed by: PHYSICAL THERAPIST

## 2024-04-22 PROCEDURE — 97530 THERAPEUTIC ACTIVITIES: CPT | Performed by: PHYSICAL THERAPIST

## 2024-04-22 NOTE — PROGRESS NOTES
"Daily Note     Today's date: 2024  Patient name: Jael Fabian  : 1965  MRN: [id number]  Referring provider: Javier Reed DO  Dx:   Encounter Diagnosis     ICD-10-CM    1. Primary osteoarthritis of right hip  M16.11       2. Right hip pain  M25.551         Start Time: 0125  Stop Time: 0210  Total time in clinic (min): 45 minutes  Subjective: Patient reports doing much better overall. Reports reducing use of prescription medication. Presenting with SPC LUE at this time.     Objective: See treatment diary below  Good incisional healing, bandage removed today 24    Assessment: Tolerated treatment well. Cane adjusted to fit patient's height. Patient reassured on healing process. Improved tolerance to single leg stance with BUE assist when performing standing hip abduction bilaterally.     Plan: Continue per plan of care.      Insurance:  AMA/CMS Eval/ Re-eval POC expires FOTO Auth #/ Referral # Total    Start date  Expiration date Extension  Visit limitation?  PT only or  PT+OT? Co-Insurance   CMS 4.8 24  needed 12v 4.8 10.4.24                       AUTH #:  Date 4            Authed: Used 1 2 3             Remaining  11 10 9              Precautions: standard precautions  Patient provided verbal consent to treatment plan and recommended interventions.    DOS: Right BRIGITTE 4/15/24    Manuals 4.8 4. 4.      visit 1 2 3                                 Neuro Re-Ed                                             Ther Ex         LAQ  10x 3*10      SAQ, glute set, ankle pump HEP        Heel slide  HEP       Quad set  HEP       Stand hip flex  10x       Stand hip extn   2*10 RLE      Calf raises  2*10 2*10      Stand hip abd  10x ea 2*10 ea.       Mini squats  10x 2*10      Nu step for ROM  5' st 7 6' st 7      PROM hip   FB      Supine hip flexion   2*10      Side step   4 laps 8'      Step up   10*4\"               Ther Activity         PT ed FB FB FB               Gait Training         AD " RW MICHAEL        Stair navigation         Modalities

## 2024-04-25 ENCOUNTER — OFFICE VISIT (OUTPATIENT)
Dept: PHYSICAL THERAPY | Facility: CLINIC | Age: 59
End: 2024-04-25
Payer: COMMERCIAL

## 2024-04-25 ENCOUNTER — TELEMEDICINE (OUTPATIENT)
Dept: PSYCHIATRY | Facility: CLINIC | Age: 59
End: 2024-04-25

## 2024-04-25 DIAGNOSIS — G47.00 INSOMNIA, UNSPECIFIED TYPE: ICD-10-CM

## 2024-04-25 DIAGNOSIS — M16.11 PRIMARY OSTEOARTHRITIS OF RIGHT HIP: Primary | ICD-10-CM

## 2024-04-25 DIAGNOSIS — M25.551 RIGHT HIP PAIN: ICD-10-CM

## 2024-04-25 DIAGNOSIS — F41.1 GAD (GENERALIZED ANXIETY DISORDER): ICD-10-CM

## 2024-04-25 DIAGNOSIS — F33.2 SEVERE EPISODE OF RECURRENT MAJOR DEPRESSIVE DISORDER, WITHOUT PSYCHOTIC FEATURES (HCC): Primary | ICD-10-CM

## 2024-04-25 DIAGNOSIS — F10.11 ALCOHOL USE DISORDER, MILD, IN EARLY REMISSION: ICD-10-CM

## 2024-04-25 PROCEDURE — 97110 THERAPEUTIC EXERCISES: CPT

## 2024-04-25 RX ORDER — HYDROXYZINE HYDROCHLORIDE 25 MG/1
25 TABLET, FILM COATED ORAL 3 TIMES DAILY PRN
Qty: 90 TABLET | Refills: 2 | Status: SHIPPED | OUTPATIENT
Start: 2024-04-25 | End: 2024-07-24

## 2024-04-25 RX ORDER — DESVENLAFAXINE SUCCINATE 50 MG/1
50 TABLET, EXTENDED RELEASE ORAL DAILY
Qty: 30 TABLET | Refills: 2 | Status: SHIPPED | OUTPATIENT
Start: 2024-05-09 | End: 2024-08-07

## 2024-04-25 NOTE — PROGRESS NOTES
"Daily Note     Today's date: 2024  Patient name: Jael Fabian  : 1965  MRN: [id number]  Referring provider: Javier Reed DO  Dx:   Encounter Diagnosis     ICD-10-CM    1. Primary osteoarthritis of right hip  M16.11       2. Right hip pain  M25.551           Start Time: 1500  Stop Time: 1540  Total time in clinic (min): 40 minutes  Subjective: Patient continues to use SPC. She complains of some soreness and tightness but this is normal for her.     Objective: See treatment diary below    Assessment: Tolerated treatment well. Pt continues to tolerate exercise progressions well. She is demonstrating good philip control and strength. Continue working on her ability to negotiate steps. Pt was fatigued and fairly sore following session. Pt will continue to benefit from skilled PT to return her to her PLOF.     Plan: Continue per plan of care.      Insurance:  AMA/CMS Eval/ Re-eval POC expires FOTO Auth #/ Referral # Total    Start date  Expiration date Extension  Visit limitation?  PT only or  PT+OT? Co-Insurance   CMS 4.8 6.24  needed 12v 4.8 10.4.24                       AUTH #:  Date 48 4.18 4.22 4.25           Authed: Used 1 2 3 4            Remaining  11 10 9 8             Precautions: standard precautions  Patient provided verbal consent to treatment plan and recommended interventions.    DOS: Right BRIGITTE 4/15/24    Manuals 4.8 4.18 4.22 4.25     visit 1 2 3 4                                Neuro Re-Ed                                             Ther Ex         LAQ  10x 3*10 3x10 3\"     SAQ, glute set, ankle pump HEP        Heel slide  HEP       Quad set  HEP       Stand hip flex  10x       Stand hip extn   2*10 RLE 2x12 RLE     Calf raises  2*10 2*10 3x12     Stand hip abd  10x ea 2*10 ea.  2x10 ea     Mini squats  10x 2*10 2x12 rail     Nu step for ROM  5' st 7 6' st 7 7' seat 7     PROM hip   FB Flexion WM     Supine hip flexion   2*10      Side step   4 laps 8'      Step up   10*4\" 4\" 2x10      "         Ther Activity         PT ed FB FB FB               Gait Training         AD RW FB        Stair navigation         Modalities

## 2024-04-25 NOTE — PSYCH
Virtual Regular Visit    Verification of patient location: [place]    Patient is located in the following state in which I hold an active license: [place]    Assessment/Plan:    Problem List Items Addressed This Visit          Behavioral Health    Severe episode of recurrent major depressive disorder, without psychotic features (HCC) - Primary     Other Visit Diagnoses       CANDICE (generalized anxiety disorder)        Insomnia, unspecified type        Alcohol use disorder, mild, in early remission                     Reason for visit is   Chief Complaint   Patient presents with    Medication Management    Depression    Anxiety        Visit Time  Visit Start Time: 1:50 PM  Visit Stop Time: 2:20 PM  Total Visit Duration: 30 minutes    Encounter provider Koko Benz MD    Provider located at: [facility] [address], [address]  [place] [address]    Recent Visits  No visits were found meeting these conditions.  Showing recent visits within past 7 days and meeting all other requirements  Today's Visits  Date Type Provider Dept   04/25/24 [facility]medicine [facility]   Showing today's visits and meeting all other requirements  Future Appointments  No visits were found meeting these conditions.  Showing future appointments within next 150 days and meeting all other requirements       The patient was identified by name and date of birth. Jael Fabian was informed that this is a telemedicine visit and that the visit is being conducted through the Epic Embedded platform. She agrees to proceed. My office door was closed. No one else was in the room. She acknowledged consent and understanding of privacy and security of the video platform. The patient has agreed to participate and understands they can discontinue the visit at any time. Patient is aware this is a billable service.       MEDICATION MANAGEMENT NOTE        [facility]  [facility]      Name and Date of Birth:  Jael Fabian 58 y.o. 1965 MRN: [id number]    Date of Visit: April 25, 2024    Reason for Visit:   Chief Complaint   Patient presents with    Medication Management    Depression    Anxiety       SUBJECTIVE:  The patient was visited virtually for Medication Management, Depression, and Anxiety. Presented calm, and cooperative. The patient had R BRIGITTE on 4/15/24 and has been recovering well and has been going to PT. She noted that she had \"a lot of pain\" after the surgery, but her pain has been improving since the soft tissue swelling is improving and denied any pain while walking. Reported feeling good. She moved to her new house two days after discharge and has unpacked the majority of her stuff, and reportedly her friends helped her. She noted that it has been \"such a relief\" since move is over and had the surgery. She reported mild depressive sxs for few days after the surgery, but went out for a walk today and her mood has been better and noted that just being out has been helpful. She will return to work in July as per patient. She noted that her pain can still go up to 6/10 at times and the pain medication helps.  Denied any changes in sleep, appetite, concentration, energy level, or daily activities. Denied feelings of anhedonia, hopelessness, helplessness, worthlessness or guilt and appeared to be future oriented. There was no thought constriction related to death. Denied SI/HI, intent or plan upon direct inquiry at this time. Denied AV/H. No intense anxiety sxs, specific phobia or panic attacks reported. No manic sxs, paranoid ideations or fixed delusions were elicited. Endorsed good compliance with the medications and denied any side effects. Denied smoking cigarettes, drinking alcohol or other illicit substance use and will restart AA meetings.    Given this presentation, medications are maintained at the same dosage. Will continue AA " meetings. The patient was educated to call 911 or go to the nearest emergency room if the symptoms become overwhelming or unable to remain in control. Verbalized understanding and agreed to seek help in case of distress or concern for safety.    Review Of Systems:  Pertinent items are noted in HPI; all others are negative; no recent changes in medications or health status reported.      PHQ-2/9 Depression Screening    Little interest or pleasure in doing things: 1 - several days  Feeling down, depressed, or hopeless: 1 - several days  Trouble falling or staying asleep, or sleeping too much: 1 - several days  Feeling tired or having little energy: 1 - several days  Poor appetite or overeatin - several days  Feeling bad about yourself - or that you are a failure or have let yourself or your family down: 0 - not at all  Trouble concentrating on things, such as reading the newspaper or watching television: 0 - not at all  Moving or speaking so slowly that other people could have noticed. Or the opposite - being so fidgety or restless that you have been moving around a lot more than usual: 0 - not at all  Thoughts that you would be better off dead, or of hurting yourself in some way: 0 - not at all  PHQ-9 Score: 5  PHQ-9 Interpretation: Mild depression               Past Psychiatric History Update:   - No inpatient psychiatric admission since last encounter  - No SA or SIB since last encounter  - No incidence of violent behavior since last encounter    Past Trauma History Update:    - No new onset of abuse or traumatic events since last encounter     Past Medical History:    Past Medical History:   Diagnosis Date    Abnormal Pap smear of cervix 1987    Anxiety     Depression     Ear problems 1996    Fibrocystic breast     GERD (gastroesophageal reflux disease)     Herpes 1987    High vitamin A level     History of anxiety     History of hypercholesterolemia     History of hypertension     History of obesity      HPV (human papilloma virus) infection 1987    Hyperlipidemia     Lordosis     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    Scoliosis     Secondary hyperparathyroidism, non-renal (HCC)     Spinal stenosis     Tinnitus 1996        Past Surgical History:   Procedure Laterality Date    ABDOMINOPLASTY      BREAST CYST ASPIRATION Right     COLONOSCOPY      COLONOSCOPY  11/22/2021    Arianah - 5 y f/u     COLPOSCOPY  1987    COMBINED AUGMENTATION MAMMAPLASTY AND ABDOMINOPLASTY  2001    EPIDURAL BLOCK INJECTION Bilateral 8/23/2023    Procedure: Left L4-L5   TRANSFORAMINAL epidural steroid injection ( [id number]);  Surgeon: Mateo Colmenares DO;  Location: [facility] MAIN OR;  Service: Pain Management     EPIDURAL BLOCK INJECTION Bilateral 1/24/2024    Procedure: L4-L5 TRANSFORAMINAL EPIDURAL STEROID INJECTION;  Surgeon: Mateo Colmenares DO;  Location: [facility] MAIN OR;  Service: Pain Management     FACIAL COSMETIC SURGERY      FL INJECTION RIGHT HIP (NON ARTHROGRAM)  05/24/2019    GASTRIC BYPASS      DC ARTHROCENTESIS ASPIR&/INJ MAJOR JT/BURSA W/O US Right 1/10/2024    Procedure: RIGHT INTRA-ARTICULAR HIP INJECTION;  Surgeon: Mateo Colmenares DO;  Location: [facility] MAIN OR;  Service: Pain Management     DC ARTHRP ACETBLR/PROX FEM PROSTC AGRFT/ALGRFT Right 4/15/2024    Procedure: ARTHROPLASTY HIP TOTAL ANTERIOR,NAVIGATED - same day;  Surgeon: Javier Reed DO;  Location: [facility] MAIN OR;  Service: Orthopedics    TUBAL LIGATION       Allergies   Allergen Reactions    Other Wheezing     Lobster when a teenager.  Wheezing       Substance Abuse History:    Social History     Substance and Sexual Activity   Alcohol Use Not Currently     Social History     Substance and Sexual Activity   Drug Use No       Social History:    Social History     Socioeconomic History    Marital status:      Spouse name: Not on file    Number of children: Not on file    Years of education: Not on file    Highest education level: Not on file    Occupational History    Not on file   Tobacco Use    Smoking status: Never    Smokeless tobacco: Never   Vaping Use    Vaping status: Never Used   Substance and Sexual Activity    Alcohol use: Not Currently    Drug use: No    Sexual activity: Not Currently     Partners: Male     Birth control/protection: Female Sterilization   Other Topics Concern    Not on file   Social History Narrative    Not on file     Social Determinants of Health     Financial Resource Strain: Low Risk  (3/13/2024)    Overall Financial Resource Strain (CARDIA)     Difficulty of Paying Living Expenses: Not hard at all   Food Insecurity: No Food Insecurity (3/13/2024)    Hunger Vital Sign     Worried About Running Out of Food in the Last Year: Never true     Ran Out of Food in the Last Year: Never true   Transportation Needs: No Transportation Needs (3/13/2024)    PRAPARE - Transportation     Lack of Transportation (Medical): No     Lack of Transportation (Non-Medical): No   Physical Activity: Not on file   Stress: Not on file   Social Connections: Not on file   Intimate Partner Violence: Not At Risk (3/13/2024)    Humiliation, Afraid, Rape, and Kick questionnaire     Fear of Current or Ex-Partner: No     Emotionally Abused: No     Physically Abused: No     Sexually Abused: No   Housing Stability: Low Risk  (3/13/2024)    Housing Stability Vital Sign     Unable to Pay for Housing in the Last Year: No     Number of Places Lived in the Last Year: 1     Unstable Housing in the Last Year: No       Family Psychiatric History:     Family History   Problem Relation Age of Onset    Hypertension Mother     Heart disease Mother     Hypertension Father     Heart disease Father     No Known Problems Sister     No Known Problems Brother     No Known Problems Maternal Aunt     No Known Problems Maternal Uncle     No Known Problems Paternal Aunt     No Known Problems Paternal Uncle     No Known Problems Maternal Grandmother     No Known Problems Maternal  Grandfather     No Known Problems Paternal Grandmother     No Known Problems Paternal Grandfather     ADD / ADHD Neg Hx     Anesthesia problems Neg Hx     Cancer Neg Hx     Clotting disorder Neg Hx     Collagen disease Neg Hx     Diabetes Neg Hx     Dislocations Neg Hx     Learning disabilities Neg Hx     Neurological problems Neg Hx     Osteoporosis Neg Hx     Rheumatologic disease Neg Hx     Scoliosis Neg Hx     Vascular Disease Neg Hx        History Review: The following portions of the patient's history were reviewed and updated as appropriate: allergies, current medications, past family history, past medical history, past social history, past surgical history and problem list.       OBJECTIVE:     Vital signs in last 24 hours: Not checked - virtual visit    Mental Status Evaluation:  Appearance and attitude: appeared as stated age, cooperative and attentive, casually dressed with good hygiene  Eye contact: good  Motor Function: within normal limits, No PMA/PMR  Gait/station: Not observed  Speech: normal for rate, rhythm, volume, latency, amount  Language: No overt abnormality  Mood/affect: euthymic / Affect was euthymic, reactive, in full range, normal intensity and mood congruent  Thought Processes: sequential and goal-directed  Thought content: denies suicidal ideation or homicidal ideation; no delusions or first rank symptoms  Associations: intact associations  Perceptual disturbances: denies Auditory/Visual/Tactile Hallucinations  Orientation: oriented to time, person, place and to the situational context  Cognitive Function: intact  Memory: recent and remote memory grossly intact  Intellect: average  Fund of knowledge: aware of current events, aware of past history, and vocabulary average  Impulse control: good  Insight/judgment: good/good    Laboratory Results: I have personally reviewed all pertinent laboratory/tests results    Recent Labs (last 2 months):   Admission on 04/15/2024, Discharged on  04/15/2024   Component Date Value    POC Glucose 04/15/2024 103     POC Glucose 04/15/2024 89    Telephone on 04/04/2024   Component Date Value    Supplier Name 04/04/2024 [employer]     Supplier Phone Number 04/04/2024 [phone]     Order Status 04/04/2024 Delivery Successful     Delivery Request Date 04/04/2024 04/04/2024     Date Delivered  04/04/2024 04/08/2024     Item Description 04/04/2024 Wheeled Walker, Adult    Appointment on 03/22/2024   Component Date Value    MRSA Culture Only 03/22/2024 No Methicillin Resistant Staphlyococcus aureus (MRSA) isolated     Hemoglobin A1C 03/22/2024 5.3     EAG 03/22/2024 105    Admission on 03/12/2024, Discharged on 03/15/2024   Component Date Value    Ventricular Rate 03/12/2024 87     Atrial Rate 03/12/2024 87     AK Interval 03/12/2024 142     QRSD Interval 03/12/2024 88     QT Interval 03/12/2024 386     QTC Interval 03/12/2024 464     P Axis 03/12/2024 22     QRS Johnston 03/12/2024 53     T Wave Axis 03/12/2024 34     Ventricular Rate 03/12/2024 87     Atrial Rate 03/12/2024 87     AK Interval 03/12/2024 142     QRSD Interval 03/12/2024 88     QT Interval 03/12/2024 386     QTC Interval 03/12/2024 464     P Axis 03/12/2024 22     QRS Johnston 03/12/2024 53     T Wave Axis 03/12/2024 34     Sodium 03/13/2024 139     Potassium 03/13/2024 3.8     Chloride 03/13/2024 104     CO2 03/13/2024 27     ANION GAP 03/13/2024 8     BUN 03/13/2024 11     Creatinine 03/13/2024 0.68     Glucose 03/13/2024 83     Glucose, Fasting 03/13/2024 83     Calcium 03/13/2024 7.9 (L)     AST 03/13/2024 35     ALT 03/13/2024 19     Alkaline Phosphatase 03/13/2024 77     Total Protein 03/13/2024 5.8 (L)     Albumin 03/13/2024 3.7     Total Bilirubin 03/13/2024 0.66     eGFR 03/13/2024 96     Magnesium 03/13/2024 2.5     Phosphorus 03/13/2024 3.3     WBC 03/13/2024 2.41 (L)     RBC 03/13/2024 3.98     Hemoglobin 03/13/2024 12.5     Hematocrit 03/13/2024 36.9     MCV  03/13/2024 93     MCH 03/13/2024 31.4     MCHC 03/13/2024 33.9     RDW 03/13/2024 12.9     MPV 03/13/2024 9.2     Platelets 03/13/2024 204     nRBC 03/13/2024 0     Segmented % 03/13/2024 47     Immature Grans % 03/13/2024 0     Lymphocytes % 03/13/2024 33     Monocytes % 03/13/2024 15 (H)     Eosinophils Relative 03/13/2024 4     Basophils Relative 03/13/2024 1     Absolute Neutrophils 03/13/2024 1.15 (L)     Absolute Immature Grans 03/13/2024 0.00     Absolute Lymphocytes 03/13/2024 0.80     Absolute Monocytes 03/13/2024 0.35     Eosinophils Absolute 03/13/2024 0.09     Basophils Absolute 03/13/2024 0.02     TSH 3RD GENERATON 03/13/2024 1.681     Vitamin B-12 03/13/2024 522     Folate 03/13/2024 14.0     Vit D, 25-Hydroxy 03/13/2024 43.5    Admission on 03/12/2024, Discharged on 03/12/2024   Component Date Value    WBC 03/12/2024 5.21     RBC 03/12/2024 4.37     Hemoglobin 03/12/2024 13.6     Hematocrit 03/12/2024 41.1     MCV 03/12/2024 94     MCH 03/12/2024 31.1     MCHC 03/12/2024 33.1     RDW 03/12/2024 13.2     MPV 03/12/2024 8.6 (L)     Platelets 03/12/2024 269     nRBC 03/12/2024 0     Segmented % 03/12/2024 70     Immature Grans % 03/12/2024 0     Lymphocytes % 03/12/2024 18     Monocytes % 03/12/2024 10     Eosinophils Relative 03/12/2024 2     Basophils Relative 03/12/2024 0     Absolute Neutrophils 03/12/2024 3.65     Absolute Immature Grans 03/12/2024 0.01     Absolute Lymphocytes 03/12/2024 0.93     Absolute Monocytes 03/12/2024 0.50     Eosinophils Absolute 03/12/2024 0.10     Basophils Absolute 03/12/2024 0.02     Sodium 03/12/2024 139     Potassium 03/12/2024 4.0     Chloride 03/12/2024 102     CO2 03/12/2024 27     ANION GAP 03/12/2024 10     BUN 03/12/2024 8     Creatinine 03/12/2024 0.72     Glucose 03/12/2024 112     Calcium 03/12/2024 8.3 (L)     AST 03/12/2024 31     ALT 03/12/2024 20     Alkaline Phosphatase 03/12/2024 74     Total Protein 03/12/2024 6.8     Albumin 03/12/2024 4.3     Total  Bilirubin 03/12/2024 0.37     eGFR 03/12/2024 91     hs TnI 0hr 03/12/2024 2     Ventricular Rate 03/12/2024 95     Atrial Rate 03/12/2024 95     GA Interval 03/12/2024 154     QRSD Interval 03/12/2024 88     QT Interval 03/12/2024 340     QTC Interval 03/12/2024 427     P Axis 03/12/2024 50     QRS Axis 03/12/2024 57     T Wave Axis 03/12/2024 40     hs TnI 2hr 03/12/2024 3     Delta 2hr hsTnI 03/12/2024 1     Protime 03/12/2024 13.6     INR 03/12/2024 0.98     PTT 03/12/2024 23     Amph/Meth UR 03/12/2024 Negative     Barbiturate Ur 03/12/2024 Negative     Benzodiazepine Urine 03/12/2024 Negative     Cocaine Urine 03/12/2024 Negative     Methadone Urine 03/12/2024 Negative     Opiate Urine 03/12/2024 Negative     PCP Ur 03/12/2024 Negative     THC Urine 03/12/2024 Negative     Oxycodone Urine 03/12/2024 Negative     Magnesium 03/12/2024 2.0     Total CK 03/12/2024 339 (H)     LACTIC ACID 03/12/2024 1.8     Lipase 03/12/2024 67     Ethanol Lvl 03/12/2024 <10     Salicylate Lvl 03/12/2024 <5     Acetaminophen Level 03/12/2024 <2 (L)     hs TnI 4hr 03/12/2024 3     Delta 4hr hsTnI 03/12/2024 1     Color, UA 03/12/2024 Yellow     Clarity, UA 03/12/2024 Clear     Specific Gravity, UA 03/12/2024 1.029     pH, UA 03/12/2024 5.0     Leukocytes, UA 03/12/2024 Small (A)     Nitrite, UA 03/12/2024 Positive (A)     Protein, UA 03/12/2024 Trace (A)     Glucose, UA 03/12/2024 Negative     Ketones, UA 03/12/2024 Negative     Urobilinogen, UA 03/12/2024 <2.0     Bilirubin, UA 03/12/2024 Negative     Occult Blood, UA 03/12/2024 Negative     TSH 3RD GENERATON 03/12/2024 2.026     EXT Preg Test, Ur 03/12/2024 Negative     Control 03/12/2024 Valid     RBC, UA 03/12/2024 1-2     WBC, UA 03/12/2024 10-20 (A)     Epithelial Cells 03/12/2024 Occasional     Bacteria, UA 03/12/2024 Occasional     Urine Culture 03/12/2024 >100,000 cfu/ml Escherichia coli (A)    Annual Exam on 03/04/2024   Component Date Value    Case Report 03/04/2024                       Value:Gynecologic Cytology Report                       Case: [id number]                                  Authorizing Provider:  Arlen Rodriguez DO   Collected:           03/04/2024 0853              Ordering Location:     [facility]  Received:            03/04/2024 0853                                     Health                                                                       First Screen:          Marcella Pike                                                                  Specimen:    LIQUID-BASED PAP, SCREENING, Cervix, Endocervical                                          Primary Interpretation 03/04/2024 Negative for intraepithelial lesion or malignancy     Specimen Adequacy 03/04/2024 Satisfactory for evaluation. Endocervical/transformation zone component present.     Note 03/04/2024                      Value:This result contains rich text formatting which cannot be displayed here.    Additional Information 03/04/2024                      Value:This result contains rich text formatting which cannot be displayed here.    HPV Other HR 03/04/2024 Negative     HPV16 03/04/2024 Negative     HPV18 03/04/2024 Negative          Assessment/Plan:   A 58 y.o.  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, w/ PMH of hip arthritis, lumbar radiculopathy, spinal stenosis, s/p gastric bypass, secondary hyperparathyroidism, SNHL and PPH of alcohol use disorder, depression and anxiety, 2 prior psychiatric admission (in June 2021 due to depressive symptoms following her 's death and most recently at \[facility]"" from 3/12/2024 until 3/15/2024 after intentional overdose on Ativan and alcohol), one prior SA (intensional overdose on 3/12/24), no h/o self-injurious behavior, currently in therapy ([facility] at [facility]), Pristiq 50 mg po daily, Neurontin 100 mg po TID,  Trazodone 100 mg po nightly PRN, Atarax 25 mg po TID PRN, Folic acid, thiamine and MVI who presented to the mental health clinic for the initial intake and psychiatric evaluation on 3/22/24. Presented w/ good mood and improved anxiety sxs. Maintained sober since last admission and has been attending AA meeting regularly. Denied SI/HI, intent or plan upon direct inquiry at this time. CANDICE-7: 4; PHQ-9: 1. Her current presentation meets criteria for MDD, CANDICE and alcohol use disorder in early remission. Maintained on Pristiq 50 mg daily, Atarax 25 mg TID PRN, Trazodone 100 mg nightly PRN, and Neurontin increased from 100/200 mg to 100/100/200 mg but discontinued after she had BRIGITTE on 4/15/24; doses to be adjusted as indicated. Will continue AA meeting and individual psychotherapy.      Diagnoses and all orders for this visit:    Severe episode of recurrent major depressive disorder, without psychotic features (HCC)    CANDICE (generalized anxiety disorder)    Insomnia, unspecified type    Alcohol use disorder, mild, in early remission          Impression:  1. Severe episode of recurrent major depressive disorder, without psychotic features (HCC)        2. CANDICE (generalized anxiety disorder)        3. Insomnia, unspecified type        4. Alcohol use disorder, mild, in early remission            Treatment Recommendations/Precautions:  - Continue Pristiq 50 mg po daily for depression and anxiety  - Discontinue Neurontin as the patient endorsed good control of pain with her current meds following BRIGITTE  - Continue Trazodone 100 mg po nightly PRN for insomnia  - Continue Atarax 25 mg po TID for breakthrough anxiety / panic attacks  - Continue vit D supplements  - Continue AA meetings and individual psychotherapy ([facility] at [facility])  - Medications sent to patient's pharmacy for 30 day supply x2 refills     - Psychoeducation provided to the patient and benefits, potential risks and side effects discussed; importance of  compliance with the psychiatric treatment reiterated, and the patient verbalized understanding of the matter     - RTC in 7 weeks  - Educated about healthy life style, risk of falls/sedation and addiction. Patient was receptive to education.  - The patient was educated about 24 hour and weekend coverage for urgent situations accessed by calling [facility] main practice number  - Patient was educated to call [place] [facility] (9-866-419-YSCX [0341]) for behavioral crisis at anytime or 911 for any safety concerns, or go to nearest ER if her symptoms become overwhelming or unmanageable.    Current Outpatient Medications   Medication Sig Dispense Refill    acetaminophen (TYLENOL) 325 mg tablet Take 3 tablets (975 mg total) by mouth every 8 (eight) hours      amLODIPine (NORVASC) 5 mg tablet Take 1 tablet (5 mg total) by mouth daily 30 tablet 0    aspirin 81 mg chewable tablet Chew 1 tablet (81 mg total) 2 (two) times a day 84 tablet 0    Calcium Citrate 1040 MG TABS Take by mouth 3 (three) times a day      desvenlafaxine succinate (PRISTIQ) 50 mg 24 hr tablet Take 1 tablet (50 mg total) by mouth daily Do not start before April 12, 2024. 30 tablet 0    gabapentin (Neurontin) 100 mg capsule Take 1 capsule (100 mg total) by mouth daily at bedtime Take 100 mg three times a day and 100 mg extra at night (100/100/200 mg) 30 capsule 0    hydrOXYzine HCL (ATARAX) 25 mg tablet Take 1 tablet (25 mg total) by mouth 3 (three) times a day as needed for anxiety (anxiety) 90 tablet 0    Multiple Vitamins-Minerals (BARIATRIC MULTIVITAMINS/IRON PO) Take by mouth daily      naloxone (NARCAN) 4 mg/0.1 mL nasal spray Administer 1 spray into a nostril. If no response after 2-3 minutes, give another dose in the other nostril using a new spray. 1 each 1    oxyCODONE (Roxicodone) 5 immediate release tablet Take 1-2 tablets by mouth every 6 hours as needed for pain 40 tablet 0    pantoprazole (PROTONIX)  40 mg tablet Take 1 tablet (40 mg total) by mouth daily 42 tablet 0    traZODone (DESYREL) 100 mg tablet Take 1 tablet (100 mg total) by mouth daily at bedtime as needed for sleep Do not start before April 12, 2024. 30 tablet 0     No current facility-administered medications for this visit.         Medications Risks/Benefits      Risks, Benefits And Possible Side Effects Of Medications:    Risks, benefits, and possible side effects of medications explained to Jael and she verbalizes understanding and agreement for treatment.    Controlled Medication Discussion:     Jael has been filling controlled prescriptions on time as prescribed according to [place] Prescription Drug Monitoring Program    Psychotherapy Provided:     Individual psychotherapy provided: Yes  Counseling was provided during the session today for 16 minutes.   Psychoeducation provided to the patient and was educated about the importance of compliance with the medications and psychiatric treatment  Supportive psychotherapy provided to the patient  Solution Focused Brief Therapy (SFBT) provided  Patient's emotions were validated and specific labeled praise provided.   [place] suggestions were offered in a supportive non-critical way.     Treatment Plan:    Completed and signed during the session: Not applicable - Treatment Plan not due at this session    Koko Benz MD 04/25/24      This note was completed in part utilizing Dragon dictation Software. Grammatical, translation, syntax errors, random word insertions, spelling mistakes, and incomplete sentences may be an occasional consequence of this system secondary to software limitations with voice recognition, ambient noise, and hardware issues. If you have any questions or concerns about the content, text, or information contained within the body of this dictation, please contact the provider for clarification.

## 2024-04-30 ENCOUNTER — OFFICE VISIT (OUTPATIENT)
Dept: PHYSICAL THERAPY | Facility: CLINIC | Age: 59
End: 2024-04-30
Payer: COMMERCIAL

## 2024-04-30 DIAGNOSIS — M16.11 PRIMARY OSTEOARTHRITIS OF RIGHT HIP: Primary | ICD-10-CM

## 2024-04-30 DIAGNOSIS — M25.551 RIGHT HIP PAIN: ICD-10-CM

## 2024-04-30 PROCEDURE — 97110 THERAPEUTIC EXERCISES: CPT

## 2024-04-30 PROCEDURE — 97140 MANUAL THERAPY 1/> REGIONS: CPT

## 2024-04-30 NOTE — PROGRESS NOTES
Daily Note     Today's date: 2024  Patient name: Jael Fabian  : 1965  MRN: [id number]  Referring provider: Javier Reed DO  Dx:   Encounter Diagnosis     ICD-10-CM    1. Primary osteoarthritis of right hip  M16.11       2. Right hip pain  M25.551           Start Time: 1415  Stop Time: 1500  Total time in clinic (min): 45 minutes    Subjective: Pt is doing well. She has an appointment with Dr. Reed tomorrow ().       Objective: See treatment diary below      Assessment: Pt tolerated session well today with increased hip strengthening exercises. Checked surgical site, scar looks clean with some dressing still attached. Pt was educated on allowing the tape to fall naturally. Noted scar hypomobility in diagonal direction. Initiated gentle scar mobilizations while staying cognizant of tape. Overall, noted improvement in hip strength with no increasing pain with additional exercises. Pt inquired about driving, and was educated that as long as she is no longer taking any pain medication, she is allowed to drive. Pt was also educated on driving small routes to start to ensure maximal safety. Pt verbally agreed. Pt would benefit from continued skilled PT to address deficits listed above to maximize functional independence with walking and other daily tasks. Plan to progress as tolerated.       Plan: Progress treatment as tolerated.       Insurance:  AMA/CMS Eval/ Re-eval POC expires FOTO Auth #/ Referral # Total    Start date  Expiration date Extension  Visit limitation?  PT only or  PT+OT? Co-Insurance   CMS 4.8 24  needed 12v 4.8 10                       AUTH #:  Date  4.18 4.22 4.25 4.30          Authed: Used 1 2 3 4 5           Remaining  11 10 9 8 7            Precautions: standard precautions  Patient provided verbal consent to treatment plan and recommended interventions.    DOS: Right BRIGITTE 4/15/24    Manuals 4.8 4.18 4.22 4.25 4.30    visit 1 2 3 4 5                  Scar tissue  "mob 8 min   gentle             Neuro Re-Ed         Bridges      2x10                                Ther Ex         LAQ  10x 3*10 3x10 3\" 3x10 3\"    SAQ, glute set, ankle pump HEP        Heel slide  HEP   2x10     Quad set  HEP       Stand hip flex  10x       Stand hip extn   2*10 RLE 2x12 RLE 3x10       Calf raises  2*10 2*10 3x12 3x12     Stand hip abd  10x ea 2*10 ea.  2x10 ea 3x10 ea    Mini squats  10x 2*10 2x12 rail 2x12 rail    Nu step for ROM  5' st 7 6' st 7 7' seat 7 7' seat 7    PROM hip   FB Flexion WM Flexion MR    Supine hip flexion   2*10      Side step   4 laps 8'      Step up   10*4\" 4\" 2x10 4\" 2x10     Leg press      85# 3x10     Ther Activity         PT ed FB FB FB  MR             Gait Training         AD RW FB        Stair navigation         Modalities                                    "

## 2024-05-01 ENCOUNTER — OFFICE VISIT (OUTPATIENT)
Dept: OBGYN CLINIC | Facility: CLINIC | Age: 59
End: 2024-05-01

## 2024-05-01 ENCOUNTER — APPOINTMENT (OUTPATIENT)
Dept: RADIOLOGY | Facility: CLINIC | Age: 59
End: 2024-05-01
Payer: COMMERCIAL

## 2024-05-01 VITALS
HEART RATE: 84 BPM | WEIGHT: 153 LBS | DIASTOLIC BLOOD PRESSURE: 85 MMHG | SYSTOLIC BLOOD PRESSURE: 128 MMHG | BODY MASS INDEX: 26.12 KG/M2 | HEIGHT: 64 IN

## 2024-05-01 DIAGNOSIS — Z96.641 AFTERCARE FOLLOWING RIGHT HIP JOINT REPLACEMENT SURGERY: ICD-10-CM

## 2024-05-01 DIAGNOSIS — Z96.641 STATUS POST TOTAL REPLACEMENT OF RIGHT HIP: ICD-10-CM

## 2024-05-01 DIAGNOSIS — Z47.1 AFTERCARE FOLLOWING RIGHT HIP JOINT REPLACEMENT SURGERY: ICD-10-CM

## 2024-05-01 DIAGNOSIS — Z96.641 STATUS POST TOTAL REPLACEMENT OF RIGHT HIP: Primary | ICD-10-CM

## 2024-05-01 PROBLEM — N39.0 UTI (URINARY TRACT INFECTION): Status: RESOLVED | Noted: 2024-03-13 | Resolved: 2024-05-01

## 2024-05-01 PROCEDURE — 99024 POSTOP FOLLOW-UP VISIT: CPT | Performed by: ORTHOPAEDIC SURGERY

## 2024-05-01 PROCEDURE — 73502 X-RAY EXAM HIP UNI 2-3 VIEWS: CPT

## 2024-05-01 NOTE — PROGRESS NOTES
Assessment/Plan:  1. Status post total replacement of right hip  XR hip/pelv 2-3 vws right if performed      2. Aftercare following right hip joint replacement surgery  XR hip/pelv 2-3 vws right if performed        Scribe Attestation      I,:  Aura Shi am acting as a scribe while in the presence of the attending physician.:       I,:  Javier Reed, DO personally performed the services described in this documentation    as scribed in my presence.:           Jael is a pleasant 58-year-old female who presents to the office today for follow-up evaluation 2 weeks status post right total hip arthroplasty. She may begin showering and allow water to run down the incision. She should avoid standing water for the time being. She may send a picture earlier to see if she can return to the pool before her 6 week follow-up visit. She will return to the office in 4 weeks for repeat evaluation of the right hip.    Subjective: Follow-up evaluation 2 weeks status post right total hip arthroplasty    Patient ID: Jael Fabian is a 58 y.o. female who presents to the office today for follow-up evaluation 2 weeks status post right total hip arthroplasty.  She has been attending physical therapy, which she feels is been going well. She feels overall the hip is doing well. She is not using any assistive device for ambulatory assistance. She has been taking 81 mg aspirin twice daily for DVT prophylaxis.  She has been taking Tylenol as needed for pain control.    Review of Systems   Constitutional:  Positive for activity change. Negative for chills and fever.   HENT:  Negative for ear pain and sore throat.    Eyes:  Negative for pain and visual disturbance.   Respiratory:  Negative for cough and shortness of breath.    Cardiovascular:  Negative for chest pain and palpitations.   Gastrointestinal:  Negative for abdominal pain and vomiting.   Genitourinary:  Negative for dysuria and hematuria.   Musculoskeletal:  Positive for arthralgias.  Negative for back pain.   Skin:  Negative for color change and rash.   Neurological:  Negative for seizures and syncope.   All other systems reviewed and are negative.        Past Medical History:   Diagnosis Date    Abnormal Pap smear of cervix 1987    Anxiety     Depression     Ear problems 1996    Fibrocystic breast     GERD (gastroesophageal reflux disease) 2025    Herpes 1987    High vitamin A level     History of anxiety     History of hypercholesterolemia     History of hypertension     History of obesity     HPV (human papilloma virus) infection 1987    Hyperlipidemia     Lordosis     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    Scoliosis     Secondary hyperparathyroidism, non-renal (HCC)     Spinal stenosis     Tinnitus 1996       Past Surgical History:   Procedure Laterality Date    ABDOMINOPLASTY      BREAST CYST ASPIRATION Right     COLONOSCOPY      COLONOSCOPY  11/22/2021    Eyvazzedah - 5 y f/u     COLPOSCOPY  1987    COMBINED AUGMENTATION MAMMAPLASTY AND ABDOMINOPLASTY  2001    EPIDURAL BLOCK INJECTION Bilateral 8/23/2023    Procedure: Left L4-L5   TRANSFORAMINAL epidural steroid injection ( [id number]);  Surgeon: Mateo Colmenares DO;  Location: [facility] MAIN OR;  Service: Pain Management     EPIDURAL BLOCK INJECTION Bilateral 1/24/2024    Procedure: L4-L5 TRANSFORAMINAL EPIDURAL STEROID INJECTION;  Surgeon: Mateo Colmenares DO;  Location: [facility] MAIN OR;  Service: Pain Management     FACIAL COSMETIC SURGERY      FL INJECTION RIGHT HIP (NON ARTHROGRAM)  05/24/2019    GASTRIC BYPASS      PA ARTHROCENTESIS ASPIR&/INJ MAJOR JT/BURSA W/O US Right 1/10/2024    Procedure: RIGHT INTRA-ARTICULAR HIP INJECTION;  Surgeon: Mateo Colmenares DO;  Location: [facility] MAIN OR;  Service: Pain Management     PA ARTHRP ACETBLR/PROX FEM PROSTC AGRFT/ALGRFT Right 4/15/2024    Procedure: ARTHROPLASTY HIP TOTAL ANTERIOR,NAVIGATED - same day;  Surgeon: Javier Reed DO;  Location: [facility] MAIN OR;  Service: Orthopedics     TUBAL LIGATION         Family History   Problem Relation Age of Onset    Hypertension Mother     Heart disease Mother     Hypertension Father     Heart disease Father     No Known Problems Sister     No Known Problems Brother     No Known Problems Maternal Aunt     No Known Problems Maternal Uncle     No Known Problems Paternal Aunt     No Known Problems Paternal Uncle     No Known Problems Maternal Grandmother     No Known Problems Maternal Grandfather     No Known Problems Paternal Grandmother     No Known Problems Paternal Grandfather     ADD / ADHD Neg Hx     Anesthesia problems Neg Hx     Cancer Neg Hx     Clotting disorder Neg Hx     Collagen disease Neg Hx     Diabetes Neg Hx     Dislocations Neg Hx     Learning disabilities Neg Hx     Neurological problems Neg Hx     Osteoporosis Neg Hx     Rheumatologic disease Neg Hx     Scoliosis Neg Hx     Vascular Disease Neg Hx        Social History     Occupational History    Not on file   Tobacco Use    Smoking status: Never    Smokeless tobacco: Never   Vaping Use    Vaping status: Never Used   Substance and Sexual Activity    Alcohol use: Not Currently    Drug use: No    Sexual activity: Not Currently     Partners: Male     Birth control/protection: Female Sterilization         Current Outpatient Medications:     acetaminophen (TYLENOL) 325 mg tablet, Take 3 tablets (975 mg total) by mouth every 8 (eight) hours, Disp: , Rfl:     aspirin 81 mg chewable tablet, Chew 1 tablet (81 mg total) 2 (two) times a day, Disp: 84 tablet, Rfl: 0    Calcium Citrate 1040 MG TABS, Take by mouth 3 (three) times a day, Disp: , Rfl:     [START ON 5/9/2024] desvenlafaxine succinate (PRISTIQ) 50 mg 24 hr tablet, Take 1 tablet (50 mg total) by mouth daily Do not start before May 9, 2024., Disp: 30 tablet, Rfl: 2    hydrOXYzine HCL (ATARAX) 25 mg tablet, Take 1 tablet (25 mg total) by mouth 3 (three) times a day as needed for anxiety (anxiety), Disp: 90 tablet, Rfl: 2    Multiple  Vitamins-Minerals (BARIATRIC MULTIVITAMINS/IRON PO), Take by mouth daily, Disp: , Rfl:     naloxone (NARCAN) 4 mg/0.1 mL nasal spray, Administer 1 spray into a nostril. If no response after 2-3 minutes, give another dose in the other nostril using a new spray., Disp: 1 each, Rfl: 1    oxyCODONE (Roxicodone) 5 immediate release tablet, Take 1-2 tablets by mouth every 6 hours as needed for pain, Disp: 40 tablet, Rfl: 0    pantoprazole (PROTONIX) 40 mg tablet, Take 1 tablet (40 mg total) by mouth daily, Disp: 42 tablet, Rfl: 0    amLODIPine (NORVASC) 5 mg tablet, Take 1 tablet (5 mg total) by mouth daily, Disp: 30 tablet, Rfl: 0    traZODone (DESYREL) 100 mg tablet, Take 1 tablet (100 mg total) by mouth daily at bedtime as needed for sleep Do not start before April 12, 2024., Disp: 30 tablet, Rfl: 0    Allergies   Allergen Reactions    Other Wheezing     Lobster when a teenager.  Wheezing       Objective:  Vitals:    05/01/24 1307   BP: 128/85   Pulse: 84       Body mass index is 26.26 kg/m².    Right Hip Exam     Tenderness   The patient is experiencing no tenderness.     Range of Motion   Right hip abduction: 55.   Adduction:  30   Right hip flexion: 115.   External rotation:  50   Internal rotation:  25     Muscle Strength   The patient has normal right hip strength.  Flexion: 5/5     Other   Erythema: absent  Scars: present (anterior scar well approximated)  Sensation: normal  Pulse: present    Comments:  Surgical scar healing well  No erythema or warmth            Physical Exam  Vitals and nursing note reviewed.   Constitutional:       Appearance: Normal appearance.   HENT:      Head: Normocephalic and atraumatic.      Right Ear: External ear normal.      Left Ear: External ear normal.      Nose: Nose normal.   Eyes:      General: No scleral icterus.     Extraocular Movements: Extraocular movements intact.      Conjunctiva/sclera: Conjunctivae normal.   Cardiovascular:      Rate and Rhythm: Normal rate.    Pulmonary:      Effort: Pulmonary effort is normal. No respiratory distress.   Musculoskeletal:      Cervical back: Normal range of motion and neck supple.      Comments: See ortho exam   Skin:     General: Skin is warm and dry.   Neurological:      Mental Status: She is alert and oriented to person, place, and time.   Psychiatric:         Mood and Affect: Mood normal.         Behavior: Behavior normal.         I have personally reviewed pertinent films in PACS.  X-rays of the right hip obtained in the office today demonstrate well-positioned, well aligned total hip prosthesis.  There is no evidence of fracture or failure present.    This document was created using speech voice recognition software.   Grammatical errors, random word insertions, pronoun errors, and incomplete sentences are an occasional consequence of this system due to software limitations, ambient noise, and hardware issues.   Any formal questions or concerns about content, text, or information contained within the body of this dictation should be directly addressed to the provider for clarification.

## 2024-05-02 ENCOUNTER — OFFICE VISIT (OUTPATIENT)
Dept: PHYSICAL THERAPY | Facility: CLINIC | Age: 59
End: 2024-05-02
Payer: COMMERCIAL

## 2024-05-02 DIAGNOSIS — M16.11 PRIMARY OSTEOARTHRITIS OF RIGHT HIP: Primary | ICD-10-CM

## 2024-05-02 DIAGNOSIS — M25.551 RIGHT HIP PAIN: ICD-10-CM

## 2024-05-02 PROCEDURE — 97110 THERAPEUTIC EXERCISES: CPT | Performed by: PHYSICAL THERAPIST

## 2024-05-02 PROCEDURE — 97112 NEUROMUSCULAR REEDUCATION: CPT | Performed by: PHYSICAL THERAPIST

## 2024-05-02 NOTE — PROGRESS NOTES
"Daily Note     Today's date: 2024  Patient name: Jael Fabian  : 1965  MRN: [id number]  Referring provider: Javier Reed DO  Dx:   Encounter Diagnosis     ICD-10-CM    1. Primary osteoarthritis of right hip  M16.11       2. Right hip pain  M25.551                    Subjective: Pt is reports walking for long duration yesterday and is feeling sore and achy today. Cleared to drive at this time. Patient no longer utilizing AD. Patient reports massaging scar at home as well. Occasional performance of reciprocal stair navigation.     Objective: See treatment diary below    Assessment: Pt did fairly well with exercises. HEP updated and reviewed. Achiness right lateral thigh reported during session. Slight improvement reported with IASTM hip and thigh.     Plan: Progress treatment as tolerated.       Insurance:  AMA/CMS Eval/ Re-eval POC expires FOTO Auth #/ Referral # Total    Start date  Expiration date Extension  Visit limitation?  PT only or  PT+OT? Co-Insurance   CMS 4.8 24  needed 12v 4.8 10                       AUTH #:  Date 4.8 4.18 4.22 4.25 4.30 5.2         Authed: Used 1 2 3 4 5 6          Remaining  11 10 9 8 7 6           Precautions: standard precautions  Patient provided verbal consent to treatment plan and recommended interventions.    DOS: Right BRIGITTE 4/15/24    HEP: calf raises,     Manuals 4.18 4.22 4.25 4.30 5.2   visit 2 3 4 5 6               Scar tissue mob 8 min   gentle MFR  quad and hip           Neuro Re-Ed        Bridges     2x10  2*10   Supine hip add     2*10, 3\"   Tandem stance     5*10'' RLE behind           Ther Ex        LAQ 10x 3*10 3x10 3\" 3x10 3\" HEP   Heel slide HEP   2x10  HEP   Stand hip flex 10x       Stand hip extn  2*10 RLE 2x12 RLE 3x10    HEP   Calf raises 2*10 2*10 3x12 3x12  HEP   Stand hip abd 10x ea 2*10 ea.  2x10 ea 3x10 ea 3*10 ea. RTB    Mini squats 10x 2*10 2x12 rail 2x12 rail    Nu step for ROM 5' st 7 6' st 7 7' seat 7 7' seat 7 5' " "RB   PROM hip  FB Flexion WM Flexion MR FB    Supine hip flexion  2*10   SLR flexion 2*8 PT assist   Side step  4 laps 8'   HEP   Step up  10*4\" 4\" 2x10 4\" 2x10     Leg press     85# 3x10     Lateral step up     2*10, 4\"   Right hip flexor stretch at step     5*10''                   Ther Activity        PT ed FB FB  MR            Gait Training        AD RW        Stair navigation        Modalities                                 "

## 2024-05-06 ENCOUNTER — OFFICE VISIT (OUTPATIENT)
Dept: PHYSICAL THERAPY | Facility: CLINIC | Age: 59
End: 2024-05-06
Payer: COMMERCIAL

## 2024-05-06 DIAGNOSIS — M16.11 PRIMARY OSTEOARTHRITIS OF RIGHT HIP: Primary | ICD-10-CM

## 2024-05-06 DIAGNOSIS — M25.551 RIGHT HIP PAIN: ICD-10-CM

## 2024-05-06 PROCEDURE — 97110 THERAPEUTIC EXERCISES: CPT | Performed by: PHYSICAL THERAPIST

## 2024-05-06 PROCEDURE — 97112 NEUROMUSCULAR REEDUCATION: CPT | Performed by: PHYSICAL THERAPIST

## 2024-05-06 NOTE — PROGRESS NOTES
"Daily Note     Today's date: 2024  Patient name: Jael Fabian  : 1965  MRN: [id number]  Referring provider: Javier Reed DO  Dx:   Encounter Diagnosis     ICD-10-CM    1. Primary osteoarthritis of right hip  M16.11       2. Right hip pain  M25.551                  Subjective: Pt reports doing better today than the other day. She reports that she was sitting binu crossed applesauce and irritated her gluteal region.     Objective: See treatment diary below    Assessment: Pt educated on sitting posture. Better tolerance to exercises today. No assist needed today with SLR flexion compared to prior visit.     Plan: Progress treatment as tolerated.       Insurance:  AMA/CMS Eval/ Re-eval POC expires FOTO Auth #/ Referral # Total    Start date  Expiration date Extension  Visit limitation?  PT only or  PT+OT? Co-Insurance   CMS 4.8 6.17.24  needed 12v 4.8 10.4.24                       AUTH #:  Date 4.8 4.18 4.22 4.25 4.30 5.2 5.6        Authed: Used 1 2 3 4 5 6 7         Remaining  11 10 9 8 7 6 5          Precautions: standard precautions  Patient provided verbal consent to treatment plan and recommended interventions.    DOS: Right BRIGITTE 4/15/24    HEP: calf raises, heel slides, stand hip extn, LAQ, stand hip abduction    Manuals 4.18 4.22 4.25 4.30 5.2 5.6.24   visit 2 3 4 5 6 7                Scar tissue mob 8 min   gentle MFR  quad and hip             Neuro Re-Ed         Bridges     2x10  2*10 3*10   Supine hip add     2*10, 3\" 2*10, 3\"   Tandem stance     5*10'' RLE behind    Clam shell      2*10   SLS      10*5''            Ther Ex         Stand hip flex 10x        Stand hip abd 10x ea 2*10 ea.  2x10 ea 3x10 ea 3*10 ea. RTB     Mini squats 10x 2*10 2x12 rail 2x12 rail  2*15   Nu step for ROM 5' st 7 6' st 7 7' seat 7 7' seat 7 5' RB 7-10' nu step lvl 7   PROM hip  FB Flexion WM Flexion MR FB  FB   Supine hip flexion  2*10   SLR flexion 2*8 PT assist SLR flexion 2*8   Side step  4 laps 8'   " "HEP    Step up  10*4\" 4\" 2x10 4\" 2x10      Leg press     85# 3x10      Lateral step up     2*10, 4\"    Right hip flexor stretch at step     5*10''                      Ther Activity         PT ed FB FB  MR              Gait Training         AD RW         Stair navigation         Modalities                                    "

## 2024-05-09 ENCOUNTER — OFFICE VISIT (OUTPATIENT)
Dept: PHYSICAL THERAPY | Facility: CLINIC | Age: 59
End: 2024-05-09
Payer: COMMERCIAL

## 2024-05-09 DIAGNOSIS — M25.551 RIGHT HIP PAIN: ICD-10-CM

## 2024-05-09 DIAGNOSIS — M16.11 PRIMARY OSTEOARTHRITIS OF RIGHT HIP: Primary | ICD-10-CM

## 2024-05-09 PROCEDURE — 97112 NEUROMUSCULAR REEDUCATION: CPT

## 2024-05-09 PROCEDURE — 97110 THERAPEUTIC EXERCISES: CPT

## 2024-05-09 NOTE — PROGRESS NOTES
"Daily Note     Today's date: 2024  Patient name: Jael Fabian  : 1965  MRN: [id number]  Referring provider: Javier Reed DO  Dx:   Encounter Diagnosis     ICD-10-CM    1. Primary osteoarthritis of right hip  M16.11       2. Right hip pain  M25.551                      Subjective: Pt provides no new complaints.       Objective: See treatment diary below      Assessment: Pt reported occasional tingling and pain in R hip and knee due to increasing sensation in R hip s/p BRIGITTE. Trialed CHENG, which helped calm down symptoms. Tolerated treatment well. Pt continues to make gains in physical strength with introduction to TRX squats. Patient demonstrated fatigue post treatment, exhibited good technique with therapeutic exercises, and would benefit from continued PT      Plan: Progress treatment as tolerated.       Insurance:  AMA/CMS Eval/ Re-eval POC expires FOTO Auth #/ Referral # Total    Start date  Expiration date Extension  Visit limitation?  PT only or  PT+OT? Co-Insurance   CMS 4.8 6.  needed 12v 4.8 10.424                       AUTH #:  Date 4.8 4.18 4.22 4.25 4.30 5.2 5.6        Authed: Used 1 2 3 4 5 6 7         Remaining  11 10 9 8 7 6 5          Precautions: standard precautions  Patient provided verbal consent to treatment plan and recommended interventions.    DOS: Right BRIGITTE 4/15/24    HEP: calf raises, heel slides, stand hip extn, LAQ, stand hip abduction    Manuals 4.22 4.25 4.30 5.2 5.6.24 5.9.24   visit 3 4 5 6 7 8               Scar tissue mob 8 min   gentle MFR  quad and hip              Neuro Re-Ed         Bridges    2x10  2*10 3*10 3x10   Supine hip add    2*10, 3\" 2*10, 3\" 2x10   Tandem stance    5*10'' RLE behind     Clam shell     2*10 2x10   SLS     10*5'' 5\" x10         CHENG x10   Ther Ex         Stand hip flex         Stand hip abd 2*10 ea.  2x10 ea 3x10 ea 3*10 ea. RTB   3x10 ea    Mini squats 2*10 2x12 rail 2x12 rail  2*15 TRX 12x, 10x   Nu step for ROM 6' st 7 " "7' seat 7 7' seat 7 5' RB 7-10' nu step lvl 7 Rec 5'   PROM hip FB Flexion WM Flexion MR FB  FB MR   Supine hip flexion 2*10   SLR flexion 2*8 PT assist SLR flexion 2*8 SLR flexion 2x8   Side step 4 laps 8'   HEP     Step up 10*4\" 4\" 2x10 4\" 2x10       Leg press    85# 3x10       Lateral step up    2*10, 4\"  2x10 4\" up and over    Right hip flexor stretch at step    5*10''                       Ther Activity         PT ed FB  MR               Gait Training         AD RW         Stair navigation         Modalities                                      "

## 2024-05-14 ENCOUNTER — OFFICE VISIT (OUTPATIENT)
Dept: PHYSICAL THERAPY | Facility: CLINIC | Age: 59
End: 2024-05-14
Payer: COMMERCIAL

## 2024-05-14 DIAGNOSIS — M25.551 RIGHT HIP PAIN: ICD-10-CM

## 2024-05-14 DIAGNOSIS — M16.11 PRIMARY OSTEOARTHRITIS OF RIGHT HIP: Primary | ICD-10-CM

## 2024-05-14 PROCEDURE — 97110 THERAPEUTIC EXERCISES: CPT | Performed by: PHYSICAL THERAPIST

## 2024-05-14 NOTE — PROGRESS NOTES
"Daily Note     Today's date: 2024  Patient name: Jael Fabian  : 1965  MRN: [id number]  Referring provider: Javier Reed DO  Dx:   Encounter Diagnosis     ICD-10-CM    1. Primary osteoarthritis of right hip  M16.11       2. Right hip pain  M25.551                      Subjective: Pt reports that she has been doing well at this time and increasing her level of activity.     Objective: See treatment diary below    Assessment: Pt reported fatigue with treatment progression today. Occasional cueing needed for proper squat performance and lateral step up to avoid lateral trunk lean. Overall patient is making good progress and will continue to benefit from treatment to address functional limitations.     Plan: Progress treatment as tolerated.       Insurance:  AMA/CMS Eval/ Re-eval POC expires FOTO Auth #/ Referral # Total    Start date  Expiration date Extension  Visit limitation?  PT only or  PT+OT? Co-Insurance   CMS 4.8 6.17.24  needed 12v 4.8 10.4.24                       AUTH #:  Date 4.8 4.18 4.22 4.25 4.30 5.2 5.6 5.9 5.14      Authed: Used 1 2 3 4 5 6 7 8 9       Remaining  11 10 9 8 7 6 5 4 3        Precautions: standard precautions  Patient provided verbal consent to treatment plan and recommended interventions.    DOS: Right BRIGITTE 4/15/24    HEP: calf raises, heel slides, stand hip extn, LAQ, stand hip abduction    Manuals 4.30 5.2 5.6.24 5.9.24 5.14.24   visit 5 6 7 8 9            Scar tissue mob 8 min   gentle MFR  quad and hip              Neuro Re-Ed        Bridges  2x10  2*10 3*10 3x10    Supine hip add  2*10, 3\" 2*10, 3\" 2x10 machine   Clam shell   2*10 2x10    SLS   10*5'' 5\" x10        CHENG x10    Ther Ex        Stand hip abd 3x10 ea 3*10 ea. RTB   3x10 ea  3*10 ea.   Mini squats 2x12 rail  2*15 TRX 12x, 10x TRX 3*10   Nu step for ROM 7' seat 7 5' RB 7-10' nu step lvl 7 Rec 5' Nu step 5' lvl 4   PROM hip Flexion MR FB  FB MR    Supine hip flexion  SLR flexion 2*8 PT assist " "SLR flexion 2*8 SLR flexion 2x8    Step up 4\" 2x10        Leg press  85# 3x10     3*10, 105#   Lateral step up  2*10, 4\"  2x10 4\" up and over  3*10, 4\" up   Right hip flexor stretch at step  5*10''      Stand hip extn     2*15 ea Blue loop   Hip abd/add machine     3*10 ea. 30#                           Ther Activity        PT ed MR               Gait Training        AD RW        Stair navigation        Modalities                                   "

## 2024-05-16 ENCOUNTER — OFFICE VISIT (OUTPATIENT)
Dept: PHYSICAL THERAPY | Facility: CLINIC | Age: 59
End: 2024-05-16
Payer: COMMERCIAL

## 2024-05-16 DIAGNOSIS — M16.11 PRIMARY OSTEOARTHRITIS OF RIGHT HIP: Primary | ICD-10-CM

## 2024-05-16 DIAGNOSIS — M25.551 RIGHT HIP PAIN: ICD-10-CM

## 2024-05-16 PROCEDURE — 97112 NEUROMUSCULAR REEDUCATION: CPT

## 2024-05-16 PROCEDURE — 97110 THERAPEUTIC EXERCISES: CPT

## 2024-05-16 NOTE — PROGRESS NOTES
Daily Note     Today's date: 2024  Patient name: Jael Fabian  : 1965  MRN: [id number]  Referring provider: Javier Reed DO  Dx:   Encounter Diagnosis     ICD-10-CM    1. Primary osteoarthritis of right hip  M16.11       2. Right hip pain  M25.551           Start Time: 1334  Stop Time: 1412  Total time in clinic (min): 38 minutes    Subjective: Patient reported she attended water aerobics earlier this morning. She is feeling good, but is a little sore afterwards.       Objective: See treatment diary below      Assessment: Tolerated treatment well. Decreased intensity on session as patient was fatigued from water aerobics. Noted decreased  r knee valgus during eccentric knee flexion with squats. Patient reported LE discomfort during nighttime, making it more difficult to lay comfortably and fall asleep. Patient was educated on performing repeated extensions as well as femoral and sciatic nerve glides when she feels that discomfort. Patient demonstrated fatigue post treatment, exhibited good technique with therapeutic exercises, and would benefit from continued PT      Plan: Progress treatment as tolerated.       Insurance:  AMA/CMS Eval/ Re-eval POC expires FOTO Auth #/ Referral # Total    Start date  Expiration date Extension  Visit limitation?  PT only or  PT+OT? Co-Insurance   CMS 4.8 617.24  needed 12v 4.8 10.4.24                       AUTH #:  Date 4.8 4.18 4.22 4.25 4.30 5.2 5.6 5.9 5.14      Authed: Used 1 2 3 4 5 6 7 8 9       Remaining  11 10 9 8 7 6 5 4 3        Precautions: standard precautions  Patient provided verbal consent to treatment plan and recommended interventions.    DOS: Right BRIGITTE 4/15/24    HEP: calf raises, heel slides, stand hip extn, LAQ, stand hip abduction    Manuals 4.30 5.2 5.6.24 5.9.24 5.14.24 5.16.24   visit 5 6 7 8 9 10             Scar tissue mob 8 min   gentle MFR  quad and hip                Neuro Re-Ed         Bridges  2x10  2*10 3*10 3x10  3x10  "  Supine hip add  2*10, 3\" 2*10, 3\" 2x10 machine    Clam shell   2*10 2x10     SLS   10*5'' 5\" x10         CHENG x10  Sciatic n glides x10     Femoral n glides x10   Ther Ex         Stand hip abd 3x10 ea 3*10 ea. RTB   3x10 ea  3*10 ea. 3x10 ea   Mini squats 2x12 rail  2*15 TRX 12x, 10x TRX 3*10 TRX 3x10   Nu step for ROM 7' seat 7 5' RB 7-10' nu step lvl 7 Rec 5' Nu step 5' lvl 4 NuStep 5'   PROM hip Flexion MR FB  FB MR     Supine hip flexion  SLR flexion 2*8 PT assist SLR flexion 2*8 SLR flexion 2x8     Step up 4\" 2x10         Leg press  85# 3x10     3*10, 105# 3x10 115#   Lateral step up  2*10, 4\"  2x10 4\" up and over  3*10, 4\" up 3x10 4\" up  3x10 4\" ecc   Right hip flexor stretch at step  5*10''       Stand hip extn     2*15 ea Blue loop 2x15    Hip abd/add machine     3*10 ea. 30# 3x10  ea   30# add  15# abd                              Ther Activity         PT ed MR                 Gait Training         AD RW         Stair navigation         Modalities                                        "

## 2024-05-23 ENCOUNTER — OFFICE VISIT (OUTPATIENT)
Dept: PHYSICAL THERAPY | Facility: CLINIC | Age: 59
End: 2024-05-23
Payer: COMMERCIAL

## 2024-05-23 DIAGNOSIS — M16.11 PRIMARY OSTEOARTHRITIS OF RIGHT HIP: Primary | ICD-10-CM

## 2024-05-23 DIAGNOSIS — M25.551 RIGHT HIP PAIN: ICD-10-CM

## 2024-05-23 PROCEDURE — 97110 THERAPEUTIC EXERCISES: CPT | Performed by: PHYSICAL THERAPIST

## 2024-05-23 NOTE — PROGRESS NOTES
Daily Note/Discharge Summary     Today's date: 2024  Patient name: Jael Fabian  : 1965  MRN: [id number]  Referring provider: Javier Reed DO  Dx:   Encounter Diagnosis     ICD-10-CM    1. Primary osteoarthritis of right hip  M16.11       2. Right hip pain  M25.551                Subjective: Patient reports that she had tightness/pinching in her right thigh noticeable when she goes to take a step. Patient reports that she is at about 80% improved overall. Has returned to water aerobics without issue. Normal stair navigation at this time, with occasional discomfort.     Objective: See treatment diary below    Patient Goals  Patient goals for therapy: independence with ADLs/IADLs, return to sport/leisure activities, increased strength, decreased pain and increased motion  Patient goal: no pain with walking and stair navigation and work without pain  Pain  Current pain ratin  At best pain ratin  At worst pain rating: 3  Quality: pinching  Relieving factors: change in position and ice  Aggravating factors: sitting, stair climbing, standing and walking  Progression: improved     Social Support     Employment status: working (nurse anesthetist)  Treatments  Previous treatment: injection treatment and physical therapy        Short Term: FROM DOS- MET  Pt will report decreased levels of pain by at least 2 subjective ratings in 4 weeks  Pt will demonstrate improved ROM by at least 10 degrees in 4 weeks  Pt will demonstrate improved strength by 1/2 grade in 4 weeks.  Pt will be able to ambulate without AD in 4 weeks.  Pt will demonstrate reciprocal stair navigation in 4 weeks.  Long Term: MET  Pt will be independent in their HEP in 8 weeks  Pt will be pain free with IADL's in 8 weeks.  Pt will demonstrate 5/5 MMT in 8 weeks.  Pt will ambulate> 30 minutes without symptoms in 8 weeks.     Objective     GAIT: Slight RLE antalgia  Squat assess: 100% depth  TUG: < 12 sec. No AD  SLS: RLE: 6 sec., LLE: 8  "sec.               MMT           PROM     Hip         R          L          R         L   Flex. 4 4 105 105   Extn. 4 4       Abd. 4- 4- 40 40   Add. 4- 4-       IR. 4 4+ 18 18   ER. 4 4+ 20 22                  MMT     Knee      R          L   Flex. 4+ 4+   Extn. 4+ 4+               Assessment: Tolerated treatment well. Patient has met goals set forth in therapy and will continue to perform exercises on own. She reports no functional limitations at this time and is appropriate for discharge.    Plan: Discharge from therapy.      Insurance:  AMA/CMS Eval/ Re-eval POC expires FOTO Auth #/ Referral # Total    Start date  Expiration date Extension  Visit limitation?  PT only or  PT+OT? Co-Insurance   CMS 4.8 6.17.24  needed 12v 4.8 10.4.24                       AUTH #:  Date 4.8 4.18 4.22 4.25 4.30 5.2 5.6 5.9 5.14 5.16 5.23    Authed: Used 1 2 3 4 5 6 7 8 9 10 11     Remaining  11 10 9 8 7 6 5 4 3 2 1      Precautions: standard precautions  Patient provided verbal consent to treatment plan and recommended interventions.    DOS: Right BRIGITTE 4/15/24    HEP: calf raises, heel slides, stand hip extn, LAQ, stand hip abduction    Manuals 5.6.24 5.9.24 5.14.24 5.16.24 5.23   visit 7 8 9 10 11                           Neuro Re-Ed        Bridges  3*10 3x10  3x10    Clam shell 2*10 2x10        CHENG x10  Sciatic n glides x10   Femoral n glides x10    Ther Ex        Stand hip abd  3x10 ea  3*10 ea. 3x10 ea    Mini squats 2*15 TRX 12x, 10x TRX 3*10 TRX 3x10 STS from 18'' box 3*10   Nu step for ROM 7-10' nu step lvl 7 Rec 5' Nu step 5' lvl 4 NuStep 5' Bike, 5'   PROM hip FB MR      Supine hip flexion SLR flexion 2*8 SLR flexion 2x8      Leg press    3*10, 105# 3x10 115# 3*10, 119#   Lateral step up  2x10 4\" up and over  3*10, 4\" up 3x10 4\" up  3x10 4\" ecc    Stand hip extn   2*15 ea Blue loop 2x15     Hip abd/add machine   3*10 ea. 30# 3x10  ea   30# add  15# abd 3x10  ea   40# add  25# abd   Step up     1*10, 12''                 "   Ther Activity        PT ed                Modalities

## 2024-05-28 ENCOUNTER — APPOINTMENT (OUTPATIENT)
Dept: PHYSICAL THERAPY | Facility: CLINIC | Age: 59
End: 2024-05-28
Payer: COMMERCIAL

## 2024-05-30 ENCOUNTER — OFFICE VISIT (OUTPATIENT)
Dept: OBGYN CLINIC | Facility: CLINIC | Age: 59
End: 2024-05-30

## 2024-05-30 ENCOUNTER — APPOINTMENT (OUTPATIENT)
Dept: PHYSICAL THERAPY | Facility: CLINIC | Age: 59
End: 2024-05-30
Payer: COMMERCIAL

## 2024-05-30 VITALS
WEIGHT: 153 LBS | HEART RATE: 74 BPM | SYSTOLIC BLOOD PRESSURE: 119 MMHG | HEIGHT: 64 IN | DIASTOLIC BLOOD PRESSURE: 86 MMHG | BODY MASS INDEX: 26.12 KG/M2

## 2024-05-30 DIAGNOSIS — Z96.641 STATUS POST TOTAL REPLACEMENT OF RIGHT HIP: Primary | ICD-10-CM

## 2024-05-30 DIAGNOSIS — Z47.1 AFTERCARE FOLLOWING RIGHT HIP JOINT REPLACEMENT SURGERY: ICD-10-CM

## 2024-05-30 DIAGNOSIS — Z96.641 AFTERCARE FOLLOWING RIGHT HIP JOINT REPLACEMENT SURGERY: ICD-10-CM

## 2024-05-30 PROBLEM — M16.11 OSTEOARTHRITIS OF RIGHT HIP: Status: RESOLVED | Noted: 2024-03-13 | Resolved: 2024-05-30

## 2024-05-30 PROBLEM — M25.551 PAIN OF RIGHT HIP: Status: RESOLVED | Noted: 2024-01-10 | Resolved: 2024-05-30

## 2024-05-30 PROCEDURE — 99024 POSTOP FOLLOW-UP VISIT: CPT | Performed by: ORTHOPAEDIC SURGERY

## 2024-05-30 RX ORDER — PREDNISOLONE ACETATE 10 MG/ML
SUSPENSION/ DROPS OPHTHALMIC
COMMUNITY
Start: 2024-05-20

## 2024-05-30 NOTE — PROGRESS NOTES
Assessment/Plan:  1. Status post total replacement of right hip        2. Aftercare following right hip joint replacement surgery          Scribe Attestation      I,:  Alexey Garcia PA-C am acting as a scribe while in the presence of the attending physician.:       I,:  Javier Reed, DO personally performed the services described in this documentation    as scribed in my presence.:           Jael is a pleasant 58-year-old presenting today for follow-up 6 weeks after a direct anterior right total hip arthroplasty.  She is doing very well in her recovery at this point.  Her incision has healed very nicely, and she no longer has any restrictions.  She is completed her aspirin and Protonix for DVT prophylaxis.  We encouraged her to continue her efforts at the [employer] program now that she has completed physical therapy.  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  She may follow-up in 1 month for a follow-up appointment with an x-ray on arrival of her right hip.  We will determine at that time whether or not she is able to return to work.  She expressed understanding and all of her questions were addressed    Subjective: 6 weeks status post direct anterior right total hip arthroplasty    Patient ID: Jael Fabian is a 58 y.o. female presenting today for follow-up of surgery.  She reports that she is doing very well.  She does not have any activity related pain in the right hip or groin.  She has joined did the [employer] program after completing physical therapy and is working on endurance.  She occasionally notes some tightness in pins-and-needles at the distal thigh, but otherwise does not have significant discomfort.  She is completed her aspirin for DVT prophylaxis.  Overall, she is very happy with her recovery    Review of Systems   Constitutional: Negative.    HENT: Negative.     Eyes:  Negative.    Respiratory: Negative.     Cardiovascular: Negative.    Gastrointestinal: Negative.    Endocrine: Negative.    Genitourinary: Negative.    Musculoskeletal:  Positive for myalgias.   Skin: Negative.    Allergic/Immunologic: Negative.    Neurological: Negative.    Hematological: Negative.    Psychiatric/Behavioral: Negative.       Past Medical History:   Diagnosis Date    Abnormal Pap smear of cervix 1987    Anxiety     Depression     Ear problems 1996    Fibrocystic breast     GERD (gastroesophageal reflux disease) 2025    Herpes 1987    High vitamin A level     History of anxiety     History of hypercholesterolemia     History of hypertension     History of obesity     HPV (human papilloma virus) infection 1987    Hyperlipidemia     Lordosis     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    Scoliosis     Secondary hyperparathyroidism, non-renal (HCC)     Spinal stenosis     Tinnitus 1996     Past Surgical History:   Procedure Laterality Date    ABDOMINOPLASTY      BREAST CYST ASPIRATION Right     COLONOSCOPY      COLONOSCOPY  11/22/2021    Eyvazzedah - 5 y f/u     COLPOSCOPY  1987    COMBINED AUGMENTATION MAMMAPLASTY AND ABDOMINOPLASTY  2001    EPIDURAL BLOCK INJECTION Bilateral 8/23/2023    Procedure: Left L4-L5   TRANSFORAMINAL epidural steroid injection ( [id number]);  Surgeon: Mateo Colmenares DO;  Location: [facility] MAIN OR;  Service: Pain Management     EPIDURAL BLOCK INJECTION Bilateral 1/24/2024    Procedure: L4-L5 TRANSFORAMINAL EPIDURAL STEROID INJECTION;  Surgeon: Mateo Colmenares DO;  Location: [facility] MAIN OR;  Service: Pain Management     FACIAL COSMETIC SURGERY      FL INJECTION RIGHT HIP (NON ARTHROGRAM)  05/24/2019    GASTRIC BYPASS      CA ARTHROCENTESIS ASPIR&/INJ MAJOR JT/BURSA W/O US Right 1/10/2024    Procedure: RIGHT INTRA-ARTICULAR HIP INJECTION;  Surgeon: Mateo Colmenares DO;  Location: [facility] MAIN OR;  Service: Pain Management     CA ARTHRP ACETBLR/PROX FEM PROSTC  AGRFT/ALGRFT Right 4/15/2024    Procedure: ARTHROPLASTY HIP TOTAL ANTERIOR,NAVIGATED - same day;  Surgeon: Javier Reed DO;  Location: [facility] MAIN OR;  Service: Orthopedics    TUBAL LIGATION         Family History   Problem Relation Age of Onset    Hypertension Mother     Heart disease Mother     Hypertension Father     Heart disease Father     No Known Problems Sister     No Known Problems Brother     No Known Problems Maternal Aunt     No Known Problems Maternal Uncle     No Known Problems Paternal Aunt     No Known Problems Paternal Uncle     No Known Problems Maternal Grandmother     No Known Problems Maternal Grandfather     No Known Problems Paternal Grandmother     No Known Problems Paternal Grandfather     ADD / ADHD Neg Hx     Anesthesia problems Neg Hx     Cancer Neg Hx     Clotting disorder Neg Hx     Collagen disease Neg Hx     Diabetes Neg Hx     Dislocations Neg Hx     Learning disabilities Neg Hx     Neurological problems Neg Hx     Osteoporosis Neg Hx     Rheumatologic disease Neg Hx     Scoliosis Neg Hx     Vascular Disease Neg Hx        Social History     Occupational History    Not on file   Tobacco Use    Smoking status: Never    Smokeless tobacco: Never   Vaping Use    Vaping status: Never Used   Substance and Sexual Activity    Alcohol use: Not Currently    Drug use: No    Sexual activity: Not Currently     Partners: Male     Birth control/protection: Female Sterilization         Current Outpatient Medications:     Calcium Citrate 1040 MG TABS, Take by mouth 3 (three) times a day, Disp: , Rfl:     desvenlafaxine succinate (PRISTIQ) 50 mg 24 hr tablet, Take 1 tablet (50 mg total) by mouth daily Do not start before May 9, 2024., Disp: 30 tablet, Rfl: 2    hydrOXYzine HCL (ATARAX) 25 mg tablet, Take 1 tablet (25 mg total) by mouth 3 (three) times a day as needed for anxiety (anxiety), Disp: 90 tablet, Rfl: 2    Multiple Vitamins-Minerals (BARIATRIC MULTIVITAMINS/IRON PO), Take by mouth daily, Disp:  , Rfl:     prednisoLONE acetate (PRED FORTE) 1 % ophthalmic suspension, , Disp: , Rfl:     amLODIPine (NORVASC) 5 mg tablet, Take 1 tablet (5 mg total) by mouth daily, Disp: 30 tablet, Rfl: 0    traZODone (DESYREL) 100 mg tablet, Take 1 tablet (100 mg total) by mouth daily at bedtime as needed for sleep Do not start before April 12, 2024., Disp: 30 tablet, Rfl: 0    Allergies   Allergen Reactions    Other Wheezing     Lobster when a teenager.  Wheezing       Objective:  Vitals:    05/30/24 1115   BP: 119/86   Pulse: 74       Body mass index is 26.26 kg/m².    Right Hip Exam     Tenderness   The patient is experiencing no tenderness.     Range of Motion   Abduction:  normal Right hip abduction: 55.  Adduction:  30 normal   Extension:  0 normal   Flexion:  130 normal   External rotation:  50   Internal rotation:  30     Muscle Strength   The patient has normal right hip strength.  Abduction: 5/5   Adduction: 5/5   Flexion: 5/5     Tests   LAZARO: negative  Melba: negative    Other   Erythema: absent  Scars: present (anterior scar well approximated)  Sensation: normal  Pulse: present    Comments:  Surgical scar healed well  No erythema or warmth  Thigh and calf soft and nontender  Grossly NVI  Negative LAZARO [place]  Ambulates with symmetrical gait without assistive device  Grossly distally neurovascularly intact            Physical Exam  Vitals and nursing note reviewed.   Constitutional:       Appearance: Normal appearance. She is well-developed.      Comments: Body mass index is 26.26 kg/m².   HENT:      Head: Normocephalic and atraumatic.      Right Ear: External ear normal.      Left Ear: External ear normal.   Eyes:      Extraocular Movements: Extraocular movements intact.      Conjunctiva/sclera: Conjunctivae normal.   Cardiovascular:      Rate and Rhythm: Normal rate.      Pulses: Normal pulses.   Pulmonary:      Effort: Pulmonary effort is normal.   Abdominal:      Palpations: Abdomen is soft.    Musculoskeletal:      Cervical back: Normal range of motion.      Comments: See ortho exam   Skin:     General: Skin is warm and dry.   Neurological:      General: No focal deficit present.      Mental Status: She is alert and oriented to person, place, and time. Mental status is at baseline.   Psychiatric:         Mood and Affect: Mood normal.         Behavior: Behavior normal.         Thought Content: Thought content normal.         Judgment: Judgment normal.       This document was created using speech voice recognition software.   Grammatical errors, random word insertions, pronoun errors, and incomplete sentences are an occasional consequence of this system due to software limitations, ambient noise, and hardware issues.   Any formal questions or concerns about content, text, or information contained within the body of this dictation should be directly addressed to the provider for clarification.

## 2024-06-05 ENCOUNTER — TELEPHONE (OUTPATIENT)
Dept: OBGYN CLINIC | Facility: HOSPITAL | Age: 59
End: 2024-06-05

## 2024-06-05 NOTE — TELEPHONE ENCOUNTER
"Caller: Jael    Doctor: Derek    Reason for call: Patient called upset stating disability needs more information from Ortho. She expalined they need PT Notes, Office notes from Derek, and New Forms filled out.    I tried explaining to the patient to please have disability call us to request the office notes we can fax them in, also I explained that PT is separate from Orthopedics, Disability can get a hold of PT for those.    If they need new forms filled out they will need to fax those to us, the last ones in her chart are from April.    Patient got mad and stated \"She will not sign up for Disability\", patient disconnected the call.    Her Surgery was 04/15/24, and no new forms have been uploaded to her chart. They put her RTW date sooner then her follow up with Dr. Reed she explained.     I explained to her the best I could. If someone can call her please and help explain more. I told her to have disability call us and we would have no issue helping them.     Call back#: [phone]   "

## 2024-06-05 NOTE — TELEPHONE ENCOUNTER
I sw pt and she informed that her disability is requesting all OVN with Dr. Reed and all PT notes. I explained she would have to go through [employer] and sign a release form. If her employer is requesting a new physician statement to be completed they or her will need to fax or bring one to office. Provided [employer] number via [employer] per patient request. She gave verbal understanding and was thankful for the call back.

## 2024-06-26 ENCOUNTER — OFFICE VISIT (OUTPATIENT)
Dept: OBGYN CLINIC | Facility: CLINIC | Age: 59
End: 2024-06-26

## 2024-06-26 ENCOUNTER — APPOINTMENT (OUTPATIENT)
Dept: RADIOLOGY | Facility: CLINIC | Age: 59
End: 2024-06-26
Payer: COMMERCIAL

## 2024-06-26 VITALS
WEIGHT: 153 LBS | HEART RATE: 68 BPM | BODY MASS INDEX: 26.12 KG/M2 | DIASTOLIC BLOOD PRESSURE: 88 MMHG | SYSTOLIC BLOOD PRESSURE: 139 MMHG | HEIGHT: 64 IN

## 2024-06-26 DIAGNOSIS — Z96.641 STATUS POST TOTAL REPLACEMENT OF RIGHT HIP: Primary | ICD-10-CM

## 2024-06-26 DIAGNOSIS — Z96.641 STATUS POST TOTAL REPLACEMENT OF RIGHT HIP: ICD-10-CM

## 2024-06-26 PROCEDURE — 99024 POSTOP FOLLOW-UP VISIT: CPT | Performed by: ORTHOPAEDIC SURGERY

## 2024-06-26 PROCEDURE — 73502 X-RAY EXAM HIP UNI 2-3 VIEWS: CPT

## 2024-06-26 RX ORDER — AMOXICILLIN 500 MG/1
2000 TABLET, FILM COATED ORAL
Qty: 4 TABLET | Refills: 1 | Status: SHIPPED | OUTPATIENT
Start: 2024-06-26 | End: 2024-06-27

## 2024-06-26 NOTE — LETTER
June 26, 2024     Patient: Jael Fabian  YOB: 1965  Date of Visit: 6/26/2024      To Whom it May Concern:    Jael Fabian is under my professional care. Jael was seen in my office on 6/26/2024. Jael may return to work with out restriction.     If you have any questions or concerns, please don't hesitate to call.         Sincerely,          Javier Reed, DO        CC: No Recipients

## 2024-06-26 NOTE — PROGRESS NOTES
Assessment/Plan:  1. Status post total replacement of right hip  XR hip/pelv 2-3 vws right if performed    amoxicillin (AMOXIL) 500 MG tablet        Scribe Attestation      I,:  Flaca Barr am acting as a scribe while in the presence of the attending physician.:       I,:  Javier Reed, DO personally performed the services described in this documentation    as scribed in my presence.:           Jael is a pleasant 58 y.o. female who presents 10 weeks S/P right total hip arthroplasty. I am pleased with her clinical exam and review of imaging today. Patient may return to work next week as tolerated. The patient is counseled on prophylactic antibiotic use prior to dental visits. She does have an upcoming dental appointment so antibiotics were sent to her pharmacy. She may continue activities as tolerated. We will see her back in 9 months for the 1 year anniversary of her surgery, with xray on arrival.     Subjective: 10 weeks S/P right hip arthroplasty     Patient ID: Jael Fabian is a 58 y.o. female who presents 10 weeks S/P right hip total arthroplasty. DOS 4/15/24. Patient is doing very well post operatively. Since her last visit she has completed physical therapy, transitioned to HEP, and resumed activities of daily living. She is pain free and very pleased with her outcomes. Patient is hoping to return to work next week.     Review of Systems   Constitutional:  Negative for chills and fever.   HENT:  Negative for ear pain and sore throat.    Eyes:  Negative for pain and visual disturbance.   Respiratory:  Negative for cough and shortness of breath.    Cardiovascular:  Negative for chest pain and palpitations.   Gastrointestinal:  Negative for abdominal pain and vomiting.   Genitourinary:  Negative for dysuria and hematuria.   Musculoskeletal:  Negative for arthralgias and back pain.   Skin:  Negative for color change and rash.   Neurological:  Negative for seizures and syncope.   All other systems reviewed and  are negative.        Past Medical History:   Diagnosis Date    Abnormal Pap smear of cervix 1987    Anxiety     Depression     Ear problems 1996    Fibrocystic breast     GERD (gastroesophageal reflux disease) 2025    Herpes 1987    High vitamin A level     History of anxiety     History of hypercholesterolemia     History of hypertension     History of obesity     HPV (human papilloma virus) infection 1987    Hyperlipidemia     Lordosis     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    Scoliosis     Secondary hyperparathyroidism, non-renal (HCC)     Spinal stenosis     Tinnitus 1996       Past Surgical History:   Procedure Laterality Date    ABDOMINOPLASTY      BREAST CYST ASPIRATION Right     COLONOSCOPY      COLONOSCOPY  11/22/2021    Eyvazzedah - 5 y f/u     COLPOSCOPY  1987    COMBINED AUGMENTATION MAMMAPLASTY AND ABDOMINOPLASTY  2001    EPIDURAL BLOCK INJECTION Bilateral 8/23/2023    Procedure: Left L4-L5   TRANSFORAMINAL epidural steroid injection ( [id number]);  Surgeon: Mateo Colmenares DO;  Location: [facility] MAIN OR;  Service: Pain Management     EPIDURAL BLOCK INJECTION Bilateral 1/24/2024    Procedure: L4-L5 TRANSFORAMINAL EPIDURAL STEROID INJECTION;  Surgeon: Mateo Colmenares DO;  Location: [facility] MAIN OR;  Service: Pain Management     FACIAL COSMETIC SURGERY      FL INJECTION RIGHT HIP (NON ARTHROGRAM)  05/24/2019    GASTRIC BYPASS      ND ARTHROCENTESIS ASPIR&/INJ MAJOR JT/BURSA W/O US Right 1/10/2024    Procedure: RIGHT INTRA-ARTICULAR HIP INJECTION;  Surgeon: Mateo Colmenares DO;  Location: [facility] MAIN OR;  Service: Pain Management     ND ARTHRP ACETBLR/PROX FEM PROSTC AGRFT/ALGRFT Right 4/15/2024    Procedure: ARTHROPLASTY HIP TOTAL ANTERIOR,NAVIGATED - same day;  Surgeon: Javier Reed DO;  Location: [facility] MAIN OR;  Service: Orthopedics    TUBAL LIGATION         Family History   Problem Relation Age of Onset    Hypertension Mother     Heart disease Mother     Hypertension Father     Heart  disease Father     No Known Problems Sister     No Known Problems Brother     No Known Problems Maternal Aunt     No Known Problems Maternal Uncle     No Known Problems Paternal Aunt     No Known Problems Paternal Uncle     No Known Problems Maternal Grandmother     No Known Problems Maternal Grandfather     No Known Problems Paternal Grandmother     No Known Problems Paternal Grandfather     ADD / ADHD Neg Hx     Anesthesia problems Neg Hx     Cancer Neg Hx     Clotting disorder Neg Hx     Collagen disease Neg Hx     Diabetes Neg Hx     Dislocations Neg Hx     Learning disabilities Neg Hx     Neurological problems Neg Hx     Osteoporosis Neg Hx     Rheumatologic disease Neg Hx     Scoliosis Neg Hx     Vascular Disease Neg Hx        Social History     Occupational History    Not on file   Tobacco Use    Smoking status: Never    Smokeless tobacco: Never   Vaping Use    Vaping status: Never Used   Substance and Sexual Activity    Alcohol use: Not Currently    Drug use: No    Sexual activity: Not Currently     Partners: Male     Birth control/protection: Female Sterilization         Current Outpatient Medications:     amoxicillin (AMOXIL) 500 MG tablet, Take 4 tablets (2,000 mg total) by mouth 60 minutes pre-procedure for 1 day, Disp: 4 tablet, Rfl: 1    Calcium Citrate 1040 MG TABS, Take by mouth 3 (three) times a day, Disp: , Rfl:     desvenlafaxine succinate (PRISTIQ) 50 mg 24 hr tablet, Take 1 tablet (50 mg total) by mouth daily Do not start before May 9, 2024., Disp: 30 tablet, Rfl: 2    hydrOXYzine HCL (ATARAX) 25 mg tablet, Take 1 tablet (25 mg total) by mouth 3 (three) times a day as needed for anxiety (anxiety), Disp: 90 tablet, Rfl: 2    Multiple Vitamins-Minerals (BARIATRIC MULTIVITAMINS/IRON PO), Take by mouth daily, Disp: , Rfl:     prednisoLONE acetate (PRED FORTE) 1 % ophthalmic suspension, , Disp: , Rfl:     amLODIPine (NORVASC) 5 mg tablet, Take 1 tablet (5 mg total) by mouth daily, Disp: 30 tablet,  Rfl: 0    traZODone (DESYREL) 100 mg tablet, Take 1 tablet (100 mg total) by mouth daily at bedtime as needed for sleep Do not start before April 12, 2024., Disp: 30 tablet, Rfl: 0    Allergies   Allergen Reactions    Other Wheezing     Lobster when a teenager.  Wheezing       Objective:  Vitals:    06/26/24 1645   BP: 139/88   Pulse: 68       Body mass index is 26.26 kg/m².    Right Hip Exam     Tenderness   The patient is experiencing no tenderness.     Range of Motion   Abduction:  50   Adduction:  30   Flexion:  130   External rotation:  40   Internal rotation:  30     Muscle Strength   Abduction: 5/5   Adduction: 5/5   Flexion: 5/5     Tests   LAZARO: negative    Other   Erythema: absent  Sensation: normal  Pulse: present    Comments:  Well healed incision  2+ TP and DP pulses with brisk capillary refill to the toes  Sural, saphenous, tibial, superficial and deep peroneal motor and sensory distribution intact  Sensation to light touch               Physical Exam  Vitals and nursing note reviewed.   Constitutional:       Appearance: Normal appearance.   HENT:      Head: Normocephalic.      Right Ear: External ear normal.      Left Ear: External ear normal.   Eyes:      Extraocular Movements: Extraocular movements intact.      Conjunctiva/sclera: Conjunctivae normal.   Cardiovascular:      Rate and Rhythm: Normal rate.      Pulses: Normal pulses.   Pulmonary:      Effort: Pulmonary effort is normal.      Breath sounds: Normal breath sounds.   Abdominal:      General: Abdomen is flat.   Musculoskeletal:         General: Normal range of motion.      Cervical back: Normal range of motion and neck supple.      Comments: See ortho exam   Skin:     General: Skin is warm and dry.   Neurological:      General: No focal deficit present.      Mental Status: She is alert.   Psychiatric:         Mood and Affect: Mood normal.         Behavior: Behavior normal.         I have personally reviewed pertinent films in PACS.     X-ray of the right hip obtained on 06/26/24 reviewed demonstrating a well-positioned and aligned total hip arthroplasty without evidence of failure.  There is no new fracture, dislocation, lytic or blastic lesion.      This document was created using speech voice recognition software.   Grammatical errors, random word insertions, pronoun errors, and incomplete sentences are an occasional consequence of this system due to software limitations, ambient noise, and hardware issues.   Any formal questions or concerns about content, text, or information contained within the body of this dictation should be directly addressed to the provider for clarification.

## 2024-06-27 ENCOUNTER — TELEMEDICINE (OUTPATIENT)
Dept: PSYCHIATRY | Facility: CLINIC | Age: 59
End: 2024-06-27

## 2024-06-27 DIAGNOSIS — F41.1 GAD (GENERALIZED ANXIETY DISORDER): ICD-10-CM

## 2024-06-27 DIAGNOSIS — G47.00 INSOMNIA, UNSPECIFIED TYPE: ICD-10-CM

## 2024-06-27 DIAGNOSIS — F10.90 ALCOHOL USE DISORDER: ICD-10-CM

## 2024-06-27 DIAGNOSIS — F33.2 SEVERE EPISODE OF RECURRENT MAJOR DEPRESSIVE DISORDER, WITHOUT PSYCHOTIC FEATURES (HCC): Primary | ICD-10-CM

## 2024-06-27 RX ORDER — HYDROXYZINE HYDROCHLORIDE 25 MG/1
25 TABLET, FILM COATED ORAL 3 TIMES DAILY PRN
Qty: 90 TABLET | Refills: 2 | Status: SHIPPED | OUTPATIENT
Start: 2024-06-27 | End: 2024-09-25

## 2024-06-27 RX ORDER — DESVENLAFAXINE SUCCINATE 50 MG/1
50 TABLET, EXTENDED RELEASE ORAL DAILY
Qty: 30 TABLET | Refills: 2 | Status: SHIPPED | OUTPATIENT
Start: 2024-06-27 | End: 2024-09-25

## 2024-06-27 RX ORDER — NALTREXONE HYDROCHLORIDE 50 MG/1
50 TABLET, FILM COATED ORAL DAILY
Qty: 30 TABLET | Refills: 2 | Status: SHIPPED | OUTPATIENT
Start: 2024-06-27 | End: 2024-09-25

## 2024-06-27 NOTE — PSYCH
Virtual Regular Visit    Verification of [place] location: [place]    Patient is located in the following state in which I hold an active license: [place]    Assessment/Plan:    Problem List Items Addressed This Visit          Behavioral Health    Severe episode of recurrent major depressive disorder, without psychotic features (HCC) - Primary    Relevant Medications    desvenlafaxine succinate (PRISTIQ) 50 mg 24 hr tablet    hydrOXYzine HCL (ATARAX) 25 mg tablet     Other Visit Diagnoses       CANDICE (generalized anxiety disorder)        Relevant Medications    desvenlafaxine succinate (PRISTIQ) 50 mg 24 hr tablet    hydrOXYzine HCL (ATARAX) 25 mg tablet    Insomnia, unspecified type        Alcohol use disorder        Relevant Medications    naltrexone (REVIA) 50 mg tablet                 Reason for visit is   Chief Complaint   Patient presents with    Medication Management    Depression    Anxiety    Insomnia    Alcohol Problem        Visit Time  Visit Start Time: 2:00 PM  Visit Stop Time: 2:27 PM  Total Visit Duration: 27 minutes    Encounter provider Koko Benz MD    Provider located at: [facility] [address], [address]  [place] [address]    Recent Visits  No visits were found meeting these conditions.  Showing recent visits within past 7 days and meeting all other requirements  Today's Visits  Date Type Provider Dept   06/27/24 [facility]icine [facility]   Showing today's visits and meeting all other requirements  Future Appointments  No visits were found meeting these conditions.  Showing future appointments within next 150 days and meeting all other requirements       The patient was identified by name and date of birth. Jael Fabian was informed that this is a telemedicine visit and that the visit is being conducted through the [employer] platform, but continued as voice only due to issues with the video/connection. She  "agrees to proceed. My office door was closed. No one else was in the room. She acknowledged consent and understanding of privacy and security of the video platform. The patient has agreed to participate and understands they can discontinue the visit at any time. Patient is aware this is a billable service.       MEDICATION MANAGEMENT NOTE        [facility]      Name and Date of Birth:  Jael Fabian 58 y.o. 1965 MRN: [id number]    Date of Visit: June 27, 2024    Reason for Visit:   Chief Complaint   Patient presents with    Medication Management    Depression    Anxiety    Insomnia    Alcohol Problem       Subjective    The patient was visited virtually for Medication Management, Depression, Anxiety, Insomnia, and Alcohol Problem. Presented calm, and cooperative. The patient finished PT following hip replacement and will return to work next week. Reported feeling ok. She had cold sxs last week, and has been feeling more depressed and isolated, and felt \"sad about a few things\" and started drinking again (4-6 tall ice tea), and had stomach ache but then stopped drinking on 6/15/24. She called the work and is going to start a week earlier and will start July 2nd. She started going more AA meeting and invited friends. She reported staying up some nights binge watching TV which interrupted her sleep, but has been following sleep hygiene. She signed up for a  at [facility] [employer] and was excited about going back to work and going back to gym. Denied any changes in appetite, concentration, energy level, or daily activities.  Denied feelings of anhedonia, hopelessness, helplessness, worthlessness or guilt and appeared to be future oriented. There was no thought constriction related to death.  Denied SI/HI, intent or plan upon direct inquiry at this time. Reported feeling anxious at times when her days are not structured, and tries to pray or go for a walk and may " use Atarax PRN. No specific phobia or full blown panic attacks reported. Denied AV/H. Endorsed good compliance with the medications and denied any side effects. Denied smoking cigarettes or other illicit substance use. The patient was educated about the negative effect of excessive alcohol use on physical health including cirrhosis, pancreatitis, direct toxic effects on brain, increased risk of multiple malignancies, as well as the risk of intoxication (giancarlo if taking other sedative agents e.g benzodiazepines), withdrawal, seizure, DT, and death in addition to other negative effects on mental health including alcohol induced mood and anxiety disorders, and interactions with prescribed medication. The patient was receptive to the education and will continue AA meetings regularly.    Given the presentation, the patient was started on Naltrexone 25 mg po daily for 4 days and then 50 mg po daily for alcohol use disorder and other medications are maintained at the same dosage.  Will continue attending AA meetings. The patient was educated to call 911 or go to the nearest emergency room if the symptoms become overwhelming or unable to remain in control. Verbalized understanding and agreed to seek help in case of distress or concern for safety.    Review Of Systems:  Pertinent items are noted in HPI; all others are negative; no recent changes in medications or health status reported.    PHQ-2/9 Depression Screening    Little interest or pleasure in doing things: 2 - more than half the days  Feeling down, depressed, or hopeless: 2 - more than half the days  Trouble falling or staying asleep, or sleeping too much: 2 - more than half the days  Feeling tired or having little energy: 1 - several days  Poor appetite or overeatin - several days  Feeling bad about yourself - or that you are a failure or have let yourself or your family down: 1 - several days  Trouble concentrating on things, such as reading the newspaper or  watching television: 0 - not at all  Moving or speaking so slowly that other people could have noticed. Or the opposite - being so fidgety or restless that you have been moving around a lot more than usual: 0 - not at all  Thoughts that you would be better off dead, or of hurting yourself in some way: 0 - not at all  PHQ-9 Score: 9  PHQ-9 Interpretation: Mild depression             Historical Information    Past Psychiatric History Update:   - No inpatient psychiatric admission since last encounter  - No SA or SIB since last encounter  - No incidence of violent behavior since last encounter    Past Trauma History Update:    - No new onset of abuse or traumatic events since last encounter     Past Medical History:    Past Medical History:   Diagnosis Date    Abnormal Pap smear of cervix 1987    Anxiety     Depression     Ear problems 1996    Fibrocystic breast     GERD (gastroesophageal reflux disease) 2025    Herpes 1987    High vitamin A level     History of anxiety     History of hypercholesterolemia     History of hypertension     History of obesity     HPV (human papilloma virus) infection 1987    Hyperlipidemia     Lordosis     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    Scoliosis     Secondary hyperparathyroidism, non-renal (HCC)     Spinal stenosis     Tinnitus 1996        Past Surgical History:   Procedure Laterality Date    ABDOMINOPLASTY      BREAST CYST ASPIRATION Right     COLONOSCOPY      COLONOSCOPY  11/22/2021    Eyvazzedah - 5 y f/u     COLPOSCOPY  1987    COMBINED AUGMENTATION MAMMAPLASTY AND ABDOMINOPLASTY  2001    EPIDURAL BLOCK INJECTION Bilateral 8/23/2023    Procedure: Left L4-L5   TRANSFORAMINAL epidural steroid injection ( [id number]);  Surgeon: Mateo Colmenares DO;  Location: [facility] MAIN OR;  Service: Pain Management     EPIDURAL BLOCK INJECTION Bilateral 1/24/2024    Procedure: L4-L5 TRANSFORAMINAL EPIDURAL STEROID INJECTION;  Surgeon: Mateo Colmenares DO;  Location: [facility] MAIN OR;   Service: Pain Management     FACIAL COSMETIC SURGERY      FL INJECTION RIGHT HIP (NON ARTHROGRAM)  05/24/2019    GASTRIC BYPASS      MT ARTHROCENTESIS ASPIR&/INJ MAJOR JT/BURSA W/O US Right 1/10/2024    Procedure: RIGHT INTRA-ARTICULAR HIP INJECTION;  Surgeon: Mateo Colmenares DO;  Location: [facility] MAIN OR;  Service: Pain Management     MT ARTHRP ACETBLR/PROX FEM PROSTC AGRFT/ALGRFT Right 4/15/2024    Procedure: ARTHROPLASTY HIP TOTAL ANTERIOR,NAVIGATED - same day;  Surgeon: Javier Reed DO;  Location: [facility] MAIN OR;  Service: Orthopedics    TUBAL LIGATION       Allergies   Allergen Reactions    Other Wheezing     Lobster when a teenager.  Wheezing       Substance Abuse History:    Social History     Substance and Sexual Activity   Alcohol Use Not Currently     Social History     Substance and Sexual Activity   Drug Use No       Social History:    Social History     Socioeconomic History    Marital status:      Spouse name: Not on file    Number of children: Not on file    Years of education: Not on file    Highest education level: Not on file   Occupational History    Not on file   Tobacco Use    Smoking status: Never    Smokeless tobacco: Never   Vaping Use    Vaping status: Never Used   Substance and Sexual Activity    Alcohol use: Not Currently    Drug use: No    Sexual activity: Not Currently     Partners: Male     Birth control/protection: Female Sterilization   Other Topics Concern    Not on file   Social History Narrative    Not on file     Social Determinants of Health     Financial Resource Strain: Low Risk  (3/13/2024)    Overall Financial Resource Strain (CARDIA)     Difficulty of Paying Living Expenses: Not hard at all   Food Insecurity: No Food Insecurity (3/13/2024)    Hunger Vital Sign     Worried About Running Out of Food in the Last Year: Never true     Ran Out of Food in the Last Year: Never true   Transportation Needs: No Transportation Needs (3/13/2024)    PRAPARE - Transportation      Lack of Transportation (Medical): No     Lack of Transportation (Non-Medical): No   Physical Activity: Not on file   Stress: Not on file   Social Connections: Not on file   Intimate Partner Violence: Not At Risk (3/13/2024)    Humiliation, Afraid, Rape, and Kick questionnaire     Fear of Current or Ex-Partner: No     Emotionally Abused: No     Physically Abused: No     Sexually Abused: No   Housing Stability: Low Risk  (3/13/2024)    Housing Stability Vital Sign     Unable to Pay for Housing in the Last Year: No     Number of Times Moved in the Last Year: 1     Homeless in the Last Year: No       Family Psychiatric History:     Family History   Problem Relation Age of Onset    Hypertension Mother     Heart disease Mother     Hypertension Father     Heart disease Father     No Known Problems Sister     No Known Problems Brother     No Known Problems Maternal Aunt     No Known Problems Maternal Uncle     No Known Problems Paternal Aunt     No Known Problems Paternal Uncle     No Known Problems Maternal Grandmother     No Known Problems Maternal Grandfather     No Known Problems Paternal Grandmother     No Known Problems Paternal Grandfather     ADD / ADHD Neg Hx     Anesthesia problems Neg Hx     Cancer Neg Hx     Clotting disorder Neg Hx     Collagen disease Neg Hx     Diabetes Neg Hx     Dislocations Neg Hx     Learning disabilities Neg Hx     Neurological problems Neg Hx     Osteoporosis Neg Hx     Rheumatologic disease Neg Hx     Scoliosis Neg Hx     Vascular Disease Neg Hx        History Review: The following portions of the patient's history were reviewed and updated as appropriate: allergies, current medications, past family history, past medical history, past social history, past surgical history and problem list.       Objective      Vital signs in last 24 hours: Not checked - virtual visit    Mental Status Evaluation:  Appearance and attitude:  unable to assess due to voice-only visit  Eye contact: unable to  "assess due to voice-only visit  Motor Function:  unable to assess due to voice-only visit  Gait/station: Not observed  Speech: normal for rate, rhythm, volume, latency, amount  Language: No overt abnormality  Mood/affect: \"better\" / Affect was unable to assess today due to virtual visit  Thought Processes: sequential and goal-directed  Thought content: denies suicidal ideation or homicidal ideation; no delusions or first rank symptoms  Associations: intact associations  Perceptual disturbances: denies Auditory/Visual/Tactile Hallucinations  Orientation: oriented to time, person, place and to the situational context  Cognitive Function: intact  Memory: recent and remote memory grossly intact  Intellect: average  Fund of knowledge: aware of current events, aware of past history, and vocabulary average  Impulse control: good  Insight/judgment: fair/good          Laboratory Results: I have personally reviewed all pertinent laboratory/tests results    Recent Labs (last 2 months):   No visits with results within 2 Month(s) from this visit.   Latest known visit with results is:   Admission on 04/15/2024, Discharged on 04/15/2024   Component Date Value    POC Glucose 04/15/2024 103     POC Glucose 04/15/2024 89          Assessment & Plan    A 58 y.o.  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, w/ [employer] of hip arthritis, lumbar radiculopathy, spinal stenosis, s/p gastric bypass, secondary hyperparathyroidism, SNHL and PPH of alcohol use disorder, depression and anxiety, 2 prior psychiatric admission (in June 2021 due to depressive symptoms following her 's death and most recently at [facility] from 3/12/2024 until 3/15/2024 after intentional overdose on Ativan and alcohol), one prior SA (intensional overdose on 3/12/24), no h/o self-injurious behavior, currently in therapy ([facility] at [facility]), Pristiq " 50 mg po daily, Neurontin 100 mg po TID, Trazodone 100 mg po nightly PRN, Atarax 25 mg po TID PRN, Folic acid, thiamine and MVI who presented to the mental health clinic for the initial intake and psychiatric evaluation on 3/22/24. Presented w/ good mood and improved anxiety sxs. Maintained sober since last admission and has been attending AA meeting regularly. Denied SI/HI, intent or plan upon direct inquiry at this time. CANDICE-7: 4; PHQ-9: 1. Her current presentation meets criteria for MDD, CANDICE and alcohol use disorder in early remission. Maintained on Pristiq 50 mg daily, Atarax 25 mg TID PRN, Trazodone 100 mg nightly PRN, and Neurontin increased from 100/200 mg to 100/100/200 mg but discontinued after she had BRIGITTE on 4/15/24; doses to be adjusted as indicated. Upon f/u on 6/27/24, the patient reported worsening of depression in the context of brief relapse on alcohol (sober since 6/15 though). Started on Naltrexone 50 mg daily and other meds maintained the same dose. Will continue AA meeting and individual psychotherapy.      Diagnoses and all orders for this visit:    Severe episode of recurrent major depressive disorder, without psychotic features (HCC)  -     desvenlafaxine succinate (PRISTIQ) 50 mg 24 hr tablet; Take 1 tablet (50 mg total) by mouth daily    CANDICE (generalized anxiety disorder)  -     hydrOXYzine HCL (ATARAX) 25 mg tablet; Take 1 tablet (25 mg total) by mouth 3 (three) times a day as needed for anxiety (anxiety)    Insomnia, unspecified type    Alcohol use disorder  -     naltrexone (REVIA) 50 mg tablet; Take 1 tablet (50 mg total) by mouth daily Take 1/2 tablet daily for 4 days and then 1 tablet daily          Impression:  1. Severe episode of recurrent major depressive disorder, without psychotic features (HCC)  desvenlafaxine succinate (PRISTIQ) 50 mg 24 hr tablet      2. CANDICE (generalized anxiety disorder)  hydrOXYzine HCL (ATARAX) 25 mg tablet      3. Insomnia, unspecified type        4.  Alcohol use disorder  naltrexone (REVIA) 50 mg tablet          Treatment Recommendations/Precautions:  - Start Naltrexone 25 mg po daily for 4 days and then 50 mg po daily for alcohol use disorder  - Continue Pristiq 50 mg po daily for depression and anxiety  - Discontinue Neurontin as the patient endorsed good control of pain with her current meds following BRIGITTE  - Continue Trazodone 100 mg po nightly PRN for insomnia  - Continue Atarax 25 mg po TID for breakthrough anxiety / panic attacks  - Continue vit D supplements  - Continue AA meetings and individual psychotherapy ([facility] at [facility])  - Medications sent to the patient's pharmacy for 90 day supply       - Psychoeducation provided to the patient and benefits, potential risks and side effects discussed; importance of compliance with the psychiatric treatment reiterated, and the patient verbalized understanding of the matter     - RTC in 8 weeks     - Educated about healthy life style, risk of falls/sedation and addiction. Patient was receptive to education.  - The patient was educated about 24 hour and weekend coverage for urgent situations accessed by calling [facility] main practice number   - Patient was educated to call [employer] Suicide Prevention Lifeline (6-340-197-CBSE [0772]) for behavioral crisis at anytime or 911 for any safety concerns, or go to nearest ER if her symptoms become overwhelming or unmanageable.    Current Outpatient Medications   Medication Sig Dispense Refill    amoxicillin (AMOXIL) 500 MG tablet Take 4 tablets (2,000 mg total) by mouth 60 minutes pre-procedure for 1 day 4 tablet 1    Calcium Citrate 1040 MG TABS Take by mouth 3 (three) times a day      desvenlafaxine succinate (PRISTIQ) 50 mg 24 hr tablet Take 1 tablet (50 mg total) by mouth daily 30 tablet 2    hydrOXYzine HCL (ATARAX) 25 mg tablet Take 1 tablet (25 mg total) by mouth 3 (three) times a day as needed for anxiety (anxiety) 90 tablet 2     Multiple Vitamins-Minerals (BARIATRIC MULTIVITAMINS/IRON PO) Take by mouth daily      naltrexone (REVIA) 50 mg tablet Take 1 tablet (50 mg total) by mouth daily Take 1/2 tablet daily for 4 days and then 1 tablet daily 30 tablet 2    prednisoLONE acetate (PRED FORTE) 1 % ophthalmic suspension       traZODone (DESYREL) 100 mg tablet Take 1 tablet (100 mg total) by mouth daily at bedtime as needed for sleep Do not start before April 12, 2024. 30 tablet 0    amLODIPine (NORVASC) 5 mg tablet Take 1 tablet (5 mg total) by mouth daily 30 tablet 0     No current facility-administered medications for this visit.         Medications Risks/Benefits      Risks, Benefits And Possible Side Effects Of Medications:    Risks, benefits, and possible side effects of medications explained to Jael and she verbalizes understanding and agreement for treatment.    Controlled Medication Discussion:     Not applicable    Psychotherapy Provided:     Individual psychotherapy provided: Yes  Counseling was provided during the session today for 16 minutes.   Psychoeducation provided to the patient and was educated about the importance of compliance with the medications and psychiatric treatment  Supportive psychotherapy provided to the patient  Solution Focused Brief Therapy (SFBT) provided  Patient's emotions were validated and specific labeled praise provided.   [place] suggestions were offered in a supportive non-critical way.     Treatment Plan:    Completed and signed during the session: Not applicable - Treatment Plan not due at this session    Koko Benz MD 06/27/24    This note was completed in part utilizing Dragon dictation Software. Grammatical, translation, syntax errors, random word insertions, spelling mistakes, and incomplete sentences may be an occasional consequence of this system secondary to software limitations with voice recognition, ambient noise, and hardware issues. If you have any questions or concerns about the  content, text, or information contained within the body of this dictation, please contact the provider for clarification.

## 2024-06-28 ENCOUNTER — TELEPHONE (OUTPATIENT)
Age: 59
End: 2024-06-28

## 2024-06-28 NOTE — TELEPHONE ENCOUNTER
Received call from patient asking to speak with Cinthya Rashid in regards to a letter she got about her laser.    Cinthya asked that I transfer her to her line and ask her to leave a voicemail because she was currently with a patient.    I explained to the patient that Cinthya was with a patient but would like for her to leave her a voicemail.    I warm transferred the patient and asked her to leave a voicemail per Rissa instructions.    Patient verbalized understanding

## 2024-08-26 ENCOUNTER — TELEMEDICINE (OUTPATIENT)
Dept: PSYCHIATRY | Facility: CLINIC | Age: 59
End: 2024-08-26
Payer: COMMERCIAL

## 2024-08-26 DIAGNOSIS — G47.00 INSOMNIA, UNSPECIFIED TYPE: ICD-10-CM

## 2024-08-26 DIAGNOSIS — F33.2 SEVERE EPISODE OF RECURRENT MAJOR DEPRESSIVE DISORDER, WITHOUT PSYCHOTIC FEATURES (HCC): Primary | ICD-10-CM

## 2024-08-26 DIAGNOSIS — F41.1 GAD (GENERALIZED ANXIETY DISORDER): ICD-10-CM

## 2024-08-26 DIAGNOSIS — F10.11 ALCOHOL USE DISORDER, MILD, IN EARLY REMISSION: ICD-10-CM

## 2024-08-26 PROCEDURE — 99214 OFFICE O/P EST MOD 30 MIN: CPT | Performed by: STUDENT IN AN ORGANIZED HEALTH CARE EDUCATION/TRAINING PROGRAM

## 2024-08-26 PROCEDURE — 90833 PSYTX W PT W E/M 30 MIN: CPT | Performed by: STUDENT IN AN ORGANIZED HEALTH CARE EDUCATION/TRAINING PROGRAM

## 2024-08-26 RX ORDER — DESVENLAFAXINE 50 MG/1
50 TABLET, FILM COATED, EXTENDED RELEASE ORAL DAILY
Qty: 30 TABLET | Refills: 2 | Status: SHIPPED | OUTPATIENT
Start: 2024-08-26 | End: 2024-11-24

## 2024-08-26 NOTE — BH TREATMENT PLAN
"Treatment Plan done but not signed at time of office visit due to:  Plan reviewed with the patient during the virtual visit and verbal consent given.    TREATMENT PLAN (Medication Management Only)        [facility]    Name and Date of Birth:  Jael Fabian 58 y.o. 1965  Date of Treatment Plan: August 26, 2024  Diagnosis/Diagnoses:    1. Severe episode of recurrent major depressive disorder, without psychotic features (HCC)    2. CANDICE (generalized anxiety disorder)    3. Insomnia, unspecified type    4. Alcohol use disorder, mild, in early remission      Strengths/Personal Resources for Self-Care: \"supportive family, AA friends, coping skills, meditation and mindfulness, working and access to therapy\".  Area/Areas of need (in own words): \"depression, anxiety and maintaining sobriety\"  1. Long Term Goal: continue improvement in depression, anxiety and maintaining sobriety  Target Date:6 months - 2/26/2025  Person/Persons responsible for completion of goal: Jael  2.  Short Term Objective (s) - How will we reach this goal?:   A. Provider new recommended medication/dosage changes and/or continue medication(s): continue current medications as prescribed.  B. Attend AA meetings regularly.  C. Attend psychotherapy regularly.  Target Date:6 months - 2/26/2025  Person/Persons Responsible for Completion of Goal: Jael  Progress Towards Goals: initiating treatment  Treatment Modality: medication management every 6 months, continue psychotherapy with own therapist, continue AA meetings  Review due 180 days from date of this plan: 6 months - 2/26/2025  Expected length of service: maintenance  My Physician/PA/NP and I have developed this plan together and I agree to work on the goals and objectives. I understand the treatment goals that were developed for my treatment.      "

## 2024-08-26 NOTE — ASSESSMENT & PLAN NOTE
Orders:    desvenlafaxine succinate (PRISTIQ) 50 mg 24 hr tablet; Take 1 tablet (50 mg total) by mouth daily

## 2024-08-26 NOTE — PATIENT INSTRUCTIONS
Use Light therapy (use a 10,000-lux light box at a distance of about 16 to 24 inches from face as daily sessions of about 20 to 30 minutes early in the morning after you first wake up) - From Mid September until Mid March for seasonal depression.

## 2024-08-26 NOTE — PSYCH
Virtual Regular Visit    Verification of patient location: [place]    Patient is located in the following state in which I hold an active license: [place]    Assessment & Plan  Severe episode of recurrent major depressive disorder, without psychotic features (HCC)    Orders:    desvenlafaxine succinate (PRISTIQ) 50 mg 24 hr tablet; Take 1 tablet (50 mg total) by mouth daily    CANDICE (generalized anxiety disorder)         Insomnia, unspecified type         Alcohol use disorder, mild, in early remission                   Reason for visit is   Chief Complaint   Patient presents with    Medication Management    Depression    Anxiety    Alcohol Problem    Virtual Regular Visit        Visit Time  Visit Start Time: 2:25 PM  Visit Stop Time: 2:50 PM  Total Visit Duration: 25 minutes    Encounter provider: Koko Benz MD    Provider located at: [facility] [address], [address]  [place] [address]    Recent Visits  No visits were found meeting these conditions.  Showing recent visits within past 7 days and meeting all other requirements  Today's Visits  Date Type Provider Dept   08/26/24 Telemedicine Koko Benz MD  [facility]   Showing today's visits and meeting all other requirements  Future Appointments  No visits were found meeting these conditions.  Showing future appointments within next 150 days and meeting all other requirements       The patient was identified by name and date of birth. Jael Fabian was informed that this is a telemedicine visit and that the visit is being conducted through the Epic Embedded platform. She agrees to proceed. My office door was closed. No one else was in the room. She acknowledged consent and understanding of privacy and security of the video platform. The patient has agreed to participate and understands they can discontinue the visit at any time. Patient is aware this is a billable service.     Name and Date of  Birth:  Jael Fabian 58 y.o. 1965 MRN: [id number]    Date of Visit: August 26, 2024    Subjective    The patient was visited virtually for Medication Management, Depression, Anxiety, Alcohol Problem, and Virtual Regular Visit. Presented calm, and cooperative. Reported feeling good since restarted her job. She continues to go to AA meetings and has been more active, and also got a , and has been more active. She wants to volunteer more in her [employer], and has decided to go part time from January. She noted that her pain has been much better controlled since the surgery. She noted that she invited some of her AA friends last weekend, and has been feeling much better since has been more active in AA. She noted that she has not taking Naltrexone due to GI side effects, and also denied any urge to drink and has been sober for past 5 months. Denied any changes in sleep, appetite, concentration, energy level, or daily activities.  Denied feelings of anhedonia, hopelessness, helplessness, worthlessness or guilt and appeared to be future oriented. She reported seasonal worsening of depression in the past. The patient was recommended to use BLT and was receptive. There was no thought constriction related to death.  Denied SI/HI, intent or plan upon direct inquiry at this time. No intense anxiety sxs, specific phobia or panic attacks reported. Denied AV/H.  Endorsed good compliance with the medications and denied any side effects. Denied smoking cigarettes, drinking alcohol (sober for 5 months) or other illicit substance use.    Given this presentation, Naltrexone was discontinued due to GI side effects and other medications are maintained at the same dosage.  Will continue individual psychotherapy. Will continue attending AA meetings. Has a f/u appointment with her PCP next month. The patient was educated to call 911 or go to the nearest emergency room if the symptoms become overwhelming or unable to  remain in control. Verbalized understanding and agreed to seek help in case of distress or concern for safety.    Review Of Systems:  Pertinent items are noted in HPI; all others are negative; no recent changes in medications or health status reported.    PHQ-2/9 Depression Screening    Little interest or pleasure in doing things: 0 - not at all  Feeling down, depressed, or hopeless: 1 - several days  Trouble falling or staying asleep, or sleeping too much: 1 - several days  Feeling tired or having little energy: 0 - not at all  Poor appetite or overeatin - not at all  Feeling bad about yourself - or that you are a failure or have let yourself or your family down: 1 - several days  Trouble concentrating on things, such as reading the newspaper or watching television: 0 - not at all  Moving or speaking so slowly that other people could have noticed. Or the opposite - being so fidgety or restless that you have been moving around a lot more than usual: 0 - not at all  Thoughts that you would be better off dead, or of hurting yourself in some way: 0 - not at all  PHQ-9 Score: 3  PHQ-9 Interpretation: No or Minimal depression             Historical Information    Past Psychiatric History Update:   - No inpatient psychiatric admission since last encounter  - No SA or SIB since last encounter  - No incidence of violent behavior since last encounter    Past Trauma History Update:    - No new onset of abuse or traumatic events since last encounter     Past Medical History:    Past Medical History:   Diagnosis Date    Abnormal Pap smear of cervix 1987    Anxiety     Depression     Ear problems 1996    Fibrocystic breast     GERD (gastroesophageal reflux disease)     Herpes 1987    High vitamin A level     History of anxiety     History of hypercholesterolemia     History of hypertension     History of obesity     HPV (human papilloma virus) infection 1987    Hyperlipidemia     Lordosis     Postgastrectomy  malabsorption 04/2017    Scoliosis     Secondary hyperparathyroidism, non-renal (HCC)     Spinal stenosis     Tinnitus 1996        Past Surgical History:   Procedure Laterality Date    ABDOMINOPLASTY      BREAST CYST ASPIRATION Right     COLONOSCOPY      COLONOSCOPY  [place]/22/2021    [place] - 5 y f/u     COLPOSCOPY  1987    COMBINED AUGMENTATION MAMMAPLASTY AND ABDOMINOPLASTY  2001    EPIDURAL BLOCK INJECTION Bilateral 8/23/2023    Procedure: Left L4-L5   TRANSFORAMINAL epidural steroid injection ( [id number]);  Surgeon: Mateo Colmenares DO;  Location: [facility] MAIN OR;  Service: Pain Management     EPIDURAL BLOCK INJECTION Bilateral 1/24/2024    Procedure: L4-L5 TRANSFORAMINAL EPIDURAL STEROID INJECTION;  Surgeon: Mateo Colmenares DO;  Location: [facility] MAIN OR;  Service: Pain Management     FACIAL COSMETIC SURGERY      FL INJECTION RIGHT HIP (NON ARTHROGRAM)  05/24/2019    GASTRIC BYPASS      ID ARTHROCENTESIS ASPIR&/INJ MAJOR JT/BURSA W/O US Right 1/10/2024    Procedure: RIGHT INTRA-ARTICULAR HIP INJECTION;  Surgeon: Mateo Colmenares DO;  Location: [facility] MAIN OR;  Service: Pain Management     ID ARTHRP ACETBLR/PROX FEM PROSTC AGRFT/ALGRFT Right 4/15/2024    Procedure: ARTHROPLASTY HIP TOTAL ANTERIOR,NAVIGATED - same day;  Surgeon: Javier Reed DO;  Location: [facility] MAIN OR;  Service: Orthopedics    TUBAL LIGATION       Allergies   Allergen Reactions    Other Wheezing     Lobster when a teenager.  Wheezing       Substance Abuse History:    Social History     Substance and Sexual Activity   Alcohol Use Not Currently     Social History     Substance and Sexual Activity   Drug Use No       Social History:    Social History     Socioeconomic History    Marital status:      Spouse name: Not on file    Number of children: Not on file    Years of education: Not on file    Highest education level: Not on file   Occupational History    Not on file   Tobacco Use    Smoking status: Never    Smokeless tobacco:  Never   Vaping Use    Vaping status: Never Used   Substance and Sexual Activity    Alcohol use: Not Currently    Drug use: No    Sexual activity: Not Currently     Partners: Male     Birth control/protection: Female Sterilization   Other Topics Concern    Not on file   Social History Narrative    Not on file     Social Determinants of Health     Financial Resource Strain: Low Risk  (3/13/2024)    Overall Financial Resource Strain (CARDIA)     Difficulty of Paying Living Expenses: Not hard at all   Food Insecurity: No Food Insecurity (3/13/2024)    Hunger Vital Sign     Worried About Running Out of Food in the Last Year: Never true     Ran Out of Food in the Last Year: Never true   Transportation Needs: No Transportation Needs (3/13/2024)    PRAPARE - Transportation     Lack of Transportation (Medical): No     Lack of Transportation (Non-Medical): No   Physical Activity: Not on file   Stress: Not on file   Social Connections: Not on file   Intimate Partner Violence: Not At Risk (3/13/2024)    Humiliation, Afraid, Rape, and Kick questionnaire     Fear of Current or Ex-Partner: No     Emotionally Abused: No     Physically Abused: No     Sexually Abused: No   Housing Stability: Low Risk  (3/13/2024)    Housing Stability Vital Sign     Unable to Pay for Housing in the Last Year: No     Number of Times Moved in the Last Year: 1     Homeless in the Last Year: No       Family Psychiatric History:     Family History   Problem Relation Age of Onset    Hypertension Mother     Heart disease Mother     Hypertension Father     Heart disease Father     No Known Problems Sister     No Known Problems Brother     No Known Problems Maternal Aunt     No Known Problems Maternal Uncle     No Known Problems Paternal Aunt     No Known Problems Paternal Uncle     No Known Problems Maternal Grandmother     No Known Problems Maternal Grandfather     No Known Problems Paternal Grandmother     No Known Problems Paternal Grandfather     ADD /  ADHD Neg Hx     Anesthesia problems Neg Hx     Cancer Neg Hx     Clotting disorder Neg Hx     Collagen disease Neg Hx     Diabetes Neg Hx     Dislocations Neg Hx     Learning disabilities Neg Hx     Neurological problems Neg Hx     Osteoporosis Neg Hx     Rheumatologic disease Neg Hx     Scoliosis Neg Hx     Vascular Disease Neg Hx        History Review: The following portions of the patient's history were reviewed and updated as appropriate: allergies, current medications, past family history, past medical history, past social history, past surgical history and problem list.       Objective      Vital signs in last 24 hours: Not checked - virtual visit    Mental Status Evaluation:  Appearance and attitude: appeared as stated age, cooperative and attentive, casually dressed with good hygiene  Eye contact: good  Motor Function: within normal limits, No PMA/PMR  Gait/station: Not observed  Speech: normal for rate, rhythm, volume, latency, amount  Language: No overt abnormality  Mood/affect: euthymic / Affect was euthymic, reactive, in full range, normal intensity and mood congruent  Thought Processes: sequential and goal-directed  Thought content: denies suicidal ideation or homicidal ideation; no delusions or first rank symptoms  Associations: intact associations  Perceptual disturbances: denies Auditory/Visual/Tactile Hallucinations  Orientation: oriented to time, person, place and to the situational context  Cognitive Function: intact  Memory: recent and remote memory grossly intact  Intellect: average  Fund of knowledge: aware of current events, aware of past history, and vocabulary average  Impulse control: good  Insight/judgment: good/good          Laboratory Results: I have personally reviewed all pertinent laboratory/tests results    Recent Labs (last 2 months):   No visits with results within 2 Month(s) from this visit.   Latest known visit with results is:   Admission on 04/15/2024, Discharged on 04/15/2024    Component Date Value    POC Glucose 04/15/2024 103     POC Glucose 04/15/2024 89          Assessment & Plan    A 58 y.o.  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, w/ PMH of hip arthritis, lumbar radiculopathy, spinal stenosis, s/p gastric bypass, secondary hyperparathyroidism, SNHL and PPH of alcohol use disorder, depression and anxiety, 2 prior psychiatric admission (in June 2021 due to depressive symptoms following her 's death and most recently at [facility] from 3/12/2024 until 3/15/2024 after intentional overdose on Ativan and alcohol), one prior SA (intensional overdose on 3/12/24), no h/o self-injurious behavior, currently in therapy ([facility] at [facility]), Pristiq 50 mg po daily, Neurontin 100 mg po TID, Trazodone 100 mg po nightly PRN, Atarax 25 mg po TID PRN, Folic acid, thiamine and MVI who presented to the mental health clinic for the initial intake and psychiatric evaluation on 3/22/24. Presented w/ good mood and improved anxiety sxs. Maintained sober since last admission and has been attending AA meeting regularly. Denied SI/HI, intent or plan upon direct inquiry at this time. CANDICE-7: 4; PHQ-9: 1. Her current presentation meets criteria for MDD, CANDICE and alcohol use disorder in early remission. Maintained on Pristiq 50 mg daily, Atarax 25 mg TID PRN, Trazodone 100 mg nightly PRN, and Neurontin increased from 100/200 mg to 100/100/200 mg but discontinued after she had BRIGITTE on 4/15/24; doses to be adjusted as indicated. Upon f/u on 6/27/24, the patient reported worsening of depression in the context of brief relapse on alcohol (sober since 6/15 though). Started on Naltrexone 50 mg daily but did not continue due to GI sxs; other meds maintained the same dose. Will continue AA meeting and individual psychotherapy and recommended to use BLT.     Diagnoses and all orders for this  visit:    Severe episode of recurrent major depressive disorder, without psychotic features (HCC)  -     desvenlafaxine succinate (PRISTIQ) 50 mg 24 hr tablet; Take 1 tablet (50 mg total) by mouth daily    CANDICE (generalized anxiety disorder)    Insomnia, unspecified type    Alcohol use disorder, mild, in early remission        Treatment Recommendations/Precautions:  - Continue Pristiq 50 mg po daily for depression and anxiety  - Discontinue Naltrexone due to GI sxs and non-compliance  - Discontinue Neurontin as the patient endorsed good control of pain with her current meds following BRIGITTE  - Continue Trazodone 100 mg po nightly PRN for insomnia  - Continue Atarax 25 mg po TID for breakthrough anxiety / panic attacks  - Continue vit D supplements  - Recommended to use BLT for seasonal depression  - Continue AA meetings and individual psychotherapy ([facility])  - Medications sent to the patient's pharmacy for 30 day supply x2 refills       - Psychoeducation provided to the patient and benefits, potential risks and side effects discussed; importance of compliance with the psychiatric treatment reiterated, and the patient verbalized understanding of the matter     - RTC in 12 weeks     - Educated about healthy life style, risk of falls/sedation and addiction. Patient was receptive to education.  - The patient was educated about 24 hour and weekend coverage for urgent situations accessed by calling [facility] main practice number   - Patient was educated to call National Suicide Prevention Lifeline (8-132-880-HOJD [9614]) for behavioral crisis at anytime or 911 for any safety concerns, or go to nearest ER if her symptoms become overwhelming or unmanageable.      Medications Risks/Benefits      Risks, Benefits And Possible Side Effects Of Medications:    Risks, benefits, and possible side effects of medications explained to Jael and she verbalizes understanding and agreement for  treatment.    Controlled Medication Discussion:     Not applicable    Psychotherapy Provided:     Individual psychotherapy provided: Yes  Counseling was provided during the session today for 16 minutes.   Psychoeducation provided to the patient and was educated about the importance of compliance with the medications and psychiatric treatment  Solution Focused Brief Therapy (SFBT) provided  Patient's emotions were validated and specific labeled praise provided.   [place] suggestions were offered in a supportive non-critical way.   Cognitive Behavioral Therapy and supportive expressive interventions    Treatment Plan:    Completed and signed during the session: Yes - with Jael Benz MD 08/26/24          This note was completed in part utilizing Dragon dictation Software. Grammatical, translation, syntax errors, random word insertions, spelling mistakes, and incomplete sentences may be an occasional consequence of this system secondary to software limitations with voice recognition, ambient noise, and hardware issues. If you have any questions or concerns about the content, text, or information contained within the body of this dictation, please contact the provider for clarification.

## 2024-08-28 ENCOUNTER — TELEPHONE (OUTPATIENT)
Age: 59
End: 2024-08-28

## 2024-08-29 NOTE — TELEPHONE ENCOUNTER
PA for PRISTIQ 50 mg 24 hr tablet SUBMITTED     via    [x]CMM-KEY: [id number]  [][employer]-Case ID #   []Faxed to plan   []Other website   []Phone call Case ID #     Office notes sent, clinical questions answered. Awaiting determination    Turnaround time for your insurance to make a decision on your Prior Authorization can take 7-21 business days.

## 2024-09-10 ENCOUNTER — OFFICE VISIT (OUTPATIENT)
Dept: GASTROENTEROLOGY | Facility: CLINIC | Age: 59
End: 2024-09-10
Payer: COMMERCIAL

## 2024-09-10 VITALS
BODY MASS INDEX: 26.19 KG/M2 | SYSTOLIC BLOOD PRESSURE: 136 MMHG | DIASTOLIC BLOOD PRESSURE: 100 MMHG | HEART RATE: 70 BPM | WEIGHT: 153.4 LBS | HEIGHT: 64 IN

## 2024-09-10 DIAGNOSIS — R10.12 LUQ PAIN: ICD-10-CM

## 2024-09-10 DIAGNOSIS — R11.0 NAUSEA: ICD-10-CM

## 2024-09-10 DIAGNOSIS — R10.13 DYSPEPSIA: Primary | ICD-10-CM

## 2024-09-10 DIAGNOSIS — R63.0 LOSS OF APPETITE: ICD-10-CM

## 2024-09-10 PROCEDURE — 99204 OFFICE O/P NEW MOD 45 MIN: CPT | Performed by: INTERNAL MEDICINE

## 2024-09-10 RX ORDER — SUCRALFATE ORAL 1 G/10ML
1 SUSPENSION ORAL 2 TIMES DAILY
Qty: 600 ML | Refills: 1 | Status: SHIPPED | OUTPATIENT
Start: 2024-09-10 | End: 2024-09-18

## 2024-09-10 RX ORDER — SPIRONOLACTONE 25 MG/1
TABLET ORAL
COMMUNITY
Start: 2024-08-28

## 2024-09-10 NOTE — PROGRESS NOTES
Consultation -  [facility]  Jael Fabian 58 y.o. female MRN: [id number]  Unit/Bed#:  Encounter: [id number]        Consults    ASSESSMENT/PLAN:     1.  Colon cancer screening: Up-to-date.  Has family history of colon polyps.  -She will be due for repeat colonoscopy in 2026.    2.  New onset of left upper quadrant pain/nausea/loss of appetite:  -Suspect symptoms likely due to marginal ulcer versus gastritis in setting of NSAID use.  Would recommend increasing the dose of omeprazole to 20 mg twice daily.  Would also recommend getting Carafate 1 g twice daily, 2 hours separate from other medications.  Avoid all other NSAIDs.  Follow GERD diet.  Will plan for EGD for further evaluation.  Procedure risks may include but are not limited to bleeding, infection, perforation were explained in detail. Also explained about less than 100% sensitivity with the exam and other alternatives.      ______________________________________________________________________    Reason for Consult / Principal Problem: [unfilled]    HPI: Jael Fabian is a 58 y.o. year old female with history of hypertension, right hip replacement in May following which patient needed aspirin, gastric bypass, presents for evaluation of new onset of left upper quadrant abdominal discomfort, nausea and loss of appetite.  Patient reports that symptoms began after the course of aspirin after surgery.  Patient reports that since then she has been having ongoing symptoms, reports that they have been progressively worsening.  Patient reports that she is starting omeprazole 20 mg OTC 2 weeks ago without any symptomatic relief.  She denies any other NSAID use.  She denies any melena or hematochezia.  No change in bowel habits.  No dysphagia, no hematemesis, coffee ground emesis.  No unintentional weight loss.  Labs from March were reviewed, magnesium levels, hemoglobin, B12, folate, were within normal range.  She is up-to-date with  colonoscopy.      Review of Systems:  The remainder of the review of systems was negative except for the pertinent positives noted in HPI.     Historical Information   Past Medical History:   Diagnosis Date    Abnormal Pap smear of cervix 1987    Anxiety     Depression     Ear problems 1996    Fibrocystic breast     GERD (gastroesophageal reflux disease) 2025    Herpes 1987    High vitamin A level     History of anxiety     History of hypercholesterolemia     History of hypertension     History of obesity     HPV (human papilloma virus) infection 1987    Hyperlipidemia     Hypertension 3/24    Lordosis     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    Scoliosis     Secondary hyperparathyroidism, non-renal (HCC)     Spinal stenosis     Tinnitus 1996     Past Surgical History:   Procedure Laterality Date    ABDOMINOPLASTY      BARIATRIC SURGERY  2/13    BREAST CYST ASPIRATION Right     COLONOSCOPY      COLONOSCOPY  11/22/2021    Eyvazzedah - 5 y f/u     COLONOSCOPY  11/21    COLPOSCOPY  1987    COMBINED AUGMENTATION MAMMAPLASTY AND ABDOMINOPLASTY  2001    EPIDURAL BLOCK INJECTION Bilateral 08/23/2023    Procedure: Left L4-L5   TRANSFORAMINAL epidural steroid injection ( [id number]);  Surgeon: Mateo Colmenares DO;  Location: [facility] MAIN OR;  Service: Pain Management     EPIDURAL BLOCK INJECTION Bilateral 01/24/2024    Procedure: L4-L5 TRANSFORAMINAL EPIDURAL STEROID INJECTION;  Surgeon: Mateo Colmenares DO;  Location: [facility] MAIN OR;  Service: Pain Management     FACIAL COSMETIC SURGERY      FL INJECTION RIGHT HIP (NON ARTHROGRAM)  05/24/2019    GASTRIC BYPASS      ND ARTHROCENTESIS ASPIR&/INJ MAJOR JT/BURSA W/O US Right 01/10/2024    Procedure: RIGHT INTRA-ARTICULAR HIP INJECTION;  Surgeon: Mateo Colmenares DO;  Location: [facility] MAIN OR;  Service: Pain Management     ND ARTHRP ACETBLR/PROX FEM PROSTC AGRFT/ALGRFT Right 04/15/2024    Procedure: ARTHROPLASTY HIP TOTAL ANTERIOR,NAVIGATED - same day;  Surgeon: Javier DUNNE  "DO Derek;  Location: [facility] MAIN OR;  Service: Orthopedics    TUBAL LIGATION       Social History   Social History     Substance and Sexual Activity   Alcohol Use Not Currently     Social History     Substance and Sexual Activity   Drug Use Never     Social History     Tobacco Use   Smoking Status Never   Smokeless Tobacco Never     Family History   Problem Relation Age of Onset    Hypertension Mother     Heart disease Mother     Arthritis Mother     Depression Mother     Hypertension Father     Heart disease Father     No Known Problems Sister     No Known Problems Brother     No Known Problems Maternal Aunt     No Known Problems Maternal Uncle     No Known Problems Paternal Aunt     No Known Problems Paternal Uncle     No Known Problems Maternal Grandmother     No Known Problems Maternal Grandfather     No Known Problems Paternal Grandmother     No Known Problems Paternal Grandfather     ADD / ADHD Neg Hx     Anesthesia problems Neg Hx     Cancer Neg Hx     Clotting disorder Neg Hx     Collagen disease Neg Hx     Diabetes Neg Hx     Dislocations Neg Hx     Learning disabilities Neg Hx     Neurological problems Neg Hx     Osteoporosis Neg Hx     Rheumatologic disease Neg Hx     Scoliosis Neg Hx     Vascular Disease Neg Hx        Meds/Allergies     Not in a hospital admission.  No current facility-administered medications for this visit.    Allergies   Allergen Reactions    Other Wheezing     Lobster when a teenager.  Wheezing       Objective     Blood pressure 136/100, pulse 70, height 5' 4\" (1.626 m), weight 69.6 kg (153 lb 6.4 oz), not currently breastfeeding.    [unfilled]    PHYSICAL EXAM     GEN: well nourished, well developed, no acute distress  HEENT: anicteric, MMM, no cervical or supraclavicular lymphadenopathy  CV: RRR, no m/r/g  CHEST: CTA b/l, no WRR  ABD: +BS, soft, NT/ND, no hepatosplenomegaly  EXT: no c/c/e  SKIN: no rashes,  NEURO: aaox3    Lab Results:   No visits with results within 1 Day(s) from " this visit.   Latest known visit with results is:   Admission on 04/15/2024, Discharged on 04/15/2024   Component Date Value    POC Glucose 04/15/2024 103     POC Glucose 04/15/2024 89      Imaging Studies: Personally reviewed the following image studies in PACS and associated radiology reports: procedure reports. My interpretation of the radiology images/reports is: Repeat colonoscopy at 5-year interval..                  Answers submitted by the patient for this visit:  Abdominal Pain Questionnaire (Submitted on 9/9/2024)  Chief Complaint: Abdominal pain  Chronicity: chronic  Onset: more than 1 month ago  Onset quality: gradual  Frequency: constantly  Progression since onset: gradually worsening  Pain location: LUQ  Pain - numeric: 2/10  Pain quality: burning  Radiates to: does not radiate  anorexia: Yes  arthralgias: No  belching: No  diarrhea: Yes  dysuria: No  fever: No  flatus: No  frequency: No  headaches: No  hematochezia: No  hematuria: No  melena: No  myalgias: No  nausea: Yes  weight loss: Yes  vomiting: Yes  Aggravated by: eating  Relieved by: recumbency  Diagnostic workup: lower endoscopy

## 2024-09-10 NOTE — PATIENT INSTRUCTIONS
Scheduled date of EGD(as of today): 9/26/24  Physician performing EGD: Dr. Soto  Location of EGD: [facility]  Instructions reviewed with patient by: N.M.  Clearances: N/A

## 2024-09-10 NOTE — LETTER
September 10, 2024     Rafael Snyder MD  [address] [place], [place] [id number]    Patient: Jael Fabian   YOB: 1965   Date of Visit: 9/10/2024       Dear Dr. Snyder:    Thank you for referring Jael Fabian to me for evaluation. Below are my notes for this consultation.    If you have questions, please do not hesitate to call me. I look forward to following your patient along with you.         Sincerely,        Jamie Soto MD        CC: No Recipients    Jamie Soto MD  9/10/2024  8:47 AM  Sign when Signing Visit  Consultation -  [facility]  Jael Fabian 58 y.o. female MRN: [id number]  Unit/Bed#:  Encounter: [id number]        Consults    ASSESSMENT/PLAN:     1.  Colon cancer screening: Up-to-date.  Has family history of colon polyps.  -She will be due for repeat colonoscopy in 2026.    2.  New onset of left upper quadrant pain/nausea/loss of appetite:  -Suspect symptoms likely due to marginal ulcer versus gastritis in setting of NSAID use.  Would recommend increasing the dose of omeprazole to 20 mg twice daily.  Would also recommend getting Carafate 1 g twice daily, 2 hours separate from other medications.  Avoid all other NSAIDs.  Follow GERD diet.  Will plan for EGD for further evaluation.  Procedure risks may include but are not limited to bleeding, infection, perforation were explained in detail. Also explained about less than 100% sensitivity with the exam and other alternatives.      ______________________________________________________________________    Reason for Consult / Principal Problem: [unfilled]    HPI: Jael Fabian is a 58 y.o. year old female with history of hypertension, right hip replacement in May following which patient needed aspirin, gastric bypass, presents for evaluation of new onset of left upper quadrant abdominal discomfort, nausea and loss of appetite.  Patient reports that symptoms began after the course of aspirin after surgery.   Patient reports that since then she has been having ongoing symptoms, reports that they have been progressively worsening.  Patient reports that she is starting omeprazole 20 mg OTC 2 weeks ago without any symptomatic relief.  She denies any other NSAID use.  She denies any melena or hematochezia.  No change in bowel habits.  No dysphagia, no hematemesis, coffee ground emesis.  No unintentional weight loss.  Labs from March were reviewed, magnesium levels, hemoglobin, B12, folate, were within normal range.  She is up-to-date with colonoscopy.      Review of Systems:  The remainder of the review of systems was negative except for the pertinent positives noted in HPI.     Historical Information  Past Medical History:   Diagnosis Date   • Abnormal Pap smear of cervix 1987   • Anxiety    • Depression    • Ear problems 1996   • Fibrocystic breast    • GERD (gastroesophageal reflux disease) 2025   • Herpes 1987   • High vitamin A level    • History of anxiety    • History of hypercholesterolemia    • History of hypertension    • History of obesity    • HPV (human papilloma virus) infection 1987   • Hyperlipidemia    • Hypertension 3/24   • Lordosis    • 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   • Scoliosis    • Secondary hyperparathyroidism, non-renal (HCC)    • Spinal stenosis    • Tinnitus 1996     Past Surgical History:   Procedure Laterality Date   • ABDOMINOPLASTY     • BARIATRIC SURGERY  2/13   • BREAST CYST ASPIRATION Right    • COLONOSCOPY     • COLONOSCOPY  11/22/2021    Eyvazzedah - 5 y f/u    • COLONOSCOPY  11/21   • COLPOSCOPY  1987   • COMBINED AUGMENTATION MAMMAPLASTY AND ABDOMINOPLASTY  2001   • EPIDURAL BLOCK INJECTION Bilateral 08/23/2023    Procedure: Left L4-L5   TRANSFORAMINAL epidural steroid injection ( [id number]);  Surgeon: Mateo Colmenares DO;  Location: [facility] MAIN OR;  Service: Pain Management    • EPIDURAL BLOCK INJECTION Bilateral 01/24/2024    Procedure: L4-L5 TRANSFORAMINAL EPIDURAL  STEROID INJECTION;  Surgeon: Mateo Colmenares DO;  Location: [facility] MAIN OR;  Service: Pain Management    • FACIAL COSMETIC SURGERY     • FL INJECTION RIGHT HIP (NON ARTHROGRAM)  05/24/2019   • GASTRIC BYPASS     • RI ARTHROCENTESIS ASPIR&/INJ MAJOR JT/BURSA W/O US Right 01/10/2024    Procedure: RIGHT INTRA-ARTICULAR HIP INJECTION;  Surgeon: Mateo Colmenares DO;  Location: [facility] MAIN OR;  Service: Pain Management    • RI ARTHRP ACETBLR/PROX FEM PROSTC AGRFT/ALGRFT Right 04/15/2024    Procedure: ARTHROPLASTY HIP TOTAL ANTERIOR,NAVIGATED - same day;  Surgeon: Javier Reed DO;  Location: [facility] MAIN OR;  Service: Orthopedics   • TUBAL LIGATION       Social History  Social History     Substance and Sexual Activity   Alcohol Use Not Currently     Social History     Substance and Sexual Activity   Drug Use Never     Social History     Tobacco Use   Smoking Status Never   Smokeless Tobacco Never     Family History   Problem Relation Age of Onset   • Hypertension Mother    • Heart disease Mother    • Arthritis Mother    • Depression Mother    • Hypertension Father    • Heart disease Father    • No Known Problems Sister    • No Known Problems Brother    • No Known Problems Maternal Aunt    • No Known Problems Maternal Uncle    • No Known Problems Paternal Aunt    • No Known Problems Paternal Uncle    • No Known Problems Maternal Grandmother    • No Known Problems Maternal Grandfather    • No Known Problems Paternal Grandmother    • No Known Problems Paternal Grandfather    • ADD / ADHD Neg Hx    • Anesthesia problems Neg Hx    • Cancer Neg Hx    • Clotting disorder Neg Hx    • Collagen disease Neg Hx    • Diabetes Neg Hx    • Dislocations Neg Hx    • Learning disabilities Neg Hx    • Neurological problems Neg Hx    • Osteoporosis Neg Hx    • Rheumatologic disease Neg Hx    • Scoliosis Neg Hx    • Vascular Disease Neg Hx        Meds/Allergies    Not in a hospital admission.  No current facility-administered medications  "for this visit.    Allergies   Allergen Reactions   • Other Wheezing     Lobster when a teenager.  Wheezing       Objective    Blood pressure 136/100, pulse 70, height 5' 4\" (1.626 m), weight 69.6 kg (153 lb 6.4 oz), not currently breastfeeding.    [unfilled]    PHYSICAL EXAM     GEN: well nourished, well developed, no acute distress  HEENT: anicteric, MMM, no cervical or supraclavicular lymphadenopathy  CV: RRR, no m/r/g  CHEST: CTA b/l, no WRR  ABD: +BS, soft, NT/ND, no hepatosplenomegaly  EXT: no c/c/e  SKIN: no rashes,  NEURO: aaox3    Lab Results:   No visits with results within 1 Day(s) from this visit.   Latest known visit with results is:   Admission on 04/15/2024, Discharged on 04/15/2024   Component Date Value   • POC Glucose 04/15/2024 103    • POC Glucose 04/15/2024 89      Imaging Studies: Personally reviewed the following image studies in PACS and associated radiology reports: procedure reports. My interpretation of the radiology images/reports is: Repeat colonoscopy at 5-year interval..                  Answers submitted by the patient for this visit:  Abdominal Pain Questionnaire (Submitted on 9/9/2024)  Chief Complaint: Abdominal pain  Chronicity: chronic  Onset: more than 1 month ago  Onset quality: gradual  Frequency: constantly  Progression since onset: gradually worsening  Pain location: LUQ  Pain - numeric: 2/10  Pain quality: burning  Radiates to: does not radiate  anorexia: Yes  arthralgias: No  belching: No  diarrhea: Yes  dysuria: No  fever: No  flatus: No  frequency: No  headaches: No  hematochezia: No  hematuria: No  melena: No  myalgias: No  nausea: Yes  weight loss: Yes  vomiting: Yes  Aggravated by: eating  Relieved by: recumbency  Diagnostic workup: lower endoscopy    "

## 2024-09-10 NOTE — H&P (VIEW-ONLY)
Consultation -  [facility]  Jael Fabian 58 y.o. female MRN: [id number]  Unit/Bed#:  Encounter: [id number]        Consults    ASSESSMENT/PLAN:     1.  Colon cancer screening: Up-to-date.  Has family history of colon polyps.  -She will be due for repeat colonoscopy in 2026.    2.  New onset of left upper quadrant pain/nausea/loss of appetite:  -Suspect symptoms likely due to marginal ulcer versus gastritis in setting of NSAID use.  Would recommend increasing the dose of omeprazole to 20 mg twice daily.  Would also recommend getting Carafate 1 g twice daily, 2 hours separate from other medications.  Avoid all other NSAIDs.  Follow GERD diet.  Will plan for EGD for further evaluation.  Procedure risks may include but are not limited to bleeding, infection, perforation were explained in detail. Also explained about less than 100% sensitivity with the exam and other alternatives.      ______________________________________________________________________    Reason for Consult / Principal Problem: [unfilled]    HPI: Jael Fabian is a 58 y.o. year old female with history of hypertension, right hip replacement in May following which patient needed aspirin, gastric bypass, presents for evaluation of new onset of left upper quadrant abdominal discomfort, nausea and loss of appetite.  Patient reports that symptoms began after the course of aspirin after surgery.  Patient reports that since then she has been having ongoing symptoms, reports that they have been progressively worsening.  Patient reports that she is starting omeprazole 20 mg OTC 2 weeks ago without any symptomatic relief.  She denies any other NSAID use.  She denies any melena or hematochezia.  No change in bowel habits.  No dysphagia, no hematemesis, coffee ground emesis.  No unintentional weight loss.  Labs from March were reviewed, magnesium levels, hemoglobin, B12, folate, were within normal range.  She is up-to-date with  colonoscopy.      Review of Systems:  The remainder of the review of systems was negative except for the pertinent positives noted in HPI.     Historical Information   Past Medical History:   Diagnosis Date    Abnormal Pap smear of cervix 1987    Anxiety     Depression     Ear problems 1996    Fibrocystic breast     GERD (gastroesophageal reflux disease) 2025    Herpes 1987    High vitamin A level     History of anxiety     History of hypercholesterolemia     History of hypertension     History of obesity     HPV (human papilloma virus) infection 1987    Hyperlipidemia     Hypertension 3/24    Lordosis     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    Scoliosis     Secondary hyperparathyroidism, non-renal (HCC)     Spinal stenosis     Tinnitus 1996     Past Surgical History:   Procedure Laterality Date    ABDOMINOPLASTY      BARIATRIC SURGERY  2/13    BREAST CYST ASPIRATION Right     COLONOSCOPY      COLONOSCOPY  11/22/2021    Eyvazzedah - 5 y f/u     COLONOSCOPY  11/21    COLPOSCOPY  1987    COMBINED AUGMENTATION MAMMAPLASTY AND ABDOMINOPLASTY  2001    EPIDURAL BLOCK INJECTION Bilateral 08/23/2023    Procedure: Left L4-L5   TRANSFORAMINAL epidural steroid injection ( [id number]);  Surgeon: Mateo Colmenares DO;  Location: [facility] MAIN OR;  Service: Pain Management     EPIDURAL BLOCK INJECTION Bilateral 01/24/2024    Procedure: L4-L5 TRANSFORAMINAL EPIDURAL STEROID INJECTION;  Surgeon: Maeto Colmenares DO;  Location: [facility] MAIN OR;  Service: Pain Management     FACIAL COSMETIC SURGERY      FL INJECTION RIGHT HIP (NON ARTHROGRAM)  05/24/2019    GASTRIC BYPASS      AL ARTHROCENTESIS ASPIR&/INJ MAJOR JT/BURSA W/O US Right 01/10/2024    Procedure: RIGHT INTRA-ARTICULAR HIP INJECTION;  Surgeon: Mateo Colmenares DO;  Location: [facility] MAIN OR;  Service: Pain Management     AL ARTHRP ACETBLR/PROX FEM PROSTC AGRFT/ALGRFT Right 04/15/2024    Procedure: ARTHROPLASTY HIP TOTAL ANTERIOR,NAVIGATED - same day;  Surgeon: Javier DUNNE  "DO Derek;  Location: [facility] MAIN OR;  Service: Orthopedics    TUBAL LIGATION       Social History   Social History     Substance and Sexual Activity   Alcohol Use Not Currently     Social History     Substance and Sexual Activity   Drug Use Never     Social History     Tobacco Use   Smoking Status Never   Smokeless Tobacco Never     Family History   Problem Relation Age of Onset    Hypertension Mother     Heart disease Mother     Arthritis Mother     Depression Mother     Hypertension Father     Heart disease Father     No Known Problems Sister     No Known Problems Brother     No Known Problems Maternal Aunt     No Known Problems Maternal Uncle     No Known Problems Paternal Aunt     No Known Problems Paternal Uncle     No Known Problems Maternal Grandmother     No Known Problems Maternal Grandfather     No Known Problems Paternal Grandmother     No Known Problems Paternal Grandfather     ADD / ADHD Neg Hx     Anesthesia problems Neg Hx     Cancer Neg Hx     Clotting disorder Neg Hx     Collagen disease Neg Hx     Diabetes Neg Hx     Dislocations Neg Hx     Learning disabilities Neg Hx     Neurological problems Neg Hx     Osteoporosis Neg Hx     Rheumatologic disease Neg Hx     Scoliosis Neg Hx     Vascular Disease Neg Hx        Meds/Allergies     Not in a hospital admission.  No current facility-administered medications for this visit.    Allergies   Allergen Reactions    Other Wheezing     Lobster when a teenager.  Wheezing       Objective     Blood pressure 136/100, pulse 70, height 5' 4\" (1.626 m), weight 69.6 kg (153 lb 6.4 oz), not currently breastfeeding.    [unfilled]    PHYSICAL EXAM     GEN: well nourished, well developed, no acute distress  HEENT: anicteric, MMM, no cervical or supraclavicular lymphadenopathy  CV: RRR, no m/r/g  CHEST: CTA b/l, no WRR  ABD: +BS, soft, NT/ND, no hepatosplenomegaly  EXT: no c/c/e  SKIN: no rashes,  NEURO: aaox3    Lab Results:   No visits with results within 1 Day(s) from " this visit.   Latest known visit with results is:   Admission on 04/15/2024, Discharged on 04/15/2024   Component Date Value    POC Glucose 04/15/2024 103     POC Glucose 04/15/2024 89      Imaging Studies: Personally reviewed the following image studies in PACS and associated radiology reports: procedure reports. My interpretation of the radiology images/reports is: Repeat colonoscopy at 5-year interval..                  Answers submitted by the patient for this visit:  Abdominal Pain Questionnaire (Submitted on 9/9/2024)  Chief Complaint: Abdominal pain  Chronicity: chronic  Onset: more than 1 month ago  Onset quality: gradual  Frequency: constantly  Progression since onset: gradually worsening  Pain location: LUQ  Pain - numeric: 2/10  Pain quality: burning  Radiates to: does not radiate  anorexia: Yes  arthralgias: No  belching: No  diarrhea: Yes  dysuria: No  fever: No  flatus: No  frequency: No  headaches: No  hematochezia: No  hematuria: No  melena: No  myalgias: No  nausea: Yes  weight loss: Yes  vomiting: Yes  Aggravated by: eating  Relieved by: recumbency  Diagnostic workup: lower endoscopy

## 2024-09-11 ENCOUNTER — ANESTHESIA EVENT (OUTPATIENT)
Dept: ANESTHESIOLOGY | Facility: HOSPITAL | Age: 59
End: 2024-09-11

## 2024-09-11 ENCOUNTER — ANESTHESIA (OUTPATIENT)
Dept: ANESTHESIOLOGY | Facility: HOSPITAL | Age: 59
End: 2024-09-11

## 2024-09-18 RX ORDER — SUCRALFATE 1 G/1
1 TABLET ORAL 2 TIMES DAILY
Qty: 60 TABLET | Refills: 2 | Status: SHIPPED | OUTPATIENT
Start: 2024-09-18 | End: 2024-10-18

## 2024-09-26 ENCOUNTER — ANESTHESIA (OUTPATIENT)
Dept: GASTROENTEROLOGY | Facility: AMBULARY SURGERY CENTER | Age: 59
End: 2024-09-26
Payer: COMMERCIAL

## 2024-09-26 ENCOUNTER — HOSPITAL ENCOUNTER (OUTPATIENT)
Dept: GASTROENTEROLOGY | Facility: AMBULARY SURGERY CENTER | Age: 59
Setting detail: OUTPATIENT SURGERY
End: 2024-09-26
Attending: INTERNAL MEDICINE
Payer: COMMERCIAL

## 2024-09-26 VITALS
HEART RATE: 72 BPM | RESPIRATION RATE: 18 BRPM | OXYGEN SATURATION: 100 % | SYSTOLIC BLOOD PRESSURE: 132 MMHG | TEMPERATURE: 97.1 F | DIASTOLIC BLOOD PRESSURE: 84 MMHG

## 2024-09-26 DIAGNOSIS — R10.13 DYSPEPSIA: ICD-10-CM

## 2024-09-26 PROBLEM — I10 HTN (HYPERTENSION): Status: ACTIVE | Noted: 2024-09-26

## 2024-09-26 PROCEDURE — 43239 EGD BIOPSY SINGLE/MULTIPLE: CPT | Performed by: INTERNAL MEDICINE

## 2024-09-26 PROCEDURE — 88305 TISSUE EXAM BY PATHOLOGIST: CPT | Performed by: STUDENT IN AN ORGANIZED HEALTH CARE EDUCATION/TRAINING PROGRAM

## 2024-09-26 RX ORDER — PROPOFOL 10 MG/ML
INJECTION, EMULSION INTRAVENOUS AS NEEDED
Status: DISCONTINUED | OUTPATIENT
Start: 2024-09-26 | End: 2024-09-26

## 2024-09-26 RX ORDER — SODIUM CHLORIDE, SODIUM LACTATE, POTASSIUM CHLORIDE, CALCIUM CHLORIDE 600; 310; 30; 20 MG/100ML; MG/100ML; MG/100ML; MG/100ML
INJECTION, SOLUTION INTRAVENOUS CONTINUOUS PRN
Status: DISCONTINUED | OUTPATIENT
Start: 2024-09-26 | End: 2024-09-26

## 2024-09-26 RX ORDER — LIDOCAINE HYDROCHLORIDE 10 MG/ML
INJECTION, SOLUTION EPIDURAL; INFILTRATION; INTRACAUDAL; PERINEURAL AS NEEDED
Status: DISCONTINUED | OUTPATIENT
Start: 2024-09-26 | End: 2024-09-26

## 2024-09-26 RX ORDER — SODIUM CHLORIDE, SODIUM LACTATE, POTASSIUM CHLORIDE, CALCIUM CHLORIDE 600; 310; 30; 20 MG/100ML; MG/100ML; MG/100ML; MG/100ML
125 INJECTION, SOLUTION INTRAVENOUS CONTINUOUS
Status: CANCELLED | OUTPATIENT
Start: 2024-09-26

## 2024-09-26 RX ADMIN — LIDOCAINE HYDROCHLORIDE 50 MG: 10 INJECTION, SOLUTION EPIDURAL; INFILTRATION; INTRACAUDAL; PERINEURAL at 08:18

## 2024-09-26 RX ADMIN — SODIUM CHLORIDE, SODIUM LACTATE, POTASSIUM CHLORIDE, AND CALCIUM CHLORIDE: .6; .31; .03; .02 INJECTION, SOLUTION INTRAVENOUS at 08:16

## 2024-09-26 RX ADMIN — PROPOFOL 150 MG: 10 INJECTION, EMULSION INTRAVENOUS at 08:18

## 2024-09-26 RX ADMIN — PROPOFOL 20 MG: 10 INJECTION, EMULSION INTRAVENOUS at 08:21

## 2024-09-26 RX ADMIN — PROPOFOL 20 MG: 10 INJECTION, EMULSION INTRAVENOUS at 08:24

## 2024-09-26 NOTE — ANESTHESIA POSTPROCEDURE EVALUATION
Post-Op Assessment Note    CV Status:  Stable  Pain Score: 0    Pain management: adequate       Mental Status:  Arousable and sleepy   PONV Controlled:  None   Airway Patency:  Patent     Post Op Vitals Reviewed: Yes    No anethesia notable event occurred.    Staff: CRNA               BP   136/80   Temp      Pulse  72   Resp   16   SpO2   100

## 2024-09-26 NOTE — INTERVAL H&P NOTE
H&P reviewed. After examining the patient I find no changes in the patients condition since the H&P had been written.    Vitals:    09/26/24 0739   BP: 152/83   Pulse: 76   Resp: 18   Temp: (!) 97.1 °F (36.2 °C)   SpO2: 99%

## 2024-09-26 NOTE — ANESTHESIA PREPROCEDURE EVALUATION
Procedure:  EGD    Relevant Problems   CARDIO   (+) HTN (hypertension)      ENDO   (+) Secondary non-renal hyperparathyroidism (HCC)      NEURO/PSYCH   (+) Severe episode of recurrent major depressive disorder, without psychotic features (HCC)      Surgery/Wound/Pain   (+) Postsurgical malabsorption   (+) S/P gastric bypass      Orthopedic/Musculoskeletal   (+) Spinal stenosis   (+) Status post total replacement of right hip      Other   (+) Dyspepsia          Physical Exam    Airway    Mallampati score: II  TM Distance: >3 FB  Neck ROM: full     Dental   Comment: Denies loose teeth     Cardiovascular  Cardiovascular exam normal    Pulmonary  Pulmonary exam normal     Other Findings  Portions of exam deferred due to low yield and/or unknown COVID statuspost-pubertal.      Anesthesia Plan  ASA Score- 2     Anesthesia Type- IV sedation with anesthesia with ASA Monitors.         Additional Monitors:     Airway Plan:            Plan Factors-Exercise tolerance (METS): >4 METS.    Chart reviewed.   Existing labs reviewed. Patient summary reviewed.    Patient is not a current smoker.              Induction- intravenous.    Postoperative Plan-         Informed Consent- Anesthetic plan and risks discussed with patient.  I personally reviewed this patient with the CRNA. Discussed and agreed on the Anesthesia Plan with the CRNA..

## 2024-09-30 PROCEDURE — 88305 TISSUE EXAM BY PATHOLOGIST: CPT | Performed by: STUDENT IN AN ORGANIZED HEALTH CARE EDUCATION/TRAINING PROGRAM

## 2024-10-15 ENCOUNTER — HOSPITAL ENCOUNTER (OUTPATIENT)
Dept: RADIOLOGY | Facility: HOSPITAL | Age: 59
Discharge: HOME | End: 2024-10-15
Payer: COMMERCIAL

## 2024-10-15 DIAGNOSIS — Z12.31 ENCOUNTER FOR SCREENING MAMMOGRAM FOR MALIGNANT NEOPLASM OF BREAST: ICD-10-CM

## 2024-10-15 PROCEDURE — 77063 BREAST TOMOSYNTHESIS BI: CPT

## 2024-10-15 PROCEDURE — 77067 SCR MAMMO BI INCL CAD: CPT

## 2024-11-15 ENCOUNTER — APPOINTMENT (EMERGENCY)
Dept: RADIOLOGY | Facility: HOSPITAL | Age: 59
End: 2024-11-15
Payer: COMMERCIAL

## 2024-11-15 ENCOUNTER — HOSPITAL ENCOUNTER (EMERGENCY)
Facility: HOSPITAL | Age: 59
Discharge: HOME | End: 2024-11-15
Attending: STUDENT IN AN ORGANIZED HEALTH CARE EDUCATION/TRAINING PROGRAM
Payer: COMMERCIAL

## 2024-11-15 ENCOUNTER — TELEMEDICINE (OUTPATIENT)
Dept: PSYCHIATRY | Facility: CLINIC | Age: 59
End: 2024-11-15

## 2024-11-15 VITALS
RESPIRATION RATE: 18 BRPM | BODY MASS INDEX: 24.92 KG/M2 | WEIGHT: 146 LBS | HEART RATE: 78 BPM | HEIGHT: 64 IN | OXYGEN SATURATION: 99 % | TEMPERATURE: 97.5 F | DIASTOLIC BLOOD PRESSURE: 77 MMHG | SYSTOLIC BLOOD PRESSURE: 112 MMHG

## 2024-11-15 DIAGNOSIS — F41.1 GAD (GENERALIZED ANXIETY DISORDER): ICD-10-CM

## 2024-11-15 DIAGNOSIS — R10.9 ABDOMINAL PAIN: Primary | ICD-10-CM

## 2024-11-15 DIAGNOSIS — F10.11 ALCOHOL USE DISORDER, MILD, IN EARLY REMISSION: ICD-10-CM

## 2024-11-15 DIAGNOSIS — G47.00 INSOMNIA, UNSPECIFIED TYPE: ICD-10-CM

## 2024-11-15 DIAGNOSIS — F33.2 SEVERE EPISODE OF RECURRENT MAJOR DEPRESSIVE DISORDER, WITHOUT PSYCHOTIC FEATURES (HCC): ICD-10-CM

## 2024-11-15 DIAGNOSIS — F33.2 SEVERE EPISODE OF RECURRENT MAJOR DEPRESSIVE DISORDER, WITHOUT PSYCHOTIC FEATURES (HCC): Primary | ICD-10-CM

## 2024-11-15 LAB
ALBUMIN SERPL BCG-MCNC: 4.7 G/DL (ref 3.5–5)
ALP SERPL-CCNC: 72 U/L (ref 34–104)
ALT SERPL W P-5'-P-CCNC: 25 U/L (ref 7–52)
ANION GAP SERPL CALCULATED.3IONS-SCNC: 12 MMOL/L (ref 4–13)
AST SERPL W P-5'-P-CCNC: 27 U/L (ref 13–39)
ATRIAL RATE: 71 BPM
BASOPHILS # BLD AUTO: 0.01 THOUSANDS/ÂΜL (ref 0–0.1)
BASOPHILS NFR BLD AUTO: 0 % (ref 0–1)
BILIRUB SERPL-MCNC: 1.01 MG/DL (ref 0.2–1)
BUN SERPL-MCNC: 18 MG/DL (ref 5–25)
CALCIUM SERPL-MCNC: 9.5 MG/DL (ref 8.4–10.2)
CHLORIDE SERPL-SCNC: 96 MMOL/L (ref 96–108)
CO2 SERPL-SCNC: 28 MMOL/L (ref 21–32)
CREAT SERPL-MCNC: 0.92 MG/DL (ref 0.6–1.3)
EOSINOPHIL # BLD AUTO: 0.01 THOUSAND/ÂΜL (ref 0–0.61)
EOSINOPHIL NFR BLD AUTO: 0 % (ref 0–6)
ERYTHROCYTE [DISTWIDTH] IN BLOOD BY AUTOMATED COUNT: 12.3 % (ref 11.6–15.1)
GFR SERPL CREATININE-BSD FRML MDRD: 67 ML/MIN/1.73SQ M
GLUCOSE SERPL-MCNC: 123 MG/DL (ref 65–140)
HCT VFR BLD AUTO: 47 % (ref 34.8–46.1)
HGB BLD-MCNC: 15.9 G/DL (ref 11.5–15.4)
IMM GRANULOCYTES # BLD AUTO: 0.04 THOUSAND/UL (ref 0–0.2)
IMM GRANULOCYTES NFR BLD AUTO: 0 % (ref 0–2)
LIPASE SERPL-CCNC: 42 U/L (ref 11–82)
LYMPHOCYTES # BLD AUTO: 0.82 THOUSANDS/ÂΜL (ref 0.6–4.47)
LYMPHOCYTES NFR BLD AUTO: 8 % (ref 14–44)
MCH RBC QN AUTO: 30.5 PG (ref 26.8–34.3)
MCHC RBC AUTO-ENTMCNC: 33.8 G/DL (ref 31.4–37.4)
MCV RBC AUTO: 90 FL (ref 82–98)
MONOCYTES # BLD AUTO: 0.47 THOUSAND/ÂΜL (ref 0.17–1.22)
MONOCYTES NFR BLD AUTO: 5 % (ref 4–12)
NEUTROPHILS # BLD AUTO: 8.8 THOUSANDS/ÂΜL (ref 1.85–7.62)
NEUTS SEG NFR BLD AUTO: 87 % (ref 43–75)
NRBC BLD AUTO-RTO: 0 /100 WBCS
P AXIS: 123 DEGREES
PLATELET # BLD AUTO: 334 THOUSANDS/UL (ref 149–390)
PMV BLD AUTO: 8.9 FL (ref 8.9–12.7)
POTASSIUM SERPL-SCNC: 3.4 MMOL/L (ref 3.5–5.3)
PR INTERVAL: 172 MS
PROT SERPL-MCNC: 7.7 G/DL (ref 6.4–8.4)
QRS AXIS: 43 DEGREES
QRSD INTERVAL: 88 MS
QT INTERVAL: 422 MS
QTC INTERVAL: 459 MS
RBC # BLD AUTO: 5.21 MILLION/UL (ref 3.81–5.12)
SODIUM SERPL-SCNC: 136 MMOL/L (ref 135–147)
T WAVE AXIS: 24 DEGREES
VENTRICULAR RATE: 71 BPM
WBC # BLD AUTO: 10.15 THOUSAND/UL (ref 4.31–10.16)

## 2024-11-15 PROCEDURE — 96374 THER/PROPH/DIAG INJ IV PUSH: CPT

## 2024-11-15 PROCEDURE — 93005 ELECTROCARDIOGRAM TRACING: CPT

## 2024-11-15 PROCEDURE — 74177 CT ABD & PELVIS W/CONTRAST: CPT

## 2024-11-15 PROCEDURE — 96375 TX/PRO/DX INJ NEW DRUG ADDON: CPT

## 2024-11-15 PROCEDURE — 96376 TX/PRO/DX INJ SAME DRUG ADON: CPT

## 2024-11-15 PROCEDURE — 96361 HYDRATE IV INFUSION ADD-ON: CPT

## 2024-11-15 PROCEDURE — 93010 ELECTROCARDIOGRAM REPORT: CPT | Performed by: INTERNAL MEDICINE

## 2024-11-15 PROCEDURE — 80053 COMPREHEN METABOLIC PANEL: CPT | Performed by: STUDENT IN AN ORGANIZED HEALTH CARE EDUCATION/TRAINING PROGRAM

## 2024-11-15 PROCEDURE — 83690 ASSAY OF LIPASE: CPT | Performed by: STUDENT IN AN ORGANIZED HEALTH CARE EDUCATION/TRAINING PROGRAM

## 2024-11-15 PROCEDURE — 99284 EMERGENCY DEPT VISIT MOD MDM: CPT

## 2024-11-15 PROCEDURE — 85025 COMPLETE CBC W/AUTO DIFF WBC: CPT | Performed by: STUDENT IN AN ORGANIZED HEALTH CARE EDUCATION/TRAINING PROGRAM

## 2024-11-15 PROCEDURE — 99285 EMERGENCY DEPT VISIT HI MDM: CPT | Performed by: STUDENT IN AN ORGANIZED HEALTH CARE EDUCATION/TRAINING PROGRAM

## 2024-11-15 PROCEDURE — 36415 COLL VENOUS BLD VENIPUNCTURE: CPT | Performed by: STUDENT IN AN ORGANIZED HEALTH CARE EDUCATION/TRAINING PROGRAM

## 2024-11-15 RX ORDER — HYDROXYZINE HYDROCHLORIDE 25 MG/1
25 TABLET, FILM COATED ORAL 3 TIMES DAILY PRN
Qty: 90 TABLET | Refills: 2 | Status: SHIPPED | OUTPATIENT
Start: 2024-11-15 | End: 2025-02-13

## 2024-11-15 RX ORDER — ONDANSETRON 2 MG/ML
4 INJECTION INTRAMUSCULAR; INTRAVENOUS ONCE
Status: COMPLETED | OUTPATIENT
Start: 2024-11-15 | End: 2024-11-15

## 2024-11-15 RX ORDER — HYDROMORPHONE HCL/PF 1 MG/ML
0.5 SYRINGE (ML) INJECTION ONCE
Status: COMPLETED | OUTPATIENT
Start: 2024-11-15 | End: 2024-11-15

## 2024-11-15 RX ORDER — HYDROMORPHONE HCL/PF 1 MG/ML
0.5 SYRINGE (ML) INJECTION ONCE
Refills: 0 | Status: COMPLETED | OUTPATIENT
Start: 2024-11-15 | End: 2024-11-15

## 2024-11-15 RX ORDER — OXYCODONE HYDROCHLORIDE 5 MG/1
5 TABLET ORAL EVERY 6 HOURS PRN
Qty: 8 TABLET | Refills: 0 | Status: SHIPPED | OUTPATIENT
Start: 2024-11-15 | End: 2024-11-25

## 2024-11-15 RX ORDER — METOCLOPRAMIDE HYDROCHLORIDE 5 MG/ML
10 INJECTION INTRAMUSCULAR; INTRAVENOUS ONCE
Status: COMPLETED | OUTPATIENT
Start: 2024-11-15 | End: 2024-11-15

## 2024-11-15 RX ORDER — DESVENLAFAXINE 50 MG/1
50 TABLET, FILM COATED, EXTENDED RELEASE ORAL DAILY
Qty: 30 TABLET | Refills: 2 | Status: SHIPPED | OUTPATIENT
Start: 2024-11-15 | End: 2025-02-13

## 2024-11-15 RX ORDER — ONDANSETRON 4 MG/1
4 TABLET, ORALLY DISINTEGRATING ORAL EVERY 6 HOURS PRN
Qty: 12 TABLET | Refills: 0 | Status: SHIPPED | OUTPATIENT
Start: 2024-11-15

## 2024-11-15 RX ADMIN — HYDROMORPHONE HYDROCHLORIDE 0.5 MG: 1 INJECTION, SOLUTION INTRAMUSCULAR; INTRAVENOUS; SUBCUTANEOUS at 10:36

## 2024-11-15 RX ADMIN — HYDROMORPHONE HYDROCHLORIDE 0.5 MG: 1 INJECTION, SOLUTION INTRAMUSCULAR; INTRAVENOUS; SUBCUTANEOUS at 11:24

## 2024-11-15 RX ADMIN — METOCLOPRAMIDE 10 MG: 5 INJECTION, SOLUTION INTRAMUSCULAR; INTRAVENOUS at 11:24

## 2024-11-15 RX ADMIN — IOHEXOL 50 ML: 240 INJECTION, SOLUTION INTRATHECAL; INTRAVASCULAR; INTRAVENOUS; ORAL at 11:00

## 2024-11-15 RX ADMIN — SODIUM CHLORIDE 1000 ML: 0.9 INJECTION, SOLUTION INTRAVENOUS at 10:33

## 2024-11-15 RX ADMIN — IOHEXOL 100 ML: 350 INJECTION, SOLUTION INTRAVENOUS at 12:25

## 2024-11-15 RX ADMIN — ONDANSETRON 4 MG: 2 INJECTION INTRAMUSCULAR; INTRAVENOUS at 10:36

## 2024-11-15 NOTE — PSYCH
No Call No Show  No Charge      Jael Shepparddanyatata no showed on 11/15/24 virtual appointment. I called the patient and initially talked to her. She stated that she has issues with connecting to my chart, and then the phone call disconnected. I called the patient again and left a VM. I stayed online for 10 minutes, but the patient did not join the visit. Staff will contact the patient to reschedule appointment.       Treatment Plan not completed within required time limits due to: Jael NINO Rui no show appointment on 11/15/24

## 2024-11-15 NOTE — DISCHARGE INSTRUCTIONS
"You have been evaluated in the Emergency Department today for abdominal pain. Your evaluation was not suggestive of any emergent condition requiring medical intervention at this time. However, some abdominal problems make take more time to appear. Therefore, it is important for you to watch for any new symptoms or worsening of your current condition.      Please follow up with your primary care physician as soon as possible. If you do not have a primary doctor, you can call \"[facility]\" to schedule an appointment with one.     Return to the Emergency Department if you experience worsening or uncontrolled pain, fevers 100.4°F or greater, recurrent vomiting, inability to tolerate food or fluids by mouth, bloody stools or vomit, black or tarry stools, or any other concerning symptoms  "

## 2024-11-15 NOTE — TELEPHONE ENCOUNTER
Patient has appointment on 1-21-25 for Pristiq 50 mg needs 30 tablets with 2 refill to make it to appointment     Reason for call:   [x] Refill   [] Prior Auth  [] Other:     Office:   [] PCP/Provider -   [x] Specialty/Provider - Koko Mei    Medication: Pristiq  Dose/Frequency: 50 mg Daily   Quantity: 30      Medication:Hydroxyzine   Dose/Frequency: 25 mg TID PRN  Quantity: 90    Pharmacy: [facility]    Does the patient have enough for 3 days?   [x] Yes   [] No - Send as HP to POD

## 2024-11-15 NOTE — ED PROVIDER NOTES
Time reflects when diagnosis was documented in both MDM as applicable and the Disposition within this note       Time User Action Codes Description Comment    11/15/2024  1:31 PM Uvaldo Hammond Add [R10.9] Abdominal pain           ED Disposition       ED Disposition   Discharge    Condition   Stable    Date/Time   Fri Nov 15, 2024  1:31 PM    Comment   Jael NINO Worobey discharge to home/self care.                   Assessment & Plan       Medical Decision Making  Patient is a 59 y.o. female who presents to the ED for abdominal pain.  Patient is nontoxic, well-appearing.  No rebound or guarding.    Differential diagnosis includes: Gastritis, gastroenteritis, pancreatitis, biliary pathology.  Low suspicion PUD (including perforation), acute infectious processes (pneumonia, hepatitis, pyelonephritis), acute appendicitis, vascular catastrophe, bowel obstruction or viscus perforation. Presentation not consistent with other acute, emergent causes of abdominal pain at this time.    Plan: labs, UA, CT AP, pain control, serial reassessment            Amount and/or Complexity of Data Reviewed  Labs: ordered.  Radiology: ordered.    Risk  Prescription drug management.        ED Course as of 11/15/24 1406   Fri Nov 15, 2024   1332 Patient reevaluated.  Resting comfortably.  Feeling better.  Discussed results and findings.  Spine no obvious emergent causes of her pain.  Will discharge.  Discussed GI follow-up.  Return precautions discussed.  Patient verbalized understanding and agreed to plan of care.       Medications   HYDROmorphone (DILAUDID) injection 0.5 mg (0.5 mg Intravenous Given 11/15/24 1036)   ondansetron (ZOFRAN) injection 4 mg (4 mg Intravenous Given 11/15/24 1036)   sodium chloride 0.9 % bolus 1,000 mL (0 mL Intravenous Stopped 11/15/24 1233)   iohexol (OMNIPAQUE) 240 MG/ML solution 50 mL (50 mL Oral Given 11/15/24 1100)   HYDROmorphone (DILAUDID) injection 0.5 mg (0.5 mg Intravenous Given 11/15/24 1124)    metoclopramide (REGLAN) injection 10 mg (10 mg Intravenous Given 11/15/24 1124)   iohexol (OMNIPAQUE) 350 MG/ML injection (MULTI-DOSE) 100 mL (100 mL Intravenous Given 11/15/24 1225)       ED Risk Strat Scores                           SBIRT 20yo+      Flowsheet Row Most Recent Value   Initial Alcohol Screen: US AUDIT-C     1. How often do you have a drink containing alcohol? 0 Filed at: 11/15/2024 1021   2. How many drinks containing alcohol do you have on a typical day you are drinking?  0 Filed at: 11/15/2024 1021   3a. Male UNDER 65: How often do you have five or more drinks on one occasion? 0 Filed at: 11/15/2024 1021   3b. FEMALE Any Age, or MALE 65+: How often do you have 4 or more drinks on one occassion? 0 Filed at: 11/15/2024 1021   Audit-C Score 0 Filed at: 11/15/2024 1021   PAYTON: How many times in the past year have you...    Used an illegal drug or used a prescription medication for non-medical reasons? Never Filed at: 11/15/2024 1021                            History of Present Illness       Chief Complaint   Patient presents with    Abdominal Pain     States abd pain that goes across abd at top of stomach with N/V since last saturday       Past Medical History:   Diagnosis Date    Abnormal Pap smear of cervix 1987    Anxiety     Depression     Ear problems 1996    Fibrocystic breast     GERD (gastroesophageal reflux disease) 2025    Herpes 1987    High vitamin A level     History of anxiety     History of hypercholesterolemia     History of hypertension     History of obesity     HPV (human papilloma virus) infection 1987    HTN (hypertension) 9/26/2024    Hyperlipidemia     Hypertension 3/24    Lordosis     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    Scoliosis     Secondary hyperparathyroidism, non-renal (HCC)     Spinal stenosis     Tinnitus 1996      Past Surgical History:   Procedure Laterality Date    ABDOMINOPLASTY      BARIATRIC SURGERY  2/13    BREAST CYST ASPIRATION Right     COLONOSCOPY       COLONOSCOPY  11/22/2021    Zach - 5 y f/u     COLONOSCOPY  11/21    COLPOSCOPY  1987    COMBINED AUGMENTATION MAMMAPLASTY AND ABDOMINOPLASTY  2001    EPIDURAL BLOCK INJECTION Bilateral 08/23/2023    Procedure: Left L4-L5   TRANSFORAMINAL epidural steroid injection ( [id number]);  Surgeon: Mateo Colmenares DO;  Location: [facility] MAIN OR;  Service: Pain Management     EPIDURAL BLOCK INJECTION Bilateral 01/24/2024    Procedure: L4-L5 TRANSFORAMINAL EPIDURAL STEROID INJECTION;  Surgeon: Mateo Colmenares DO;  Location: [facility] MAIN OR;  Service: Pain Management     FACIAL COSMETIC SURGERY      FL INJECTION RIGHT HIP (NON ARTHROGRAM)  05/24/2019    GASTRIC BYPASS      UT ARTHROCENTESIS ASPIR&/INJ MAJOR JT/BURSA W/O US Right 01/10/2024    Procedure: RIGHT INTRA-ARTICULAR HIP INJECTION;  Surgeon: Mateo Colmenares DO;  Location: [facility] MAIN OR;  Service: Pain Management     UT ARTHRP ACETBLR/PROX FEM PROSTC AGRFT/ALGRFT Right 04/15/2024    Procedure: ARTHROPLASTY HIP TOTAL ANTERIOR,NAVIGATED - same day;  Surgeon: Javier Reed DO;  Location: [facility] MAIN OR;  Service: Orthopedics    TUBAL LIGATION        Family History   Problem Relation Age of Onset    Hypertension Mother     Heart disease Mother     Arthritis Mother     Depression Mother     Hypertension Father     Heart disease Father     No Known Problems Sister     No Known Problems Brother     No Known Problems Maternal Aunt     No Known Problems Maternal Uncle     No Known Problems Paternal Aunt     No Known Problems Paternal Uncle     No Known Problems Maternal Grandmother     No Known Problems Maternal Grandfather     No Known Problems Paternal Grandmother     No Known Problems Paternal Grandfather     ADD / ADHD Neg Hx     Anesthesia problems Neg Hx     Cancer Neg Hx     Clotting disorder Neg Hx     Collagen disease Neg Hx     Diabetes Neg Hx     Dislocations Neg Hx     Learning disabilities Neg Hx     Neurological problems Neg Hx     Osteoporosis Neg Hx      Rheumatologic disease Neg Hx     Scoliosis Neg Hx     Vascular Disease Neg Hx       Social History     Tobacco Use    Smoking status: Never    Smokeless tobacco: Never   Vaping Use    Vaping status: Never Used   Substance Use Topics    Alcohol use: Not Currently    Drug use: Never      E-Cigarette/Vaping    E-Cigarette Use Never User       E-Cigarette/Vaping Substances    Nicotine No     THC No     CBD No     Flavoring No     Other No     Unknown No       I have reviewed and agree with the history as documented.     Patient is a 59-year-old female, past medical history including anxiety, depression, GERD, high cholesterol, and hypertension, who presents to the emergency department for epigastric abdominal pain.  Started on Saturday.  Intermittent.  Feels it across her entire upper abdomen.  No modifying factors.  Has had some nonbloody diarrhea and nausea..  No prior history of symptoms like this in the past.  No history of pancreatitis.  No biliary history.  No other complaints or concerns.      Abdominal Pain  Associated symptoms: diarrhea        Review of Systems   Gastrointestinal:  Positive for abdominal pain and diarrhea.   All other systems reviewed and are negative.          Objective       ED Triage Vitals   Temperature Pulse Blood Pressure Respirations SpO2 Patient Position - Orthostatic VS   11/15/24 1022 11/15/24 1023 11/15/24 1023 11/15/24 1022 11/15/24 1023 --   97.5 °F (36.4 °C) 89 (!) 168/105 18 98 %       Temp src Heart Rate Source BP Location FiO2 (%) Pain Score    -- 11/15/24 1105 11/15/24 1105 -- 11/15/24 1022     Monitor Left arm  10 - Worst Possible Pain      Vitals      Date and Time Temp Pulse SpO2 Resp BP Pain Score FACES Pain Rating User   11/15/24 1352 -- 78 99 % 18 112/77 -- -- BECCA   11/15/24 1229 -- -- -- -- -- 3 -- JV   11/15/24 1215 -- 81 99 % 16 148/81 -- -- JV   11/15/24 1143 -- -- -- -- -- 4 improving -- JV   11/15/24 1124 -- -- -- -- -- 5 -- JV   11/15/24 1109 -- -- -- -- -- 3  improvied with pain meds -- JV   11/15/24 1105 -- 84 99 % 17 154/89 -- -- JV   11/15/24 1036 -- -- -- -- -- 10 - Worst Possible Pain -- YONI   11/15/24 1023 -- 89 98 % -- 168/105 -- -- YONI   11/15/24 1022 97.5 °F (36.4 °C) -- -- 18 -- 10 - Worst Possible Pain -- YONI            Physical Exam  Vitals and nursing note reviewed.   Constitutional:       General: She is not in acute distress.     Appearance: She is well-developed. She is not ill-appearing, toxic-appearing or diaphoretic.   HENT:      Head: Normocephalic and atraumatic.      Right Ear: External ear normal.      Left Ear: External ear normal.      Nose: Nose normal.   Eyes:      General: Lids are normal. No scleral icterus.  Cardiovascular:      Rate and Rhythm: Normal rate and regular rhythm.      Heart sounds: Normal heart sounds. No murmur heard.     No friction rub. No gallop.   Pulmonary:      Effort: Pulmonary effort is normal. No respiratory distress.      Breath sounds: Normal breath sounds. No wheezing or rales.   Abdominal:      Palpations: Abdomen is soft.      Tenderness: There is abdominal tenderness in the right upper quadrant, epigastric area and left upper quadrant. There is no guarding or rebound.   Musculoskeletal:         General: No deformity. Normal range of motion.      Cervical back: Normal range of motion and neck supple.   Skin:     General: Skin is warm and dry.   Neurological:      General: No focal deficit present.      Mental Status: She is alert.   Psychiatric:         Mood and Affect: Mood normal.         Behavior: Behavior normal.         Results Reviewed       Procedure Component Value Units Date/Time    CMP [[id number]]  (Abnormal) Collected: 11/15/24 1033    Lab Status: Final result Specimen: Blood from Arm, Left Updated: 11/15/24 1105     Sodium 136 mmol/L      Potassium 3.4 mmol/L      Chloride 96 mmol/L      CO2 28 mmol/L      ANION GAP 12 mmol/L      BUN 18 mg/dL      Creatinine 0.92 mg/dL      Glucose 123 mg/dL       Calcium 9.5 mg/dL      AST 27 U/L      ALT 25 U/L      Alkaline Phosphatase 72 U/L      Total Protein 7.7 g/dL      Albumin 4.7 g/dL      Total Bilirubin 1.01 mg/dL      eGFR 67 ml/min/1.73sq m     Narrative:      National Kid[employer] Foundation guidelines for Chronic Kidney Disease (CKD):     Stage 1 with normal or high GFR (GFR > 90 mL/min/1.73 square meters)    Stage 2 Mild CKD (GFR = 60-89 mL/min/1.73 square meters)    Stage 3A Moderate CKD (GFR = 45-59 mL/min/1.73 square meters)    Stage 3B Moderate CKD (GFR = 30-44 mL/min/1.73 square meters)    Stage 4 Severe CKD (GFR = 15-29 mL/min/1.73 square meters)    Stage 5 End Stage CKD (GFR <15 mL/min/1.73 square meters)  Note: GFR calculation is accurate only with a steady state creatinine    Lipase [[id number]]  (Normal) Collected: 11/15/24 1033    Lab Status: Final result Specimen: Blood from Arm, Left Updated: 11/15/24 1105     Lipase 42 u/L     CBC and differential [[id number]]  (Abnormal) Collected: 11/15/24 1033    Lab Status: Final result Specimen: Blood from Arm, Left Updated: 11/15/24 1039     WBC 10.15 Thousand/uL      RBC 5.21 Million/uL      Hemoglobin 15.9 g/dL      Hematocrit 47.0 %      MCV 90 fL      MCH 30.5 pg      MCHC 33.8 g/dL      RDW 12.3 %      MPV 8.9 fL      Platelets 334 Thousands/uL      nRBC 0 /100 WBCs      Segmented % 87 %      Immature Grans % 0 %      Lymphocytes % 8 %      Monocytes % 5 %      Eosinophils Relative 0 %      Basophils Relative 0 %      Absolute Neutrophils 8.80 Thousands/µL      Absolute Immature Grans 0.04 Thousand/uL      Absolute Lymphocytes 0.82 Thousands/µL      Absolute Monocytes 0.47 Thousand/µL      Eosinophils Absolute 0.01 Thousand/µL      Basophils Absolute 0.01 Thousands/µL             CT Abdomen pelvis with contrast   Final Interpretation by Marquez Dunham MD (11/15 1323)      No acute findings in the abdomen or pelvis.         Workstation performed: [id number]             ECG 12 Lead Documentation  Only    Date/Time: 11/15/2024 2:03 PM    Performed by: Uvaldo Hammond DO  Authorized by: Uvaldo Hammond DO    ECG reviewed by me, the ED Provider: yes    Patient location:  ED  Interpretation:     Interpretation: normal    Rate:     ECG rate:  71    ECG rate assessment: normal    Rhythm:     Rhythm: sinus rhythm    Ectopy:     Ectopy: none    QRS:     QRS axis:  Normal  Conduction:     Conduction: normal    ST segments:     ST segments:  Normal  T waves:     T waves: normal        ED Medication and Procedure Management   Prior to Admission Medications   Prescriptions Last Dose Informant Patient Reported? Taking?   Calcium Citrate 1040 MG TABS  Self Yes No   Sig: Take by mouth 3 (three) times a day   Multiple Vitamins-Minerals (BARIATRIC MULTIVITAMINS/IRON PO)  Self Yes No   Sig: Take by mouth daily   amLODIPine (NORVASC) 5 mg tablet   No No   Sig: Take 1 tablet (5 mg total) by mouth daily   desvenlafaxine succinate (PRISTIQ) 50 mg 24 hr tablet  Self No No   Sig: Take 1 tablet (50 mg total) by mouth daily   hydrOXYzine HCL (ATARAX) 25 mg tablet  Self No No   Sig: Take 1 tablet (25 mg total) by mouth 3 (three) times a day as needed for anxiety (anxiety)   omeprazole (PriLOSEC) 20 mg delayed release capsule   No No   Sig: Take 1 capsule (20 mg total) by mouth 2 (two) times a day   spironolactone (ALDACTONE) 25 mg tablet  Self Yes No   traZODone (DESYREL) 100 mg tablet   No No   Sig: Take 1 tablet (100 mg total) by mouth daily at bedtime as needed for sleep Do not start before April 12, 2024.      Facility-Administered Medications: None     Discharge Medication List as of 11/15/2024  1:32 PM        START taking these medications    Details   ondansetron (ZOFRAN-ODT) 4 mg disintegrating tablet Take 1 tablet (4 mg total) by mouth every 6 (six) hours as needed for nausea or vomiting, Starting Fri 11/15/2024, Normal      oxyCODONE (Roxicodone) 5 immediate release tablet Take 1 tablet (5 mg total) by mouth every 6  (six) hours as needed for moderate pain for up to 10 days Max Daily Amount: 20 mg, Starting Fri 11/15/2024, Until Mon 11/25/2024 at 2359, Normal           CONTINUE these medications which have NOT CHANGED    Details   amLODIPine (NORVASC) 5 mg tablet Take 1 tablet (5 mg total) by mouth daily, Starting Fri 3/15/2024, Until Thu 9/26/2024, Normal      Calcium Citrate 1040 MG TABS Take by mouth 3 (three) times a day, Historical Med      desvenlafaxine succinate (PRISTIQ) 50 mg 24 hr tablet Take 1 tablet (50 mg total) by mouth daily, Starting Mon 8/26/2024, Until Sun 11/24/2024, Normal      hydrOXYzine HCL (ATARAX) 25 mg tablet Take 1 tablet (25 mg total) by mouth 3 (three) times a day as needed for anxiety (anxiety), Starting Thu 6/27/2024, Until Thu 9/26/2024 at 2359, Normal      Multiple Vitamins-Minerals (BARIATRIC MULTIVITAMINS/IRON PO) Take by mouth daily, Historical Med      omeprazole (PriLOSEC) 20 mg delayed release capsule Take 1 capsule (20 mg total) by mouth 2 (two) times a day, Starting Tue 9/10/2024, Until Thu 10/10/2024, Normal      spironolactone (ALDACTONE) 25 mg tablet Historical Med      traZODone (DESYREL) 100 mg tablet Take 1 tablet (100 mg total) by mouth daily at bedtime as needed for sleep Do not start before April 12, 2024., Starting Fri 4/12/2024, Until Thu 9/26/2024 at 2359, Normal           No discharge procedures on file.  ED SEPSIS DOCUMENTATION   Time reflects when diagnosis was documented in both MDM as applicable and the Disposition within this note       Time User Action Codes Description Comment    11/15/2024  1:31 PM Uvaldo Hammond Add [R10.9] Abdominal pain                  Uvaldo Hammond,   11/15/24 4801

## 2024-11-29 ENCOUNTER — TELEPHONE (OUTPATIENT)
Age: 59
End: 2024-11-29

## 2024-11-29 NOTE — TELEPHONE ENCOUNTER
Caller: Jael PT    Doctor: Dr Colmenares     Reason for call: Pt called in regard to the procedure that was scheduled in Jan 2024. They need the clinical notes as well as MRI reports . Please advised Pt will  from the office . This is time sensitive   Pt would like to  from the [place] office   Call back#: [phone]

## 2025-01-21 ENCOUNTER — TELEMEDICINE (OUTPATIENT)
Dept: PSYCHIATRY | Facility: CLINIC | Age: 60
End: 2025-01-21
Payer: COMMERCIAL

## 2025-01-21 DIAGNOSIS — Z13.21 SCREENING FOR ENDOCRINE, NUTRITIONAL, METABOLIC AND IMMUNITY DISORDER: ICD-10-CM

## 2025-01-21 DIAGNOSIS — Z13.29 SCREENING FOR ENDOCRINE, NUTRITIONAL, METABOLIC AND IMMUNITY DISORDER: ICD-10-CM

## 2025-01-21 DIAGNOSIS — Z13.0 SCREENING FOR ENDOCRINE, NUTRITIONAL, METABOLIC AND IMMUNITY DISORDER: ICD-10-CM

## 2025-01-21 DIAGNOSIS — G47.00 INSOMNIA, UNSPECIFIED TYPE: ICD-10-CM

## 2025-01-21 DIAGNOSIS — E53.8 B12 DEFICIENCY: ICD-10-CM

## 2025-01-21 DIAGNOSIS — F33.2 SEVERE EPISODE OF RECURRENT MAJOR DEPRESSIVE DISORDER, WITHOUT PSYCHOTIC FEATURES (HCC): Primary | ICD-10-CM

## 2025-01-21 DIAGNOSIS — F41.1 GAD (GENERALIZED ANXIETY DISORDER): ICD-10-CM

## 2025-01-21 DIAGNOSIS — Z13.228 SCREENING FOR ENDOCRINE, NUTRITIONAL, METABOLIC AND IMMUNITY DISORDER: ICD-10-CM

## 2025-01-21 PROCEDURE — 99214 OFFICE O/P EST MOD 30 MIN: CPT | Performed by: STUDENT IN AN ORGANIZED HEALTH CARE EDUCATION/TRAINING PROGRAM

## 2025-01-21 PROCEDURE — 90833 PSYTX W PT W E/M 30 MIN: CPT | Performed by: STUDENT IN AN ORGANIZED HEALTH CARE EDUCATION/TRAINING PROGRAM

## 2025-01-21 RX ORDER — MINOXIDIL 2.5 MG/1
TABLET ORAL
COMMUNITY
Start: 2024-12-12

## 2025-01-21 RX ORDER — DESVENLAFAXINE 50 MG/1
50 TABLET, FILM COATED, EXTENDED RELEASE ORAL DAILY
Qty: 30 TABLET | Refills: 2 | Status: SHIPPED | OUTPATIENT
Start: 2025-02-15 | End: 2025-05-16

## 2025-01-21 NOTE — ASSESSMENT & PLAN NOTE
Orders:    desvenlafaxine succinate (PRISTIQ) 50 mg 24 hr tablet; Take 1 tablet (50 mg total) by mouth daily Do not start before February 15, 2025.

## 2025-01-21 NOTE — PSYCH
MEDICATION MANAGEMENT NOTE - VIRTUAL VISIT        [facility]      Name and Date of Birth:  Jael Fabian 59 y.o. 1965 MRN: [id number]    Date of Visit: January 21, 2025  Assessment & Plan  Severe episode of recurrent major depressive disorder, without psychotic features (HCC)    Orders:    desvenlafaxine succinate (PRISTIQ) 50 mg 24 hr tablet; Take 1 tablet (50 mg total) by mouth daily Do not start before February 15, 2025.    CANDICE (generalized anxiety disorder)         Insomnia, unspecified type         Screening for endocrine, nutritional, metabolic and immunity disorder    Orders:    Lipid panel; Future    Hemoglobin A1C; Future    TSH, 3rd generation with Free T4 reflex; Future    CBC and differential; Future    Comprehensive metabolic panel; Future    Vitamin B12; Future    B12 deficiency    Orders:    Vitamin B12; Future      Plan   A 59 y.o.  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, w/ PMH of hip arthritis, lumbar radiculopathy, spinal stenosis, s/p gastric bypass, secondary hyperparathyroidism, SNHL and PPH of alcohol use disorder, depression and anxiety, 2 prior psychiatric admission (in June 2021 due to depressive symptoms following her 's death and most recently at [facility] from 3/12/2024 until 3/15/2024 after intentional overdose on Ativan and alcohol), one prior SA (intensional overdose on 3/12/24), no h/o self-injurious behavior, currently in therapy ([facility] at [facility]), Pristiq 50 mg po daily, Neurontin 100 mg po TID, Trazodone 100 mg po nightly PRN, Atarax 25 mg po TID PRN, Folic acid, thiamine and MVI who presented to the mental health clinic for the initial intake and psychiatric evaluation on 3/22/24. Presented w/ good mood and improved anxiety sxs. Maintained sober since last admission and has been attending AA  meeting regularly. Denied SI/HI, intent or plan upon direct inquiry at this time. CANDICE-7: 4; PHQ-9: 1. Her current presentation meets criteria for MDD, CANDICE and alcohol use disorder in early remission. Maintained on Pristiq 50 mg daily, Atarax 25 mg TID PRN, Trazodone 100 mg nightly PRN, and Neurontin increased from 100/200 mg to 100/100/200 mg but discontinued after she had BRIGITTE on 4/15/24; doses to be adjusted as indicated. Upon f/u on 6/27/24, the patient reported worsening of depression in the context of brief relapse on alcohol (sober since 6/15 though). Started on Naltrexone 50 mg daily but did not continue due to GI sxs; other meds maintained the same dose. Will continue AA meeting and individual psychotherapy and recommended to use BLT.     - Continue Pristiq 50 mg po daily for depression and anxiety  - Discontinue Naltrexone due to GI sxs and non-compliance  - Discontinue Neurontin as the patient endorsed good control of pain with her current meds following BRIGITTE  - Continue Trazodone 100 mg po nightly PRN for insomnia  - Continue Atarax 25 mg po TID for breakthrough anxiety / panic attacks  - Continue vit D supplements  - Recommended to use BLT for seasonal depression  - Continue AA meetings and individual psychotherapy ([facility]da at [facility])  - Educated about healthy life style, risk of falls/sedation and addiction. Patient was receptive to education.  - Medications sent to the patient's pharmacy for 90 day supply    - RTC in 10 weeks  - The patient was educated about 24 hour and weekend coverage for urgent situations accessed by calling [facility] main practice number  - Patient was educated to call National Suicide Prevention Lifeline (4-106-245-GWZZ [9122]) for behavioral crisis at anytime or 241 for any safety concerns, or go to nearest ER if the symptoms become overwhelming or unmanageable.    Medications Risks/Benefits    Risks, Benefits And Possible Side Effects Of  Medications:  Risks, benefits, and possible side effects of medications explained to Jael and she verbalizes understanding and agreement for treatment.    Controlled Medication Discussion:   Not applicable    Psychotherapy Provided:   Individual psychotherapy provided: Yes  Counseling was provided during the session today for 16 minutes.  Crisis/safety plan discussed with Jael.   Psychoeducation provided to the patient and was educated about the importance of compliance with the medications and psychiatric treatment  Supportive psychotherapy provided to the patient  Solution Focused Brief Therapy (SFBT) provided  Patient's emotions were validated and specific labeled praise provided.   [place] suggestions were offered in a supportive non-critical way.     Treatment Plan:  Completed and signed during the session: Yes - with Jael       Subjective    The patient was visited virtually for Medication Management, Depression, Anxiety, and Alcohol Problem after no showed to her f/u appointment on 11/15/24. Presented calm, and cooperative. Reported feeling ok. She noted that she had a rough time during the [place] for two weeks, but has been feeling better. She noted that loneliness during the Holiday season was the main trigger. She started BLT and working out with a  and started to listening to Music and they helped. Endorsed good function at work and better energy since started working out. Denied any changes in sleep, appetite, concentration, energy level, or daily activities.  Denied feelings of anhedonia, hopelessness, helplessness, worthlessness or guilt and appeared to be future oriented.  There was no thought constriction related to death.  Denied SI/HI, intent or plan upon direct inquiry at this time. No intense anxiety sxs, specific phobia or panic attacks reported. Denied AV/H.  Endorsed good compliance with the medications and denied any side effects. Denied smoking cigarettes, drinking alcohol  (sober since March - continues to go to AA meetings) or other illicit substance use.    Given this presentation, medications are maintained at the same dosage.  Will continue individual psychotherapy. Will continue attending AA meetings. The patient was educated to call 911 or go to the nearest emergency room if the symptoms become overwhelming or unable to remain in control. Verbalized understanding and agreed to seek help in case of distress or concern for safety.    Review Of Systems:  Pertinent items are noted in HPI; all others are negative; no recent changes in medications or health status reported.  PHQ-2/9 Depression Screening    Little interest or pleasure in doing things: 1 - several days  Feeling down, depressed, or hopeless: 1 - several days  Trouble falling or staying asleep, or sleeping too much: 1 - several days  Feeling tired or having little energy: 0 - not at all  Poor appetite or overeatin - not at all  Feeling bad about yourself - or that you are a failure or have let yourself or your family down: 1 - several days  Trouble concentrating on things, such as reading the newspaper or watching television: 0 - not at all  Moving or speaking so slowly that other people could have noticed. Or the opposite - being so fidgety or restless that you have been moving around a lot more than usual: 0 - not at all  Thoughts that you would be better off dead, or of hurting yourself in some way: 0 - not at all  PHQ-9 Score: 4  PHQ-9 Interpretation: No or Minimal depression             Historical Information    Past Psychiatric History Update:   - No inpatient psychiatric admission since last encounter  - No SA or SIB since last encounter  - No incidence of violent behavior since last encounter    Past Trauma History Update:    - No new onset of abuse or traumatic events since last encounter     History Review: The following portions of the patient's history were reviewed and updated as appropriate: allergies,  current medications, past family history, past medical history, past social history, past surgical history and problem list.       Objective      Vital signs in last 24 hours: Not checked - virtual visit    Mental Status Evaluation:  Appearance and attitude: appeared as stated age, cooperative and attentive, casually dressed, with good hygiene  Eye contact: good  Motor Function: within normal limits, No PMA/PMR  Gait/station: Not observed  Speech: normal for rate, rhythm, volume, latency, amount  Language: No overt abnormality  Mood/affect: euthymic / Affect was euthymic, reactive, in full range, normal intensity and mood congruent  Thought Processes: sequential and goal-directed  Thought content: denied suicidal ideations or homicidal ideations, no overt delusions elicited, negative thinking, ruminations  Associations: intact associations  Perceptual disturbances: denies Auditory/Visual/Tactile Hallucinations  Orientation: oriented to time, person, place and to the situational context  Cognitive Function: intact  Memory: recent and remote memory grossly intact  Intellect: average  Fund of knowledge: aware of current events, aware of past history, and vocabulary average  Impulse control: good  Insight/judgment: fair/good          Laboratory Results: I have personally reviewed all pertinent laboratory/tests results  Recent Labs (last 2 months):   No visits with results within 2 Month(s) from this visit.   Latest known visit with results is:   Admission on 11/15/2024, Discharged on 11/15/2024   Component Date Value    WBC 11/15/2024 10.15     RBC 11/15/2024 5.21 (H)     Hemoglobin 11/15/2024 15.9 (H)     Hematocrit 11/15/2024 47.0 (H)     MCV 11/15/2024 90     MCH 11/15/2024 30.5     MCHC 11/15/2024 33.8     RDW 11/15/2024 12.3     MPV 11/15/2024 8.9     Platelets 11/15/2024 334     nRBC 11/15/2024 0     Segmented % 11/15/2024 87 (H)     Immature Grans % 11/15/2024 0     Lymphocytes % 11/15/2024 8 (L)     Monocytes %  11/15/2024 5     Eosinophils Relative 11/15/2024 0     Basophils Relative 11/15/2024 0     Absolute Neutrophils 11/15/2024 8.80 (H)     Absolute Immature Grans 11/15/2024 0.04     Absolute Lymphocytes 11/15/2024 0.82     Absolute Monocytes 11/15/2024 0.47     Eosinophils Absolute 11/15/2024 0.01     Basophils Absolute 11/15/2024 0.01     Sodium 11/15/2024 136     Potassium 11/15/2024 3.4 (L)     Chloride 11/15/2024 96     CO2 11/15/2024 28     ANION GAP 11/15/2024 12     BUN 11/15/2024 18     Creatinine 11/15/2024 0.92     Glucose 11/15/2024 123     Calcium 11/15/2024 9.5     AST 11/15/2024 27     ALT 11/15/2024 25     Alkaline Phosphatase 11/15/2024 72     Total Protein 11/15/2024 7.7     Albumin 11/15/2024 4.7     Total Bilirubin 11/15/2024 1.01 (H)     eGFR 11/15/2024 67     Lipase 11/15/2024 42     Ventricular Rate 11/15/2024 71     Atrial Rate 11/15/2024 71     UT Interval 11/15/2024 172     QRSD Interval 11/15/2024 88     QT Interval 11/15/2024 422     QTC Interval 11/15/2024 459     P Axis 11/15/2024 123     QRS [place] 11/15/2024 43     T Wave [place] 11/15/2024 24            Koko Benz MD 01/21/25    This note was completed in part utilizing Dragon dictation Software. Grammatical, translation, syntax errors, random word insertions, spelling mistakes, and incomplete sentences may be an occasional consequence of this system secondary to software limitations with voice recognition, ambient noise, and hardware issues. If you have any questions or concerns about the content, text, or information contained within the body of this dictation, please contact the provider for clarification.     -----------------------------  Virtual Regular Visit    Patient is located at Home in the following state in which I hold an active license [place].    Problem List Items Addressed This Visit          Behavioral Health    Severe episode of recurrent major depressive disorder, without psychotic features (HCC) - Primary                    Other Visit Diagnoses         CANDICE (generalized anxiety disorder)          Insomnia, unspecified type                     Reason for visit is   Chief Complaint   Patient presents with    Medication Management    Depression    Anxiety    Alcohol Problem        Visit Time  Visit Start Time: 1:45 PM  Visit Stop Time: 2:10 PM  Total Visit Duration: 25 minutes    Encounter provider Koko Benz MD    Provider located at: [facility] [address], [address]  [place] [address]    Recent Visits  No visits were found meeting these conditions.  Showing recent visits within past 7 days and meeting all other requirements  Today's Visits  Date Type Provider Dept   01/21/25 Telemedicine Koko Benz MD  [facility]   Showing today's visits and meeting all other requirements  Future Appointments  No visits were found meeting these conditions.  Showing future appointments within next 150 days and meeting all other requirements     This note was not shared with the patient due to patient requested     The patient was identified by name and date of birth. Jael Fabian was informed that this is a telemedicine visit and that the visit is being conducted through the Epic Embedded platform. She agrees to proceed. My office door was closed. No one else was in the room. She acknowledged consent and understanding of privacy and security of the video platform. The patient has agreed to participate and understands they can discontinue the visit at any time. Patient is aware this is a billable service.

## 2025-01-21 NOTE — BH CRISIS PLAN
"Client Name: Jael Fabian       Client YOB: 1965    [place] Safety Plan      Creation Date: 3/22/24 Update Date: 1/21/25   Created By: Koko Benz MD Last Updated By: Koko Benz MD      Step 1: Warning Signs:   Warning Signs   \"I would want to drink\"   \"becoming isolative\"            Step 2: Internal Coping Strategies:   Internal Coping Strategies   Meditation and praying   Walking and exercise   Yoga   Watching TV   Reading   Listening to Music            Step 3: People and social settings that provide distraction:   Name Contact Information   Sons saved in cell   AA friends saved in cell   Friends outside AA saved in cell    Places   going to AA meetings           Step 4: People whom I can ask for help during a crisis:      Name Contact Information    Sons saved in cell    Friends (AA and outside AA) saved in cell    Sister saved in cell      Step 5: Professionals or agencies I can contact during a crisis:      [facility]/Agency Name Phone Emergency Contact    Koko Benz MD (psychiatrist) [phone]     Magda (Therapist at [facility]) [phone]       Local Emergency Department Emergency Department Phone Emergency Department Address    [facility] [phone] [address] [place] [id number]        Crisis Phone Numbers:   Suicide Prevention Lifeline: Call or Text  428 Crisis Text Line: Text HOME to 378-524   Please note: Some [facility] do not have a separate number for Child/Adolescent specific crisis. If your county is not listed under Child/Adolescent, please call the adult number for your county      Adult Crisis Numbers: Child/Adolescent Crisis Numbers   [facility]: [phone] [facility]: [phone]   [facility]: [phone] [facility]: [phone]   [facility]: [phone] [place]: [phone]   [facility]: [phone] [place]: [phone]   [place]: [phone] "   [facility]: [phone]   [facility]: [phone]   [facility]: [phone] (daytime) [phone] (after hours, weekends, holidays)      Step 6: Making the environment safer (plan for lethal means safety):   Patient did not identify any lethal methods: Yes     Optional: What is most important to me and worth living for?      [place] Safety Plan. Kaur Pandya and Parth Magallon. Used with permission of the authors.

## 2025-01-21 NOTE — BH TREATMENT PLAN
"Treatment Plan done but not signed at time of office visit due to:  Plan reviewed with the patient during the virtual visit and verbal consent given.    TREATMENT PLAN (Medication Management Only)        [facility]    Name and Date of Birth:  Jael Fabian 59 y.o. 1965  Date of Treatment Plan: January 21, 2025  Diagnosis/Diagnoses:    1. Severe episode of recurrent major depressive disorder, without psychotic features (HCC)    2. CANDICE (generalized anxiety disorder)    3. Insomnia, unspecified type    4. Screening for endocrine, nutritional, metabolic and immunity disorder    5. B12 deficiency      Strengths/Personal Resources for Self-Care: \"supportive family, AA friends, coping skills, meditation and mindfulness, working and access to therapy\".  Area/Areas of need (in own words): \"depression, anxiety and maintaining sobriety\"  1. Long Term Goal: continue improvement in depression, anxiety and maintaining sobriety  Target Date:6 months - 7/21/2025  Person/Persons responsible for completion of goal: Jael  2.  Short Term Objective (s) - How will we reach this goal?:   A. Provider new recommended medication/dosage changes and/or continue medication(s): continue current medications as prescribed.  B. Attend AA meetings regularly.  C. Attend psychotherapy regularly.  Target Date:6 months - 7/21/2025  Person/Persons Responsible for Completion of Goal: Jael  Progress Towards Goals: initiating treatment  Treatment Modality: medication management every 6 months, continue psychotherapy with own therapist, continue AA meetings  Review due 180 days from date of this plan: 6 months - 7/21/2025  Expected length of service: maintenance  My Physician/PA/NP and I have developed this plan together and I agree to work on the goals and objectives. I understand the treatment goals that were developed for my treatment.      "

## 2025-03-10 ENCOUNTER — OFFICE VISIT (OUTPATIENT)
Dept: OTOLARYNGOLOGY | Facility: CLINIC | Age: 60
End: 2025-03-10
Payer: COMMERCIAL

## 2025-03-10 VITALS
WEIGHT: 146 LBS | HEIGHT: 64 IN | BODY MASS INDEX: 24.92 KG/M2 | OXYGEN SATURATION: 100 % | TEMPERATURE: 97.4 F | HEART RATE: 65 BPM

## 2025-03-10 DIAGNOSIS — H90.3 SENSORINEURAL HEARING LOSS (SNHL), BILATERAL: Primary | ICD-10-CM

## 2025-03-10 DIAGNOSIS — H61.23 BILATERAL IMPACTED CERUMEN: ICD-10-CM

## 2025-03-10 PROCEDURE — 69210 REMOVE IMPACTED EAR WAX UNI: CPT | Performed by: NURSE PRACTITIONER

## 2025-03-10 PROCEDURE — 99213 OFFICE O/P EST LOW 20 MIN: CPT | Performed by: NURSE PRACTITIONER

## 2025-03-10 NOTE — PROGRESS NOTES
":  Assessment & Plan  Sensorineural hearing loss (SNHL), bilateral    Orders:    Ambulatory referral to Audiology    Bilateral impacted cerumen    Orders:    Ear cerumen removal      On exam noted bilateral cerumen impaction and unable to fully view tympanic membrane.  Cerumen impaction removed bilateral eac with irrigation and suction, pt tolerated procedure well.  Upon removal, improved hearing and decreased clogged sensation of bilateral ears.  Discussed routine cerumen care including avoidance of q-tips, may use cerumen softeners every one to two months.  Hydrocortisone cream pea sized amount on finger as needed for itching in ears.  Encourage ongoing follow up prn to monitor for cerumen and hearing.      Discussed hearing concerns. Offered audiogram and she is considering.       History of Present Illness     Jael Fabian is a 59 y.o. female   Presents today as a follow up due to ear concerns.  Hearing gradually worsening.  Bilateral ears feel blocked.  Asking others to repeat themselves.  Bilateral (right more than left) ringing tinnitus.  No otalgia or otorrhea.  No history of ear surgery.  No current hearing aids.    Previously seen in our [place] office in 2019        Review of Systems   Constitutional: Negative.    HENT:  Positive for hearing loss and tinnitus. Negative for congestion, ear discharge, ear pain, nosebleeds, postnasal drip, rhinorrhea, sinus pressure, sinus pain, sore throat and voice change.    Respiratory:  Negative for chest tightness and shortness of breath.    Skin:  Negative for color change.   Neurological:  Negative for dizziness, numbness and headaches.   Psychiatric/Behavioral: Negative.       Objective   Pulse 65   Temp (!) 97.4 °F (36.3 °C) (Temporal)   Ht 5' 4\" (1.626 m)   Wt 66.2 kg (146 lb)   SpO2 100%   BMI 25.06 kg/m²      Physical Exam  Vitals and nursing note reviewed.   Constitutional:       General: She is not in acute distress.     Appearance: She is " well-developed.   HENT:      Head: Normocephalic and atraumatic.      Right Ear: Tympanic membrane, ear canal and external ear normal. There is impacted cerumen.      Left Ear: Tympanic membrane, ear canal and external ear normal. There is impacted cerumen.      Nose: Nose normal.      Mouth/Throat:      Mouth: Mucous membranes are moist.   Pulmonary:      Effort: Pulmonary effort is normal. No respiratory distress.   Musculoskeletal:      Cervical back: Neck supple.   Skin:     General: Skin is warm and dry.   Neurological:      Mental Status: She is alert.   Psychiatric:         Mood and Affect: Mood normal.             Ear cerumen removal    Date/Time: 3/10/2025 9:30 AM    Performed by: KYLEE Centeno  Authorized by: KYLEE Centeno  Universal Protocol:  procedure performed by consultantConsent: Verbal consent obtained.  Risks and benefits: risks, benefits and alternatives were discussed  Consent given by: patient  Patient understanding: patient states understanding of the procedure being performed    Patient location:  Clinic  Procedure details:     Local anesthetic:  None    Location:  L ear and R ear    Approach:  External  Post-procedure details:     Complication:  None    Hearing quality:  Normal    Patient tolerance of procedure:  Tolerated well, no immediate complications

## 2025-03-24 ENCOUNTER — TELEMEDICINE (OUTPATIENT)
Dept: PSYCHIATRY | Facility: CLINIC | Age: 60
End: 2025-03-24
Payer: COMMERCIAL

## 2025-03-24 DIAGNOSIS — G47.00 INSOMNIA, UNSPECIFIED TYPE: ICD-10-CM

## 2025-03-24 DIAGNOSIS — F41.1 GAD (GENERALIZED ANXIETY DISORDER): ICD-10-CM

## 2025-03-24 DIAGNOSIS — F33.2 SEVERE EPISODE OF RECURRENT MAJOR DEPRESSIVE DISORDER, WITHOUT PSYCHOTIC FEATURES (HCC): Primary | ICD-10-CM

## 2025-03-24 PROCEDURE — 90833 PSYTX W PT W E/M 30 MIN: CPT | Performed by: STUDENT IN AN ORGANIZED HEALTH CARE EDUCATION/TRAINING PROGRAM

## 2025-03-24 PROCEDURE — 99214 OFFICE O/P EST MOD 30 MIN: CPT | Performed by: STUDENT IN AN ORGANIZED HEALTH CARE EDUCATION/TRAINING PROGRAM

## 2025-03-24 NOTE — PSYCH
MEDICATION MANAGEMENT NOTE    Name: Jael Fabian      : 1965      MRN: [id number]  Encounter Provider: Koko Benz MD  Encounter Date: 3/24/2025   Encounter department: [facility]    Insurance: Payor: [employer] / Plan: [employer] / Product Type: Blue Fee for Service /      Reason for Visit:   Chief Complaint   Patient presents with    Virtual Regular Visit    Medication Management    Depression    Anxiety    Insomnia   :  Assessment & Plan  Severe episode of recurrent major depressive disorder, without psychotic features (HCC)         CANDICE (generalized anxiety disorder)         Insomnia, unspecified type             Treatment Recommendations:  A 59 y.o.  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, w/ PMH of hip arthritis, lumbar radiculopathy, spinal stenosis, s/p gastric bypass, secondary hyperparathyroidism, SNHL and PPH of alcohol use disorder, depression and anxiety, 2 prior psychiatric admission (in 2021 due to depressive symptoms following her 's death and most recently at [facility] from 3/12/2024 until 3/15/2024 after intentional overdose on Ativan and alcohol), one prior SA (intensional overdose on 3/12/24), no h/o self-injurious behavior, currently in therapy ([facility] at [facility]), Pristiq 50 mg po daily, Neurontin 100 mg po TID, Trazodone 100 mg po nightly PRN, Atarax 25 mg po TID PRN, Folic acid, thiamine and MVI who presented to the mental health clinic for the initial intake and psychiatric evaluation on 3/22/24. Presented w/ good mood and improved anxiety sxs. Maintained sober since last admission and has been attending AA meeting regularly. Denied SI/HI, intent or plan upon direct inquiry at this time. CANDICE-7: 4; PHQ-9: 1. Her current presentation meets criteria for MDD, CANDICE and alcohol use disorder in early remission.  Maintained on Pristiq 50 mg daily, Atarax 25 mg TID PRN, Trazodone 100 mg nightly PRN, and Neurontin increased from 100/200 mg to 100/100/200 mg but discontinued after she had BRIGITTE on 4/15/24; doses to be adjusted as indicated. Upon f/u on 6/27/24, the patient reported worsening of depression in the context of brief relapse on alcohol (sober since 6/15 though). Started on Naltrexone 50 mg daily but did not continue due to GI sxs; other meds maintained the same dose. Will continue AA meeting and individual psychotherapy and recommended to use BLT.     - Continue Pristiq 50 mg po daily for depression and anxiety  - Discontinue Naltrexone due to GI sxs and non-compliance  - Discontinue Neurontin as the patient endorsed good control of pain with her current meds following BRIGITTE  - Continue Trazodone 100 mg po nightly PRN for insomnia  - Continue Atarax 25 mg po TID for breakthrough anxiety / panic attacks  - Continue vit D supplements  - Recommended to use BLT for seasonal depression  - Continue AA meetings and individual psychotherapy ([facility] at [facility])  - Educated about healthy life style, risk of falls/sedation and addiction. Patient was receptive to education.  - Medications sent to the patient's pharmacy for 90 day supply    - RTC in 8 weeks  - The patient was educated about 24 hour and weekend coverage for urgent situations accessed by calling [facility] main practice number  - Patient was educated to call National Suicide Prevention Lifeline (5-746-749-VBFZ [6069]) for behavioral crisis at anytime or 881 for any safety concerns, or go to nearest ER if the symptoms become overwhelming or unmanageable.  Educated about diagnosis and treatment modalities. Verbalizes understanding and agreement with the treatment plan.  Discussed self monitoring of symptoms, and symptom monitoring tools.  Discussed medications and if treatment adjustment was needed or desired.  Aware of 24 hour and  "weekend coverage for urgent situations accessed by calling [facility] main practice number  I am scheduling this patient out for greater than 3 months: No    Medications Risks/Benefits:      Risks, Benefits And Possible Side Effects Of Medications:    Risks, benefits, and possible side effects of medications explained to Jael and she (or legal representative) verbalizes understanding and agreement for treatment.    Controlled Medication Discussion:     Not applicable      History of Present Illness     The patient was visited for Virtual Regular Visit, Medication Management, Depression, Anxiety, and Insomnia. Presented calm, and cooperative. Reported feeling ok. She noted that March is a hard time for her (reportedly her late  passed away in March but this year she decided to work, and maintained her connection to , and went out for a dinner with a friend from . She has been feeling \"at home\" in her apartment and \"in peace\" in her apartment. She was happy about weather getting better and noted that she likes walking in Spring. She reported back pain (up to 6/10) at times but reportedly manageable with Massage therapy and taking a Tylenol occasionally. She denied any major issues after the hip surgery. She enjoys working and interacting with co-workers. Denied any changes in sleep, appetite, concentration, energy level, or daily activities.  Denied feelings of anhedonia, hopelessness, helplessness, worthlessness or guilt and appeared to be future oriented.  There was no thought constriction related to death.  Denied SI/HI, intent or plan upon direct inquiry at this time. No intense anxiety sxs, specific phobia or panic attacks reported. Denied AV/H.  Endorsed good compliance with the medications and denied any side effects. Denied smoking cigarettes, drinking alcohol (sober about a year) or other illicit substance use.    Given this presentation, medications are maintained at the same " dosage.  Will continue individual psychotherapy. Will continue attending AA meetings. Pending labs. The patient was educated to call 911 or go to the nearest emergency room if the symptoms become overwhelming or unable to remain in control. Verbalized understanding and agreed to seek help in case of distress or concern for safety.    Review Of Systems: A review of systems is obtained and is negative except for the pertinent positives listed in HPI/Subjective above.      Current Rating Scores:     Current PHQ-9   PHQ-2/9 Depression Screening    Little interest or pleasure in doing things: 0 - not at all  Feeling down, depressed, or hopeless: 1 - several days  Trouble falling or staying asleep, or sleeping too much: 1 - several days  Feeling tired or having little energy: 1 - several days  Poor appetite or overeatin - not at all  Feeling bad about yourself - or that you are a failure or have let yourself or your family down: 0 - not at all  Trouble concentrating on things, such as reading the newspaper or watching television: 0 - not at all  Moving or speaking so slowly that other people could have noticed. Or the opposite - being so fidgety or restless that you have been moving around a lot more than usual: 0 - not at all  Thoughts that you would be better off dead, or of hurting yourself in some way: 0 - not at all  PHQ-9 Score: 3  PHQ-9 Interpretation: No or Minimal depression       Current CANDICE-7     Areas of Improvement: reviewed in HPI/Subjective Section and reviewed in Assessment and Plan Section    Past Psychiatric History: (unchanged information from previous note copied and updated)  - No inpatient psychiatric admission since last encounter  - No SA or SIB since last encounter  - No incidence of violent behavior since last encounter    Traumatic History: (unchanged information from previous note copied and updated)  - No new onset of abuse or traumatic events since last encounter      Past Medical  History:   Diagnosis Date    Abnormal Pap smear of cervix 1987    Anxiety     Depression     Ear problems 1996    Fibrocystic breast     GERD (gastroesophageal reflux disease) 2025    Herpes 1987    High vitamin A level     History of anxiety     History of hypercholesterolemia     History of hypertension     History of obesity     HPV (human papilloma virus) infection 1987    HTN (hypertension) 9/26/2024    Hyperlipidemia     Hypertension 3/24    Lordosis     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    Scoliosis     Secondary hyperparathyroidism, non-renal (HCC)     Spinal stenosis     Tinnitus 1996        Past Surgical History:   Procedure Laterality Date    ABDOMINOPLASTY      BARIATRIC SURGERY  2/13    BREAST CYST ASPIRATION Right     COLONOSCOPY      COLONOSCOPY  11/22/2021    [place] - 5 y f/u     COLONOSCOPY  11/21    COLPOSCOPY  1987    COMBINED AUGMENTATION MAMMAPLASTY AND ABDOMINOPLASTY  2001    EPIDURAL BLOCK INJECTION Bilateral 08/23/2023    Procedure: Left L4-L5   TRANSFORAMINAL epidural steroid injection ( [id number]);  Surgeon: Mateo Colmenares DO;  Location: [facility] MAIN OR;  Service: Pain Management     EPIDURAL BLOCK INJECTION Bilateral 01/24/2024    Procedure: L4-L5 TRANSFORAMINAL EPIDURAL STEROID INJECTION;  Surgeon: Mateo Colmenares DO;  Location: [facility] MAIN OR;  Service: Pain Management     FACIAL COSMETIC SURGERY      FL INJECTION RIGHT HIP (NON ARTHROGRAM)  05/24/2019    GASTRIC BYPASS      VA ARTHROCENTESIS ASPIR&/INJ MAJOR JT/BURSA W/O US Right 01/10/2024    Procedure: RIGHT INTRA-ARTICULAR HIP INJECTION;  Surgeon: Mateo Colmenares DO;  Location: [facility] MAIN OR;  Service: Pain Management     VA ARTHRP ACETBLR/PROX FEM PROSTC AGRFT/ALGRFT Right 04/15/2024    Procedure: ARTHROPLASTY HIP TOTAL ANTERIOR,NAVIGATED - same day;  Surgeon: Javier Reed DO;  Location: [facility] MAIN OR;  Service: Orthopedics    TUBAL LIGATION       Allergies:   Allergies   Allergen Reactions    Other Wheezing      Basiliaer when a teenager.  Wheezing       Current Outpatient Medications   Medication Sig Dispense Refill    amLODIPine (NORVASC) 5 mg tablet Take 1 tablet (5 mg total) by mouth daily 30 tablet 0    Calcium Citrate 1040 MG TABS Take by mouth 3 (three) times a day      desvenlafaxine succinate (PRISTIQ) 50 mg 24 hr tablet Take 1 tablet (50 mg total) by mouth daily Do not start before February 15, 2025. 30 tablet 2    hydrOXYzine HCL (ATARAX) 25 mg tablet Take 1 tablet (25 mg total) by mouth 3 (three) times a day as needed for anxiety (anxiety) 90 tablet 2    minoxidil (LONITEN) 2.5 mg tablet       Multiple Vitamins-Minerals (BARIATRIC MULTIVITAMINS/IRON PO) Take by mouth daily      traZODone (DESYREL) 100 mg tablet Take 1 tablet (100 mg total) by mouth daily at bedtime as needed for sleep Do not start before April 12, 2024. 30 tablet 0     No current facility-administered medications for this visit.       Substance Abuse History:    Social History     Substance and Sexual Activity   Alcohol Use Not Currently     Social History     Substance and Sexual Activity   Drug Use Never       Social History:    Social History     Socioeconomic History    Marital status:      Spouse name: Not on file    Number of children: Not on file    Years of education: Not on file    Highest education level: Not on file   Occupational History    Not on file   Tobacco Use    Smoking status: Never    Smokeless tobacco: Never   Vaping Use    Vaping status: Never Used   Substance and Sexual Activity    Alcohol use: Not Currently    Drug use: Never    Sexual activity: Not Currently     Partners: Male     Birth control/protection: Surgical, Female Sterilization   Other Topics Concern    Not on file   Social History Narrative    Not on file     Social Drivers of Health     Financial Resource Strain: Low Risk  (3/13/2024)    Overall Financial Resource Strain (CARDIA)     Difficulty of Paying Living Expenses: Not hard at all   Food  Insecurity: No Food Insecurity (3/13/2024)    Nursing - Inadequate Food Risk Classification     Worried About Running Out of Food in the Last Year: Never true     Ran Out of Food in the Last Year: Never true     Ran Out of Food in the Last Year: Not on file   Transportation Needs: No Transportation Needs (3/13/2024)    PRAPARE - Transportation     Lack of Transportation (Medical): No     Lack of Transportation (Non-Medical): No   Physical Activity: Not on file   Stress: Not on file   Social Connections: Not on file   Intimate Partner Violence: Not At Risk (3/13/2024)    Humiliation, Afraid, Rape, and Kick questionnaire     Fear of Current or Ex-Partner: No     Emotionally Abused: No     Physically Abused: No     Sexually Abused: No   Housing Stability: Low Risk  (3/13/2024)    Housing Stability Vital Sign     Unable to Pay for Housing in the Last Year: No     Number of Times Moved in the Last Year: 1     Homeless in the Last Year: No       Family Psychiatric History:     Family History   Problem Relation Age of Onset    Hypertension Mother     Heart disease Mother     Arthritis Mother     Depression Mother     Hypertension Father     Heart disease Father     No Known Problems Sister     No Known Problems Brother     No Known Problems Maternal Aunt     No Known Problems Maternal Uncle     No Known Problems Paternal Aunt     No Known Problems Paternal Uncle     No Known Problems Maternal Grandmother     No Known Problems Maternal Grandfather     No Known Problems Paternal Grandmother     No Known Problems Paternal Grandfather     ADD / ADHD Neg Hx     Anesthesia problems Neg Hx     Cancer Neg Hx     Clotting disorder Neg Hx     Collagen disease Neg Hx     Diabetes Neg Hx     Dislocations Neg Hx     Learning disabilities Neg Hx     Neurological problems Neg Hx     Osteoporosis Neg Hx     Rheumatologic disease Neg Hx     Scoliosis Neg Hx     Vascular Disease Neg Hx        Medical History Reviewed by provider this  encounter:  Tobacco  Allergies  Meds  Problems  Med Hx  Surg Hx  Fam Hx          Objective   There were no vitals taken for this visit.     Mental Status Evaluation:  Appearance and attitude: appeared as stated age, cooperative and attentive, casually dressed, with good hygiene  Eye contact: good  Motor Function: within normal limits, No PMA/PMR  Gait/station: Not observed  Speech: normal for rate, rhythm, volume, latency, amount  Language: No overt abnormality  Mood/affect: euthymic / Affect was euthymic, reactive, in full range, normal intensity and mood congruent  Thought Processes: sequential and goal-directed  Thought content: denied suicidal ideations or homicidal ideations, no overt delusions elicited  Associations: intact associations  Perceptual disturbances: denies Auditory/Visual/Tactile Hallucinations  Orientation: oriented to time, person, place and to the situational context  Cognitive Function: intact  Memory: recent and remote memory grossly intact  Intellect: average  Fund of knowledge: aware of current events, aware of past history, and vocabulary average  Impulse control: good  Insight/judgment: fair/good  Pain : reported having pain in lower back  Pain Scale : 6 at times      Laboratory Results: I have personally reviewed all pertinent laboratory/tests results    Pending labs to be checked before next appointment    Suicide/Homicide Risk Assessment:    Risk of Harm to Self:  The following ratings are based on assessment at the time of the interview  Demographic Risk Factors include: ,  status, age: over 50 or older  Historical Risk Factors include: history of depression, history of anxiety, history of suicide attempt, alcohol use  Current Specific Risk Factors include: diagnosis of depression, social isolation  Protective Factors: no current suicidal ideation, access to mental health treatment, compliant with medications  Weapons/Firearms: none. The following steps have  been taken to ensure weapons are properly secured: not applicable  Based on today's assessment, Jael presents the following risk of harm to self: chronically elevated; low at this time - consented for safety    Risk of Harm to Others:  The following ratings are based on assessment at the time of the interview  Based on today's assessment, Jael presents the following risk of harm to others: low    The following interventions are recommended: Continue medication management. Continue psychotherapy. Continue AA meetings.    Psychotherapy Provided:     Individual psychotherapy provided: Yes    Counseling was provided during the session today for 16 minutes.  Psychoeducation provided to the patient and was educated about the importance of compliance with the medications and psychiatric treatment  Solution Focused Brief Therapy (SFBT) provided  Patient's emotions were validated and specific labeled praise provided.   Nixon suggestions were offered in a supportive non-critical way.   Cognitive Behavioral Therapy and supportive expressive interventions    Treatment Plan:    Completed and signed during the session: Not applicable - Treatment Plan to be completed by [facility] therapist    Goals: Progress towards Treatment Plan goals - Yes, progressing, as evidenced by subjective findings in HPI/Subjective Section and in Assessment and Plan Section    Depression Follow-up Plan Completed: Yes    Note Share:    This note was not shared with the patient due to patient requested    Administrative Statements   Administrative Statements   Encounter provider Koko Benz MD    The Patient is located at Home and in the following state in which I hold an active license [place].    The patient was identified by name and date of birth. Jael Fabian was informed that this is a telemedicine visit and that the visit is being conducted through the Epic Embedded platform. She agrees to proceed..  My office door was  closed. No one else was in the room.  She acknowledged consent and understanding of privacy and security of the video platform. The patient has agreed to participate and understands they can discontinue the visit at any time.    I have spent a total time of 25 minutes in caring for this patient on the day of the visit/encounter including Risks and benefits of tx options, Risk factor reductions, Counseling / Coordination of care, and Obtaining or reviewing history  , not including the time spent for establishing the audio/video connection.    Visit Time  Visit Start Time: 2:41 PM  Visit Stop Time: 3:05 PM  Total Visit Duration:  25 minutes    Koko Benz MD 03/24/25    This note was completed in part utilizing Dragon dictation Software. Grammatical, translation, syntax errors, random word insertions, spelling mistakes, and incomplete sentences may be an occasional consequence of this system secondary to software limitations with voice recognition, ambient noise, and hardware issues. If you have any questions or concerns about the content, text, or information contained within the body of this dictation, please contact the provider for clarification.

## 2025-03-26 ENCOUNTER — APPOINTMENT (OUTPATIENT)
Dept: RADIOLOGY | Facility: CLINIC | Age: 60
End: 2025-03-26
Payer: COMMERCIAL

## 2025-03-26 ENCOUNTER — OFFICE VISIT (OUTPATIENT)
Dept: OBGYN CLINIC | Facility: CLINIC | Age: 60
End: 2025-03-26
Payer: COMMERCIAL

## 2025-03-26 VITALS — WEIGHT: 142 LBS | HEIGHT: 64 IN | BODY MASS INDEX: 24.24 KG/M2

## 2025-03-26 DIAGNOSIS — Z47.1 AFTERCARE FOLLOWING RIGHT HIP JOINT REPLACEMENT SURGERY: ICD-10-CM

## 2025-03-26 DIAGNOSIS — Z96.641 AFTERCARE FOLLOWING RIGHT HIP JOINT REPLACEMENT SURGERY: ICD-10-CM

## 2025-03-26 DIAGNOSIS — Z96.641 STATUS POST TOTAL REPLACEMENT OF RIGHT HIP: ICD-10-CM

## 2025-03-26 DIAGNOSIS — Z96.641 STATUS POST TOTAL REPLACEMENT OF RIGHT HIP: Primary | ICD-10-CM

## 2025-03-26 PROCEDURE — 99214 OFFICE O/P EST MOD 30 MIN: CPT | Performed by: ORTHOPAEDIC SURGERY

## 2025-03-26 PROCEDURE — 73502 X-RAY EXAM HIP UNI 2-3 VIEWS: CPT

## 2025-03-26 NOTE — ASSESSMENT & PLAN NOTE
Doing very well  Requires ABX prior to dental visits  Activity as tolerated  F/u PRN  Orders:    XR hip/pelv 2-3 vws right if performed; Future

## 2025-03-26 NOTE — PROGRESS NOTES
Name: Jael Fabian      : 1965      MRN: [id number]  Encounter Provider: Javier Reed DO  Encounter Date: 3/26/2025   Encounter department: [facility]  :  Assessment & Plan  Status post total replacement of right hip  Doing very well  Requires ABX prior to dental visits  Activity as tolerated  F/u PRN  Orders:    XR hip/pelv 2-3 vws right if performed; Future    Aftercare following right hip joint replacement surgery    Orders:    XR hip/pelv 2-3 vws right if performed; Future         Jael Fabian is a pleasant 59 y.o. female who presents today 1 year status post right total hip arthroplasty performed 4/15/2024.  I am very pleased with her clinical presentation and radiographic imaging obtained today.  Her right hip x-ray obtained today demonstrates stable prosthesis with no evidence of lucency or failure. She may continue to work and recreational activities as tolerated.  She understands that she requires prophylactic antibiotics prior to any dental visits and will call the office for this prescription as needed.  We will see her back on an as-needed basis or if any symptoms arise.     I have personally reviewed pertinent imaging in PACS.  X-ray of the right hip obtained today, 3/26/2025, reviewed demonstrating stable right hip prosthesis with no evidence of lucency or failure.  No evidence of acute osseous abnormality.    History: Jael Fabian is a 59 y.o. female who presents today for evaluation 1 year status post right total hip arthroplasty performed 4/15/2024  She is very pleased with her outcome at this time.  She denies pain,  mechanical symptoms, weakness, limitation with mobility or activity participation. She does not require assistive device for ambulation. She continues with home exercise. She does note occasional anterior/distal thigh pain at times that is resolved with a foam roller.       Estimated body mass index is 24.37 kg/m² as calculated from the  "following:    Height as of this encounter: 5' 4\" (1.626 m).    Weight as of this encounter: 64.4 kg (142 lb).    Lab Results   Component Value Date    HGBA1C 5.3 03/22/2024       Social History     Occupational History    Not on file   Tobacco Use    Smoking status: Never    Smokeless tobacco: Never   Vaping Use    Vaping status: Never Used   Substance and Sexual Activity    Alcohol use: Not Currently    Drug use: Never    Sexual activity: Not Currently     Partners: Male     Birth control/protection: Surgical, Female Sterilization       Objective:  Right Hip Exam     Tenderness   The patient is experiencing no tenderness.     Range of Motion   Abduction:  50   Adduction:  30   Flexion:  130   External rotation:  40   Internal rotation:  30     Muscle Strength   Abduction: 5/5   Adduction: 5/5   Flexion: 5/5     Tests   LAZARO: negative    Other   Erythema: absent  Sensation: normal  Pulse: present    Comments:    Well healed incision  2+ TP and DP pulses with brisk capillary refill to the toes  Sural, saphenous, tibial, superficial and deep peroneal motor and sensory distribution intact  Sensation to light touch               There were no vitals filed for this visit.    Subjective:  Past Medical History:   Diagnosis Date    Abnormal Pap smear of cervix 1987    Anxiety     Depression     Ear problems 1996    Fibrocystic breast     GERD (gastroesophageal reflux disease) 2025    Herpes 1987    High vitamin A level     History of anxiety     History of hypercholesterolemia     History of hypertension     History of obesity     HPV (human papilloma virus) infection 1987    HTN (hypertension) 9/26/2024    Hyperlipidemia     Hypertension 3/24    Lordosis     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    Scoliosis     Secondary hyperparathyroidism, non-renal (HCC)     Spinal stenosis     Tinnitus 1996       Past Surgical History:   Procedure Laterality Date    ABDOMINOPLASTY      BARIATRIC SURGERY  2/13    BREAST CYST " ASPIRATION Right     COLONOSCOPY      COLONOSCOPY  11/22/2021    Zach - 5 y f/u     COLONOSCOPY  11/21    COLPOSCOPY  1987    COMBINED AUGMENTATION MAMMAPLASTY AND ABDOMINOPLASTY  2001    EPIDURAL BLOCK INJECTION Bilateral 08/23/2023    Procedure: Left L4-L5   TRANSFORAMINAL epidural steroid injection ( [id number]);  Surgeon: Mateo Colmenares DO;  Location: [facility] MAIN OR;  Service: Pain Management     EPIDURAL BLOCK INJECTION Bilateral 01/24/2024    Procedure: L4-L5 TRANSFORAMINAL EPIDURAL STEROID INJECTION;  Surgeon: Mateo Colmenares DO;  Location: [facility] MAIN OR;  Service: Pain Management     FACIAL COSMETIC SURGERY      FL INJECTION RIGHT HIP (NON ARTHROGRAM)  05/24/2019    GASTRIC BYPASS      IA ARTHROCENTESIS ASPIR&/INJ MAJOR JT/BURSA W/O US Right 01/10/2024    Procedure: RIGHT INTRA-ARTICULAR HIP INJECTION;  Surgeon: Mateo Colmenares DO;  Location: [facility] MAIN OR;  Service: Pain Management     IA ARTHRP ACETBLR/PROX FEM PROSTC AGRFT/ALGRFT Right 04/15/2024    Procedure: ARTHROPLASTY HIP TOTAL ANTERIOR,NAVIGATED - same day;  Surgeon: Javier Reed DO;  Location: [facility] MAIN OR;  Service: Orthopedics    TUBAL LIGATION         Family History   Problem Relation Age of Onset    Hypertension Mother     Heart disease Mother     Arthritis Mother     Depression Mother     Hypertension Father     Heart disease Father     No Known Problems Sister     No Known Problems Brother     No Known Problems Maternal Aunt     No Known Problems Maternal Uncle     No Known Problems Paternal Aunt     No Known Problems Paternal Uncle     No Known Problems Maternal Grandmother     No Known Problems Maternal Grandfather     No Known Problems Paternal Grandmother     No Known Problems Paternal Grandfather     ADD / ADHD Neg Hx     Anesthesia problems Neg Hx     Cancer Neg Hx     Clotting disorder Neg Hx     Collagen disease Neg Hx     Diabetes Neg Hx     Dislocations Neg Hx     Learning disabilities Neg Hx     Neurological  problems Neg Hx     Osteoporosis Neg Hx     Rheumatologic disease Neg Hx     Scoliosis Neg Hx     Vascular Disease Neg Hx          Current Outpatient Medications:     Calcium Citrate 1040 MG TABS, Take by mouth 3 (three) times a day, Disp: , Rfl:     desvenlafaxine succinate (PRISTIQ) 50 mg 24 hr tablet, Take 1 tablet (50 mg total) by mouth daily Do not start before February 15, 2025., Disp: 30 tablet, Rfl: 2    minoxidil (LONITEN) 2.5 mg tablet, , Disp: , Rfl:     Multiple Vitamins-Minerals (BARIATRIC MULTIVITAMINS/IRON PO), Take by mouth daily, Disp: , Rfl:     amLODIPine (NORVASC) 5 mg tablet, Take 1 tablet (5 mg total) by mouth daily, Disp: 30 tablet, Rfl: 0    hydrOXYzine HCL (ATARAX) 25 mg tablet, Take 1 tablet (25 mg total) by mouth 3 (three) times a day as needed for anxiety (anxiety), Disp: 90 tablet, Rfl: 2    traZODone (DESYREL) 100 mg tablet, Take 1 tablet (100 mg total) by mouth daily at bedtime as needed for sleep Do not start before April 12, 2024., Disp: 30 tablet, Rfl: 0    Allergies   Allergen Reactions    Other Wheezing     Lobster when a teenager.  Wheezing       Review of Systems   Constitutional:  Positive for activity change. Negative for chills, fever and unexpected weight change.   HENT:  Negative for hearing loss, nosebleeds and sore throat.    Eyes:  Negative for pain, redness and visual disturbance.   Respiratory:  Negative for cough, shortness of breath and wheezing.    Cardiovascular:  Negative for chest pain, palpitations and leg swelling.   Gastrointestinal:  Negative for abdominal pain, nausea and vomiting.   Endocrine: Negative for polydipsia and polyuria.   Genitourinary:  Negative for dysuria and hematuria.   Musculoskeletal:  See HPI  Skin:  Negative for rash and wound.   Neurological:  Negative for dizziness, numbness and headaches.   Psychiatric/Behavioral:  Negative for decreased concentration and suicidal ideas. The patient is not nervous/anxious.      Physical Exam  Vitals  and nursing note reviewed.   Constitutional:       Appearance: Normal appearance. She is well-developed.   HENT:      Head: Normocephalic and atraumatic.      Right Ear: External ear normal.      Left Ear: External ear normal.   Eyes:      General: No scleral icterus.     Extraocular Movements: Extraocular movements intact.      Conjunctiva/sclera: Conjunctivae normal.   Cardiovascular:      Rate and Rhythm: Normal rate.   Pulmonary:      Effort: Pulmonary effort is normal. No respiratory distress.   Musculoskeletal:      Cervical back: Normal range of motion and neck supple.      Comments: See Ortho exam   Skin:     General: Skin is warm and dry.   Neurological:      General: No focal deficit present.      Mental Status: She is alert and oriented to person, place, and time.   Psychiatric:         Behavior: Behavior normal.     Scribe Attestation      I,:  Lori Parsons am acting as a scribe while in the presence of the attending physician.:       I,:  Javier Reed,  personally performed the services described in this documentation    as scribed in my presence.:           This document was created using speech voice recognition software.   Grammatical errors, random word insertions, pronoun errors, and incomplete sentences are an occasional consequence of this system due to software limitations, ambient noise, and hardware issues.   Any formal questions or concerns about content, text, or information contained within the body of this dictation should be directly addressed to the provider for clarification.

## 2025-04-16 ENCOUNTER — TELEPHONE (OUTPATIENT)
Age: 60
End: 2025-04-16

## 2025-04-16 NOTE — TELEPHONE ENCOUNTER
Patient called in requesting to speak with their provider.    Writer stated a message would be sent to their provider making them aware of the request for the call back.    Writer was given no more detail.    Writer stated the patient could speak with the nurses in the mean time, patient agreed.    Writer was able to successfully transfer the patient to the nurses line for further assistance.

## 2025-04-16 NOTE — TELEPHONE ENCOUNTER
Jael called back after seeing my missed call.  Informed her that due to her relapse, provider would be unable to continue follow up with her and recommends that she follow up with dual diagnosis.  Jael reports that she wasn't that bad.  States that she relapsed for about 2 weeks and has been sober again for 2 weeks.  She does not need detox because she has been sober and states that she was not that bad.  States she does not need a referral to case management and will go back to her PCP and he will order her medication.  Noting further needed at this time.     Will refer to Dr Benz for review.

## 2025-04-16 NOTE — TELEPHONE ENCOUNTER
"Jael called the office reporting that she has not really been in a good place since October.  States that she has been battling alcoholism and broke down and drank.  Reports recently losing a friend and now she has nobody.  Claims she has been feeling very reactive and her anxiety is \"over my head\".  She confirmed safely, no SI/HI.  She is requesting either an increase in her Pristiq or something added to her Pristiq.  Her next follow up appointment is on 5/19.  She is not available to talk until after 3:30 but it is ok to LM on her VM.    Will refer to Dr Benz for review.  "

## 2025-04-16 NOTE — TELEPHONE ENCOUNTER
Called Jael back and informed her that I wanted to clarify that provider was still going to keep her appointment on 5/19.  Jael was under the impression that he was not going to see her anymore.  She made an appointment with her PCP for 4/29 to see if he would be able to manage her medications.  She was hoping to have her Pristiq increased to help with her anxiety.  If her PCP is willing to manage her medications, she may call and cancel her appointment.

## 2025-05-02 ENCOUNTER — HOSPITAL ENCOUNTER (OUTPATIENT)
Dept: RADIOLOGY | Facility: HOSPITAL | Age: 60
Discharge: HOME | End: 2025-05-02
Attending: STUDENT IN AN ORGANIZED HEALTH CARE EDUCATION/TRAINING PROGRAM
Payer: COMMERCIAL

## 2025-05-02 DIAGNOSIS — G56.21 CUBITAL TUNNEL SYNDROME ON RIGHT: ICD-10-CM

## 2025-05-02 DIAGNOSIS — G56.21 CUBITAL TUNNEL SYNDROME ON RIGHT: Primary | ICD-10-CM

## 2025-05-02 PROCEDURE — 76882 US LMTD JT/FCL EVL NVASC XTR: CPT

## 2025-07-15 ENCOUNTER — HOSPITAL ENCOUNTER (OUTPATIENT)
Dept: RADIOLOGY | Facility: HOSPITAL | Age: 60
Discharge: HOME | End: 2025-07-15
Attending: OBSTETRICS & GYNECOLOGY
Payer: COMMERCIAL

## 2025-07-15 VITALS — WEIGHT: 142 LBS | HEIGHT: 64 IN | BODY MASS INDEX: 24.24 KG/M2

## 2025-07-15 DIAGNOSIS — Z98.84 H/O GASTRIC BYPASS: ICD-10-CM

## 2025-07-15 DIAGNOSIS — Z78.0 ASYMPTOMATIC MENOPAUSAL STATE: ICD-10-CM

## 2025-07-15 PROCEDURE — 77080 DXA BONE DENSITY AXIAL: CPT

## (undated) DEVICE — ARTHROSCOPY FLOOR MAT

## (undated) DEVICE — DRESSING MEPILEX AG BORDER POST-OP 4 X 8 IN

## (undated) DEVICE — GLOVE SRG BIOGEL 8

## (undated) DEVICE — SYRINGE 5ML LL

## (undated) DEVICE — SYRINGE 3ML LL

## (undated) DEVICE — OSCILLATING TIP SAW CARTRIDGE: Brand: PRECISION FALCON

## (undated) DEVICE — WEBRIL 6 IN UNSTERILE

## (undated) DEVICE — HOOD: Brand: FLYTE, SURGICOOL

## (undated) DEVICE — NEEDLE SPINAL 22G X 3.5 IN PLST HUB

## (undated) DEVICE — 450 ML BOTTLE OF 0.05% CHLORHEXIDINE GLUCONATE IN 99.95% STERILE WATER FOR IRRIGATION, USP AND APPLICATOR.: Brand: IRRISEPT ANTIMICROBIAL WOUND LAVAGE

## (undated) DEVICE — BIPOLAR SEALER 23-113-1 AQM 2.3: Brand: AQUAMANTYS™

## (undated) DEVICE — ASTOUND SURGICAL GOWN, XXX LARGE, X-LONG: Brand: CONVERTORS

## (undated) DEVICE — TUBE MINI KAMVAC SUCTION 7310 7 LATEX FREE

## (undated) DEVICE — TIBURON SPLIT SHEET: Brand: CONVERTORS

## (undated) DEVICE — TOWEL SET X-RAY

## (undated) DEVICE — SKIN MARKER DUAL TIP WITH RULER CAP, FLEXIBLE RULER AND LABELS: Brand: DEVON

## (undated) DEVICE — INTENDED FOR TISSUE SEPARATION, AND OTHER PROCEDURES THAT REQUIRE A SHARP SURGICAL BLADE TO PUNCTURE OR CUT.: Brand: BARD-PARKER ® CARBON RIB-BACK BLADES

## (undated) DEVICE — SUT STRATFIX SPIRAL PDS PLUS 1 CT-1/CT-1 12IN SXPP2B403

## (undated) DEVICE — 3/8 INCH SMOKE TUBING

## (undated) DEVICE — CHLORAPREP APPLICATOR TINTED 10.5ML ONE-STEP

## (undated) DEVICE — FOOT SWITCH DRAPE: Brand: UNBRANDED

## (undated) DEVICE — CAPIT HIP COP -CMNT/POR-ACTIS

## (undated) DEVICE — RADIOLOGY STERILE LABELS: Brand: CENTURION

## (undated) DEVICE — INSTRUMENT POUCH: Brand: CONVERTORS

## (undated) DEVICE — TRAY PAIN SUPPORT

## (undated) DEVICE — PLASTIC ADHESIVE BANDAGE: Brand: CURITY

## (undated) DEVICE — PAD CAST 4 IN COTTON NON STERILE

## (undated) DEVICE — SYRINGE 10ML LL

## (undated) DEVICE — SUT ETHIBOND 2 V-37 30 IN MX69G

## (undated) DEVICE — SUT VICRYL 0 CP-1 27 IN J267H

## (undated) DEVICE — VEST SURGEON DISPOSABLE

## (undated) DEVICE — STERILE DOUBLE BASIN SET PACK: Brand: CARDINAL HEALTH

## (undated) DEVICE — DRAPE SHEET X-LG

## (undated) DEVICE — GLOVE INDICATOR PI UNDERGLOVE SZ 8.5 BLUE

## (undated) DEVICE — WIPES BABY PAMPERS SENSITIVE 36/PK

## (undated) DEVICE — ANTIBACTERIAL UNDYED BRAIDED (POLYGLACTIN 910), SYNTHETIC ABSORBABLE SUTURE: Brand: COATED VICRYL

## (undated) DEVICE — GLOVE SRG BIOGEL 8.5

## (undated) DEVICE — 3M™ IOBAN™ 2 ANTIMICROBIAL INCISE DRAPE 6650EZ: Brand: IOBAN™ 2

## (undated) DEVICE — PINNACLE HIP SOLUTIONS ALTRX POLYETHYLENE ACETABULAR LINER NEUTRAL 36MM ID 52MM OD
Type: IMPLANTABLE DEVICE | Site: HIP | Status: NON-FUNCTIONAL
Brand: PINNACLE ALTRX

## (undated) DEVICE — GLOVE INDICATOR PI UNDERGLOVE SZ 7.5 BLUE

## (undated) DEVICE — GLOVE SRG BIOGEL 7.5

## (undated) DEVICE — Device: Brand: PORTEX

## (undated) DEVICE — 3M™ STERI-DRAPE™ U-DRAPE 1015: Brand: STERI-DRAPE™

## (undated) DEVICE — HANDPIECE SET WITH HIGH FLOW TIP AND SUCTION TUBE: Brand: INTERPULSE

## (undated) DEVICE — IV SET EXT SM BORE CARESITE 8IN

## (undated) DEVICE — NEPTUNE E-SEP SMOKE EVACUATION PENCIL, COATED, 70MM BLADE, PUSH BUTTON SWITCH: Brand: NEPTUNE E-SEP

## (undated) DEVICE — GLOVE SRG LF STRL BGL SKNSNS 7.5 PF

## (undated) DEVICE — ADHESIVE SKIN HIGH VISCOSITY EXOFIN 1ML

## (undated) DEVICE — POSITIONER HANA TABLE PACK

## (undated) DEVICE — DRAPE C-ARM X-RAY

## (undated) DEVICE — PACK MAJOR ORTHO W/SPLITS PBDS

## (undated) DEVICE — ELECTRODE BLADE E-Z CLEAN 6.5IN -0014

## (undated) DEVICE — SUT STRATAFIX SPIRAL 3-0 PGA/PCL 30 X 30 CM SXMD2B410

## (undated) DEVICE — CHLORAPREP HI-LITE 26ML ORANGE

## (undated) DEVICE — DRESSING MEPILEX AG BORDER 4 X 8 IN